# Patient Record
Sex: FEMALE | Race: WHITE | URBAN - METROPOLITAN AREA
[De-identification: names, ages, dates, MRNs, and addresses within clinical notes are randomized per-mention and may not be internally consistent; named-entity substitution may affect disease eponyms.]

---

## 2017-09-24 ENCOUNTER — EMERGENCY (EMERGENCY)
Facility: HOSPITAL | Age: 82
LOS: 0 days | Discharge: HOME | End: 2017-09-24
Attending: EMERGENCY MEDICINE | Admitting: EMERGENCY MEDICINE
Payer: MEDICARE

## 2017-09-24 VITALS
OXYGEN SATURATION: 99 % | TEMPERATURE: 98 F | RESPIRATION RATE: 16 BRPM | HEART RATE: 74 BPM | SYSTOLIC BLOOD PRESSURE: 174 MMHG | DIASTOLIC BLOOD PRESSURE: 87 MMHG

## 2017-09-24 VITALS — WEIGHT: 106.04 LBS | HEIGHT: 61 IN

## 2017-09-24 DIAGNOSIS — Z91.81 HISTORY OF FALLING: ICD-10-CM

## 2017-09-24 DIAGNOSIS — M54.9 DORSALGIA, UNSPECIFIED: ICD-10-CM

## 2017-09-24 DIAGNOSIS — S70.01XA CONTUSION OF RIGHT HIP, INITIAL ENCOUNTER: ICD-10-CM
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
## 2017-09-24 DIAGNOSIS — S20.211A CONTUSION OF RIGHT FRONT WALL OF THORAX, INITIAL ENCOUNTER: ICD-10-CM

## 2017-09-24 DIAGNOSIS — Z96.641 PRESENCE OF RIGHT ARTIFICIAL HIP JOINT: ICD-10-CM
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
## 2017-09-24 PROCEDURE — 73502 X-RAY EXAM HIP UNI 2-3 VIEWS: CPT | Mod: 26

## 2017-09-24 PROCEDURE — 71250 CT THORAX DX C-: CPT | Mod: 26

## 2017-09-24 PROCEDURE — 99285 EMERGENCY DEPT VISIT HI MDM: CPT

## 2017-09-24 PROCEDURE — 71010: CPT | Mod: 26

## 2017-09-24 NOTE — ED STATDOCS - OBJECTIVE STATEMENT
89 y/o female with no significant PMHx presents to the ED for worsening pain s/p fall three days ago. Pt is c/o right sided hip pain, back pain, lower CP. Pt is using a walker to ambulate. Denies dyspnea, head trauma, LOC, MULLINS. Dr. Manley/PMD.

## 2017-09-24 NOTE — ED STATDOCS - ATTENDING CONTRIBUTION TO CARE
I, Urban Hatch MD,  performed the initial face to face bedside interview with this patient regarding history of present illness, review of symptoms and relevant past medical, social and family history.  I completed an independent physical examination.  I was the initial provider who evaluated this patient. I have signed out the follow up of any pending tests (i.e. labs, radiological studies) to the ACP.  I have communicated the patient’s plan of care and disposition with the ACP.  The history, relevant review of systems, past medical and surgical history, medical decision making, and physical examination was documented by the scribe in my presence and I attest to the accuracy of the documentation.    I, Urban Hatch MD, personally saw the patient with ACP.  I have personally performed a face to face diagnostic evaluation on this patient.  I have reviewed the ACP note and agree with the history, exam, and plan of care, except as noted.

## 2017-09-24 NOTE — ED STATDOCS - CARE PLAN
Principal Discharge DX:	Chest wall contusion  Secondary Diagnosis:	Contusion of hip  Secondary Diagnosis:	Fall

## 2017-09-24 NOTE — ED STATDOCS - PROGRESS NOTE DETAILS
89 yr. old female PMH: Glaucoma, MD, Right hip Replacement presents to ED with pain in right side of back and right hip after fall 3 days ago worsening pain no head injury or LOC. . Uses walker for ambulation after fall.  No SOB, No dyspnea. Seen and examined by attending in intake. Plan: X-Ray and CT Chest. Will f/U with results and re evaluate. Chris NP

## 2017-10-15 ENCOUNTER — EMERGENCY (EMERGENCY)
Facility: HOSPITAL | Age: 82
LOS: 0 days | Discharge: HOME | End: 2017-10-15
Attending: EMERGENCY MEDICINE | Admitting: EMERGENCY MEDICINE
Payer: MEDICARE

## 2017-10-15 VITALS
DIASTOLIC BLOOD PRESSURE: 60 MMHG | OXYGEN SATURATION: 100 % | SYSTOLIC BLOOD PRESSURE: 148 MMHG | HEART RATE: 72 BPM | RESPIRATION RATE: 18 BRPM

## 2017-10-15 VITALS — WEIGHT: 119.93 LBS

## 2017-10-15 DIAGNOSIS — H40.9 UNSPECIFIED GLAUCOMA: ICD-10-CM

## 2017-10-15 DIAGNOSIS — K40.90 UNILATERAL INGUINAL HERNIA, WITHOUT OBSTRUCTION OR GANGRENE, NOT SPECIFIED AS RECURRENT: ICD-10-CM

## 2017-10-15 DIAGNOSIS — E78.5 HYPERLIPIDEMIA, UNSPECIFIED: ICD-10-CM

## 2017-10-15 DIAGNOSIS — Z96.649 PRESENCE OF UNSPECIFIED ARTIFICIAL HIP JOINT: ICD-10-CM

## 2017-10-15 DIAGNOSIS — R10.2 PELVIC AND PERINEAL PAIN: ICD-10-CM

## 2017-10-15 LAB
ALBUMIN SERPL ELPH-MCNC: 3.5 G/DL — SIGNIFICANT CHANGE UP (ref 3.3–5)
ALP SERPL-CCNC: 90 U/L — SIGNIFICANT CHANGE UP (ref 40–120)
ALT FLD-CCNC: 18 U/L — SIGNIFICANT CHANGE UP (ref 12–78)
ANION GAP SERPL CALC-SCNC: 3 MMOL/L — LOW (ref 5–17)
APTT BLD: 31.5 SEC — SIGNIFICANT CHANGE UP (ref 27.5–37.4)
AST SERPL-CCNC: 11 U/L — LOW (ref 15–37)
BASOPHILS # BLD AUTO: 0 K/UL — SIGNIFICANT CHANGE UP (ref 0–0.2)
BASOPHILS NFR BLD AUTO: 0.7 % — SIGNIFICANT CHANGE UP (ref 0–2)
BILIRUB SERPL-MCNC: 0.3 MG/DL — SIGNIFICANT CHANGE UP (ref 0.2–1.2)
BUN SERPL-MCNC: 20 MG/DL — SIGNIFICANT CHANGE UP (ref 7–23)
CALCIUM SERPL-MCNC: 8.8 MG/DL — SIGNIFICANT CHANGE UP (ref 8.5–10.1)
CHLORIDE SERPL-SCNC: 107 MMOL/L — SIGNIFICANT CHANGE UP (ref 96–108)
CO2 SERPL-SCNC: 32 MMOL/L — HIGH (ref 22–31)
CREAT SERPL-MCNC: 0.74 MG/DL — SIGNIFICANT CHANGE UP (ref 0.5–1.3)
EOSINOPHIL # BLD AUTO: 0.1 K/UL — SIGNIFICANT CHANGE UP (ref 0–0.5)
EOSINOPHIL NFR BLD AUTO: 0.9 % — SIGNIFICANT CHANGE UP (ref 0–6)
GLUCOSE SERPL-MCNC: 86 MG/DL — SIGNIFICANT CHANGE UP (ref 70–99)
HCT VFR BLD CALC: 37.9 % — SIGNIFICANT CHANGE UP (ref 34.5–45)
HGB BLD-MCNC: 12.5 G/DL — SIGNIFICANT CHANGE UP (ref 11.5–15.5)
INR BLD: 1.06 RATIO — SIGNIFICANT CHANGE UP (ref 0.88–1.16)
LACTATE SERPL-SCNC: 1 MMOL/L — SIGNIFICANT CHANGE UP (ref 0.7–2)
LYMPHOCYTES # BLD AUTO: 1.2 K/UL — SIGNIFICANT CHANGE UP (ref 1–3.3)
LYMPHOCYTES # BLD AUTO: 18.9 % — SIGNIFICANT CHANGE UP (ref 13–44)
MCHC RBC-ENTMCNC: 29.3 PG — SIGNIFICANT CHANGE UP (ref 27–34)
MCHC RBC-ENTMCNC: 33 GM/DL — SIGNIFICANT CHANGE UP (ref 32–36)
MCV RBC AUTO: 88.9 FL — SIGNIFICANT CHANGE UP (ref 80–100)
MONOCYTES # BLD AUTO: 0.4 K/UL — SIGNIFICANT CHANGE UP (ref 0–0.9)
MONOCYTES NFR BLD AUTO: 6.7 % — SIGNIFICANT CHANGE UP (ref 2–14)
NEUTROPHILS # BLD AUTO: 4.6 K/UL — SIGNIFICANT CHANGE UP (ref 1.8–7.4)
NEUTROPHILS NFR BLD AUTO: 72.9 % — SIGNIFICANT CHANGE UP (ref 43–77)
PLATELET # BLD AUTO: 210 K/UL — SIGNIFICANT CHANGE UP (ref 150–400)
POTASSIUM SERPL-MCNC: 4.6 MMOL/L — SIGNIFICANT CHANGE UP (ref 3.5–5.3)
POTASSIUM SERPL-SCNC: 4.6 MMOL/L — SIGNIFICANT CHANGE UP (ref 3.5–5.3)
PROT SERPL-MCNC: 6.9 GM/DL — SIGNIFICANT CHANGE UP (ref 6–8.3)
PROTHROM AB SERPL-ACNC: 11.5 SEC — SIGNIFICANT CHANGE UP (ref 9.8–12.7)
RBC # BLD: 4.27 M/UL — SIGNIFICANT CHANGE UP (ref 3.8–5.2)
RBC # FLD: 13 % — SIGNIFICANT CHANGE UP (ref 10.3–14.5)
SODIUM SERPL-SCNC: 142 MMOL/L — SIGNIFICANT CHANGE UP (ref 135–145)
WBC # BLD: 6.3 K/UL — SIGNIFICANT CHANGE UP (ref 3.8–10.5)
WBC # FLD AUTO: 6.3 K/UL — SIGNIFICANT CHANGE UP (ref 3.8–10.5)

## 2017-10-15 PROCEDURE — 73502 X-RAY EXAM HIP UNI 2-3 VIEWS: CPT | Mod: 26

## 2017-10-15 PROCEDURE — 99285 EMERGENCY DEPT VISIT HI MDM: CPT

## 2017-10-15 PROCEDURE — 74177 CT ABD & PELVIS W/CONTRAST: CPT | Mod: 26

## 2017-10-15 NOTE — ED PROVIDER NOTE - OBJECTIVE STATEMENT
89 y/o female with a PMHX of glaucoma, HLD, hip replacement presents to the ED c/o pelvic pain. Pt had a previous fall on buttocks and on right hip- week and a half ago. Today sudden onset of L sided hip pain and lump on groin. Hurts to walk due to groin pain which has worsened. Normal bowel movements. denies fever. denies HA or neck pain. no chest pain or sob. no abd pain. no n/v/d. no urinary f/u/d. no back pain. no motor or sensory deficits. denies illicit drug use. no recent travel. no rash. no other acute issues symptoms or concerns Dr.Grace Griggs. 89 y/o female with a PMHX of Glaucoma, HLD, hip replacement presents to the ED c/o pelvic pain. Pt had a previous fall on buttocks and on right hip- week and a half ago. Today sudden onset of L sided hip pain and lump on groin. Hurts to walk due to groin pain which has worsened. Normal bowel movements. denies fever. denies HA or neck pain. no chest pain or sob. no abd pain. no n/v/d. no urinary f/u/d. no back pain. no motor or sensory deficits. denies illicit drug use. no recent travel. no rash. no other acute issues symptoms or concerns Dr.Grace Lucia.

## 2017-10-15 NOTE — ED PROVIDER NOTE - MEDICAL DECISION MAKING DETAILS
patient with reducible inguinal hernia with no physical exam findings suggestive of strangulation. appropriate for discharge home with PMD f/u and surgery f/u

## 2017-10-15 NOTE — ED ADULT NURSE REASSESSMENT NOTE - NS ED NURSE REASSESS COMMENT FT1
Report taken at the change of shift at bedside. pt awake alert and oriented x4 resting comfortably in bed with no acute distress noted. Vitals stable. pending CT results. Denies cp,sob,ha,dz,n/v/d/fever/chills or urinary sx. Daughter at bedside. Will cont to monitor for safety and comfort.

## 2018-09-21 ENCOUNTER — INPATIENT (INPATIENT)
Facility: HOSPITAL | Age: 83
LOS: 3 days | Discharge: NURSING, REHAB OR LONG-TERM CARE | End: 2018-09-25
Attending: INTERNAL MEDICINE | Admitting: INTERNAL MEDICINE
Payer: MEDICARE

## 2018-09-21 VITALS — WEIGHT: 100.09 LBS | HEIGHT: 61 IN

## 2018-09-21 PROBLEM — E78.5 HYPERLIPIDEMIA, UNSPECIFIED: Chronic | Status: ACTIVE | Noted: 2017-10-15

## 2018-09-21 PROBLEM — H40.9 UNSPECIFIED GLAUCOMA: Chronic | Status: ACTIVE | Noted: 2017-09-30

## 2018-09-21 LAB
ALBUMIN SERPL ELPH-MCNC: 3.5 G/DL — SIGNIFICANT CHANGE UP (ref 3.3–5)
ALP SERPL-CCNC: 53 U/L — SIGNIFICANT CHANGE UP (ref 40–120)
ALT FLD-CCNC: 22 U/L — SIGNIFICANT CHANGE UP (ref 12–78)
ANION GAP SERPL CALC-SCNC: 9 MMOL/L — SIGNIFICANT CHANGE UP (ref 5–17)
AST SERPL-CCNC: 17 U/L — SIGNIFICANT CHANGE UP (ref 15–37)
BASOPHILS # BLD AUTO: 0.01 K/UL — SIGNIFICANT CHANGE UP (ref 0–0.2)
BASOPHILS NFR BLD AUTO: 0.1 % — SIGNIFICANT CHANGE UP (ref 0–2)
BILIRUB SERPL-MCNC: 0.6 MG/DL — SIGNIFICANT CHANGE UP (ref 0.2–1.2)
BLD GP AB SCN SERPL QL: SIGNIFICANT CHANGE UP
BUN SERPL-MCNC: 16 MG/DL — SIGNIFICANT CHANGE UP (ref 7–23)
CALCIUM SERPL-MCNC: 9.1 MG/DL — SIGNIFICANT CHANGE UP (ref 8.5–10.1)
CHLORIDE SERPL-SCNC: 107 MMOL/L — SIGNIFICANT CHANGE UP (ref 96–108)
CO2 SERPL-SCNC: 24 MMOL/L — SIGNIFICANT CHANGE UP (ref 22–31)
CREAT SERPL-MCNC: 0.73 MG/DL — SIGNIFICANT CHANGE UP (ref 0.5–1.3)
EOSINOPHIL # BLD AUTO: 0 K/UL — SIGNIFICANT CHANGE UP (ref 0–0.5)
EOSINOPHIL NFR BLD AUTO: 0 % — SIGNIFICANT CHANGE UP (ref 0–6)
GLUCOSE SERPL-MCNC: 110 MG/DL — HIGH (ref 70–99)
HCT VFR BLD CALC: 40.3 % — SIGNIFICANT CHANGE UP (ref 34.5–45)
HGB BLD-MCNC: 13.1 G/DL — SIGNIFICANT CHANGE UP (ref 11.5–15.5)
IMM GRANULOCYTES NFR BLD AUTO: 0.5 % — SIGNIFICANT CHANGE UP (ref 0–1.5)
INR BLD: 1.1 RATIO — SIGNIFICANT CHANGE UP (ref 0.88–1.16)
LYMPHOCYTES # BLD AUTO: 0.78 K/UL — LOW (ref 1–3.3)
LYMPHOCYTES # BLD AUTO: 10 % — LOW (ref 13–44)
MCHC RBC-ENTMCNC: 28.9 PG — SIGNIFICANT CHANGE UP (ref 27–34)
MCHC RBC-ENTMCNC: 32.5 GM/DL — SIGNIFICANT CHANGE UP (ref 32–36)
MCV RBC AUTO: 88.8 FL — SIGNIFICANT CHANGE UP (ref 80–100)
MONOCYTES # BLD AUTO: 0.46 K/UL — SIGNIFICANT CHANGE UP (ref 0–0.9)
MONOCYTES NFR BLD AUTO: 5.9 % — SIGNIFICANT CHANGE UP (ref 2–14)
NEUTROPHILS # BLD AUTO: 6.49 K/UL — SIGNIFICANT CHANGE UP (ref 1.8–7.4)
NEUTROPHILS NFR BLD AUTO: 83.5 % — HIGH (ref 43–77)
NRBC # BLD: 0 /100 WBCS — SIGNIFICANT CHANGE UP (ref 0–0)
PLATELET # BLD AUTO: 183 K/UL — SIGNIFICANT CHANGE UP (ref 150–400)
POTASSIUM SERPL-MCNC: 4.4 MMOL/L — SIGNIFICANT CHANGE UP (ref 3.5–5.3)
POTASSIUM SERPL-SCNC: 4.4 MMOL/L — SIGNIFICANT CHANGE UP (ref 3.5–5.3)
PROT SERPL-MCNC: 6.8 GM/DL — SIGNIFICANT CHANGE UP (ref 6–8.3)
PROTHROM AB SERPL-ACNC: 11.9 SEC — SIGNIFICANT CHANGE UP (ref 9.8–12.7)
RBC # BLD: 4.54 M/UL — SIGNIFICANT CHANGE UP (ref 3.8–5.2)
RBC # FLD: 14.7 % — HIGH (ref 10.3–14.5)
SODIUM SERPL-SCNC: 140 MMOL/L — SIGNIFICANT CHANGE UP (ref 135–145)
TYPE + AB SCN PNL BLD: SIGNIFICANT CHANGE UP
WBC # BLD: 7.78 K/UL — SIGNIFICANT CHANGE UP (ref 3.8–10.5)
WBC # FLD AUTO: 7.78 K/UL — SIGNIFICANT CHANGE UP (ref 3.8–10.5)

## 2018-09-21 PROCEDURE — 73552 X-RAY EXAM OF FEMUR 2/>: CPT | Mod: 26,RT

## 2018-09-21 PROCEDURE — 93010 ELECTROCARDIOGRAM REPORT: CPT

## 2018-09-21 PROCEDURE — 99285 EMERGENCY DEPT VISIT HI MDM: CPT

## 2018-09-21 PROCEDURE — 72131 CT LUMBAR SPINE W/O DYE: CPT | Mod: 26

## 2018-09-21 PROCEDURE — 73502 X-RAY EXAM HIP UNI 2-3 VIEWS: CPT | Mod: 26,RT

## 2018-09-21 RX ORDER — ONDANSETRON 8 MG/1
4 TABLET, FILM COATED ORAL EVERY 6 HOURS
Qty: 0 | Refills: 0 | Status: DISCONTINUED | OUTPATIENT
Start: 2018-09-21 | End: 2018-09-25

## 2018-09-21 RX ORDER — ACETAMINOPHEN 500 MG
1000 TABLET ORAL ONCE
Qty: 0 | Refills: 0 | Status: COMPLETED | OUTPATIENT
Start: 2018-09-21 | End: 2018-09-21

## 2018-09-21 RX ORDER — ACETAMINOPHEN 500 MG
650 TABLET ORAL EVERY 6 HOURS
Qty: 0 | Refills: 0 | Status: DISCONTINUED | OUTPATIENT
Start: 2018-09-21 | End: 2018-09-21

## 2018-09-21 RX ORDER — ENOXAPARIN SODIUM 100 MG/ML
30 INJECTION SUBCUTANEOUS EVERY 24 HOURS
Qty: 0 | Refills: 0 | Status: DISCONTINUED | OUTPATIENT
Start: 2018-09-21 | End: 2018-09-25

## 2018-09-21 RX ORDER — ACETAMINOPHEN 500 MG
650 TABLET ORAL EVERY 6 HOURS
Qty: 0 | Refills: 0 | Status: COMPLETED | OUTPATIENT
Start: 2018-09-21 | End: 2018-09-22

## 2018-09-21 RX ORDER — SODIUM CHLORIDE 9 MG/ML
1000 INJECTION INTRAMUSCULAR; INTRAVENOUS; SUBCUTANEOUS
Qty: 0 | Refills: 0 | Status: DISCONTINUED | OUTPATIENT
Start: 2018-09-21 | End: 2018-09-25

## 2018-09-21 RX ORDER — MORPHINE SULFATE 50 MG/1
2 CAPSULE, EXTENDED RELEASE ORAL EVERY 6 HOURS
Qty: 0 | Refills: 0 | Status: DISCONTINUED | OUTPATIENT
Start: 2018-09-21 | End: 2018-09-25

## 2018-09-21 RX ORDER — KETOROLAC TROMETHAMINE 30 MG/ML
15 SYRINGE (ML) INJECTION EVERY 6 HOURS
Qty: 0 | Refills: 0 | Status: DISCONTINUED | OUTPATIENT
Start: 2018-09-21 | End: 2018-09-23

## 2018-09-21 RX ADMIN — SODIUM CHLORIDE 70 MILLILITER(S): 9 INJECTION INTRAMUSCULAR; INTRAVENOUS; SUBCUTANEOUS at 22:16

## 2018-09-21 RX ADMIN — ENOXAPARIN SODIUM 30 MILLIGRAM(S): 100 INJECTION SUBCUTANEOUS at 22:19

## 2018-09-21 RX ADMIN — Medication 400 MILLIGRAM(S): at 11:51

## 2018-09-21 RX ADMIN — Medication 1000 MILLIGRAM(S): at 12:12

## 2018-09-21 RX ADMIN — Medication 650 MILLIGRAM(S): at 22:40

## 2018-09-21 RX ADMIN — Medication 650 MILLIGRAM(S): at 22:18

## 2018-09-21 NOTE — ED ADULT NURSE NOTE - OBJECTIVE STATEMENT
PT FEll last night at home backward c/o right hip pain and back pain ,denies loc or dizziness prior to fall.h/o left eye blindness, pace  maker PT FEll last night at home backward c/o right hip pain and back pain ,denies loc or dizziness prior to fall.h/o left eye blindness, Left breast cancer yellow wrist band applied to left wrist.

## 2018-09-21 NOTE — H&P ADULT - ASSESSMENT
89 y/o F with PMHx of Glaucoma, HTN, and Right Hip replacement, performed by Dr. Hennessy presenting to the ED with son s/p witnessed mechanical fall yesterday. Patient lives with her elderly  with home care.  has dementia, and she provides for him. Family is involved.  Patient was sweeping her kitchen when she lost her balance and fell. At home excrutiating back pain, and family bought her to ER. In Er patient was medicated. Currently she states that she is not pain when not moving. She states when she moves , she has alot of pain. She denies any sob, chest pain, nv/d.     Admit for pain control  -around the clock acetaminophen x 24 hours  -ketorolac prn moderate pain  -ivp morphine for severe pain  -physical therapy consult    DVT prophy- sc lovenox    Plan dicussed with family at bedside whom is open to discussing options about patient goign to rehab or back home, depending how she feel in the next few days. They also want to talk to orthospine to talk about any options

## 2018-09-21 NOTE — ED PROVIDER NOTE - PROGRESS NOTE DETAILS
[facility] for ED attending, Dr. Galvan: Spoke with Ortho Spine Dr. Velazco. Will see pt as inpatient. Will admit to medicine at this time for difficulty ambulating and pain control.

## 2018-09-21 NOTE — PATIENT PROFILE ADULT. - ABILITY TO HEAR (WITH HEARING AID OR HEARING APPLIANCE IF NORMALLY USED):
hard of hearing & no hearing aid with the pt/Mildly to Moderately Impaired: difficulty hearing in some environments or speaker may need to increase volume or speak distinctly

## 2018-09-21 NOTE — ED PROVIDER NOTE - OBJECTIVE STATEMENT
89 y/o F with PMHx of Glaucoma, HTN, and Right Hip replacement, performed by Dr. Hennessy presenting to the ED with son c/o right hip pain and lower back pain s/p witnessed mechanical fall yesterday. Pt reports that she had just swept her kitchen and was waving a broom, trying to prevent her  from walking into the kitchen and making the kitchen dirty when she lost her balance and fell onto the floor. Denies any head injury or LOC. Daughter states that home aide reported pt fell on her left side. Pt c/o rip hip pain and lower back pain. Allergic to Atenolol and Sulfa.

## 2018-09-21 NOTE — ED ADULT NURSE NOTE - NSIMPLEMENTINTERV_GEN_ALL_ED
Implemented All Fall with Harm Risk Interventions:  [place] to call system. Call bell, personal items and telephone within reach. Instruct patient to call for assistance. Room bathroom lighting operational. Non-slip footwear when patient is off stretcher. Physically safe environment: no spills, clutter or unnecessary equipment. Stretcher in lowest position, wheels locked, appropriate side rails in place. Provide visual cue, wrist band, yellow gown, etc. Monitor gait and stability. Monitor for mental status changes and reorient to person, place, and time. Review medications for side effects contributing to fall risk. Reinforce activity limits and safety measures with patient and family. Provide visual clues: red socks.

## 2018-09-21 NOTE — ED ADULT TRIAGE NOTE - CHIEF COMPLAINT QUOTE
Pt alert and oriented x3, pt s/p mechanical fall yesterday c/o bilateral hip pain. Pt states she did hit her head, Pt denies LOC and no blood thinners.

## 2018-09-21 NOTE — H&P ADULT - NSHPPHYSICALEXAM_GEN_ALL_CORE
Vital Signs Last 24 Hrs  T(C): 36.6 (21 Sep 2018 19:10), Max: 37.1 (21 Sep 2018 15:34)  T(F): 97.8 (21 Sep 2018 19:10), Max: 98.8 (21 Sep 2018 15:34)  HR: 87 (21 Sep 2018 19:10) (50 - 87)  BP: 135/45 (21 Sep 2018 19:10) (135/45 - 135/50)  BP(mean): --  RR: 16 (21 Sep 2018 19:10) (16 - 16)  SpO2: 97% (21 Sep 2018 19:10) (97% - 99%)    HEENT:   pupils equal and reactive, EOMI, no oropharyngeal lesions, erythema, exudates, oral thrush    NECK:   supple, no carotid bruits, no palpable lymph nodes, no thyromegaly    CV:  +S1, +S2, regular, no murmurs or rubs    RESP:   lungs clear to auscultation bilaterally, no wheezing, rales, rhonchi, good air entry bilaterally    BREAST:  not examined    GI:  abdomen soft, non-tender, non-distended, normal BS, no bruits, no abdominal masses, no palpable masses    RECTAL:  not examined    :  not examined    MSK:   normal muscle tone, no atrophy, no rigidity, no contractions    EXT:   no clubbing, no cyanosis, no edema, no calf pain, swelling or erythema    VASCULAR:  pulses equal and symmetric in the upper and lower extremities    NEURO:  AAOX3, no focal neurological deficits, follows all commands, able to move extremities spontaneously    SKIN:  no ulcers, lesions or rashes

## 2018-09-21 NOTE — H&P ADULT - NSHPLABSRESULTS_GEN_ALL_CORE
21 Sep 2018 13:21    140    |  107    |  16     ----------------------------<  110    4.4     |  24     |  0.73     Ca    9.1        21 Sep 2018 13:21    TPro  6.8    /  Alb  3.5    /  TBili  0.6    /  DBili  x      /  AST  17     /  ALT  22     /  AlkPhos  53     21 Sep 2018 13:21  LIVER FUNCTIONS - ( 21 Sep 2018 13:21 )  Alb: 3.5 g/dL / Pro: 6.8 gm/dL / ALK PHOS: 53 U/L / ALT: 22 U/L / AST: 17 U/L / GGT: x         PT/INR - ( 21 Sep 2018 11:45 )   PT: 11.9 sec;   INR: 1.10 ratio         CBC Full  -  ( 21 Sep 2018 11:45 )  WBC Count : 7.78 K/uL  Hemoglobin : 13.1 g/dL  Hematocrit : 40.3 %  Platelet Count - Automated : 183 K/uL  Mean Cell Volume : 88.8 fl  Mean Cell Hemoglobin : 28.9 pg  Mean Cell Hemoglobin Concentration : 32.5 gm/dL  Auto Neutrophil # : 6.49 K/uL  Auto Lymphocyte # : 0.78 K/uL  Auto Monocyte # : 0.46 K/uL  Auto Eosinophil # : 0.00 K/uL  Auto Basophil # : 0.01 K/uL  Auto Neutrophil % : 83.5 %  Auto Lymphocyte % : 10.0 %  Auto Monocyte % : 5.9 %  Auto Eosinophil % : 0.0 %  Auto Basophil % : 0.1 %

## 2018-09-21 NOTE — PATIENT PROFILE ADULT. - FALL HARM RISK
coagulation(Bleeding disorder R/T clinical cond/anti-coags)/bones(Osteoporosis,prev fx,steroid use,metastatic bone ca/surgery/age(85 years old or older)

## 2018-09-21 NOTE — ED PROVIDER NOTE - MEDICAL DECISION MAKING DETAILS
Pt with fracture of lumbar spine, with difficulty walking 2/2 pain. Neurologically intact.  Dr. Velazco consulted will see patient.  Admit to medicine service.

## 2018-09-21 NOTE — H&P ADULT - HISTORY OF PRESENT ILLNESS
89 y/o F with PMHx of Glaucoma, HTN, and Right Hip replacement, performed by Dr. Hennessy presenting to the ED with son c/o right hip pain and lower back pain s/p witnessed mechanical fall yesterday. Pt reports that she had just swept her kitchen and was waving a broom, trying to prevent her  from walking into the kitchen and making the kitchen dirty when she lost her balance and fell onto the floor. Denies any head injury or LOC. Daughter states that home aide reported pt fell on her left side. Pt c/o rip hip pain and lower back pain. Allergic to Atenolol and Sulfa 89 y/o F with PMHx of Glaucoma, HTN, and Right Hip replacement, performed by Dr. Hennessy presenting to the ED with son s/p witnessed mechanical fall yesterday. Patient lives with her elderly  with home care.  has dementia, and she provides for him. Family is involved.  Patient was sweeping her kitchen when she lost her balance and fell. At home excrutiating back pain, and family bought her to ER. In Er patient was medicated. Currently she states that she is not pain when not moving. She states when she moves , she has alot of pain. She denies any sob, chest pain, nv/d.

## 2018-09-22 LAB
ANION GAP SERPL CALC-SCNC: 2 MMOL/L — LOW (ref 5–17)
BASOPHILS # BLD AUTO: 0.02 K/UL — SIGNIFICANT CHANGE UP (ref 0–0.2)
BASOPHILS NFR BLD AUTO: 0.3 % — SIGNIFICANT CHANGE UP (ref 0–2)
BUN SERPL-MCNC: 11 MG/DL — SIGNIFICANT CHANGE UP (ref 7–23)
CALCIUM SERPL-MCNC: 8.4 MG/DL — LOW (ref 8.5–10.1)
CHLORIDE SERPL-SCNC: 110 MMOL/L — HIGH (ref 96–108)
CO2 SERPL-SCNC: 30 MMOL/L — SIGNIFICANT CHANGE UP (ref 22–31)
CREAT SERPL-MCNC: 0.59 MG/DL — SIGNIFICANT CHANGE UP (ref 0.5–1.3)
EOSINOPHIL # BLD AUTO: 0.04 K/UL — SIGNIFICANT CHANGE UP (ref 0–0.5)
EOSINOPHIL NFR BLD AUTO: 0.7 % — SIGNIFICANT CHANGE UP (ref 0–6)
GLUCOSE SERPL-MCNC: 93 MG/DL — SIGNIFICANT CHANGE UP (ref 70–99)
HCT VFR BLD CALC: 39.1 % — SIGNIFICANT CHANGE UP (ref 34.5–45)
HGB BLD-MCNC: 12.7 G/DL — SIGNIFICANT CHANGE UP (ref 11.5–15.5)
IMM GRANULOCYTES NFR BLD AUTO: 0.5 % — SIGNIFICANT CHANGE UP (ref 0–1.5)
LYMPHOCYTES # BLD AUTO: 1.33 K/UL — SIGNIFICANT CHANGE UP (ref 1–3.3)
LYMPHOCYTES # BLD AUTO: 21.8 % — SIGNIFICANT CHANGE UP (ref 13–44)
MCHC RBC-ENTMCNC: 28.5 PG — SIGNIFICANT CHANGE UP (ref 27–34)
MCHC RBC-ENTMCNC: 32.5 GM/DL — SIGNIFICANT CHANGE UP (ref 32–36)
MCV RBC AUTO: 87.9 FL — SIGNIFICANT CHANGE UP (ref 80–100)
MONOCYTES # BLD AUTO: 0.85 K/UL — SIGNIFICANT CHANGE UP (ref 0–0.9)
MONOCYTES NFR BLD AUTO: 13.9 % — SIGNIFICANT CHANGE UP (ref 2–14)
NEUTROPHILS # BLD AUTO: 3.83 K/UL — SIGNIFICANT CHANGE UP (ref 1.8–7.4)
NEUTROPHILS NFR BLD AUTO: 62.8 % — SIGNIFICANT CHANGE UP (ref 43–77)
NRBC # BLD: 0 /100 WBCS — SIGNIFICANT CHANGE UP (ref 0–0)
PLATELET # BLD AUTO: 165 K/UL — SIGNIFICANT CHANGE UP (ref 150–400)
POTASSIUM SERPL-MCNC: 3.8 MMOL/L — SIGNIFICANT CHANGE UP (ref 3.5–5.3)
POTASSIUM SERPL-SCNC: 3.8 MMOL/L — SIGNIFICANT CHANGE UP (ref 3.5–5.3)
RBC # BLD: 4.45 M/UL — SIGNIFICANT CHANGE UP (ref 3.8–5.2)
RBC # FLD: 14.9 % — HIGH (ref 10.3–14.5)
SODIUM SERPL-SCNC: 142 MMOL/L — SIGNIFICANT CHANGE UP (ref 135–145)
WBC # BLD: 6.1 K/UL — SIGNIFICANT CHANGE UP (ref 3.8–10.5)
WBC # FLD AUTO: 6.1 K/UL — SIGNIFICANT CHANGE UP (ref 3.8–10.5)

## 2018-09-22 RX ADMIN — Medication 650 MILLIGRAM(S): at 11:23

## 2018-09-22 RX ADMIN — Medication 650 MILLIGRAM(S): at 17:56

## 2018-09-22 RX ADMIN — ENOXAPARIN SODIUM 30 MILLIGRAM(S): 100 INJECTION SUBCUTANEOUS at 22:04

## 2018-09-22 RX ADMIN — MORPHINE SULFATE 2 MILLIGRAM(S): 50 CAPSULE, EXTENDED RELEASE ORAL at 10:32

## 2018-09-22 RX ADMIN — MORPHINE SULFATE 2 MILLIGRAM(S): 50 CAPSULE, EXTENDED RELEASE ORAL at 09:49

## 2018-09-22 RX ADMIN — Medication 650 MILLIGRAM(S): at 12:39

## 2018-09-22 NOTE — PHYSICAL THERAPY INITIAL EVALUATION ADULT - MODALITIES TREATMENT COMMENTS
delfina tx fair, limited by increased pain, RN aware. left in supine, all needs in reach, alarm in place.

## 2018-09-22 NOTE — CONSULT NOTE ADULT - SUBJECTIVE AND OBJECTIVE BOX
[facility]                                                           Orthopedic Spine Consultation    CHIEF COMPLAINT: back pain    HPI: 89 year old pleasant female with hx of right hip replacement, HTN, Glaucoma seen resting in bed. Pt c/o low back pain since fall x 2 days ago. Pt was sweeping in the kitchen after taking care of her  who has dementia. Pt slipped back and hit her back. She states since the fall she has low back pain. She characterizes pain as aching. She has intermittent low back pain. Pain is worse with movement and alleviated with medications she is taking in the hospital and immobility. She has right buttock and R hip pain since the fall. She denies lower extremities numbness. She states she has chronic weakness of b/l lower extremities which has unchanged. She has increase frequency of voiding and constipation.       PAST MEDICAL & SURGICAL HISTORY:  HLD (hyperlipidemia)  Glaucoma  HTN    SOCIAL HISTORY:    REVIEW OF SYSTEMS:    CONSTITUTIONAL: No fever, weight loss, or fatigue  GASTROINTESTINAL: No abdominal or epigastric pain. No nausea, vomiting, or hematemesis; No diarrhea or constipation. No melena or hematochezia.  GENITOURINARY: No dysuria, hematuria, or incontinence  NEUROLOGICAL: See HPI  MUSCULOSKELETAL: See HPI    Vital Signs Last 24 Hrs  T(C): 36.6 (22 Sep 2018 04:15), Max: 37.1 (21 Sep 2018 15:34)  T(F): 97.8 (22 Sep 2018 04:15), Max: 98.8 (21 Sep 2018 15:34)  HR: 91 (22 Sep 2018 04:15) (50 - 91)  BP: 155/89 (22 Sep 2018 04:15) (135/45 - 155/89)  BP(mean): --  RR: 16 (21 Sep 2018 21:27) (16 - 16)  SpO2: 97% (22 Sep 2018 04:15) (97% - 99%)  I&O's Detail    21 Sep 2018 07:01  -  22 Sep 2018 07:00  --------------------------------------------------------  IN:    Oral Fluid: 200 mL    sodium chloride 0.9%.: 550 mL  Total IN: 750 mL    OUT:    Voided: 400 mL  Total OUT: 400 mL    Total NET: 350 mL          LABS:                        12.7   6.10  )-----------( 165      ( 22 Sep 2018 05:22 )             39.1     09-22    142  |  110<H>  |  11  ----------------------------<  93  3.8   |  30  |  0.59    Ca    8.4<L>      22 Sep 2018 05:22    TPro  6.8  /  Alb  3.5  /  TBili  0.6  /  DBili  x   /  AST  17  /  ALT  22  /  AlkPhos  53  09-21    PT/INR - ( 21 Sep 2018 11:45 )   PT: 11.9 sec;   INR: 1.10 ratio               RADIOLOGY & ADDITIONAL STUDIES:    PHYSICAL EXAM:  Constitutional: NAD, well-groomed, well-developed  Extremities:   Vascular:  peripheral pulses intact  Psychiatric: Normal mood, normal affect  Skin: No rashes  Lumbar: Minimal tenderness at left paraspinal region approx at L3  Neurological: A/O x 3              Sensation: [X] intact to light touch  [ ] decreased:          Motor exam: [X]                  [X] Lower ext.     Hip Flx  Hip Ext   Hip Abd  Hip Add Quad   Hamstrg   TA       EHL      GS                              R        5/5        5/5        5/5             5/5        5/5        5/5        5/5     5/5      5/5                              L         5/5        5/5        5/5             5/5        5/5        5/5        5/5     5/5      5/5                                                    SLR of the RLE causes right buttock pain. Neg SLR on the LLE         CT lumbar 9/20/2018: Acute minimal superior endplate compression fracture at L3 with   additional fractures through the L3 vertebral body. No retropulsion or   spinal canal compresses.     Unchanged moderate L1 superior endplate compression fracture with mild   retropulsion and resultant mild spinal canal stenosis.    Unchanged mild L2 superior endplate compression fracture without   retropulsion.    Findings consistent with Baastrup's disease.    Severe diffuse osteopenia.    L5-S1: Moderate left neural foraminal stenosis with contacting the   exiting left L5 nerve root secondary to disc bulge and facet arthrosis.    Subacute to chronic appearing posterior right 12th rib   fracture (image 24 series 3). Total right hip arthroplasty is noted.   Moderate atrophy of the right iliopsoas muscle.      X-rays Right hip 9/21/2018: RIGHT Hip: RIGHT hip prosthesis intact. RIGHT femur intact.  Pelvis:  Unremarkable radiographs of the pelvis.       A/P :  - Acute minimal superior endplate compression fracture at L3 with   additional fractures through the L3 vertebral body. No retropulsion or   spinal canal compresses. Unchanged L1 and L2 compression fracture compared to previous studies on 9/24/2017.    - Right hip pain--Gen Ortho consult recommended  - Subacute to chronic appearing posterior right 12th rib   fracture eval per Gen Ortho  - Lumbar corset recommended  - Recommend f/u with PCP for Osteoporosis.  - Continue analgesics  - Sign off from Spine stand point.   - Discussed case with Dr. Velazco.

## 2018-09-22 NOTE — PROGRESS NOTE ADULT - SUBJECTIVE AND OBJECTIVE BOX
c/c: fall/back pain    HPI:  89 y/o F with PMHx of Glaucoma, HLD, , and Right Hip replacement, performed by Dr. Hennessy presenting to the ED with son s/p witnessed mechanical fall . Patient lives with her elderly  with home care.  has dementia, and she provides care for him. Family is involved.  Patient was sweeping her kitchen when she lost her balance and fell. At home had severe back pain, and family bought her to ER. In ED noted to have acute L3 fracture and admited.    9/22: pt seen and examined this am. Still with a lot of pain with movement. Was unabel to get out of bed with physical therapy. No sob/chest pain. no f/c/r. No focal weakness. No parasthesia.      Review of system- All 10 systems reviewed and is as per HPI otherwise negative.   Vital Signs Last 24 Hrs  T(C): 36.7 (22 Sep 2018 11:26), Max: 37.1 (21 Sep 2018 15:34)  T(F): 98 (22 Sep 2018 11:26), Max: 98.8 (21 Sep 2018 15:34)  HR: 73 (22 Sep 2018 11:26) (50 - 91)  BP: 141/53 (22 Sep 2018 11:26) (135/45 - 155/89)  RR: 16 (22 Sep 2018 11:26) (16 - 16)  SpO2: 96% (22 Sep 2018 11:26) (96% - 99%)  PHYSICAL EXAM:    GENERAL: elderly female laying in bed  HEAD:  Atraumatic, Normocephalic  EYES: EOMI, PERRLA  HEENT: Moist mucous membranes  NECK: Supple, No JVD  NERVOUS SYSTEM:  Alert & Oriented X3,non focal  CHEST/LUNG: Clear to auscultation bilaterally  HEART: Regular rate and rhythm; No murmurs, rubs, or gallops  ABDOMEN: Soft, Nontender, Nondistended; Bowel sounds present  GENITOURINARY- Voiding, no palpable bladder  EXTREMITIES:  No clubbing, cyanosis, or edema  MUSCULOSKELETAL- Lower back tenderness midline.  SKIN-no rash        LABS:                        12.7   6.10  )-----------( 165      ( 22 Sep 2018 05:22 )             39.1     09-22    142  |  110<H>  |  11  ----------------------------<  93  3.8   |  30  |  0.59    Ca    8.4<L>      22 Sep 2018 05:22    TPro  6.8  /  Alb  3.5  /  TBili  0.6  /  DBili  x   /  AST  17  /  ALT  22  /  AlkPhos  53  09-21    PT/INR - ( 21 Sep 2018 11:45 )   PT: 11.9 sec;   INR: 1.10 ratio           MEDS  acetaminophen   Tablet .. 650 milliGRAM(s) Oral every 6 hours  enoxaparin Injectable 30 milliGRAM(s) SubCutaneous every 24 hours  ketorolac   Injectable 15 milliGRAM(s) IV Push every 6 hours PRN  morphine  - Injectable 2 milliGRAM(s) IV Push every 6 hours PRN  ondansetron Injectable 4 milliGRAM(s) IV Push every 6 hours PRN  sodium chloride 0.9%. 1000 milliLiter(s) IV Continuous <Continuous>    ASSESSMENT AND PLAN:  89f, PMH AS ABOVE A/W:   of Glaucoma, HTN, and Right Hip replacement, performed by Dr. Hennessy presenting to the ED with son s/p witnessed mechanical fall yesterday. Patient lives with her elderly  with home care.  has dementia, and she provides for him. Family is involved.  Patient was sweeping her kitchen when she lost her balance and fell. At home excrutiating back pain, and family bought her to ER. In Er patient was medicated. Currently she states that she is not pain when not moving. She states when she moves , she has alot of pain. She denies any sob, chest pain, nv/d.     1. Mechanical fall/acute L3 fracture:  -pain control  -spine eval appreciated  -brace ordered  -incentive spirometry  -physical therapy    2. HLD:  -statin    3. DVT px c/c: fall/back pain    HPI:  89 y/o F with PMHx of Glaucoma, HLD, , and Right Hip replacement, performed by Dr. Hennessy presenting to the ED with son s/p witnessed mechanical fall . Patient lives with her elderly  with home care.  has dementia, and she provides care for him. Family is involved.  Patient was sweeping her kitchen when she lost her balance and fell. At home had severe back pain, and family bought her to ER. In ED noted to have acute L3 fracture and admited.    9/22: pt seen and examined this am. Still with a lot of pain with movement. Was unabel to get out of bed with physical therapy. No sob/chest pain. no f/c/r. No focal weakness. No parasthesia.      Review of system- All 10 systems reviewed and is as per HPI otherwise negative.   Vital Signs Last 24 Hrs  T(C): 36.7 (22 Sep 2018 11:26), Max: 37.1 (21 Sep 2018 15:34)  T(F): 98 (22 Sep 2018 11:26), Max: 98.8 (21 Sep 2018 15:34)  HR: 73 (22 Sep 2018 11:26) (50 - 91)  BP: 141/53 (22 Sep 2018 11:26) (135/45 - 155/89)  RR: 16 (22 Sep 2018 11:26) (16 - 16)  SpO2: 96% (22 Sep 2018 11:26) (96% - 99%)  PHYSICAL EXAM:    GENERAL: elderly female laying in bed  HEAD:  Atraumatic, Normocephalic  EYES: EOMI, PERRLA  HEENT: Moist mucous membranes  NECK: Supple, No JVD  NERVOUS SYSTEM:  Alert & Oriented X3,non focal  CHEST/LUNG: Clear to auscultation bilaterally  HEART: Regular rate and rhythm; No murmurs, rubs, or gallops  ABDOMEN: Soft, Nontender, Nondistended; Bowel sounds present  GENITOURINARY- Voiding, no palpable bladder  EXTREMITIES:  No clubbing, cyanosis, or edema  MUSCULOSKELETAL- Lower back tenderness midline.  SKIN-no rash        LABS:                        12.7   6.10  )-----------( 165      ( 22 Sep 2018 05:22 )             39.1     09-22    142  |  110<H>  |  11  ----------------------------<  93  3.8   |  30  |  0.59    Ca    8.4<L>      22 Sep 2018 05:22    TPro  6.8  /  Alb  3.5  /  TBili  0.6  /  DBili  x   /  AST  17  /  ALT  22  /  AlkPhos  53  09-21    PT/INR - ( 21 Sep 2018 11:45 )   PT: 11.9 sec;   INR: 1.10 ratio           MEDS  acetaminophen   Tablet .. 650 milliGRAM(s) Oral every 6 hours  enoxaparin Injectable 30 milliGRAM(s) SubCutaneous every 24 hours  ketorolac   Injectable 15 milliGRAM(s) IV Push every 6 hours PRN  morphine  - Injectable 2 milliGRAM(s) IV Push every 6 hours PRN  ondansetron Injectable 4 milliGRAM(s) IV Push every 6 hours PRN  sodium chloride 0.9%. 1000 milliLiter(s) IV Continuous <Continuous>    ASSESSMENT AND PLAN:  89f, PMH AS ABOVE A/W:  1. Mechanical fall/acute L3 fracture:  -pain control  -spine eval appreciated  -brace ordered  -incentive spirometry  -physical therapy    2. HLD:  -statin    3. DVT px

## 2018-09-22 NOTE — PHYSICAL THERAPY INITIAL EVALUATION ADULT - PERTINENT HX OF CURRENT PROBLEM, REHAB EVAL
89 y/o F with PMHx of Glaucoma, HTN, and Right Hip replacement, performed by Dr. Hennessy presenting to the ED with son s/p witnessed mechanical fall yesterday. Patient lives with her elderly  with home care.  has dementia, and she provides for him. Family is involved.  Patient was sweeping her kitchen when she lost her balance and fell.

## 2018-09-22 NOTE — PHYSICAL THERAPY INITIAL EVALUATION ADULT - ADDITIONAL COMMENTS
lives with spouse in private home, no steps.   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   functionally independent prior to fall.

## 2018-09-23 RX ORDER — OXYCODONE HYDROCHLORIDE 5 MG/1
10 TABLET ORAL EVERY 4 HOURS
Qty: 0 | Refills: 0 | Status: DISCONTINUED | OUTPATIENT
Start: 2018-09-23 | End: 2018-09-25

## 2018-09-23 RX ORDER — OXYCODONE HYDROCHLORIDE 5 MG/1
5 TABLET ORAL EVERY 4 HOURS
Qty: 0 | Refills: 0 | Status: DISCONTINUED | OUTPATIENT
Start: 2018-09-23 | End: 2018-09-25

## 2018-09-23 RX ORDER — SIMVASTATIN 20 MG/1
10 TABLET, FILM COATED ORAL AT BEDTIME
Qty: 0 | Refills: 0 | Status: DISCONTINUED | OUTPATIENT
Start: 2018-09-23 | End: 2018-09-25

## 2018-09-23 RX ORDER — SENNA PLUS 8.6 MG/1
2 TABLET ORAL AT BEDTIME
Qty: 0 | Refills: 0 | Status: DISCONTINUED | OUTPATIENT
Start: 2018-09-23 | End: 2018-09-25

## 2018-09-23 RX ORDER — DOCUSATE SODIUM 100 MG
100 CAPSULE ORAL THREE TIMES A DAY
Qty: 0 | Refills: 0 | Status: DISCONTINUED | OUTPATIENT
Start: 2018-09-23 | End: 2018-09-25

## 2018-09-23 RX ADMIN — Medication 100 MILLIGRAM(S): at 21:13

## 2018-09-23 RX ADMIN — OXYCODONE HYDROCHLORIDE 5 MILLIGRAM(S): 5 TABLET ORAL at 12:19

## 2018-09-23 RX ADMIN — Medication 650 MILLIGRAM(S): at 10:04

## 2018-09-23 RX ADMIN — Medication 100 MILLIGRAM(S): at 14:47

## 2018-09-23 RX ADMIN — SIMVASTATIN 10 MILLIGRAM(S): 20 TABLET, FILM COATED ORAL at 21:20

## 2018-09-23 RX ADMIN — OXYCODONE HYDROCHLORIDE 5 MILLIGRAM(S): 5 TABLET ORAL at 11:34

## 2018-09-23 RX ADMIN — ONDANSETRON 4 MILLIGRAM(S): 8 TABLET, FILM COATED ORAL at 11:58

## 2018-09-23 NOTE — PROGRESS NOTE ADULT - SUBJECTIVE AND OBJECTIVE BOX
c/c: fall/back pain    HPI:  89 y/o F with PMHx of Glaucoma, HLD, , and Right Hip replacement, performed by Dr. Hennessy presenting to the ED with son s/p witnessed mechanical fall . Patient lives with her elderly  with home care.  has dementia, and she provides care for him. Family is involved.  Patient was sweeping her kitchen when she lost her balance and fell. At home had severe back pain, and family bought her to ER. In ED noted to have acute L3 fracture and admited.    9/23: pt seen and examined this am. C/o back pain, nausea. Ate some of breakfast. No dizziness/lightheadness. No sob/chest pain.       Review of system- All 10 systems reviewed and is as per HPI otherwise negative.     Vital Signs Last 24 Hrs  T(C): 36.7 (22 Sep 2018 23:32), Max: 36.7 (22 Sep 2018 11:26)  T(F): 98 (22 Sep 2018 23:32), Max: 98 (22 Sep 2018 11:26)  HR: 57 (22 Sep 2018 23:32) (57 - 73)  BP: 157/61 (22 Sep 2018 23:32) (141/53 - 157/61)  RR: 16 (22 Sep 2018 23:32) (16 - 16)  SpO2: 95% (22 Sep 2018 23:32) (95% - 96%)    PHYSICAL EXAM:    GENERAL: elderly female laying in bed  HEAD:  Atraumatic, Normocephalic  EYES: EOMI, PERRLA  HEENT: Moist mucous membranes  NECK: Supple, No JVD  NERVOUS SYSTEM:  Alert & Oriented X3,non focal  CHEST/LUNG: Clear to auscultation bilaterally  HEART: Regular rate and rhythm; No murmurs, rubs, or gallops  ABDOMEN: Soft, Nontender, Nondistended; Bowel sounds present  GENITOURINARY- Voiding, no palpable bladder  EXTREMITIES:  No clubbing, cyanosis, or edema  MUSCULOSKELETAL- Lower back tenderness midline.  SKIN-no rash    LABS:                        12.7   6.10  )-----------( 165      ( 22 Sep 2018 05:22 )             39.1     09-22    142  |  110<H>  |  11  ----------------------------<  93  3.8   |  30  |  0.59    Ca    8.4<L>      22 Sep 2018 05:22    TPro  6.8  /  Alb  3.5  /  TBili  0.6  /  DBili  x   /  AST  17  /  ALT  22  /  AlkPhos  53  09-21    PT/INR - ( 21 Sep 2018 11:45 )   PT: 11.9 sec;   INR: 1.10 ratio       MEDICATIONS  (STANDING):  enoxaparin Injectable 30 milliGRAM(s) SubCutaneous every 24 hours  simvastatin 10 milliGRAM(s) Oral at bedtime  sodium chloride 0.9%. 1000 milliLiter(s) (70 mL/Hr) IV Continuous <Continuous>    MEDICATIONS  (PRN):  ketorolac   Injectable 15 milliGRAM(s) IV Push every 6 hours PRN Moderate Pain (4 - 6)  morphine  - Injectable 2 milliGRAM(s) IV Push every 6 hours PRN Severe Pain (7 - 10)  ondansetron Injectable 4 milliGRAM(s) IV Push every 6 hours PRN Nausea    ASSESSMENT AND PLAN:  89f, PMH AS ABOVE A/W:  1. Mechanical fall/acute L3 fracture:  -pain control  -spine eval appreciated  -brace ordered  -incentive spirometry  -physical therapy    2. HLD:  -statin    3. DVT px c/c: fall/back pain    HPI:  89 y/o F with PMHx of Glaucoma, HLD, , and Right Hip replacement, performed by Dr. Hennessy presenting to the ED with son s/p witnessed mechanical fall . Patient lives with her elderly  with home care.  has dementia, and she provides care for him. Family is involved.  Patient was sweeping her kitchen when she lost her balance and fell. At home had severe back pain, and family bought her to ER. In ED noted to have acute L3 fracture and admited.    9/23: pt seen and examined this am. C/o back pain, nausea. Ate some of breakfast. No dizziness/lightheadness. No sob/chest pain.       Review of system- All 10 systems reviewed and is as per HPI otherwise negative.     Vital Signs Last 24 Hrs  T(C): 36.7 (22 Sep 2018 23:32), Max: 36.7 (22 Sep 2018 11:26)  T(F): 98 (22 Sep 2018 23:32), Max: 98 (22 Sep 2018 11:26)  HR: 57 (22 Sep 2018 23:32) (57 - 73)  BP: 157/61 (22 Sep 2018 23:32) (141/53 - 157/61)  RR: 16 (22 Sep 2018 23:32) (16 - 16)  SpO2: 95% (22 Sep 2018 23:32) (95% - 96%)    PHYSICAL EXAM:    GENERAL: elderly female laying in bed  HEAD:  Atraumatic, Normocephalic  EYES: EOMI, PERRLA  HEENT: Moist mucous membranes  NECK: Supple, No JVD  NERVOUS SYSTEM:  Alert & Oriented X3,non focal  CHEST/LUNG: Clear to auscultation bilaterally  HEART: Regular rate and rhythm; No murmurs, rubs, or gallops  ABDOMEN: Soft, Nontender, Nondistended; Bowel sounds present  GENITOURINARY- Voiding, no palpable bladder  EXTREMITIES:  No clubbing, cyanosis, or edema  MUSCULOSKELETAL- Lower back tenderness midline.  SKIN-no rash    LABS:                        12.7   6.10  )-----------( 165      ( 22 Sep 2018 05:22 )             39.1     09-22    142  |  110<H>  |  11  ----------------------------<  93  3.8   |  30  |  0.59    Ca    8.4<L>      22 Sep 2018 05:22    TPro  6.8  /  Alb  3.5  /  TBili  0.6  /  DBili  x   /  AST  17  /  ALT  22  /  AlkPhos  53  09-21    PT/INR - ( 21 Sep 2018 11:45 )   PT: 11.9 sec;   INR: 1.10 ratio       MEDICATIONS  (STANDING):  enoxaparin Injectable 30 milliGRAM(s) SubCutaneous every 24 hours  simvastatin 10 milliGRAM(s) Oral at bedtime  sodium chloride 0.9%. 1000 milliLiter(s) (70 mL/Hr) IV Continuous <Continuous>    MEDICATIONS  (PRN):  ketorolac   Injectable 15 milliGRAM(s) IV Push every 6 hours PRN Moderate Pain (4 - 6)  morphine  - Injectable 2 milliGRAM(s) IV Push every 6 hours PRN Severe Pain (7 - 10)  ondansetron Injectable 4 milliGRAM(s) IV Push every 6 hours PRN Nausea    ASSESSMENT AND PLAN:  89f, PMH AS ABOVE A/W:  1. Mechanical fall/acute L3 fracture:  -pain control  -spine eval appreciated  -brace ordered  -incentive spirometry  -physical therapy    2. HLD:  -statin    3. DVT px    4. Dispo:  pt eval  will likely need rehab.

## 2018-09-24 RX ADMIN — Medication 100 MILLIGRAM(S): at 13:38

## 2018-09-24 RX ADMIN — OXYCODONE HYDROCHLORIDE 5 MILLIGRAM(S): 5 TABLET ORAL at 11:00

## 2018-09-24 RX ADMIN — ENOXAPARIN SODIUM 30 MILLIGRAM(S): 100 INJECTION SUBCUTANEOUS at 21:20

## 2018-09-24 RX ADMIN — OXYCODONE HYDROCHLORIDE 5 MILLIGRAM(S): 5 TABLET ORAL at 10:06

## 2018-09-24 RX ADMIN — SIMVASTATIN 10 MILLIGRAM(S): 20 TABLET, FILM COATED ORAL at 21:18

## 2018-09-24 NOTE — PROGRESS NOTE ADULT - SUBJECTIVE AND OBJECTIVE BOX
c/c: fall/back pain    HPI:  89 y/o F with PMHx of Glaucoma, HLD, , and Right Hip replacement, performed by Dr. Hennessy presenting to the ED with son s/p witnessed mechanical fall . Patient lives with her elderly  with home care.  has dementia, and she provides care for him. Family is involved.  Patient was sweeping her kitchen when she lost her balance and fell. At home had severe back pain, and family bought her to ER. In ED noted to have acute L3 fracture and admited.    9/23: pt seen and examined this am. C/o back pain, nausea. Ate some of breakfast. No dizziness/lightheadness. No sob/chest pain.   9/24/18 awaiting for brace, no acute overnight events    Review of system- All 10 systems reviewed and is as per HPI otherwise negative.     Vital Signs Last 24 Hrs  T(C): 36.7 (23 Sep 2018 23:00), Max: 36.7 (23 Sep 2018 23:00)  T(F): 98.1 (23 Sep 2018 23:00), Max: 98.1 (23 Sep 2018 23:00)  HR: 93 (23 Sep 2018 23:00) (93 - 94)  BP: 134/64 (23 Sep 2018 23:00) (134/64 - 166/67)  BP(mean): --  RR: 18 (23 Sep 2018 23:00) (18 - 18)  SpO2: 97% (23 Sep 2018 23:00) (95% - 97%)    PHYSICAL EXAM:  GENERAL: elderly female laying in bed  HEAD:  Atraumatic, Normocephalic  EYES: EOMI, PERRLA  HEENT: Moist mucous membranes  NECK: Supple, No JVD  NERVOUS SYSTEM:  Alert & Oriented X3,non focal  CHEST/LUNG: Clear to auscultation bilaterally  HEART: Regular rate and rhythm; No murmurs, rubs, or gallops  ABDOMEN: Soft, Nontender, Nondistended; Bowel sounds present  GENITOURINARY- Voiding, no palpable bladder  EXTREMITIES:  No clubbing, cyanosis, or edema  MUSCULOSKELETAL- Lower back tenderness midline.  SKIN-no rash    LABS:             no new labs    MEDICATIONS  (STANDING):  enoxaparin Injectable 30 milliGRAM(s) SubCutaneous every 24 hours  simvastatin 10 milliGRAM(s) Oral at bedtime  sodium chloride 0.9%. 1000 milliLiter(s) (70 mL/Hr) IV Continuous <Continuous>    MEDICATIONS  (PRN):  ketorolac   Injectable 15 milliGRAM(s) IV Push every 6 hours PRN Moderate Pain (4 - 6)  morphine  - Injectable 2 milliGRAM(s) IV Push every 6 hours PRN Severe Pain (7 - 10)  ondansetron Injectable 4 milliGRAM(s) IV Push every 6 hours PRN Nausea    ASSESSMENT AND PLAN:  89f, PMH AS ABOVE A/W:  1. Mechanical fall/acute L3 fracture:  -pain control  -spine eval appreciated  -brace ordered  -incentive spirometry  -physical therapy    2. HLD:  -statin    #Dispo- likely [facility] in 1-2 days

## 2018-09-25 ENCOUNTER — TRANSCRIPTION ENCOUNTER (OUTPATIENT)
Age: 83
End: 2018-09-25

## 2018-09-25 VITALS
DIASTOLIC BLOOD PRESSURE: 69 MMHG | RESPIRATION RATE: 16 BRPM | OXYGEN SATURATION: 97 % | HEART RATE: 94 BPM | SYSTOLIC BLOOD PRESSURE: 126 MMHG | TEMPERATURE: 99 F

## 2018-09-25 RX ORDER — MULTIVIT-MIN/FERROUS GLUCONATE 9 MG/15 ML
1 LIQUID (ML) ORAL
Qty: 0 | Refills: 0 | COMMUNITY

## 2018-09-25 RX ORDER — ENOXAPARIN SODIUM 100 MG/ML
30 INJECTION SUBCUTANEOUS
Qty: 0 | Refills: 0 | COMMUNITY
Start: 2018-09-25

## 2018-09-25 RX ORDER — SENNA PLUS 8.6 MG/1
2 TABLET ORAL
Qty: 0 | Refills: 0 | COMMUNITY
Start: 2018-09-25

## 2018-09-25 RX ORDER — DOCUSATE SODIUM 100 MG
1 CAPSULE ORAL
Qty: 0 | Refills: 0 | COMMUNITY
Start: 2018-09-25

## 2018-09-25 RX ORDER — OXYCODONE HYDROCHLORIDE 5 MG/1
1 TABLET ORAL
Qty: 0 | Refills: 0 | COMMUNITY
Start: 2018-09-25

## 2018-09-25 RX ADMIN — OXYCODONE HYDROCHLORIDE 5 MILLIGRAM(S): 5 TABLET ORAL at 13:20

## 2018-09-25 NOTE — DISCHARGE NOTE ADULT - ABILITY TO HEAR (WITH HEARING AID OR HEARING APPLIANCE IF NORMALLY USED):
Mildly to Moderately Impaired: difficulty hearing in some environments or speaker may need to increase volume or speak distinctly/hard of hearing & no hearing aid with the pt

## 2018-09-25 NOTE — DISCHARGE NOTE ADULT - CARE PROVIDER_API CALL
Ingrid Manley), Internal Medicine  [address] [facility]  [address] [address]  Phone: [phone]  Fax: [phone]

## 2018-09-25 NOTE — DISCHARGE NOTE ADULT - HOSPITAL COURSE
CC-  fall / back pain (25 Sep 2018 11:38)  HPI:  89 y/o F with PMHx of Glaucoma, HTN, and Right Hip replacement, performed by Dr. Hennessy presenting to the ED with son s/p witnessed mechanical fall yesterday. Patient lives with her elderly  with home care.  has dementia, and she provides for him. Family is involved.  Patient was sweeping her kitchen when she lost her balance and fell. At home excrutiating back pain, and family bought her to ER. In Er patient was medicated. Currently she states that she is not pain when not moving. She states when she moves , she has alot of pain. She denies any sob, chest pain, nv/d. (21 Sep 2018 19:01)    Hospital stay- seen by spine, brace arrived. For [facility] today  Review of system- All 10 systems reviewed and is as per HPI otherwise negative.     T(C): 37 (09-25-18 @ 11:09), Max: 37 (09-25-18 @ 11:09)  HR: 94 (09-25-18 @ 11:09) (69 - 100)  BP: 126/69 (09-25-18 @ 11:09) (123/68 - 150/66)  RR: 16 (09-25-18 @ 11:09) (16 - 18)  SpO2: 97% (09-25-18 @ 11:09) (96% - 100%)  Wt(kg): --    PHYSICAL EXAM:  GENERAL: NAD, well-groomed, well-developed  HEAD:  Atraumatic, Normocephalic  EYES: EOMI, PERRLA, conjunctiva and sclera clear  HEENT: Moist mucous membranes  NECK: Supple, No JVD  NERVOUS SYSTEM:  Alert & Oriented X3, Motor Strength 5/5 B/L upper and lower extremities; DTRs 2+ intact and symmetric  CHEST/LUNG: Clear to auscultation bilaterally; No rales, rhonchi, wheezing, or rubs  HEART: Regular rate and rhythm; No murmurs, rubs, or gallops  ABDOMEN: Soft, Nontender, Nondistended; Bowel sounds present  GENITOURINARY- Voiding, no palpable bladder  EXTREMITIES:  2+ Peripheral Pulses, No clubbing, cyanosis, or edema  MUSCULOSKELTAL- No muscle tenderness, Muscle tone normal, No joint tenderness, no Joint swelling, Joint range of motion-normal  SKIN-no rash, no lesion  CNS- alert, oriented X3, non focal     Assessment/Plan  #S/p mechanical fall with L3 compression fracture  [facility] today  #HTN- stable  #Dispo- [facility] today

## 2018-09-25 NOTE — DISCHARGE NOTE ADULT - PATIENT PORTAL LINK FT
You can access the [facility] Patient Portal, offered by [facility], by registering with the following website: [email or web address]

## 2018-09-25 NOTE — DISCHARGE NOTE ADULT - MEDICATION SUMMARY - MEDICATIONS TO TAKE
I will START or STAY ON the medications listed below when I get home from the hospital:    oxyCODONE 5 mg oral tablet  -- 1 tab(s) by mouth every 4 hours, As needed, for moderate-to-severe pain  -- Indication: For prn for pain    enoxaparin  -- 30 milligram(s) subcutaneous once a day for 2 weeks or until fully ambulatory  -- Indication: For DVT proph    simvastatin 10 mg oral tablet  -- 1 tab(s) by mouth once a day (at bedtime)  -- Indication: For cholesterol    docusate sodium 100 mg oral capsule  -- 1 cap(s) by mouth 3 times a day  -- Indication: For bowel regimen    senna oral tablet  -- 2 tab(s) by mouth once a day (at bedtime)  -- Indication: For bowel regimen

## 2018-09-25 NOTE — DISCHARGE NOTE ADULT - CARE PLAN
Principal Discharge DX:	Fracture of lumbar spine  Goal:	[facility]  Assessment and plan of treatment:	outpatient f/u

## 2018-09-25 NOTE — PROGRESS NOTE ADULT - SUBJECTIVE AND OBJECTIVE BOX
c/c: fall/back pain    HPI:  89 y/o F with PMHx of Glaucoma, HLD, , and Right Hip replacement, performed by Dr. Hennessy presenting to the ED with son s/p witnessed mechanical fall . Patient lives with her elderly  with home care.  has dementia, and she provides care for him. Family is involved.  Patient was sweeping her kitchen when she lost her balance and fell. At home had severe back pain, and family bought her to ER. In ED noted to have acute L3 fracture and admited.    9/23: pt seen and examined this am. C/o back pain, nausea. Ate some of breakfast. No dizziness/lightheadness. No sob/chest pain.   9/24/18 awaiting for brace, no acute overnight events  9/25/18- doing good    Review of system- All 10 systems reviewed and is as per HPI otherwise negative.     Vital Signs Last 24 Hrs  T(C): 37 (25 Sep 2018 11:09), Max: 37 (25 Sep 2018 11:09)  T(F): 98.6 (25 Sep 2018 11:09), Max: 98.6 (25 Sep 2018 11:09)  HR: 94 (25 Sep 2018 11:09) (69 - 100)  BP: 126/69 (25 Sep 2018 11:09) (123/68 - 150/66)  BP(mean): --  RR: 16 (25 Sep 2018 11:09) (16 - 18)  SpO2: 97% (25 Sep 2018 11:09) (96% - 100%)    PHYSICAL EXAM:  GENERAL: elderly female laying in bed  HEAD:  Atraumatic, Normocephalic  EYES: EOMI, PERRLA  HEENT: Moist mucous membranes  NECK: Supple, No JVD  NERVOUS SYSTEM:  Alert & Oriented X3,non focal  CHEST/LUNG: Clear to auscultation bilaterally  HEART: Regular rate and rhythm; No murmurs, rubs, or gallops  ABDOMEN: Soft, Nontender, Nondistended; Bowel sounds present  GENITOURINARY- Voiding, no palpable bladder  EXTREMITIES:  No clubbing, cyanosis, or edema  MUSCULOSKELETAL- Lower back tenderness midline.  SKIN-no rash    LABS:             no new labs    MEDICATIONS  (STANDING):  enoxaparin Injectable 30 milliGRAM(s) SubCutaneous every 24 hours  simvastatin 10 milliGRAM(s) Oral at bedtime  sodium chloride 0.9%. 1000 milliLiter(s) (70 mL/Hr) IV Continuous <Continuous>    MEDICATIONS  (PRN):  ketorolac   Injectable 15 milliGRAM(s) IV Push every 6 hours PRN Moderate Pain (4 - 6)  morphine  - Injectable 2 milliGRAM(s) IV Push every 6 hours PRN Severe Pain (7 - 10)  ondansetron Injectable 4 milliGRAM(s) IV Push every 6 hours PRN Nausea    ASSESSMENT AND PLAN:  89f, PMH AS ABOVE A/W:  1. Mechanical fall/acute L3 fracture:  -pain control  -spine eval appreciated  -brace ordered  -incentive spirometry  -physical therapy- for [facility] today    2. HLD:  -statin    #Dispo- [facility] today. DC time 35 mins

## 2018-09-28 DIAGNOSIS — M25.551 PAIN IN RIGHT HIP: ICD-10-CM

## 2018-09-28 DIAGNOSIS — Z96.641 PRESENCE OF RIGHT ARTIFICIAL HIP JOINT: ICD-10-CM
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
## 2018-09-28 DIAGNOSIS — Z88.8 ALLERGY STATUS TO OTHER DRUGS, MEDICAMENTS AND BIOLOGICAL SUBSTANCES STATUS: ICD-10-CM
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
## 2018-09-28 DIAGNOSIS — M84.48XA PATHOLOGICAL FRACTURE, OTHER SITE, INITIAL ENCOUNTER FOR FRACTURE: ICD-10-CM

## 2018-09-28 DIAGNOSIS — I10 ESSENTIAL (PRIMARY) HYPERTENSION: ICD-10-CM

## 2018-09-28 DIAGNOSIS — S32.039A UNSPECIFIED FRACTURE OF THIRD LUMBAR VERTEBRA, INITIAL ENCOUNTER FOR CLOSED FRACTURE: ICD-10-CM

## 2018-09-28 DIAGNOSIS — Z88.2 ALLERGY STATUS TO SULFONAMIDES: ICD-10-CM

## 2018-09-28 DIAGNOSIS — E78.5 HYPERLIPIDEMIA, UNSPECIFIED: ICD-10-CM

## 2018-09-28 DIAGNOSIS — M48.56XA COLLAPSED VERTEBRA, NOT ELSEWHERE CLASSIFIED, LUMBAR REGION, INITIAL ENCOUNTER FOR FRACTURE: ICD-10-CM
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
## 2018-09-28 DIAGNOSIS — H40.9 UNSPECIFIED GLAUCOMA: ICD-10-CM

## 2018-09-28 DIAGNOSIS — M54.9 DORSALGIA, UNSPECIFIED: ICD-10-CM

## 2018-11-10 ENCOUNTER — INPATIENT (INPATIENT)
Facility: HOSPITAL | Age: 83
LOS: 1 days | Discharge: NURSING, REHAB OR LONG-TERM CARE | End: 2018-11-12
Attending: FAMILY MEDICINE | Admitting: FAMILY MEDICINE
Payer: MEDICARE

## 2018-11-10 VITALS
WEIGHT: 106.92 LBS | SYSTOLIC BLOOD PRESSURE: 187 MMHG | RESPIRATION RATE: 18 BRPM | DIASTOLIC BLOOD PRESSURE: 72 MMHG | OXYGEN SATURATION: 100 % | HEART RATE: 86 BPM | TEMPERATURE: 98 F | HEIGHT: 64 IN

## 2018-11-10 LAB
ANION GAP SERPL CALC-SCNC: 8 MMOL/L — SIGNIFICANT CHANGE UP (ref 5–17)
APTT BLD: 25.6 SEC — LOW (ref 27.5–36.3)
BASOPHILS # BLD AUTO: 0.02 K/UL — SIGNIFICANT CHANGE UP (ref 0–0.2)
BASOPHILS NFR BLD AUTO: 0.4 % — SIGNIFICANT CHANGE UP (ref 0–2)
BLD GP AB SCN SERPL QL: SIGNIFICANT CHANGE UP
BUN SERPL-MCNC: 30 MG/DL — HIGH (ref 7–23)
CALCIUM SERPL-MCNC: 9 MG/DL — SIGNIFICANT CHANGE UP (ref 8.5–10.1)
CHLORIDE SERPL-SCNC: 109 MMOL/L — HIGH (ref 96–108)
CK SERPL-CCNC: 44 U/L — SIGNIFICANT CHANGE UP (ref 26–192)
CO2 SERPL-SCNC: 28 MMOL/L — SIGNIFICANT CHANGE UP (ref 22–31)
CREAT SERPL-MCNC: 0.7 MG/DL — SIGNIFICANT CHANGE UP (ref 0.5–1.3)
EOSINOPHIL # BLD AUTO: 0.04 K/UL — SIGNIFICANT CHANGE UP (ref 0–0.5)
EOSINOPHIL NFR BLD AUTO: 0.9 % — SIGNIFICANT CHANGE UP (ref 0–6)
GLUCOSE SERPL-MCNC: 114 MG/DL — HIGH (ref 70–99)
HCT VFR BLD CALC: 38 % — SIGNIFICANT CHANGE UP (ref 34.5–45)
HGB BLD-MCNC: 12.2 G/DL — SIGNIFICANT CHANGE UP (ref 11.5–15.5)
IMM GRANULOCYTES NFR BLD AUTO: 0.2 % — SIGNIFICANT CHANGE UP (ref 0–1.5)
INR BLD: 1.04 RATIO — SIGNIFICANT CHANGE UP (ref 0.88–1.16)
LYMPHOCYTES # BLD AUTO: 1.33 K/UL — SIGNIFICANT CHANGE UP (ref 1–3.3)
LYMPHOCYTES # BLD AUTO: 28.4 % — SIGNIFICANT CHANGE UP (ref 13–44)
MCHC RBC-ENTMCNC: 28.7 PG — SIGNIFICANT CHANGE UP (ref 27–34)
MCHC RBC-ENTMCNC: 32.1 GM/DL — SIGNIFICANT CHANGE UP (ref 32–36)
MCV RBC AUTO: 89.4 FL — SIGNIFICANT CHANGE UP (ref 80–100)
MONOCYTES # BLD AUTO: 0.35 K/UL — SIGNIFICANT CHANGE UP (ref 0–0.9)
MONOCYTES NFR BLD AUTO: 7.5 % — SIGNIFICANT CHANGE UP (ref 2–14)
NEUTROPHILS # BLD AUTO: 2.94 K/UL — SIGNIFICANT CHANGE UP (ref 1.8–7.4)
NEUTROPHILS NFR BLD AUTO: 62.6 % — SIGNIFICANT CHANGE UP (ref 43–77)
NRBC # BLD: 0 /100 WBCS — SIGNIFICANT CHANGE UP (ref 0–0)
PLATELET # BLD AUTO: 207 K/UL — SIGNIFICANT CHANGE UP (ref 150–400)
POTASSIUM SERPL-MCNC: 4.1 MMOL/L — SIGNIFICANT CHANGE UP (ref 3.5–5.3)
POTASSIUM SERPL-SCNC: 4.1 MMOL/L — SIGNIFICANT CHANGE UP (ref 3.5–5.3)
PROTHROM AB SERPL-ACNC: 11.6 SEC — SIGNIFICANT CHANGE UP (ref 10–12.9)
RBC # BLD: 4.25 M/UL — SIGNIFICANT CHANGE UP (ref 3.8–5.2)
RBC # FLD: 15.4 % — HIGH (ref 10.3–14.5)
SODIUM SERPL-SCNC: 145 MMOL/L — SIGNIFICANT CHANGE UP (ref 135–145)
TYPE + AB SCN PNL BLD: SIGNIFICANT CHANGE UP
WBC # BLD: 4.69 K/UL — SIGNIFICANT CHANGE UP (ref 3.8–10.5)
WBC # FLD AUTO: 4.69 K/UL — SIGNIFICANT CHANGE UP (ref 3.8–10.5)

## 2018-11-10 PROCEDURE — 72125 CT NECK SPINE W/O DYE: CPT | Mod: 26

## 2018-11-10 PROCEDURE — 70450 CT HEAD/BRAIN W/O DYE: CPT | Mod: 26

## 2018-11-10 PROCEDURE — 71045 X-RAY EXAM CHEST 1 VIEW: CPT | Mod: 26

## 2018-11-10 PROCEDURE — 73030 X-RAY EXAM OF SHOULDER: CPT | Mod: 26,LT,76

## 2018-11-10 PROCEDURE — 73060 X-RAY EXAM OF HUMERUS: CPT | Mod: 26,LT

## 2018-11-10 PROCEDURE — 93010 ELECTROCARDIOGRAM REPORT: CPT

## 2018-11-10 PROCEDURE — 99285 EMERGENCY DEPT VISIT HI MDM: CPT | Mod: 25

## 2018-11-10 RX ORDER — ACETAMINOPHEN 500 MG
650 TABLET ORAL EVERY 4 HOURS
Qty: 0 | Refills: 0 | Status: DISCONTINUED | OUTPATIENT
Start: 2018-11-10 | End: 2018-11-11

## 2018-11-10 RX ORDER — SODIUM CHLORIDE 9 MG/ML
1000 INJECTION, SOLUTION INTRAVENOUS
Qty: 0 | Refills: 0 | Status: DISCONTINUED | OUTPATIENT
Start: 2018-11-10 | End: 2018-11-12

## 2018-11-10 RX ORDER — MULTIVIT-MIN/FERROUS GLUCONATE 9 MG/15 ML
1 LIQUID (ML) ORAL DAILY
Qty: 0 | Refills: 0 | Status: DISCONTINUED | OUTPATIENT
Start: 2018-11-10 | End: 2018-11-11

## 2018-11-10 RX ORDER — INFLUENZA VIRUS VACCINE 15; 15; 15; 15 UG/.5ML; UG/.5ML; UG/.5ML; UG/.5ML
0.5 SUSPENSION INTRAMUSCULAR ONCE
Qty: 0 | Refills: 0 | Status: COMPLETED | OUTPATIENT
Start: 2018-11-10 | End: 2018-11-10

## 2018-11-10 RX ORDER — DOCUSATE SODIUM 100 MG
100 CAPSULE ORAL THREE TIMES A DAY
Qty: 0 | Refills: 0 | Status: DISCONTINUED | OUTPATIENT
Start: 2018-11-10 | End: 2018-11-12

## 2018-11-10 RX ORDER — ONDANSETRON 8 MG/1
4 TABLET, FILM COATED ORAL ONCE
Qty: 0 | Refills: 0 | Status: COMPLETED | OUTPATIENT
Start: 2018-11-10 | End: 2018-11-10

## 2018-11-10 RX ORDER — ENOXAPARIN SODIUM 100 MG/ML
30 INJECTION SUBCUTANEOUS EVERY 24 HOURS
Qty: 0 | Refills: 0 | Status: DISCONTINUED | OUTPATIENT
Start: 2018-11-10 | End: 2018-11-12

## 2018-11-10 RX ORDER — MORPHINE SULFATE 50 MG/1
2 CAPSULE, EXTENDED RELEASE ORAL ONCE
Qty: 0 | Refills: 0 | Status: DISCONTINUED | OUTPATIENT
Start: 2018-11-10 | End: 2018-11-10

## 2018-11-10 RX ORDER — MORPHINE SULFATE 50 MG/1
4 CAPSULE, EXTENDED RELEASE ORAL ONCE
Qty: 0 | Refills: 0 | Status: DISCONTINUED | OUTPATIENT
Start: 2018-11-10 | End: 2018-11-10

## 2018-11-10 RX ORDER — SIMVASTATIN 20 MG/1
10 TABLET, FILM COATED ORAL AT BEDTIME
Qty: 0 | Refills: 0 | Status: DISCONTINUED | OUTPATIENT
Start: 2018-11-10 | End: 2018-11-12

## 2018-11-10 RX ADMIN — MORPHINE SULFATE 4 MILLIGRAM(S): 50 CAPSULE, EXTENDED RELEASE ORAL at 11:37

## 2018-11-10 RX ADMIN — Medication 650 MILLIGRAM(S): at 17:51

## 2018-11-10 RX ADMIN — MORPHINE SULFATE 2 MILLIGRAM(S): 50 CAPSULE, EXTENDED RELEASE ORAL at 04:46

## 2018-11-10 RX ADMIN — MORPHINE SULFATE 2 MILLIGRAM(S): 50 CAPSULE, EXTENDED RELEASE ORAL at 05:10

## 2018-11-10 RX ADMIN — MORPHINE SULFATE 4 MILLIGRAM(S): 50 CAPSULE, EXTENDED RELEASE ORAL at 05:30

## 2018-11-10 RX ADMIN — ONDANSETRON 4 MILLIGRAM(S): 8 TABLET, FILM COATED ORAL at 06:58

## 2018-11-10 RX ADMIN — Medication 100 MILLIGRAM(S): at 21:12

## 2018-11-10 RX ADMIN — Medication 650 MILLIGRAM(S): at 21:10

## 2018-11-10 RX ADMIN — MORPHINE SULFATE 4 MILLIGRAM(S): 50 CAPSULE, EXTENDED RELEASE ORAL at 06:17

## 2018-11-10 RX ADMIN — SODIUM CHLORIDE 60 MILLILITER(S): 9 INJECTION, SOLUTION INTRAVENOUS at 15:25

## 2018-11-10 RX ADMIN — ENOXAPARIN SODIUM 30 MILLIGRAM(S): 100 INJECTION SUBCUTANEOUS at 17:51

## 2018-11-10 NOTE — ED ADULT NURSE NOTE - CHPI ED NUR SYMPTOMS NEG
no tingling/no nausea/no vomiting/no decreased eating/drinking/no chills/no dizziness/no weakness/no fever

## 2018-11-10 NOTE — H&P ADULT - NSHPPHYSICALEXAM_GEN_ALL_CORE
PHYSICAL EXAM:    Daily Height in cm: 162.56 (10 Nov 2018 04:14)    Daily     ICU Vital Signs Last 24 Hrs  T(C): 37.1 (10 Nov 2018 07:37), Max: 37.1 (10 Nov 2018 07:37)  T(F): 98.7 (10 Nov 2018 07:37), Max: 98.7 (10 Nov 2018 07:37)  HR: 86 (10 Nov 2018 11:39) (80 - 86)  BP: 165/71 (10 Nov 2018 11:39) (147/64 - 187/72)  BP(mean): --  ABP: --  ABP(mean): --  RR: 18 (10 Nov 2018 11:39) (18 - 19)  SpO2: 96% (10 Nov 2018 11:39) (96% - 100%)      Constitutional: mild distress due to left arm pain,   HEENT: left forehead abrasion  Respiratory: Breath Sounds normal, no rhonchi/wheeze  Cardiovascular: N S1S2;   Gastrointestinal: Abdomen soft, non tender, Bowel Ssounds present  Extremities: left arm in sling, is able to move all fingers , B/l lower extremities and Right upper extremity motor 4/5  Neurological: awake, alert, follows all commands, responds to questions of HPI and ROS appropriately    Musculoskeletal: non tender  Breasts: Deferred  Genitourinary: deferred  Rectal: Deferred

## 2018-11-10 NOTE — ED ADULT TRIAGE NOTE - CHIEF COMPLAINT QUOTE
Patient reports that she was walking to the bathroom with a walker and had a mechanical fall, striking her head and left shoulder. Patient has a laceration above her left eye and left shoulder pain 10/10. Denies anticoagulation or LOC.

## 2018-11-10 NOTE — H&P ADULT - NSHPLABSRESULTS_GEN_ALL_CORE
12.2   4.69  )-----------( 207      ( 10 Nov 2018 04:41 )             38.0       CBC Full  -  ( 10 Nov 2018 04:41 )  WBC Count : 4.69 K/uL  Hemoglobin : 12.2 g/dL  Hematocrit : 38.0 %  Platelet Count - Automated : 207 K/uL  Mean Cell Volume : 89.4 fl  Mean Cell Hemoglobin : 28.7 pg  Mean Cell Hemoglobin Concentration : 32.1 gm/dL  Auto Neutrophil # : 2.94 K/uL  Auto Lymphocyte # : 1.33 K/uL  Auto Monocyte # : 0.35 K/uL  Auto Eosinophil # : 0.04 K/uL  Auto Basophil # : 0.02 K/uL  Auto Neutrophil % : 62.6 %  Auto Lymphocyte % : 28.4 %  Auto Monocyte % : 7.5 %  Auto Eosinophil % : 0.9 %  Auto Basophil % : 0.4 %      11-10    145  |  109<H>  |  30<H>  ----------------------------<  114<H>  4.1   |  28  |  0.70    Ca    9.0      10 Nov 2018 04:41            PT/INR - ( 10 Nov 2018 04:41 )   PT: 11.6 sec;   INR: 1.04 ratio         PTT - ( 10 Nov 2018 04:41 )  PTT:25.6 sec    CARDIAC MARKERS ( 10 Nov 2018 04:41 )  x     / x     / 44 U/L / x     / x                    MEDICATIONS  (STANDING):  acetaminophen   Tablet .. 650 milliGRAM(s) Oral every 4 hours  docusate sodium 100 milliGRAM(s) Oral three times a day  multivitamin/minerals 1 Tablet(s) Oral daily  simvastatin 10 milliGRAM(s) Oral at bedtime  sodium chloride 0.45%. 1000 milliLiter(s) (60 mL/Hr) IV Continuous <Continuous>

## 2018-11-10 NOTE — ED ADULT NURSE NOTE - NSIMPLEMENTINTERV_GEN_ALL_ED
Implemented All Fall with Harm Risk Interventions:  [place] to call system. Call bell, personal items and telephone within reach. Instruct patient to call for assistance. Room bathroom lighting operational. Non-slip footwear when patient is off stretcher. Physically safe environment: no spills, clutter or unnecessary equipment. Stretcher in lowest position, wheels locked, appropriate side rails in place. Provide visual cue, wrist band, yellow gown, etc. Monitor gait and stability. Monitor for mental status changes and reorient to person, place, and time. Review medications for side effects contributing to fall risk. Reinforce activity limits and safety measures with patient and family. Provide visual clues: red socks.

## 2018-11-10 NOTE — CONSULT NOTE ADULT - ASSESSMENT
A/P: 89y Female with L proximal humerus fracture  Pain control  NWB LUE in sling, keep c/d/I   Ice  Active movement of fingers/elbow encouraged  Ca/Vit D, outpt osteoporosis workup  Possible future need for surgical intervention discussed with patient  All question answered  No acute orthopedic surgical intervention at this time  Follow up with Dr. Leone as outpatient within 1 week, call office for appointment   Ortho stable for DC  Discussed with Dr. Leone who agrees with above

## 2018-11-10 NOTE — ED PROVIDER NOTE - CARE PLAN
Principal Discharge DX:	Fall  Secondary Diagnosis:	Head injury Principal Discharge DX:	Fall  Secondary Diagnosis:	Head injury  Secondary Diagnosis:	Fracture of humerus, closed Principal Discharge DX:	Fall  Secondary Diagnosis:	Head injury  Secondary Diagnosis:	Fracture of humerus, closed  Secondary Diagnosis:	Unable to ambulate

## 2018-11-10 NOTE — ED PROVIDER NOTE - OBJECTIVE STATEMENT
pt presents with left shoulder pain constant achy non radiating and left forehead abrasion s/p mechanical fall walking to bathromm. no loc no blood thinners. denies fever. denies HA or neck pain. no chest pain or sob. no abd pain. no n/v/d. no urinary f/u/d. no back pain. no  sensory deficits. denies illicit drug use. no recent travel. + left forehead abrasion . no other acute issues symptoms or concerns

## 2018-11-10 NOTE — H&P ADULT - HISTORY OF PRESENT ILLNESS
pt presents with left shoulder pain constant achy non radiating and left forehead abrasion s/p mechanical fall walking to bathromm. no loc no blood thinners. denies fever. denies HA or neck pain. no chest pain or sob. no abd pain. no n/v/d. no urinary f/u/d. no back pain. no  sensory deficits. denies illicit drug use. no recent travel. + left forehead abrasion . no other acute issues symptoms or concern 89 y/o F with PMHx of hx meningioma followed up by her outpatient neurologist, Glaucoma, HTN, and Right Hip replacement, performed by Dr. Hennessy,ambulates with walker,  last admission sept 2018for mechanical fall resulting in L3 vertebral compression fx and sent to short term rehab.Recently discharged from rehab within 1 month, presented to the ED  with left shoulder pain constant achy non radiating and left forehead abrasion s/p unwitnessed  mechanical fall walking to bathroom. no loc no blood thinners. Her daughter at bedside side says she always trip over her own foot   due to chronic B/l lower extremity weakness due to her chronic  lower back pain since she incurred L3 fx.  In ed afebrile, c/o sever left shoulder pain, was evaluated by ortho and found to have left proximal humerus closed fx  and has left forehead abrasion from fall  CT head- meningioma R frontal lobe unchanged from prior study as per report and CT C spine - no acute pathology  EKG NSR, premature atrial complexes, septal infarct age undetermined  No acute ortho surgical intervention recommended as per ortho  patient was to be dsicharged from ed, but was unable to hold walker and unable to  ambulate with walker due to left arm pain  patient being admitted for PT evaluation and possible short term rehab        ROS- patient denies any dizziness or CP or palpitations or nausea or SOB before or after the fall   denies HA or neck pain. no chest pain or sob. no abd pain. no n/v/d. no urinary f/u/d. denies illicit drug use.     Family hx -no significant family hx of heart problems  social hx - denies smoking, etoh use, no recreational drug use, lives with demented  and walks with walker

## 2018-11-10 NOTE — ED ADULT NURSE REASSESSMENT NOTE - NS ED NURSE REASSESS COMMENT FT1
An attempt made to place a sling at this time. Pt not tolerating the sling. Pain med given as ordered. Awaiting to xray result at this time. Will follow up.
pt received at change of shift. c/o left shoulder pain states only mild relief in pain s/p morphine. NV intact in left arm and wrist. abrasion to left eyebrow is clean no oozing or drainage noted. awaiting ortho consult. pt updated on POC. will cont to monitor.

## 2018-11-10 NOTE — ED ADULT NURSE NOTE - OBJECTIVE STATEMENT
Pt presented to ED c/o fall. As per pt, pt fell down at home while ambulating w/ walker to go to bathroom. Pt not sure how she fell down. Upon arrival to ED, pt GCS of 15. Limited movement to left shoulder due to excruciating pain. C/O lower back pain as well. Hx L1~L3 compression Fx 3 in sept. Was at rehab and discharged home recently. Hematoma to left shoulder. VS as documented. Noticed abrasion to right eyebrow w/ controlled bleeding.

## 2018-11-10 NOTE — ED PROVIDER NOTE - PROGRESS NOTE DETAILS
ortho resdient paged for grosly displaced humeral fx CC:  Signout received from Dr. Reinoso at shift change.  Ortho resident aware of ED consult. CC:  Orthopedic consult appreciated, no surgical intervention indicated at this time.  Pt baseline ambulatory w/ walker use but cannot use walker currently due to prox. humeral fx; pt therefore nonambulatory.  Dr. Hudson aware of admission.

## 2018-11-10 NOTE — ED ADULT NURSE REASSESSMENT NOTE - COMFORT CARE
warm blanket provided/Bed Change
treatment delay explained/wait time explained/plan of care explained/darkened lights/repositioned/warm blanket provided/side rails up

## 2018-11-10 NOTE — CONSULT NOTE ADULT - SUBJECTIVE AND OBJECTIVE BOX
89y Female RHD presents c/o L shoulder pain sp mechanical fall this morning while ambulating to the bathroom. Patient reports a recent vertebral compression fracture which is being treated nonoperatively. Denies HS/LOC. Denies numbness/tingling. Denies fever/chills. Denies new pain/injury elsewhere. No other complaints.    HEALTH ISSUES - PROBLEM Dx:  Glaucoma    HLD (hyperlipidemia).      MEDICATIONS  (STANDING):    Allergies    atenolol (Other)  sulfa drugs (Other)    Intolerances                            12.2   4.69  )-----------( 207      ( 10 Nov 2018 04:41 )             38.0     10 Nov 2018 04:41    145    |  109    |  30     ----------------------------<  114    4.1     |  28     |  0.70     Ca    9.0        10 Nov 2018 04:41      PT/INR - ( 10 Nov 2018 04:41 )   PT: 11.6 sec;   INR: 1.04 ratio         PTT - ( 10 Nov 2018 04:41 )  PTT:25.6 sec  Vital Signs Last 24 Hrs  T(C): 37.1 (11-10-18 @ 07:37), Max: 37.1 (11-10-18 @ 07:37)  T(F): 98.7 (11-10-18 @ 07:37), Max: 98.7 (11-10-18 @ 07:37)  HR: 80 (11-10-18 @ 07:37) (80 - 86)  BP: 147/64 (11-10-18 @ 07:37) (147/64 - 187/72)  BP(mean): --  RR: 18 (11-10-18 @ 07:37) (18 - 19)  SpO2: 97% (11-10-18 @ 07:37) (97% - 100%)    Imaging: XR demonstrates L proximal humerus fracture  Axillary view shows humeral head is located    Physical Exam  Gen: NAD  L UE: Skin intact,  +ttp shoulder, +r/u/m/ain/pin function, SILT, radial pulse intact, compartments soft/compressible, warm/well perfused  Secondary Survey: Full ROM of unaffected extremities, able to SLR B/L, SILT Globally, compartments soft, no bony TTP over bony prominences, no calf TTP B/L, no TTP along axial spine.

## 2018-11-10 NOTE — H&P ADULT - ASSESSMENT
89 y/o F with PMHx of hx meningioma followed up by her outpatient neurologist, Glaucoma, HTN, and Right Hip replacement, performed by Dr. Hennessy,ambulates with walker,  last admission sept 2018for mechanical fall resulting in L3 vertebral compression fx and sent to short term rehab.Recently discharged from rehab within 1 month, presented to the ED  with left shoulder pain constant achy non radiating and left forehead abrasion s/p unwitnessed  mechanical fall walking to bathroom.    A/P  #Mechanical fall/left proximal closed fx of humerus  # Unable to use walker and ambulate due to left arm pain  admit to med surg  plan for PT evaluation for  possible short term rehab placment   NWDENZEL BRENNAN in sling   f/u with Dr Leone(ortho) in 1week as outpatient  no acute ortho surgical intervention recommended by ortho  pain management  DVT prophylaxis     #Mild dehydration from decreased po intake from pain  slow IVF, monito electrolytes  diet    #hx glaucoma, HLD  continue home meds    #,hx HTN  stable -not on BP meds  monitor BP    # DVT prophylaxis-Improve score 2(due to immobilisation and age >60)  lovenox SC   IMPROVE VTE Individual Risk Assessment    RISK                                                                Points    [  ] Previous VTE                                                  3    [  ] Thrombophilia                                               2    [  ] Lower limb paralysis                                      2        (unable to hold up >15 seconds)      [  ] Current Cancer                                              2         (within 6 months)    [  ] Immobilization > 24 hrs                                1    [  ] ICU/CCU stay > 24 hours                              1    [  ] Age > 60                                                      1    IMPROVE VTE Score _________ 89 y/o F with PMHx of hx meningioma followed up by her outpatient neurologist, Glaucoma, HTN, and Right Hip replacement, performed by Dr. Hennessy,ambulates with walker,  last admission sept 2018for mechanical fall resulting in L3 vertebral compression fx and sent to short term rehab.Recently discharged from rehab within 1 month, presented to the ED  with left shoulder pain constant achy non radiating and left forehead abrasion s/p unwitnessed  mechanical fall walking to bathroom.    A/P  #Mechanical fall/left proximal closed fx of humerus  # Unable to use walker and ambulate due to left arm pain  admit to med surg  plan for PT evaluation for  possible short term rehab placment   NWDENZEL BRENNAN in sling   f/u with Dr Leone(ortho) in 1week as outpatient  no acute ortho surgical intervention recommended by ortho  pain management  DVT prophylaxis     #Mild dehydration from decreased po intake from pain  slow IVF, monito electrolytes  diet    #hx glaucoma, HLD  continue home meds    #,hx HTN  stable -not on BP meds  monitor BP    # hx meningioma, last MRI by her neurologist was a couple months ago ,which  was  stable as per daughter at bedside  f/u with outpatient neurology    # DVT prophylaxis-Improve score 2(due to immobilisation and age >60)  lovenox SC   IMPROVE VTE Individual Risk Assessment    RISK                                                                Points    [  ] Previous VTE                                                  3    [  ] Thrombophilia                                               2    [  ] Lower limb paralysis                                      2        (unable to hold up >15 seconds)      [  ] Current Cancer                                              2         (within 6 months)    [  ] Immobilization > 24 hrs                                1    [  ] ICU/CCU stay > 24 hours                              1    [  ] Age > 60                                                      1    IMPROVE VTE Score _____2____

## 2018-11-11 LAB
ANION GAP SERPL CALC-SCNC: 9 MMOL/L — SIGNIFICANT CHANGE UP (ref 5–17)
BUN SERPL-MCNC: 14 MG/DL — SIGNIFICANT CHANGE UP (ref 7–23)
CALCIUM SERPL-MCNC: 8.4 MG/DL — LOW (ref 8.5–10.1)
CHLORIDE SERPL-SCNC: 106 MMOL/L — SIGNIFICANT CHANGE UP (ref 96–108)
CO2 SERPL-SCNC: 24 MMOL/L — SIGNIFICANT CHANGE UP (ref 22–31)
CREAT SERPL-MCNC: 0.52 MG/DL — SIGNIFICANT CHANGE UP (ref 0.5–1.3)
GLUCOSE SERPL-MCNC: 99 MG/DL — SIGNIFICANT CHANGE UP (ref 70–99)
POTASSIUM SERPL-MCNC: 4.2 MMOL/L — SIGNIFICANT CHANGE UP (ref 3.5–5.3)
POTASSIUM SERPL-SCNC: 4.2 MMOL/L — SIGNIFICANT CHANGE UP (ref 3.5–5.3)
SODIUM SERPL-SCNC: 139 MMOL/L — SIGNIFICANT CHANGE UP (ref 135–145)

## 2018-11-11 RX ORDER — MORPHINE SULFATE 50 MG/1
2 CAPSULE, EXTENDED RELEASE ORAL EVERY 4 HOURS
Qty: 0 | Refills: 0 | Status: DISCONTINUED | OUTPATIENT
Start: 2018-11-11 | End: 2018-11-12

## 2018-11-11 RX ORDER — ACETAMINOPHEN 500 MG
650 TABLET ORAL EVERY 4 HOURS
Qty: 0 | Refills: 0 | Status: DISCONTINUED | OUTPATIENT
Start: 2018-11-11 | End: 2018-11-12

## 2018-11-11 RX ADMIN — Medication 100 MILLIGRAM(S): at 13:33

## 2018-11-11 RX ADMIN — Medication 1 TABLET(S): at 13:33

## 2018-11-11 RX ADMIN — Medication 650 MILLIGRAM(S): at 03:24

## 2018-11-11 RX ADMIN — MORPHINE SULFATE 2 MILLIGRAM(S): 50 CAPSULE, EXTENDED RELEASE ORAL at 13:33

## 2018-11-11 RX ADMIN — Medication 650 MILLIGRAM(S): at 18:03

## 2018-11-11 RX ADMIN — Medication 650 MILLIGRAM(S): at 22:54

## 2018-11-11 RX ADMIN — Medication 650 MILLIGRAM(S): at 22:49

## 2018-11-11 RX ADMIN — Medication 100 MILLIGRAM(S): at 07:09

## 2018-11-11 RX ADMIN — Medication 650 MILLIGRAM(S): at 18:08

## 2018-11-11 RX ADMIN — Medication 650 MILLIGRAM(S): at 13:37

## 2018-11-11 RX ADMIN — Medication 650 MILLIGRAM(S): at 07:08

## 2018-11-11 RX ADMIN — Medication 650 MILLIGRAM(S): at 14:00

## 2018-11-11 RX ADMIN — ENOXAPARIN SODIUM 30 MILLIGRAM(S): 100 INJECTION SUBCUTANEOUS at 18:03

## 2018-11-11 NOTE — PHYSICAL THERAPY INITIAL EVALUATION ADULT - PERTINENT HX OF CURRENT PROBLEM, REHAB EVAL
89 y/o F with PMHx of hx meningioma followed up by her outpatient neurologist, Glaucoma, HTN, and Right Hip replacement, performed by Dr. Hennessy,ambulates with walker,  last admission sept 2018for mechanical fall resulting in L3 vertebral compression fx and sent to short term rehab.Recently discharged from rehab within 1 month, presented to the ED  with left shoulder pain constant achy non radiating and left forehead abrasion s/p unwitnessed  mechanical fall walking to bathroom.

## 2018-11-11 NOTE — PROGRESS NOTE ADULT - SUBJECTIVE AND OBJECTIVE BOX
PCP- DR Manley    CC- s/p mechanical fall    HPI:  89 y/o F with PMHx of hx meningioma followed up by her outpatient neurologist, Glaucoma, HTN, and Right Hip replacement, performed by Dr. Hennessy,ambulates with walker,  last admission sept 2018for mechanical fall resulting in L3 vertebral compression fx and sent to short term rehab.Recently discharged from rehab within 1 month, presented to the ED  with left shoulder pain constant achy non radiating and left forehead abrasion s/p unwitnessed  mechanical fall walking to bathroom. no loc no blood thinners. Her daughter at bedside side says she always trip over her own foot   due to chronic B/l lower extremity weakness due to her chronic  lower back pain since she incurred L3 fx.  In ed afebrile, c/o sever left shoulder pain, was evaluated by ortho and found to have left proximal humerus closed fx  and has left forehead abrasion from fall  CT head- meningioma R frontal lobe unchanged from prior study as per report and CT C spine - no acute pathology  EKG NSR, premature atrial complexes, septal infarct age undetermined  No acute ortho surgical intervention recommended as per ortho  patient was to be dsicharged from ed, but was unable to hold walker and unable to  ambulate with walker due to left arm pain  patient being admitted for PT evaluation and possible short term rehab     ROS- patient denies any dizziness or CP or palpitations or nausea or SOB before or after the fall   denies HA or neck pain. no chest pain or sob. no abd pain. no n/v/d. no urinary f/u/d. denies illicit drug use.     Family hx -no significant family hx of heart problems  social hx - denies smoking, etoh use, no recreational drug use, lives with demented  and walks with walker (10 Nov 2018 12:13)    11/11/18- c/o pain left arm    Review of system- All 10 systems reviewed and is as per HPI otherwise negative.     T(C): 36.9 (11-11-18 @ 11:32), Max: 37.1 (11-10-18 @ 13:30)  HR: 85 (11-11-18 @ 11:32) (69 - 85)  BP: 144/79 (11-11-18 @ 11:32) (134/54 - 161/66)  RR: 16 (11-11-18 @ 11:32) (16 - 18)  SpO2: 97% (11-11-18 @ 11:32) (95% - 100%)  Wt(kg): --    LABS:                        12.2   4.69  )-----------( 207      ( 10 Nov 2018 04:41 )             38.0     11 Nov 2018 07:11    139    |  106    |  14     ----------------------------<  99     4.2     |  24     |  0.52     Ca    8.4        11 Nov 2018 07:11    PT/INR - ( 10 Nov 2018 04:41 )   PT: 11.6 sec;   INR: 1.04 ratio       PTT - ( 10 Nov 2018 04:41 )  PTT:25.6 sec    CARDIAC MARKERS ( 10 Nov 2018 04:41 )  x     / x     / 44 U/L / x     / x        CARDIAC MARKERS ( 10 Nov 2018 04:41 )  x     / x     / 44 U/L / x     / x        RADIOLOGY & ADDITIONAL TESTS:  EXAM:  XR SHOULDER COMP MIN 2V LT                        PROCEDURE DATE:  11/10/2018    INTERPRETATION:      Radiographs of the LEFT shoulder        CLINICAL INFORMATION:       Pain.    TECHNIQUE: Axillary view of the shoulder was obtained.    FINDINGS:   Series performed the same date available for review.    The osseous structures of the shoulder comminuted transverse fracture at   the neck of the humerus with impaction. Associated soft tissue swelling   is noted.    The glenohumeral joint is located and intact.  The acromioclavicular   joint appears aligned and intact.    No pathologic calcifications or other soft tissue abnormalities are seen.    The visualized chest wall and ribs are intact.  No radiopaque foreign   body is identified.     IMPRESSION:  comminuted transverse fracture at the neck of the humerus   with impaction.    PHYSICAL EXAM:  GENERAL: NAD, well-groomed, well-developed  HEAD:  Atraumatic, Normocephalic  EYES: EOMI, PERRLA, conjunctiva and sclera clear  HEENT: Moist mucous membranes  NECK: Supple, No JVD  NERVOUS SYSTEM:  Alert & Oriented X3, Motor Strength 5/5 B/L upper and lower extremities; DTRs 2+ intact and symmetric  CHEST/LUNG: Clear to auscultation bilaterally; No rales, rhonchi, wheezing, or rubs  HEART: Regular rate and rhythm; No murmurs, rubs, or gallops  ABDOMEN: Soft, Nontender, Nondistended; Bowel sounds present  GENITOURINARY- Voiding, no palpable bladder  EXTREMITIES:  2+ Peripheral Pulses, No clubbing, cyanosis, or edema  MUSCULOSKELTAL- LUE with hematoma, in sling  SKIN-no rash, no lesion  CNS- alert, oriented X3, non focal     MEDICATIONS  (STANDING):  acetaminophen    Suspension .. 650 milliGRAM(s) Oral every 4 hours  docusate sodium 100 milliGRAM(s) Oral three times a day  enoxaparin Injectable 30 milliGRAM(s) SubCutaneous every 24 hours  influenza   Vaccine 0.5 milliLiter(s) IntraMuscular once  multivitamin 1 Tablet(s) Oral daily  simvastatin 10 milliGRAM(s) Oral at bedtime  sodium chloride 0.45%. 1000 milliLiter(s) (60 mL/Hr) IV Continuous <Continuous>    MEDICATIONS  (PRN):  morphine  - Injectable 2 milliGRAM(s) IV Push every 4 hours PRN Severe Pain (7 - 10)    Assessment/Plan  #Mechanical fall/left proximal closed fx of humerus  # Unable to use walker and ambulate due to left arm pain  plan for PT evaluation for  possible short term rehab placement  NWB LUE in sling  f/u with Dr Leone(ortho) in 1week as outpatient  no acute ortho surgical intervention recommended by ortho  pain management  DVT prophylaxis     #Mild dehydration from decreased po intake from pain  slow IVF, monitor electrolytes    #hx glaucoma, HLD  continue home meds    #,hx HTN  stable -not on BP meds  monitor BP    # hx meningioma, last MRI by her neurologist was a couple months ago ,which  was  stable as per daughter at bedside  f/u with outpatient neurology    #Dispo- transfer to [address] [place] [address]. Continue PT. [facility] on [place]

## 2018-11-12 ENCOUNTER — TRANSCRIPTION ENCOUNTER (OUTPATIENT)
Age: 83
End: 2018-11-12

## 2018-11-12 VITALS
OXYGEN SATURATION: 98 % | DIASTOLIC BLOOD PRESSURE: 83 MMHG | RESPIRATION RATE: 17 BRPM | SYSTOLIC BLOOD PRESSURE: 149 MMHG | HEART RATE: 92 BPM | TEMPERATURE: 98 F

## 2018-11-12 LAB
ANION GAP SERPL CALC-SCNC: 8 MMOL/L — SIGNIFICANT CHANGE UP (ref 5–17)
BUN SERPL-MCNC: 10 MG/DL — SIGNIFICANT CHANGE UP (ref 7–23)
CALCIUM SERPL-MCNC: 8.3 MG/DL — LOW (ref 8.5–10.1)
CHLORIDE SERPL-SCNC: 105 MMOL/L — SIGNIFICANT CHANGE UP (ref 96–108)
CO2 SERPL-SCNC: 26 MMOL/L — SIGNIFICANT CHANGE UP (ref 22–31)
CREAT SERPL-MCNC: 0.69 MG/DL — SIGNIFICANT CHANGE UP (ref 0.5–1.3)
GLUCOSE SERPL-MCNC: 147 MG/DL — HIGH (ref 70–99)
HCT VFR BLD CALC: 34 % — LOW (ref 34.5–45)
HGB BLD-MCNC: 10.9 G/DL — LOW (ref 11.5–15.5)
MCHC RBC-ENTMCNC: 28.2 PG — SIGNIFICANT CHANGE UP (ref 27–34)
MCHC RBC-ENTMCNC: 32.1 GM/DL — SIGNIFICANT CHANGE UP (ref 32–36)
MCV RBC AUTO: 87.9 FL — SIGNIFICANT CHANGE UP (ref 80–100)
NRBC # BLD: 0 /100 WBCS — SIGNIFICANT CHANGE UP (ref 0–0)
PLATELET # BLD AUTO: 181 K/UL — SIGNIFICANT CHANGE UP (ref 150–400)
POTASSIUM SERPL-MCNC: 3.6 MMOL/L — SIGNIFICANT CHANGE UP (ref 3.5–5.3)
POTASSIUM SERPL-SCNC: 3.6 MMOL/L — SIGNIFICANT CHANGE UP (ref 3.5–5.3)
RBC # BLD: 3.87 M/UL — SIGNIFICANT CHANGE UP (ref 3.8–5.2)
RBC # FLD: 15.3 % — HIGH (ref 10.3–14.5)
SODIUM SERPL-SCNC: 139 MMOL/L — SIGNIFICANT CHANGE UP (ref 135–145)
WBC # BLD: 5.4 K/UL — SIGNIFICANT CHANGE UP (ref 3.8–10.5)
WBC # FLD AUTO: 5.4 K/UL — SIGNIFICANT CHANGE UP (ref 3.8–10.5)

## 2018-11-12 RX ORDER — ACETAMINOPHEN 500 MG
650 TABLET ORAL EVERY 6 HOURS
Qty: 0 | Refills: 0 | Status: DISCONTINUED | OUTPATIENT
Start: 2018-11-12 | End: 2018-11-12

## 2018-11-12 RX ORDER — MULTIVIT-MIN/FERROUS GLUCONATE 9 MG/15 ML
1 LIQUID (ML) ORAL
Qty: 0 | Refills: 0 | COMMUNITY

## 2018-11-12 RX ADMIN — Medication 1 TABLET(S): at 11:36

## 2018-11-12 RX ADMIN — Medication 650 MILLIGRAM(S): at 05:26

## 2018-11-12 RX ADMIN — Medication 650 MILLIGRAM(S): at 13:43

## 2018-11-12 NOTE — DISCHARGE NOTE ADULT - PATIENT PORTAL LINK FT
You can access the [facility] Patient Portal, offered by [facility], by registering with the following website: [email or web address]

## 2018-11-12 NOTE — DISCHARGE NOTE ADULT - CARE PROVIDER_API CALL
Salina Leone), Orthopaedic Surgery; Surgery of the Hand  [address] [place] [address]  Phone: [phone]  Fax: [phone]    Ingrid Manley), Internal Medicine  [address] [place] [address]  Phone: [phone]  Fax: [phone]

## 2018-11-12 NOTE — DISCHARGE NOTE ADULT - MEDICATION SUMMARY - MEDICATIONS TO TAKE
I will START or STAY ON the medications listed below when I get home from the hospital:    acetaminophen 325 mg oral tablet  -- 2 tab(s) by mouth every 4 hours, As Needed - for mild to moderate pain  -- Indication: For prn for pain    aspirin 325 mg oral tablet  -- 1 tab(s) by mouth 2 times a day for 7 days (DVT proph)  -- Indication: For DVT proph    oxyCODONE 5 mg oral tablet  -- 1 tab(s) by mouth every 6 hours, As Needed for severe pain  -- Indication: For prn for pain    simvastatin 10 mg oral tablet  -- 1 tab(s) by mouth once a day (at bedtime)  -- Indication: For cholesterol    Colace 100 mg oral capsule  -- 1 cap(s) by mouth 3 times a day  -- Indication: For bowel regimen    Multiple Vitamins oral tablet  -- 1 tab(s) by mouth once a day  -- Indication: For vitamin

## 2018-11-12 NOTE — DISCHARGE NOTE ADULT - CARE PLAN
Principal Discharge DX:	Fracture of humerus, closed  Goal:	[facility]  Assessment and plan of treatment:	Outpatient f/u with DR Leone

## 2018-11-12 NOTE — PROGRESS NOTE ADULT - SUBJECTIVE AND OBJECTIVE BOX
PCP- DR Manley    CC- s/p mechanical fall    HPI:  89 y/o F with PMHx of hx meningioma followed up by her outpatient neurologist, Glaucoma, HTN, and Right Hip replacement, performed by Dr. Hennessy,ambulates with walker,  last admission sept 2018for mechanical fall resulting in L3 vertebral compression fx and sent to short term rehab.Recently discharged from rehab within 1 month, presented to the ED  with left shoulder pain constant achy non radiating and left forehead abrasion s/p unwitnessed  mechanical fall walking to bathroom. no loc no blood thinners. Her daughter at bedside side says she always trip over her own foot   due to chronic B/l lower extremity weakness due to her chronic  lower back pain since she incurred L3 fx.  In ed afebrile, c/o sever left shoulder pain, was evaluated by ortho and found to have left proximal humerus closed fx  and has left forehead abrasion from fall  CT head- meningioma R frontal lobe unchanged from prior study as per report and CT C spine - no acute pathology  EKG NSR, premature atrial complexes, septal infarct age undetermined  No acute ortho surgical intervention recommended as per ortho  patient was to be dsicharged from ed, but was unable to hold walker and unable to  ambulate with walker due to left arm pain  patient being admitted for PT evaluation and possible short term rehab     ROS- patient denies any dizziness or CP or palpitations or nausea or SOB before or after the fall   denies HA or neck pain. no chest pain or sob. no abd pain. no n/v/d. no urinary f/u/d. denies illicit drug use.     Family hx -no significant family hx of heart problems  social hx - denies smoking, etoh use, no recreational drug use, lives with demented  and walks with walker (10 Nov 2018 12:13)    11/11/18- c/o pain left arm  11/12/18 doing good    Review of system- All 10 systems reviewed and is as per HPI otherwise negative.     Vital Signs Last 24 Hrs  T(C): 36.8 (12 Nov 2018 05:29), Max: 37.2 (11 Nov 2018 17:37)  T(F): 98.3 (12 Nov 2018 05:29), Max: 99 (11 Nov 2018 17:37)  HR: 85 (12 Nov 2018 05:29) (83 - 90)  BP: 158/69 (12 Nov 2018 05:29) (136/73 - 158/69)  BP(mean): --  RR: 16 (12 Nov 2018 05:29) (16 - 16)  SpO2: 94% (12 Nov 2018 05:29) (94% - 99%)    LABS:                        10.9   5.40  )-----------( 181      ( 12 Nov 2018 06:14 )             34.0     12 Nov 2018 06:14    139    |  105    |  10     ----------------------------<  147    3.6     |  26     |  0.69     Ca    8.3        12 Nov 2018 06:14    RADIOLOGY & ADDITIONAL TESTS:  EXAM:  XR SHOULDER COMP MIN 2V LT                        PROCEDURE DATE:  11/10/2018    INTERPRETATION:      Radiographs of the LEFT shoulder        CLINICAL INFORMATION:       Pain.    TECHNIQUE: Axillary view of the shoulder was obtained.    FINDINGS:   Series performed the same date available for review.    The osseous structures of the shoulder comminuted transverse fracture at   the neck of the humerus with impaction. Associated soft tissue swelling   is noted.    The glenohumeral joint is located and intact.  The acromioclavicular   joint appears aligned and intact.    No pathologic calcifications or other soft tissue abnormalities are seen.    The visualized chest wall and ribs are intact.  No radiopaque foreign   body is identified.     IMPRESSION:  comminuted transverse fracture at the neck of the humerus   with impaction.    PHYSICAL EXAM:  GENERAL: NAD, well-groomed, well-developed  HEAD:  Atraumatic, Normocephalic  EYES: EOMI, PERRLA, conjunctiva and sclera clear  HEENT: Moist mucous membranes  NECK: Supple, No JVD  NERVOUS SYSTEM:  Alert & Oriented X3, Motor Strength 5/5 B/L upper and lower extremities; DTRs 2+ intact and symmetric  CHEST/LUNG: Clear to auscultation bilaterally; No rales, rhonchi, wheezing, or rubs  HEART: Regular rate and rhythm; No murmurs, rubs, or gallops  ABDOMEN: Soft, Nontender, Nondistended; Bowel sounds present  GENITOURINARY- Voiding, no palpable bladder  EXTREMITIES:  2+ Peripheral Pulses, No clubbing, cyanosis, or edema  MUSCULOSKELTAL- LUE with hematoma, in sling  SKIN-no rash, no lesion  CNS- alert, oriented X3, non focal     MEDICATIONS  (STANDING):  acetaminophen    Suspension .. 650 milliGRAM(s) Oral every 4 hours  docusate sodium 100 milliGRAM(s) Oral three times a day  enoxaparin Injectable 30 milliGRAM(s) SubCutaneous every 24 hours  influenza   Vaccine 0.5 milliLiter(s) IntraMuscular once  multivitamin 1 Tablet(s) Oral daily  simvastatin 10 milliGRAM(s) Oral at bedtime  sodium chloride 0.45%. 1000 milliLiter(s) (60 mL/Hr) IV Continuous <Continuous>    MEDICATIONS  (PRN):  morphine  - Injectable 2 milliGRAM(s) IV Push every 4 hours PRN Severe Pain (7 - 10)    Assessment/Plan  #Mechanical fall/left proximal closed fx of humerus  # Unable to use walker and ambulate due to left arm pain  #Anemia acute blood loss from fracture  NWB LUE in sling  f/u with Dr Leone(ortho) in 1week as outpatient  no acute ortho surgical intervention recommended by ortho  pain management  DVT prophylaxis     #Mild dehydration from decreased po intake from pain  slow IVF, monitor electrolytes    #hx glaucoma, HLD  continue home meds    #,hx HTN  stable -not on BP meds  monitor BP    # hx meningioma, last MRI by her neurologist was a couple months ago ,which  was  stable as per daughter at bedside  f/u with outpatient neurology    #Dispo- stable for [facility] today. DC time 40 mins

## 2018-11-12 NOTE — DISCHARGE NOTE ADULT - HOSPITAL COURSE
PCP- DR Manley  CC- s/p mechanical fall  HPI:  89 y/o F with PMHx of hx meningioma followed up by her outpatient neurologist, Glaucoma, HTN, and Right Hip replacement, performed by Dr. Hennessy,ambulates with walker,  last admission sept 2018for mechanical fall resulting in L3 vertebral compression fx and sent to short term rehab.Recently discharged from rehab within 1 month, presented to the ED  with left shoulder pain constant achy non radiating and left forehead abrasion s/p unwitnessed  mechanical fall walking to bathroom. no loc no blood thinners. Her daughter at bedside side says she always trip over her own foot   due to chronic B/l lower extremity weakness due to her chronic  lower back pain since she incurred L3 fx.  In ed afebrile, c/o sever left shoulder pain, was evaluated by ortho and found to have left proximal humerus closed fx  and has left forehead abrasion from fall  CT head- meningioma R frontal lobe unchanged from prior study as per report and CT C spine - no acute pathology  EKG NSR, premature atrial complexes, septal infarct age undetermined  No acute ortho surgical intervention recommended as per ortho  patient was to be dsicharged from ed, but was unable to hold walker and unable to  ambulate with walker due to left arm pain  patient being admitted for PT evaluation and possible short term rehab   Hospital stay- seen by ortho- fracture non-operative, NWB to LUE, keep in sling. Outpatient f/u with DR Leone    Review of system- All 10 systems reviewed and is as per HPI otherwise negative.     T(C): 36.8 (11-12-18 @ 05:29), Max: 37.2 (11-11-18 @ 17:37)  HR: 85 (11-12-18 @ 05:29) (83 - 90)  BP: 158/69 (11-12-18 @ 05:29) (136/73 - 158/69)  RR: 16 (11-12-18 @ 05:29) (16 - 16)  SpO2: 94% (11-12-18 @ 05:29) (94% - 99%)  Wt(kg): --    LABS:                        10.9   5.40  )-----------( 181      ( 12 Nov 2018 06:14 )             34.0     139  |  105  |  10  ----------------------------<  147<H>  3.6   |  26  |  0.69  Ca    8.3<L>      12 Nov 2018 06:14    PHYSICAL EXAM:  GENERAL: NAD, well-groomed, well-developed  HEAD:  Atraumatic, Normocephalic  EYES: EOMI, PERRLA, conjunctiva and sclera clear  HEENT: Moist mucous membranes  NECK: Supple, No JVD  NERVOUS SYSTEM:  Alert & Oriented X3, Motor Strength 5/5 B/L upper and lower extremities; DTRs 2+ intact and symmetric  CHEST/LUNG: Clear to auscultation bilaterally; No rales, rhonchi, wheezing, or rubs  HEART: Regular rate and rhythm; No murmurs, rubs, or gallops  ABDOMEN: Soft, Nontender, Nondistended; Bowel sounds present  GENITOURINARY- Voiding, no palpable bladder  EXTREMITIES:  2+ Peripheral Pulses, No clubbing, cyanosis, or edema  MUSCULOSKELTAL- LT arm hematoma, in sling  SKIN-no rash, no lesion  CNS- alert, oriented X3, non focal     Assessment/Plan  #S/p mechanical fall with LT humerus fracture/Anemia acute blood loss from fracture  Non-operative, NWB to LUE, keep in sling. Outpatient f/u with Dr Leone in 2 weeks  DVT proph by Aspirin BID for 7 days  #Recent L3 compression fracture- family will bring the brace to [facility]  #Dispo- [facility] to [facility] today

## 2018-11-15 DIAGNOSIS — I10 ESSENTIAL (PRIMARY) HYPERTENSION: ICD-10-CM

## 2018-11-15 DIAGNOSIS — D62 ACUTE POSTHEMORRHAGIC ANEMIA: ICD-10-CM

## 2018-11-15 DIAGNOSIS — S42.202A UNSPECIFIED FRACTURE OF UPPER END OF LEFT HUMERUS, INITIAL ENCOUNTER FOR CLOSED FRACTURE: ICD-10-CM
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
## 2018-11-15 DIAGNOSIS — E86.0 DEHYDRATION: ICD-10-CM

## 2018-11-15 DIAGNOSIS — D32.9 BENIGN NEOPLASM OF MENINGES, UNSPECIFIED: ICD-10-CM
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
## 2018-11-15 DIAGNOSIS — S00.81XA ABRASION OF OTHER PART OF HEAD, INITIAL ENCOUNTER: ICD-10-CM

## 2018-11-15 DIAGNOSIS — E78.5 HYPERLIPIDEMIA, UNSPECIFIED: ICD-10-CM

## 2018-11-15 DIAGNOSIS — H40.9 UNSPECIFIED GLAUCOMA: ICD-10-CM

## 2019-02-28 NOTE — PATIENT PROFILE ADULT - HARM RISK FACTORS
Bunion of the great toe of the right foot with comfortable shoes the patient can tolerate this I do not recommend surgical intervention for correction of this deformity at this point in the patient's life  yes

## 2019-05-08 NOTE — H&P ADULT - MINUTES
[facility]  Cardiology Progress Note    Patient Identification:  Name: Rock Maciel Jr.  Age: 74 y.o.  Sex: male  :  1944  MRN: [id number]                 Follow Up / Chief Complaint: cardiac clearance    Interval History: Surgery planned once Plavix washout complete.  ECG stable from prior.  Vital signs stable and within normal limits.  Troponins negative, sinus rhythm with right bundle branch block noted on telemetry, bradycardic with heart rate in the 50s.       Subjective: LLE edema noted, denies chest pain and shortness of breath.       Objective:    Past Medical History:  Past Medical History:   Diagnosis Date   • Allergic rhinitis    • Bronchitis    • Coronary artery disease    • Diabetes mellitus (CMS/Formerly [facility])    • DM (diabetes mellitus) (CMS/Formerly [facility])    • Encounter for annual health examination 2014    Annual Health Assessment   • Gout    • Hiatal hernia    • History of MRSA infection     RIGHT FOOT    • Hyperlipidemia    • Hypertension    • Murmur    • Pneumothorax on right    • Sleep apnea     pt wears CPAP at night   • Wellness examination 2015    Annual Wellness Visit     Past Surgical History:  Past Surgical History:   Procedure Laterality Date   • ARTERY SURGERY Bilateral     carotid   • CARDIAC CATHETERIZATION N/A 2018    Procedure: Left Heart Cath;  Surgeon: Jo Toney MD;  Location:  [facility] CATH INVASIVE LOCATION;  Service: Cardiovascular   • CARDIAC CATHETERIZATION N/A 2018    Procedure: Coronary angiography;  Surgeon: Jo Toney MD;  Location: [facility] CATH INVASIVE LOCATION;  Service: Cardiovascular   • CAROTID ENDARTERECTOMY Bilateral    • COLONOSCOPY     • CORONARY ARTERY BYPASS GRAFT WITH AORTIC VALVE REPAIR/REPLACEMENT N/A 2018    Procedure: INTRAOPERATIVE ALEX, MIDLINE STERNOTOMY, CORONARY ARTERY BYPASS GRAFTING X 3 USING ENDOSCOPICALLY HARVESTED LEFT GREATER SAPHENOUS VEIN,  AORTIC VALVE REPLACEMENT USING 25MM  LOPEZ II ULTRA PORCINE VALVE, PRP;  Surgeon: Phong Posey MD;  Location: [facility];  Service: Cardiothoracic   • FOOT SURGERY Right 2010    5th digit removal   • FOOT SURGERY Left 2011    1 digit removed   • THORACENTESIS Right 11/21/2016   • THORACOSCOPY Right 5/8/2017    Procedure: BRONCHOSCOPY, RIGHT VAT,  TOTAL DECORTICATION RIGHT LUNG, PLEURAL BX, PLACEMENT SUBPLEURAL PAIN CAATHETERS X2;  Surgeon: Donald Orlando III, MD;  Location: [facility];  Service:    • TONSILECTOMY, ADENOIDECTOMY, BILATERAL MYRINGOTOMY AND TUBES          Social History:   Social History     Tobacco Use   • Smoking status: Never Smoker   • Smokeless tobacco: Never Used   Substance Use Topics   • Alcohol use: Yes     Comment: either one glass wine or one glass liquor per  day      Family History:  Family History   Problem Relation Age of Onset   • Hypertension Father           Allergies:  Allergies   Allergen Reactions   • Ceclor [Cefaclor] Rash     Scheduled Meds:    amLODIPine 5 mg Daily   atenolol 50 mg Daily   atorvastatin 40 mg Nightly   insulin lispro 0-7 Units 4x Daily With Meals & Nightly   miconazole nitrate  Q12H   sodium chloride 3 mL Q12H       I have reviewed the patient's recent medical history and current medications.     INTAKE AND OUTPUT:    Intake/Output Summary (Last 24 hours) at 5/8/2019 1156  Last data filed at 5/8/2019 0420  Gross per 24 hour   Intake 480 ml   Output 400 ml   Net 80 ml       Review of Systems:   GI: Denies abdominal pain and n/v/d  Cardiac: Denies chest pain and palpitations  Pulmonary: Denies shortness of breath and cough    Constitutional:  Temp:  [96.7 °F (35.9 °C)-98.7 °F (37.1 °C)] 97.5 °F (36.4 °C)  Heart Rate:  [50-56] 56  Resp:  [16-18] 16  BP: (138-165)/(55-66) 165/66    Physical Exam:  General:  Appears in no acute distress; resting in bed reading.   Eyes: PERTL,  HEENT:  No JVD. Thyroid not visibly enlarged. No mucosal pallor or cyanosis  Respiratory: Respirations regular  and unlabored at rest. BBS with good air entry anteriorly and laterally. No crackles, rubs or wheezes auscultated  Cardiovascular: S1S2 Regular rate and rhythm. No murmur, rub or gallop. No carotid bruits. DP/PT pulses 1+. 1+ pretibial pitting edema  Gastrointestinal: Abdomen soft, round, non tender. Bowel sounds present. No ascites  Musculoskeletal: MARSHALL x4. No abnormal movements  Extremities: No digital clubbing or cyanosis  Skin: Color pink. Skin warm and dry to touch. No rashes    Neuro: AAO x3 CN II-XII grossly intact  Psych: Mood and affect normal, pleasant and cooperative          Cardiographics  Telemetry:     Unavailable     EC/7 SR rate 50s-talisha, RBBB known-similar to prior tracing        2018 SR rate 70s RBBB            Echocardiogram:     Pending     2018  Interpretation Summary     · Limited echo performed  · Suboptimal scan  · LV function appears to be normal  · No pericardial effusion         2018  Interpretation Summary     · Left atrial cavity size is moderately dilated.  · Mild mitral valve regurgitation is present  · Mild tricuspid valve regurgitation is present.  · Calculated EF = 68%.  · There is no evidence of pericardial effusion.  · There is calcification of the aortic valve.  · Mild to moderate aortic valve stenosis is present.       Stress test  2018  Interpretation Summary        · Equivocal ECG evidence of myocardial ischemia.Negative clinical evidence of myocardial ischemia. Findings consistent with an equivocal ECG stress test. Submaximal Stress Test.  · . Asymptomatic for chest pain       Cardiac catheterization  2018  Conclusion        · Successful right and left coronary angiogram and LV pressures  · Normal left main  · Minimum diffuse left anterior descending irregularity with midportion 40-50% stenosis  · Circumflex artery has OM ostial 70% distal circumflex 60% stenosis  · Right coronary artery dominant with mid and distal portion 70% stenosis  · Severe aortic  "stenosis with gradient 60 mmHg               Lab Review   Results from last 7 days   Lab Units 05/08/19  0554 05/07/19  1744 05/07/19  0639 05/06/19  0721 05/06/19  0000   CK TOTAL U/L  --   --  480* 517* 510*   TROPONIN T ng/mL <0.010 <0.010  --   --   --      Results from last 7 days   Lab Units 05/08/19  0554   MAGNESIUM mg/dL 2.6*     Results from last 7 days   Lab Units 05/07/19  0639   SODIUM mmol/L 137   POTASSIUM mmol/L 3.7   BUN mg/dL 39*   CREATININE mg/dL 1.27   CALCIUM mg/dL 8.6       Results from last 7 days   Lab Units 05/07/19  0639 05/06/19  0721 05/06/19  0000   WBC 10*3/mm3 6.15 8.17 13.61*   HEMOGLOBIN g/dL 10.4* 11.0* 12.4*   HEMATOCRIT % 33.7* 34.7* 39.8   PLATELETS 10*3/mm3 174 197 221           I have reviewed the medical decision making with Dr. Toney in detail.       Assessment/plan    1.  Cardiac clearance-ECG similar to prior tracing, rhythm a bit slower with rate in the 50s.  Sinus rhythm with right bundle branch block noted.  Troponins negative x2.  Discussed with MD, patient clear to proceed with surgery.  Awaiting repeat echo.    2.  CAD-last cardiac cath as above.  Currently without chest pain and shortness of breath, troponins negative, no ST elevation or depression noted on ECG.    3.  Patient currently on statin therapy.  AST/ALT within normal limits 5/6, lipid panel 5/8 revealed  HDL 39 LDL 61 triglycerides 77, controlled.  Continue statin therapy.    4.  Hypertension- better controlled today.  Continue to monitor, suspect elevation in blood pressure is related to pain.    5.  Left knee pain- left quadricep tendon repair planned once Plavix washout complete.  Patient cleared for surgery from cardiac standpoint.        Labs/tests ordered: echo pending.       )5/8/2019  ZEINA Wilson/Transcription:   \"Dictated utilizing Dragon dictation\".   " 60

## 2019-08-10 ENCOUNTER — INPATIENT (INPATIENT)
Facility: HOSPITAL | Age: 84
LOS: 4 days | Discharge: NURSING, REHAB OR LONG-TERM CARE | DRG: 481 | End: 2019-08-15
Attending: HOSPITALIST | Admitting: INTERNAL MEDICINE
Payer: MEDICARE

## 2019-08-10 VITALS
DIASTOLIC BLOOD PRESSURE: 80 MMHG | OXYGEN SATURATION: 100 % | TEMPERATURE: 99 F | WEIGHT: 160.06 LBS | SYSTOLIC BLOOD PRESSURE: 167 MMHG | HEIGHT: 64 IN | HEART RATE: 86 BPM | RESPIRATION RATE: 18 BRPM

## 2019-08-10 DIAGNOSIS — M97.8XXA PERIPROSTHETIC FRACTURE AROUND OTHER INTERNAL PROSTHETIC JOINT, INITIAL ENCOUNTER: ICD-10-CM

## 2019-08-10 LAB
ALBUMIN SERPL ELPH-MCNC: 4 G/DL — SIGNIFICANT CHANGE UP (ref 3.3–5)
ALP SERPL-CCNC: 50 U/L — SIGNIFICANT CHANGE UP (ref 40–120)
ALT FLD-CCNC: 17 U/L — SIGNIFICANT CHANGE UP (ref 12–78)
ANION GAP SERPL CALC-SCNC: 7 MMOL/L — SIGNIFICANT CHANGE UP (ref 5–17)
APTT BLD: 32.5 SEC — SIGNIFICANT CHANGE UP (ref 27.5–36.3)
AST SERPL-CCNC: 23 U/L — SIGNIFICANT CHANGE UP (ref 15–37)
BASOPHILS # BLD AUTO: 0.03 K/UL — SIGNIFICANT CHANGE UP (ref 0–0.2)
BASOPHILS NFR BLD AUTO: 0.2 % — SIGNIFICANT CHANGE UP (ref 0–2)
BILIRUB SERPL-MCNC: 0.5 MG/DL — SIGNIFICANT CHANGE UP (ref 0.2–1.2)
BUN SERPL-MCNC: 19 MG/DL — SIGNIFICANT CHANGE UP (ref 7–23)
CALCIUM SERPL-MCNC: 9.4 MG/DL — SIGNIFICANT CHANGE UP (ref 8.5–10.1)
CHLORIDE SERPL-SCNC: 107 MMOL/L — SIGNIFICANT CHANGE UP (ref 96–108)
CO2 SERPL-SCNC: 28 MMOL/L — SIGNIFICANT CHANGE UP (ref 22–31)
CREAT SERPL-MCNC: 0.84 MG/DL — SIGNIFICANT CHANGE UP (ref 0.5–1.3)
EOSINOPHIL # BLD AUTO: 0 K/UL — SIGNIFICANT CHANGE UP (ref 0–0.5)
EOSINOPHIL NFR BLD AUTO: 0 % — SIGNIFICANT CHANGE UP (ref 0–6)
GLUCOSE SERPL-MCNC: 112 MG/DL — HIGH (ref 70–99)
HCT VFR BLD CALC: 39 % — SIGNIFICANT CHANGE UP (ref 34.5–45)
HGB BLD-MCNC: 12.6 G/DL — SIGNIFICANT CHANGE UP (ref 11.5–15.5)
IMM GRANULOCYTES NFR BLD AUTO: 0.9 % — SIGNIFICANT CHANGE UP (ref 0–1.5)
INR BLD: 1.06 RATIO — SIGNIFICANT CHANGE UP (ref 0.88–1.16)
LYMPHOCYTES # BLD AUTO: 0.86 K/UL — LOW (ref 1–3.3)
LYMPHOCYTES # BLD AUTO: 6.7 % — LOW (ref 13–44)
MCHC RBC-ENTMCNC: 29.2 PG — SIGNIFICANT CHANGE UP (ref 27–34)
MCHC RBC-ENTMCNC: 32.3 GM/DL — SIGNIFICANT CHANGE UP (ref 32–36)
MCV RBC AUTO: 90.5 FL — SIGNIFICANT CHANGE UP (ref 80–100)
MONOCYTES # BLD AUTO: 0.4 K/UL — SIGNIFICANT CHANGE UP (ref 0–0.9)
MONOCYTES NFR BLD AUTO: 3.1 % — SIGNIFICANT CHANGE UP (ref 2–14)
NEUTROPHILS # BLD AUTO: 11.51 K/UL — HIGH (ref 1.8–7.4)
NEUTROPHILS NFR BLD AUTO: 89.1 % — HIGH (ref 43–77)
PLATELET # BLD AUTO: 170 K/UL — SIGNIFICANT CHANGE UP (ref 150–400)
POTASSIUM SERPL-MCNC: 5.1 MMOL/L — SIGNIFICANT CHANGE UP (ref 3.5–5.3)
POTASSIUM SERPL-SCNC: 5.1 MMOL/L — SIGNIFICANT CHANGE UP (ref 3.5–5.3)
PROT SERPL-MCNC: 7.3 GM/DL — SIGNIFICANT CHANGE UP (ref 6–8.3)
PROTHROM AB SERPL-ACNC: 11.8 SEC — SIGNIFICANT CHANGE UP (ref 10–12.9)
RBC # BLD: 4.31 M/UL — SIGNIFICANT CHANGE UP (ref 3.8–5.2)
RBC # FLD: 14.7 % — HIGH (ref 10.3–14.5)
SODIUM SERPL-SCNC: 142 MMOL/L — SIGNIFICANT CHANGE UP (ref 135–145)
WBC # BLD: 12.91 K/UL — HIGH (ref 3.8–10.5)
WBC # FLD AUTO: 12.91 K/UL — HIGH (ref 3.8–10.5)

## 2019-08-10 PROCEDURE — 85610 PROTHROMBIN TIME: CPT

## 2019-08-10 PROCEDURE — C1713: CPT

## 2019-08-10 PROCEDURE — 85027 COMPLETE CBC AUTOMATED: CPT

## 2019-08-10 PROCEDURE — 72100 X-RAY EXAM L-S SPINE 2/3 VWS: CPT

## 2019-08-10 PROCEDURE — 86923 COMPATIBILITY TEST ELECTRIC: CPT

## 2019-08-10 PROCEDURE — 73501 X-RAY EXAM HIP UNI 1 VIEW: CPT | Mod: RT

## 2019-08-10 PROCEDURE — 70450 CT HEAD/BRAIN W/O DYE: CPT | Mod: 26

## 2019-08-10 PROCEDURE — 36415 COLL VENOUS BLD VENIPUNCTURE: CPT

## 2019-08-10 PROCEDURE — 86900 BLOOD TYPING SEROLOGIC ABO: CPT

## 2019-08-10 PROCEDURE — 73552 X-RAY EXAM OF FEMUR 2/>: CPT | Mod: 26,RT

## 2019-08-10 PROCEDURE — 97116 GAIT TRAINING THERAPY: CPT | Mod: GP

## 2019-08-10 PROCEDURE — 86850 RBC ANTIBODY SCREEN: CPT

## 2019-08-10 PROCEDURE — 97162 PT EVAL MOD COMPLEX 30 MIN: CPT | Mod: GP

## 2019-08-10 PROCEDURE — 76000 FLUOROSCOPY <1 HR PHYS/QHP: CPT

## 2019-08-10 PROCEDURE — 73502 X-RAY EXAM HIP UNI 2-3 VIEWS: CPT | Mod: 26,RT

## 2019-08-10 PROCEDURE — C1889: CPT

## 2019-08-10 PROCEDURE — 73700 CT LOWER EXTREMITY W/O DYE: CPT | Mod: 26,RT

## 2019-08-10 PROCEDURE — 97530 THERAPEUTIC ACTIVITIES: CPT | Mod: GP

## 2019-08-10 PROCEDURE — 76376 3D RENDER W/INTRP POSTPROCES: CPT | Mod: 26

## 2019-08-10 PROCEDURE — 72070 X-RAY EXAM THORAC SPINE 2VWS: CPT

## 2019-08-10 PROCEDURE — 80048 BASIC METABOLIC PNL TOTAL CA: CPT

## 2019-08-10 PROCEDURE — 85730 THROMBOPLASTIN TIME PARTIAL: CPT

## 2019-08-10 PROCEDURE — 86901 BLOOD TYPING SEROLOGIC RH(D): CPT

## 2019-08-10 PROCEDURE — 93005 ELECTROCARDIOGRAM TRACING: CPT

## 2019-08-10 PROCEDURE — 81001 URINALYSIS AUTO W/SCOPE: CPT

## 2019-08-10 PROCEDURE — 71045 X-RAY EXAM CHEST 1 VIEW: CPT | Mod: 26

## 2019-08-10 RX ORDER — MORPHINE SULFATE 50 MG/1
2 CAPSULE, EXTENDED RELEASE ORAL ONCE
Refills: 0 | Status: DISCONTINUED | OUTPATIENT
Start: 2019-08-10 | End: 2019-08-10

## 2019-08-10 RX ORDER — TRAMADOL HYDROCHLORIDE 50 MG/1
50 TABLET ORAL ONCE
Refills: 0 | Status: DISCONTINUED | OUTPATIENT
Start: 2019-08-10 | End: 2019-08-10

## 2019-08-10 RX ORDER — ACETAMINOPHEN 500 MG
1000 TABLET ORAL ONCE
Refills: 0 | Status: COMPLETED | OUTPATIENT
Start: 2019-08-10 | End: 2019-08-10

## 2019-08-10 RX ADMIN — MORPHINE SULFATE 2 MILLIGRAM(S): 50 CAPSULE, EXTENDED RELEASE ORAL at 20:51

## 2019-08-10 RX ADMIN — Medication 1000 MILLIGRAM(S): at 20:05

## 2019-08-10 RX ADMIN — MORPHINE SULFATE 2 MILLIGRAM(S): 50 CAPSULE, EXTENDED RELEASE ORAL at 21:00

## 2019-08-10 RX ADMIN — Medication 400 MILLIGRAM(S): at 19:50

## 2019-08-10 NOTE — ED PROVIDER NOTE - CARE PLAN
Principal Discharge DX:	Periprosthetic fracture around internal prosthetic hip joint, initial encounter  Secondary Diagnosis:	Fall from standing, initial encounter

## 2019-08-10 NOTE — ED PROVIDER NOTE - PRINCIPAL DIAGNOSIS
----- Message from Valarie Jamison NP sent at 7/22/2019  1:11 PM CDT -----  Pt needs CT Head w/o in 1 week. Send me a reminder about when his appt is so I can review it and call pt with results after scan. Thanks.   Periprosthetic fracture around internal prosthetic hip joint, initial encounter

## 2019-08-10 NOTE — ED ADULT TRIAGE NOTE - CHIEF COMPLAINT QUOTE
Pt BIBA EMS s/p mechanical fall from standing height, denies LOC. pt states right hip pain. Pt denies blood thinners.

## 2019-08-10 NOTE — ED PROVIDER NOTE - PROGRESS NOTE DETAILS
Panfilo GAN for ED Attending Dr. Garcia: Reviewed imaging with ortho who is consulting on pt in ED at this time. Ortho discussed w/ patient, likely to go to OR tomorrow pending patient and daughters consent. Endorsed to Dr. Fenton Panfilo GAN for ED Attending Dr. Garcia: Discussed w/ patient about XR findings, family aware. Repeat NV exam normal, redosed w/ pain medication. Reviewed imaging with ortho who is consulting on pt in ED at this time. Ortho discussed w/ patient, likely to go to OR tomorrow pending patient and daughters consent. Endorsed to Dr. Fenton. Stable NV exam.

## 2019-08-10 NOTE — ED PROVIDER NOTE - CONSTITUTIONAL, MLM
normal... Well appearing, well nourished, awake, alert, oriented to person, place, time/situation and in no apparent distress. Well appearing, well nourished, awake, alert, oriented to person, place, situation and in no apparent distress.

## 2019-08-10 NOTE — ED PROVIDER NOTE - MUSCULOSKELETAL, MLM
RLE in shortened, externally rotated, abducted. otherwise distally neurovascularly intact. RLE in shortened, externally rotated, abducted. + TTP to the R proximal and mid thigh. soft compartments. otherwise distally neurovascularly intact.

## 2019-08-10 NOTE — ED PROVIDER NOTE - PMH
Assumed care of patient who is AOx4. Pt has 7 out of 10 pain level, no nausea, or dizziness. Interventions for pain level can be seen on the MAR. Pt is tolerating Soft Fiber GI diet adequately 100%, but will be NPO at midnight. Pt is voiding in the urinal. Pt mobility status is independent. Pt is awaiting IR procedure.  RN Reviewed plan of care with patient, bed in lowest position and locked, pt resting comfortably now, call light within reach, all needs met at this time   Glaucoma    HLD (hyperlipidemia)

## 2019-08-10 NOTE — CONSULT NOTE ADULT - SUBJECTIVE AND OBJECTIVE BOX
89y Female presents c/o R hip pain and inability to ambulate sp mechanical fall. Admits to HS, CT head in ED negative. Denies LOC. Denies numbness/tingling. Denies fever/chills. Denies pain/injury elsewhere. Patient has a hx of right JUAN by Dr. Hennessy approximately 10-12 years ago.     HEALTH ISSUES - PROBLEM Dx:  Glaucoma    HLD (hyperlipidemia).      MEDICATIONS  (STANDING):    Allergies    atenolol (Other)  sulfa drugs (Other)    Intolerances                            12.6   12.91 )-----------( 170      ( 10 Aug 2019 18:42 )             39.0     10 Aug 2019 18:42    142    |  107    |  19     ----------------------------<  112    5.1     |  28     |  0.84     Ca    9.4        10 Aug 2019 18:42    TPro  7.3    /  Alb  4.0    /  TBili  0.5    /  DBili  x      /  AST  23     /  ALT  17     /  AlkPhos  50     10 Aug 2019 18:42    PT/INR - ( 10 Aug 2019 18:42 )   PT: 11.8 sec;   INR: 1.06 ratio         PTT - ( 10 Aug 2019 18:42 )  PTT:32.5 sec  Vital Signs Last 24 Hrs  T(C): 37 (08-10-19 @ 16:42), Max: 37 (08-10-19 @ 16:42)  T(F): 98.6 (08-10-19 @ 16:42), Max: 98.6 (08-10-19 @ 16:42)  HR: 87 (08-10-19 @ 20:58) (86 - 87)  BP: 115/90 (08-10-19 @ 20:58) (115/90 - 167/80)  BP(mean): --  RR: 16 (08-10-19 @ 20:58) (16 - 18)  SpO2: 98% (08-10-19 @ 20:58) (98% - 100%)  Imaging: XR demonstrates R periprosthetic hip fracture    Physical Exam  Gen: NAD  RLE: skin intact, unable to SLR RLE, + log roll RLE, +ttp hip/groin, no ttp elsewhere, +ehl/fhl/ta/gs function, no calf ttp, dp/pt pulse intact, compartments soft  Secondary survey: benign, nv intact, able to SLR contralateral leg, negative log roll contralateral leg, no bony ttp elsewhere    A/P: 89y Female with R periprosthetic hip fracture  Pain control  NWB RLE, bedrest  FU labs/imaging  Ca/Vit D  Outpt osteoporosis workup  Admit to medical team  Medical clearance/optimization for OR  Will discuss with attending  Possible OR with Dr. Hennessy 8/11 for ORIF

## 2019-08-11 LAB
ANION GAP SERPL CALC-SCNC: 6 MMOL/L — SIGNIFICANT CHANGE UP (ref 5–17)
APPEARANCE UR: CLEAR — SIGNIFICANT CHANGE UP
APTT BLD: 30.4 SEC — SIGNIFICANT CHANGE UP (ref 27.5–36.3)
BILIRUB UR-MCNC: NEGATIVE — SIGNIFICANT CHANGE UP
BUN SERPL-MCNC: 13 MG/DL — SIGNIFICANT CHANGE UP (ref 7–23)
CALCIUM SERPL-MCNC: 8.5 MG/DL — SIGNIFICANT CHANGE UP (ref 8.5–10.1)
CHLORIDE SERPL-SCNC: 105 MMOL/L — SIGNIFICANT CHANGE UP (ref 96–108)
CO2 SERPL-SCNC: 29 MMOL/L — SIGNIFICANT CHANGE UP (ref 22–31)
COLOR SPEC: YELLOW — SIGNIFICANT CHANGE UP
CREAT SERPL-MCNC: 0.63 MG/DL — SIGNIFICANT CHANGE UP (ref 0.5–1.3)
DIFF PNL FLD: ABNORMAL
GLUCOSE SERPL-MCNC: 125 MG/DL — HIGH (ref 70–99)
GLUCOSE UR QL: NEGATIVE MG/DL — SIGNIFICANT CHANGE UP
HCT VFR BLD CALC: 35 % — SIGNIFICANT CHANGE UP (ref 34.5–45)
HGB BLD-MCNC: 11.5 G/DL — SIGNIFICANT CHANGE UP (ref 11.5–15.5)
INR BLD: 1.11 RATIO — SIGNIFICANT CHANGE UP (ref 0.88–1.16)
KETONES UR-MCNC: ABNORMAL
LEUKOCYTE ESTERASE UR-ACNC: NEGATIVE — SIGNIFICANT CHANGE UP
MCHC RBC-ENTMCNC: 29.3 PG — SIGNIFICANT CHANGE UP (ref 27–34)
MCHC RBC-ENTMCNC: 32.9 GM/DL — SIGNIFICANT CHANGE UP (ref 32–36)
MCV RBC AUTO: 89.3 FL — SIGNIFICANT CHANGE UP (ref 80–100)
NITRITE UR-MCNC: NEGATIVE — SIGNIFICANT CHANGE UP
PH UR: 6.5 — SIGNIFICANT CHANGE UP (ref 5–8)
PLATELET # BLD AUTO: 156 K/UL — SIGNIFICANT CHANGE UP (ref 150–400)
POTASSIUM SERPL-MCNC: 3.8 MMOL/L — SIGNIFICANT CHANGE UP (ref 3.5–5.3)
POTASSIUM SERPL-SCNC: 3.8 MMOL/L — SIGNIFICANT CHANGE UP (ref 3.5–5.3)
PROT UR-MCNC: 30 MG/DL
PROTHROM AB SERPL-ACNC: 12.4 SEC — SIGNIFICANT CHANGE UP (ref 10–12.9)
RBC # BLD: 3.92 M/UL — SIGNIFICANT CHANGE UP (ref 3.8–5.2)
RBC # FLD: 14.7 % — HIGH (ref 10.3–14.5)
SODIUM SERPL-SCNC: 140 MMOL/L — SIGNIFICANT CHANGE UP (ref 135–145)
SP GR SPEC: 1.01 — SIGNIFICANT CHANGE UP (ref 1.01–1.02)
UROBILINOGEN FLD QL: NEGATIVE MG/DL — SIGNIFICANT CHANGE UP
WBC # BLD: 7.63 K/UL — SIGNIFICANT CHANGE UP (ref 3.8–10.5)
WBC # FLD AUTO: 7.63 K/UL — SIGNIFICANT CHANGE UP (ref 3.8–10.5)

## 2019-08-11 PROCEDURE — 93010 ELECTROCARDIOGRAM REPORT: CPT

## 2019-08-11 PROCEDURE — 72100 X-RAY EXAM L-S SPINE 2/3 VWS: CPT | Mod: 26

## 2019-08-11 PROCEDURE — 72070 X-RAY EXAM THORAC SPINE 2VWS: CPT | Mod: 26

## 2019-08-11 RX ORDER — SODIUM CHLORIDE 9 MG/ML
1000 INJECTION, SOLUTION INTRAVENOUS
Refills: 0 | Status: DISCONTINUED | OUTPATIENT
Start: 2019-08-11 | End: 2019-08-11

## 2019-08-11 RX ORDER — DOCUSATE SODIUM 100 MG
1 CAPSULE ORAL
Qty: 0 | Refills: 0 | DISCHARGE

## 2019-08-11 RX ORDER — HYDROMORPHONE HYDROCHLORIDE 2 MG/ML
0.5 INJECTION INTRAMUSCULAR; INTRAVENOUS; SUBCUTANEOUS EVERY 6 HOURS
Refills: 0 | Status: DISCONTINUED | OUTPATIENT
Start: 2019-08-11 | End: 2019-08-15

## 2019-08-11 RX ORDER — ACETAMINOPHEN 500 MG
650 TABLET ORAL EVERY 6 HOURS
Refills: 0 | Status: DISCONTINUED | OUTPATIENT
Start: 2019-08-11 | End: 2019-08-15

## 2019-08-11 RX ORDER — HEPARIN SODIUM 5000 [USP'U]/ML
5000 INJECTION INTRAVENOUS; SUBCUTANEOUS EVERY 8 HOURS
Refills: 0 | Status: COMPLETED | OUTPATIENT
Start: 2019-08-11 | End: 2019-08-11

## 2019-08-11 RX ORDER — OXYCODONE HYDROCHLORIDE 5 MG/1
1 TABLET ORAL
Qty: 0 | Refills: 0 | DISCHARGE

## 2019-08-11 RX ORDER — ASPIRIN/CALCIUM CARB/MAGNESIUM 324 MG
1 TABLET ORAL
Qty: 0 | Refills: 0 | DISCHARGE

## 2019-08-11 RX ORDER — SIMVASTATIN 20 MG/1
10 TABLET, FILM COATED ORAL AT BEDTIME
Refills: 0 | Status: DISCONTINUED | OUTPATIENT
Start: 2019-08-11 | End: 2019-08-15

## 2019-08-11 RX ORDER — DOCUSATE SODIUM 100 MG
100 CAPSULE ORAL
Refills: 0 | Status: DISCONTINUED | OUTPATIENT
Start: 2019-08-11 | End: 2019-08-15

## 2019-08-11 RX ORDER — ACETAMINOPHEN 500 MG
2 TABLET ORAL
Qty: 0 | Refills: 0 | DISCHARGE

## 2019-08-11 RX ORDER — SODIUM CHLORIDE 9 MG/ML
1000 INJECTION, SOLUTION INTRAVENOUS
Refills: 0 | Status: DISCONTINUED | OUTPATIENT
Start: 2019-08-11 | End: 2019-08-15

## 2019-08-11 RX ORDER — ONDANSETRON 8 MG/1
4 TABLET, FILM COATED ORAL EVERY 6 HOURS
Refills: 0 | Status: DISCONTINUED | OUTPATIENT
Start: 2019-08-11 | End: 2019-08-15

## 2019-08-11 RX ORDER — MORPHINE SULFATE 50 MG/1
2 CAPSULE, EXTENDED RELEASE ORAL EVERY 4 HOURS
Refills: 0 | Status: DISCONTINUED | OUTPATIENT
Start: 2019-08-11 | End: 2019-08-11

## 2019-08-11 RX ADMIN — MORPHINE SULFATE 2 MILLIGRAM(S): 50 CAPSULE, EXTENDED RELEASE ORAL at 08:32

## 2019-08-11 RX ADMIN — ONDANSETRON 4 MILLIGRAM(S): 8 TABLET, FILM COATED ORAL at 13:37

## 2019-08-11 RX ADMIN — HEPARIN SODIUM 5000 UNIT(S): 5000 INJECTION INTRAVENOUS; SUBCUTANEOUS at 21:31

## 2019-08-11 RX ADMIN — SIMVASTATIN 10 MILLIGRAM(S): 20 TABLET, FILM COATED ORAL at 21:31

## 2019-08-11 RX ADMIN — Medication 650 MILLIGRAM(S): at 02:05

## 2019-08-11 RX ADMIN — SODIUM CHLORIDE 80 MILLILITER(S): 9 INJECTION, SOLUTION INTRAVENOUS at 05:24

## 2019-08-11 NOTE — CONSULT NOTE ADULT - ASSESSMENT
89 yo female with PMHx of HLD, macular degeneration, hip surgery 8 years ago presents to the ED with complain of Right hip pain radiating to knee after fall. Patient was getting up off of toilet with help of commode rails when she fell forward, landing against shower door. Unable to ambulate afterwards.    8/11/2019  she denies chest pain or shortness of breath.   EKG has no evidence of ischemia.   From a cardiac standpoint, she can proceed with surgery as planned.

## 2019-08-11 NOTE — PROGRESS NOTE ADULT - SUBJECTIVE AND OBJECTIVE BOX
Pt S&E. Sub optimal pain control. No acute events overnight. Patient complains of mild vague back pain.    Vital Signs Last 24 Hrs  T(C): 36.4 (11 Aug 2019 04:55), Max: 37 (10 Aug 2019 16:42)  T(F): 97.6 (11 Aug 2019 04:55), Max: 98.6 (10 Aug 2019 16:42)  HR: 84 (11 Aug 2019 04:55) (73 - 87)  BP: 140/64 (11 Aug 2019 04:55) (115/90 - 167/80)  BP(mean): --  RR: 19 (11 Aug 2019 04:55) (16 - 19)  SpO2: 98% (11 Aug 2019 04:55) (98% - 100%)    Gen: NAD  RLE:  SILT L2-S1  +EHL/FHL/TA/Gastroc  DP+  Soft compartments, - calf ttp

## 2019-08-11 NOTE — H&P ADULT - ASSESSMENT
89 yo Female presented with Right LE pain after fall.    A/P:    1.  Right Femur Fracture  Fall  -need surgical intervention  -Orthopedics service is following to provide the surgical intervention  -EKG showed abnormality, no prior comparison available for now  -Will need clearance fro cardiology service  -If she is cleared by the Cardiology service then she will be medically optimized for the surgery    2.  HLD  -on Simvastatin    3.  SCD for DVT ppx

## 2019-08-11 NOTE — PROGRESS NOTE ADULT - ASSESSMENT
89 YO F with R periprosthetic hip fracture  Pain control  NWB RLE  Treatment options discussed at great length with patient and family overnight. Surgery is recommended by orthopedic service, patient and family still deciding whether to proceed  FU med/cards clearance for OR  Remain NPO for now  Will reach out to patients daughter and speak with Dr. Hennessy this Am regarding patient  Further recommendations to follow

## 2019-08-11 NOTE — PROVIDER CONTACT NOTE (OTHER) - SITUATION
Tele hospital  to request patient be put on the list to see .
Tele service spoke with Kayy to advise MD of admission.
Tele service spoke with Kayy to request routine consult.

## 2019-08-11 NOTE — CONSULT NOTE ADULT - SUBJECTIVE AND OBJECTIVE BOX
89 yo female with PMHx of HLD, macular degeneration, hip surgery 8 years ago presents to the ED with complain of Right hip pain radiating to knee after fall. Patient was getting up off of toilet with help of commode rails when she fell forward, landing against shower door. Unable to ambulate afterwards. Denies LOC. Denies numbness/tingling. Denies fever/chills. Denies pain/injury elsewhere. Denies CP, Headache, abd pain, dizziness, back pain. Took 2 Tylenol before coming to ED. Lives at home with  with dementia. Has 2 home aids.     8/11/2019  very uncomfortable.   denies chest pain or shortness of breath.   Has no cardiac history except for elevated cholesterol for which she takes Zocor.     Family Hx:  Patient is unable to provide detail family history this time. (11 Aug 2019 04:58)      PAST MEDICAL & SURGICAL HISTORY:  HLD (hyperlipidemia)  Glaucoma    MEDICATIONS  (STANDING):  dextrose 5% + sodium chloride 0.45%. 1000 milliLiter(s) (80 mL/Hr) IV Continuous <Continuous>  simvastatin 10 milliGRAM(s) Oral at bedtime    MEDICATIONS  (PRN):  acetaminophen   Tablet .. 650 milliGRAM(s) Oral every 6 hours PRN Mild Pain (1 - 3), Moderate Pain (4 - 6)  acetaminophen   Tablet .. 650 milliGRAM(s) Oral every 6 hours PRN Temp greater or equal to 38C (100.4F), Mild Pain (1 - 3)  morphine  - Injectable 2 milliGRAM(s) IV Push every 4 hours PRN Moderate Pain (4 - 6)    Allergies    atenolol (Other)  sulfa drugs (Other)    Intolerances      FAMILY HISTORY:        REVIEW OF SYSTEMS:    CONSTITUTIONAL: No weakness, fevers or chills. in a great deal of discomfort, mostly in the hip and back.   EYES/ENT: No visual changes;  No vertigo or throat pain   NECK: No pain or stiffness  RESPIRATORY: No cough, wheezing, hemoptysis; No shortness of breath  CARDIOVASCULAR: No chest pain or palpitations  GASTROINTESTINAL: No abdominal or epigastric pain. No nausea, vomiting, or hematemesis; No diarrhea or constipation. No melena or hematochezia.  GENITOURINARY: No dysuria, frequency or hematuria  NEUROLOGICAL: No numbness or weakness  SKIN: No itching, burning, rashes, or lesions   All other review of systems is negative unless indicated above      PHYSICAL EXAM:  Daily Height in cm: 162.56 (10 Aug 2019 16:42)    Daily   Vital Signs Last 24 Hrs  T(C): 36.4 (11 Aug 2019 04:55), Max: 37 (10 Aug 2019 16:42)  T(F): 97.6 (11 Aug 2019 04:55), Max: 98.6 (10 Aug 2019 16:42)  HR: 84 (11 Aug 2019 04:55) (73 - 87)  BP: 140/64 (11 Aug 2019 04:55) (115/90 - 167/80)  BP(mean): --  RR: 19 (11 Aug 2019 04:55) (16 - 19)  SpO2: 98% (11 Aug 2019 04:55) (98% - 100%)    Constitutional: NAD, awake and alert, well-developed  HEENT: PERR, EOMI, Normal Hearing, MMM  Neck: Soft and supple, No LAD, No JVD  Respiratory: Breath sounds are clear bilaterally, No wheezing, rales or rhonchi  Cardiovascular: S1 and S2, regular rate and rhythm, no Murmurs, gallops or rubs  Gastrointestinal: Bowel Sounds present, soft, nontender, nondistended, no guarding, no rebound  Extremities: No peripheral edema  Vascular: 2+ peripheral pulses  Neurological: A/O x 3, no focal deficits  Musculoskeletal: 5/5 strength b/l upper and lower extremities  Skin: No rashes    LABS: All Labs Reviewed:                        12.6   12.91 )-----------( 170      ( 10 Aug 2019 18:42 )             39.0     08-10    142  |  107  |  19  ----------------------------<  112<H>  5.1   |  28  |  0.84    Ca    9.4      10 Aug 2019 18:42    TPro  7.3  /  Alb  4.0  /  TBili  0.5  /  DBili  x   /  AST  23  /  ALT  17  /  AlkPhos  50  08-10    PT/INR - ( 10 Aug 2019 18:42 )   PT: 11.8 sec;   INR: 1.06 ratio         PTT - ( 10 Aug 2019 18:42 )  PTT:32.5 sec      Blood Culture:     RADIOLOGY:< from: CT 3D Reconstruct w/o Workstation (08.10.19 @ 20:48) >  EXAM:  CT FEMUR ONLY RT                          EXAM:  CT 3D RECONSTRUCT WO MEG                            PROCEDURE DATE:  08/10/2019          INTERPRETATION:      EXAM:   CT Right Lower Extremity Without Contrast, Femur     EXAM DATE/TIME:  8/10/2019 8:37 PM     CLINICAL HISTORY: Right hip pain. 89 years old, female; Other: Femur   fracture     TECHNIQUE:   Imaging protocol: CT of the Right lower extremity without contrast was   performed. Exam focused on the femur. Coronal and sagittal reformatted   images   were created and reviewed. 3-dimensional reconstructions were obtained.    COMPARISON:   CR XR FEMUR 2 VIEWS RIGHT 8/10/2019 5:41 PM     FINDINGS:   Bones/joints: Right hip prosthesis in place with streak artifact. Mildly   displaced acute periprosthetic fracture of the proximal femur extending   from the subtrochanteric region to the distal aspect of the femoral stem   component. There is no periprosthetic fracture of the acetabulum.     Soft tissues: Periosseous and intramuscular soft tissue swelling adjacent   to the fracture.     IMPRESSION:   Right hip prosthesis in place with streak artifact. Mildly displaced   periprosthetic fracture of the right femur extending from the   subtrochanteric region to the level ofthe distal aspect of the femoral   prosthesis.    < end of copied text >    EKG: NSR with frequent PVC and PAC. LBBB

## 2019-08-11 NOTE — H&P ADULT - NEUROLOGICAL DETAILS
deep reflexes intact/cranial nerves intact/sensation intact/alert and oriented x 3/responds to pain/responds to verbal commands/normal strength

## 2019-08-11 NOTE — H&P ADULT - NSHPPHYSICALEXAM_GEN_ALL_CORE
Vital Signs Last 24 Hrs  T(C): 36.4 (11 Aug 2019 04:55), Max: 37 (10 Aug 2019 16:42)  T(F): 97.6 (11 Aug 2019 04:55), Max: 98.6 (10 Aug 2019 16:42)  HR: 84 (11 Aug 2019 04:55) (73 - 87)  BP: 140/64 (11 Aug 2019 04:55) (115/90 - 167/80)  RR: 19 (11 Aug 2019 04:55) (16 - 19)  SpO2: 98% (11 Aug 2019 04:55) (98% - 100%)

## 2019-08-11 NOTE — H&P ADULT - HISTORY OF PRESENT ILLNESS
89 yo female with PMHx of HLD, macular degeneration, hip surgery 8 years ago presents to the ED with complain of Right hip pain radiating to knee after fall. Patient was getting up off of toilet with help of commode rails when she fell forward, landing against shower door. Unable to ambulate afterwards. Denies LOC. Denies numbness/tingling. Denies fever/chills. Denies pain/injury elsewhere. Denies CP, Headache, abd pain, dizziness, back pain. Took 2 Tylenol before coming to ED. Lives at home with  with dementia. Has 2 home aids.     Family Hx:  Patient is unable to provide detail family history this time.

## 2019-08-11 NOTE — PROGRESS NOTE ADULT - SUBJECTIVE AND OBJECTIVE BOX
Dx:     Periprosthetic right femur fracture    Sx: Right Femur ORIF versus Right Revision Total Hip Arthroplasty    Surgeons:  Dr. Hennessy    Med Cleared by Heladio; Cards Cleared Miriam    H&P: done    Allergies:  atenolol (Other)  sulfa drugs (Other)    Vital Signs Last 24 Hrs  T(C): 37.2 (11 Aug 2019 11:52), Max: 37.2 (11 Aug 2019 11:52)  T(F): 98.9 (11 Aug 2019 11:52), Max: 98.9 (11 Aug 2019 11:52)  HR: 59 (11 Aug 2019 11:52) (59 - 87)  BP: 136/64 (11 Aug 2019 11:52) (115/90 - 167/80)  BP(mean): --  RR: 18 (11 Aug 2019 11:52) (16 - 19)  SpO2: 98% (11 Aug 2019 11:52) (98% - 100%)                          11.5   7.63  )-----------( 156      ( 11 Aug 2019 09:11 )             35.0   08-11    140  |  105  |  13  ----------------------------<  125<H>  3.8   |  29  |  0.63    Ca    8.5      11 Aug 2019 09:11    TPro  7.3  /  Alb  4.0  /  TBili  0.5  /  DBili  x   /  AST  23  /  ALT  17  /  AlkPhos  50  08-10    PT/INR - ( 11 Aug 2019 09:11 )   PT: 12.4 sec;   INR: 1.11 ratio         PTT - ( 11 Aug 2019 09:11 )  PTT:30.4 sec    Repeat CBC, BMP, Coags, T&S ordered for AM    UA:  done    EKG: done    CXR: no Pacemaker, clear lungs      89 yo F w/ R periprosthetic femur fracture. To OR tomorrow w/ Dr. Hennessy.    - Pain Control  - NWB R LE  - DVT PPx: - hold all chemical PPx at midnight  - NPO except for meds  - IVF while NPO  - Medical Clearance per Dr. Leija, Cards clearance per Dr. Pineda  - Plan for OR 8/11    Pt discussed w/ Dr. Kaiser who agrees with the plan.

## 2019-08-12 ENCOUNTER — TRANSCRIPTION ENCOUNTER (OUTPATIENT)
Age: 84
End: 2019-08-12

## 2019-08-12 LAB
ANION GAP SERPL CALC-SCNC: 4 MMOL/L — LOW (ref 5–17)
ANION GAP SERPL CALC-SCNC: 8 MMOL/L — SIGNIFICANT CHANGE UP (ref 5–17)
APTT BLD: 29 SEC — SIGNIFICANT CHANGE UP (ref 27.5–36.3)
BUN SERPL-MCNC: 11 MG/DL — SIGNIFICANT CHANGE UP (ref 7–23)
BUN SERPL-MCNC: 13 MG/DL — SIGNIFICANT CHANGE UP (ref 7–23)
CALCIUM SERPL-MCNC: 8 MG/DL — LOW (ref 8.5–10.1)
CALCIUM SERPL-MCNC: 8.5 MG/DL — SIGNIFICANT CHANGE UP (ref 8.5–10.1)
CHLORIDE SERPL-SCNC: 107 MMOL/L — SIGNIFICANT CHANGE UP (ref 96–108)
CHLORIDE SERPL-SCNC: 107 MMOL/L — SIGNIFICANT CHANGE UP (ref 96–108)
CO2 SERPL-SCNC: 25 MMOL/L — SIGNIFICANT CHANGE UP (ref 22–31)
CO2 SERPL-SCNC: 28 MMOL/L — SIGNIFICANT CHANGE UP (ref 22–31)
CREAT SERPL-MCNC: 0.63 MG/DL — SIGNIFICANT CHANGE UP (ref 0.5–1.3)
CREAT SERPL-MCNC: 0.71 MG/DL — SIGNIFICANT CHANGE UP (ref 0.5–1.3)
GLUCOSE SERPL-MCNC: 107 MG/DL — HIGH (ref 70–99)
GLUCOSE SERPL-MCNC: 134 MG/DL — HIGH (ref 70–99)
HCT VFR BLD CALC: 32.5 % — LOW (ref 34.5–45)
HCT VFR BLD CALC: 35.2 % — SIGNIFICANT CHANGE UP (ref 34.5–45)
HGB BLD-MCNC: 10.5 G/DL — LOW (ref 11.5–15.5)
HGB BLD-MCNC: 11.7 G/DL — SIGNIFICANT CHANGE UP (ref 11.5–15.5)
INR BLD: 1.06 RATIO — SIGNIFICANT CHANGE UP (ref 0.88–1.16)
MCHC RBC-ENTMCNC: 29.5 PG — SIGNIFICANT CHANGE UP (ref 27–34)
MCHC RBC-ENTMCNC: 29.7 PG — SIGNIFICANT CHANGE UP (ref 27–34)
MCHC RBC-ENTMCNC: 32.3 GM/DL — SIGNIFICANT CHANGE UP (ref 32–36)
MCHC RBC-ENTMCNC: 33.2 GM/DL — SIGNIFICANT CHANGE UP (ref 32–36)
MCV RBC AUTO: 89.3 FL — SIGNIFICANT CHANGE UP (ref 80–100)
MCV RBC AUTO: 91.3 FL — SIGNIFICANT CHANGE UP (ref 80–100)
PLATELET # BLD AUTO: 129 K/UL — LOW (ref 150–400)
PLATELET # BLD AUTO: 141 K/UL — LOW (ref 150–400)
POTASSIUM SERPL-MCNC: 3.8 MMOL/L — SIGNIFICANT CHANGE UP (ref 3.5–5.3)
POTASSIUM SERPL-MCNC: 4.1 MMOL/L — SIGNIFICANT CHANGE UP (ref 3.5–5.3)
POTASSIUM SERPL-SCNC: 3.8 MMOL/L — SIGNIFICANT CHANGE UP (ref 3.5–5.3)
POTASSIUM SERPL-SCNC: 4.1 MMOL/L — SIGNIFICANT CHANGE UP (ref 3.5–5.3)
PROTHROM AB SERPL-ACNC: 11.8 SEC — SIGNIFICANT CHANGE UP (ref 10–12.9)
RBC # BLD: 3.56 M/UL — LOW (ref 3.8–5.2)
RBC # BLD: 3.94 M/UL — SIGNIFICANT CHANGE UP (ref 3.8–5.2)
RBC # FLD: 14.7 % — HIGH (ref 10.3–14.5)
RBC # FLD: 14.7 % — HIGH (ref 10.3–14.5)
SODIUM SERPL-SCNC: 139 MMOL/L — SIGNIFICANT CHANGE UP (ref 135–145)
SODIUM SERPL-SCNC: 140 MMOL/L — SIGNIFICANT CHANGE UP (ref 135–145)
WBC # BLD: 12.95 K/UL — HIGH (ref 3.8–10.5)
WBC # BLD: 8.17 K/UL — SIGNIFICANT CHANGE UP (ref 3.8–10.5)
WBC # FLD AUTO: 12.95 K/UL — HIGH (ref 3.8–10.5)
WBC # FLD AUTO: 8.17 K/UL — SIGNIFICANT CHANGE UP (ref 3.8–10.5)

## 2019-08-12 PROCEDURE — 93010 ELECTROCARDIOGRAM REPORT: CPT

## 2019-08-12 PROCEDURE — 73501 X-RAY EXAM HIP UNI 1 VIEW: CPT | Mod: 26,RT

## 2019-08-12 RX ORDER — OXYCODONE HYDROCHLORIDE 5 MG/1
5 TABLET ORAL EVERY 4 HOURS
Refills: 0 | Status: DISCONTINUED | OUTPATIENT
Start: 2019-08-12 | End: 2019-08-15

## 2019-08-12 RX ORDER — FOLIC ACID 0.8 MG
1 TABLET ORAL DAILY
Refills: 0 | Status: DISCONTINUED | OUTPATIENT
Start: 2019-08-12 | End: 2019-08-15

## 2019-08-12 RX ORDER — MORPHINE SULFATE 50 MG/1
4 CAPSULE, EXTENDED RELEASE ORAL
Refills: 0 | Status: DISCONTINUED | OUTPATIENT
Start: 2019-08-12 | End: 2019-08-13

## 2019-08-12 RX ORDER — FENTANYL CITRATE 50 UG/ML
25 INJECTION INTRAVENOUS
Refills: 0 | Status: DISCONTINUED | OUTPATIENT
Start: 2019-08-12 | End: 2019-08-13

## 2019-08-12 RX ORDER — MAGNESIUM HYDROXIDE 400 MG/1
30 TABLET, CHEWABLE ORAL DAILY
Refills: 0 | Status: DISCONTINUED | OUTPATIENT
Start: 2019-08-12 | End: 2019-08-15

## 2019-08-12 RX ORDER — ASCORBIC ACID 60 MG
500 TABLET,CHEWABLE ORAL
Refills: 0 | Status: DISCONTINUED | OUTPATIENT
Start: 2019-08-12 | End: 2019-08-15

## 2019-08-12 RX ORDER — BENZOCAINE AND MENTHOL 5; 1 G/100ML; G/100ML
1 LIQUID ORAL
Refills: 0 | Status: DISCONTINUED | OUTPATIENT
Start: 2019-08-12 | End: 2019-08-15

## 2019-08-12 RX ORDER — OXYCODONE HYDROCHLORIDE 5 MG/1
2.5 TABLET ORAL EVERY 4 HOURS
Refills: 0 | Status: DISCONTINUED | OUTPATIENT
Start: 2019-08-12 | End: 2019-08-15

## 2019-08-12 RX ORDER — SODIUM CHLORIDE 9 MG/ML
1000 INJECTION, SOLUTION INTRAVENOUS
Refills: 0 | Status: DISCONTINUED | OUTPATIENT
Start: 2019-08-12 | End: 2019-08-13

## 2019-08-12 RX ORDER — CEFAZOLIN SODIUM 1 G
2000 VIAL (EA) INJECTION EVERY 8 HOURS
Refills: 0 | Status: COMPLETED | OUTPATIENT
Start: 2019-08-12 | End: 2019-08-13

## 2019-08-12 RX ORDER — FERROUS SULFATE 325(65) MG
325 TABLET ORAL
Refills: 0 | Status: DISCONTINUED | OUTPATIENT
Start: 2019-08-12 | End: 2019-08-15

## 2019-08-12 RX ORDER — SODIUM CHLORIDE 9 MG/ML
1000 INJECTION, SOLUTION INTRAVENOUS
Refills: 0 | Status: DISCONTINUED | OUTPATIENT
Start: 2019-08-12 | End: 2019-08-14

## 2019-08-12 RX ADMIN — ONDANSETRON 4 MILLIGRAM(S): 8 TABLET, FILM COATED ORAL at 23:13

## 2019-08-12 RX ADMIN — FENTANYL CITRATE 25 MICROGRAM(S): 50 INJECTION INTRAVENOUS at 23:13

## 2019-08-12 RX ADMIN — FENTANYL CITRATE 25 MICROGRAM(S): 50 INJECTION INTRAVENOUS at 22:46

## 2019-08-12 RX ADMIN — SODIUM CHLORIDE 75 MILLILITER(S): 9 INJECTION, SOLUTION INTRAVENOUS at 09:07

## 2019-08-12 NOTE — DIETITIAN INITIAL EVALUATION ADULT. - FACTORS AFF FOOD INTAKE
family stress - pt states her  has dementia and requires increased care./persistent lack of appetite/other (specify)

## 2019-08-12 NOTE — DISCHARGE NOTE PROVIDER - CARE PROVIDER_API CALL
Casimiro Hennessy)  Orthopaedic Surgery  [address] [place] [address] [place] [address]  Phone: [phone]  Fax: [phone]  Follow Up Time:

## 2019-08-12 NOTE — PROGRESS NOTE ADULT - ASSESSMENT
89 YO F with R periprosthetic hip fracture    Pain control  NWB RLE  Plan for OR today.   Cleared  Npo  Hold AC  FAISAL Hennessy

## 2019-08-12 NOTE — DISCHARGE NOTE PROVIDER - NSDCFUADDINST_GEN_ALL_CORE_FT
ORIF DC Instructions:    1.	Analgesia PRN pain  2.	Non-Weight Bearing  Right Lower Extremity, with assistive device/rolling walker  3.	Continue DVT/PE Prophylaxis. EC ASpirin 325 x 2 weeks OR Lovenox x 2-4 weeks See Med Rec.   4. Take Protonix while on Lovenox  5.	Out of Bed Daily, try not to sit around.  6.	Follow up with Orthopedic Surgeon Dr. Hennessy in 14 Days. Call Office For Appointment. Repeat x-rays in office.  7.	RN to Remove Staples/Sutures at Rehab Post-Op Day 14 (8/26) if they are accessible ie. not covered by cast or splint.   VERSUS  MD will need to Remove staples/sutures in office POD14 (8/26).  8.	Elevate the extremity as much as possible  9.	Keep bandage/Splint Clean and dry. Do not get it wet. Do not walk or put any body weight on splint because it will break.  10.  ***If Dr. Hennessy did surgery, follow up in 1 month. And perform Portable Xray of Extremity at Rehab POD14 before follow up. ORIF DC Instructions:    1.	Analgesia PRN pain  2.	Non-Weight Bearing  Right Lower Extremity, with assistive device/rolling walker  3.	Continue DVT/PE Prophylaxis. See Med Rec.   4. Take Protonix while on Lovenox  5.	Out of Bed Daily, try not to sit around.  6.	Follow up with Orthopedic Surgeon Dr. Hennessy in 14 Days. Call Office For Appointment. Repeat x-rays in office.  7.	RN to Remove Staples/Sutures at Rehab Post-Op Day 14 (8/26) if they are accessible ie. not covered by cast or splint.   8.	Elevate the extremity as much as possible  9.	Keep bandage/Splint Clean and dry. Do not get it wet. Do not walk or put any body weight on splint because it will break.  10.  ***If Dr. Hennessy did surgery, follow up in 1 month. And perform Portable Xray of Extremity at Rehab POD14 before follow up. ORIF DC Instructions:    1. Analgesia PRN pain  2. Non-Weight Bearing  Right Lower Extremity, with assistive device/rolling walker  3. Continue DVT/PE Prophylaxis. See Med Rec.   4. Continue Anticoagulation  5. Out of Bed Daily, try not to sit around.  6. Follow up with Orthopedic Surgeon Dr. Hennessy in 14 Days. Call Office For Appointment. Repeat x-rays in office.  7. RN to Remove Staples at Rehab Post-Op Day 14 (8/26)   8. Aquacel bandage to hip every 7 days, or sooner if it falls off or is saturated.  9. follow up in 1 month with Dr. Hennessy. And perform Portable Xray of Extremity at Rehab POD14 (8/26) before follow up.

## 2019-08-12 NOTE — DIETITIAN INITIAL EVALUATION ADULT. - PERTINENT MEDS FT
MEDICATIONS  (STANDING):  docusate sodium Liquid 100 milliGRAM(s) Oral two times a day  lactated ringers. 1000 milliLiter(s) (75 mL/Hr) IV Continuous <Continuous>  simvastatin 10 milliGRAM(s) Oral at bedtime    MEDICATIONS  (PRN):  acetaminophen   Tablet .. 650 milliGRAM(s) Oral every 6 hours PRN Mild Pain (1 - 3), Moderate Pain (4 - 6)  acetaminophen   Tablet .. 650 milliGRAM(s) Oral every 6 hours PRN Temp greater or equal to 38C (100.4F), Mild Pain (1 - 3)  HYDROmorphone  Injectable 0.5 milliGRAM(s) IV Push every 6 hours PRN Severe Pain (7 - 10)  ondansetron Injectable 4 milliGRAM(s) IV Push every 6 hours PRN Nausea and/or Vomiting

## 2019-08-12 NOTE — DIETITIAN INITIAL EVALUATION ADULT. - ENERGY INTAKE
Pt reports good appetite and states that since aides have been hired her intake has improved. Pt reports having 3 meals daily and normally consuming >75% at each meal. Pts current intake likely meeting needs to maintain current wt however inadequate to regain prior wt loss. Encoraged small frequent meals w/ adequate protein. Recommended supplementing w/ ensure enlive and prosource gelatein to meet ENN Poor (<50%)/Pt currently poor d/t NPO for procedure.

## 2019-08-12 NOTE — CHART NOTE - NSCHARTNOTEFT_GEN_A_CORE
Upon Nutritional Assessment by the Registered Dietitian your patient was determined to meet criteria / has evidence of the following diagnosis/diagnoses:          [ ]  Mild Protein Calorie Malnutrition        [x ]  Moderate Protein Calorie Malnutrition        [ ] Severe Protein Calorie Malnutrition        [ ] Unspecified Protein Calorie Malnutrition        [ ] Underweight / BMI <19        [ ] Morbid Obesity / BMI > 40      Findings:    Pt meets criteria for moderate malnutrition in the context of social/environmental circumstances AEB severe muscle wasting and moderate fat depletion.    *Pt may meet criteria for severe malnutrition however admission wt likely inaccurate. Recommend obtaining new wt to assess for true wt loss.*      Findings as based on:  •  Comprehensive nutrition assessment and consultation  •  Calorie counts (nutrient intake analysis)  •  Food acceptance and intake status from observations by staff  •  Follow up  •  Patient education  •  Intervention secondary to interdisciplinary rounds  •   concerns      Treatment:      1. advance diet as medically feasible to regular diet   2. add ensure enlive BID and gelatein 1x/day   3. encourage small frequent meals to optimize intake   4. obtain new wt and monitor weekly   5. add MVI w/ minerals      The following diet has been recommended:    regular diet      PROVIDER Section:     By signing this assessment you are acknowledging and agree with the diagnosis/diagnoses assigned by the Registered Dietitian    Comments:

## 2019-08-12 NOTE — DISCHARGE NOTE PROVIDER - HOSPITAL COURSE
Orthopedic Summary    H&P:    Pt is a 89y Female  PAST MEDICAL & SURGICAL HISTORY:    HLD (hyperlipidemia)    Glaucoma             Now s/p Total Hip REvision Arthroplasty for [place] B fracture. Pt is afebrile with stable vital signs. Pain is controlled. Exam reveals intact EHL FHL TA GS, +DP. Dressing is clean and dry with a New Aquacel bandage on.        Hospital Course:    Patient presented to [facility] ED after a fall, found to have a femoral neck fracture. Pt was  medically/cardiac cleared prior to surgery. Prophylactic antibiotics were started before the procedure and continued for 24 hours. They were admitted after surgery to the orthopedic floor.  There were no complications during the hospital stay. All home medications were continued. Pt was kept Non Weight Bearing on the Right Leg until further notice.        Routine consults were obtained from the Anticoagulation Team for DVT/PE prophylaxis, from Physical Therapy for posterior hip precaution teaching and twice daily PT, Non Weight Bearing and pt was followed by Medicine for Co-management. Patient was placed on anticoagulation.  Pertinent home medications were continued.  Daily labs were followed.          On POD 0 there were no overnight events. Received twice daily PT and new Aquacel dressing was applied prior to discharge. The pt will likely need DC to Rehab for ongoing PT, once evaluated and ok per Medicine.  The orthopedic Attending is aware and agrees. See addendum to DC summary per medical team below for any additional info or if any changes. Orthopedic Summary    H&P:    Pt is a 89y Female  PAST MEDICAL & SURGICAL HISTORY:    HLD (hyperlipidemia)    Glaucoma              Now s/p Total Hip REvision Arthroplasty for [place] B fracture. Pt is afebrile with stable vital signs. Pain is controlled. Exam reveals intact EHL FHL TA GS, +DP. Dressing is clean and dry with a New Aquacel bandage on.        Hospital Course:    Patient presented to [facility] ED after a fall, found to have a femoral neck fracture. Pt was  medically/cardiac cleared prior to surgery. Prophylactic antibiotics were started before the procedure and continued for 24 hours. They were admitted after surgery to the orthopedic floor.  There were no complications during the hospital stay. All home medications were continued. Pt was kept Non Weight Bearing on the Right Leg until further notice.        Routine consults were obtained from the Anticoagulation Team for DVT/PE prophylaxis, from Physical Therapy for posterior hip precaution teaching and twice daily PT, Non Weight Bearing and pt was followed by Medicine for Co-management. Patient was placed on anticoagulation.  Pertinent home medications were continued.  Daily labs were followed.          On POD 0 there were no overnight events. Received twice daily PT and new Aquacel dressing was applied prior to discharge. The pt will likely need DC to Rehab for ongoing PT, once evaluated and ok per Medicine.  The orthopedic Attending is aware and agrees. See addendum to DC summary per medical team below for any additional info or if any changes.

## 2019-08-12 NOTE — PROGRESS NOTE ADULT - SUBJECTIVE AND OBJECTIVE BOX
hpi: 89y female presents w/ hip pain after fall and found to have right femur fracture.  Plan for OR today.   No complaints.  No pain.     ros- as per hpi, 10 point ros negative    Vital Signs Last 24 Hrs  T(C): 36.8 (12 Aug 2019 11:36), Max: 37.1 (11 Aug 2019 16:48)  T(F): 98.3 (12 Aug 2019 11:36), Max: 98.8 (11 Aug 2019 16:48)  HR: 88 (12 Aug 2019 11:36) (76 - 88)  BP: 125/45 (12 Aug 2019 11:36) (125/45 - 133/50)  BP(mean): --  RR: 18 (12 Aug 2019 11:36) (17 - 18)  SpO2: 95% (12 Aug 2019 11:36) (95% - 98%)      PHYSICAL EXAM:  General: NAD, comfortable  Neuro: AAOx3, no focal deficits  HEENT: NCAT   Neck: Soft and supple  Respiratory: CTA b/l, no w/r/r  Cardiovascular: S1 and S2, RRR  Gastrointestinal: soft, non ttp   Extremities: No edema  Vascular: 2+ peripheral pulses          LABS: All Labs Reviewed:                        11.7   8.17  )-----------( 141      ( 12 Aug 2019 07:11 )             35.2     08-12    139  |  107  |  11  ----------------------------<  107<H>  4.1   |  28  |  0.63    Ca    8.5      12 Aug 2019 07:11    TPro  7.3  /  Alb  4.0  /  TBili  0.5  /  DBili  x   /  AST  23  /  ALT  17  /  AlkPhos  50  08-10    PT/INR - ( 12 Aug 2019 07:11 )   PT: 11.8 sec;   INR: 1.06 ratio         PTT - ( 12 Aug 2019 07:11 )  PTT:29.0 sec        MEDICATIONS  (STANDING):  docusate sodium Liquid 100 milliGRAM(s) Oral two times a day  lactated ringers. 1000 milliLiter(s) (75 mL/Hr) IV Continuous <Continuous>  simvastatin 10 milliGRAM(s) Oral at bedtime    MEDICATIONS  (PRN):  acetaminophen   Tablet .. 650 milliGRAM(s) Oral every 6 hours PRN Mild Pain (1 - 3), Moderate Pain (4 - 6)  acetaminophen   Tablet .. 650 milliGRAM(s) Oral every 6 hours PRN Temp greater or equal to 38C (100.4F), Mild Pain (1 - 3)  HYDROmorphone  Injectable 0.5 milliGRAM(s) IV Push every 6 hours PRN Severe Pain (7 - 10)  ondansetron Injectable 4 milliGRAM(s) IV Push every 6 hours PRN Nausea and/or Vomiting    Assessment and Plan:   89y female w/     1. right periprosthetic hip fracture  - pain controlled  - npo for procedure   - medically optimized for OR today   - Cardio, Ortho f/u appreciated     2. dvt px  scds  - start post op

## 2019-08-12 NOTE — DIETITIAN INITIAL EVALUATION ADULT. - PHYSICAL APPEARANCE
BMI 27.5 however admission wt likely inaccurate causing inaccurate BMi/other (specify)/underweight/emaciated NFPE significant for muscle wasting: severe: temporal, clavicle, deltoid regions; fat depletion: moderate: occipital and buccal.   No noted edema or skin breakdown  Anton 16.

## 2019-08-12 NOTE — DIETITIAN INITIAL EVALUATION ADULT. - OTHER INFO
Pt is a 89 yo F w/ PMH HLD, macular degeneration, hip surgery 8 years ago presents to the ED with complain of Right hip pain radiating to knee after fall.

## 2019-08-12 NOTE — DIETITIAN INITIAL EVALUATION ADULT. - PERTINENT LABORATORY DATA
08-12 Na139 mmol/L Glu 107 mg/dL<H> K+ 4.1 mmol/L Cr  0.63 mg/dL BUN 11 mg/dL Phos n/a   Alb n/a   PAB n/a

## 2019-08-12 NOTE — DISCHARGE NOTE PROVIDER - NSDCCPCAREPLAN_GEN_ALL_CORE_FT
PRINCIPAL DISCHARGE DIAGNOSIS  Diagnosis: Periprosthetic fracture around internal prosthetic hip joint, initial encounter  Assessment and Plan of Treatment:       SECONDARY DISCHARGE DIAGNOSES  Diagnosis: Fall from standing, initial encounter  Assessment and Plan of Treatment: PRINCIPAL DISCHARGE DIAGNOSIS  Diagnosis: Periprosthetic fracture around internal prosthetic hip joint, initial encounter  Assessment and Plan of Treatment: s/p ORIF.   pain control.   follow up with Dr. Hennessy  take lovenox 40mg subcutaneous injections daily for 4 weeks for dvt prophylaxis.   Physical therapy at rehab.   check labs within 1 week- cbc.      SECONDARY DISCHARGE DIAGNOSES  Diagnosis: Fall from standing, initial encounter  Assessment and Plan of Treatment:

## 2019-08-12 NOTE — DIETITIAN INITIAL EVALUATION ADULT. - ADD RECOMMEND
1. advance diet as medically feasible to regular diet 2. add ensure enlive BID and gelatein 1x/day 3. encourage small frequent meals to optimize intake 4. obtain new wt and monitor weekly 5. add MVI w/ minerals

## 2019-08-12 NOTE — DIETITIAN INITIAL EVALUATION ADULT. - NS FNS WEIGHT CHANGE REASON
unintentional/Pt reports unintentional wt loss over the past year r/t  requiring increased help and care and pts hx of falls. Pt now has 2 aides at home to help with care for her and her  also w/ daughter in the home. As per pt wt has been stable as of recent. Pt reports UBW prior to wt loss 135# and she was recently 106#. Admission wt likely inaccurate given hx. Bed scale reveals 124# however bed not zeroed out to provide accurate wt. Based on pts reported wt loss, wt loss is clinically significant.

## 2019-08-12 NOTE — PROGRESS NOTE ADULT - SUBJECTIVE AND OBJECTIVE BOX
Pt S&E. Sub optimal pain control. No acute events overnight. Mild LBp, nonradicular.     Vital Signs Last 24 Hrs  T(C): 36.4 (11 Aug 2019 04:55), Max: 37 (10 Aug 2019 16:42)  T(F): 97.6 (11 Aug 2019 04:55), Max: 98.6 (10 Aug 2019 16:42)  HR: 84 (11 Aug 2019 04:55) (73 - 87)  BP: 140/64 (11 Aug 2019 04:55) (115/90 - 167/80)  BP(mean): --  RR: 19 (11 Aug 2019 04:55) (16 - 19)  SpO2: 98% (11 Aug 2019 04:55) (98% - 100%)    Gen: NAD  RLE:  SILT L2-S1  +EHL/FHL/TA/Gastroc  DP+  Soft compartments, - calf ttp

## 2019-08-13 LAB
ANION GAP SERPL CALC-SCNC: 6 MMOL/L — SIGNIFICANT CHANGE UP (ref 5–17)
APPEARANCE UR: CLEAR — SIGNIFICANT CHANGE UP
BILIRUB UR-MCNC: NEGATIVE — SIGNIFICANT CHANGE UP
BUN SERPL-MCNC: 12 MG/DL — SIGNIFICANT CHANGE UP (ref 7–23)
CALCIUM SERPL-MCNC: 8.1 MG/DL — LOW (ref 8.5–10.1)
CHLORIDE SERPL-SCNC: 106 MMOL/L — SIGNIFICANT CHANGE UP (ref 96–108)
CO2 SERPL-SCNC: 28 MMOL/L — SIGNIFICANT CHANGE UP (ref 22–31)
COLOR SPEC: YELLOW — SIGNIFICANT CHANGE UP
CREAT SERPL-MCNC: 0.72 MG/DL — SIGNIFICANT CHANGE UP (ref 0.5–1.3)
DIFF PNL FLD: ABNORMAL
GLUCOSE SERPL-MCNC: 115 MG/DL — HIGH (ref 70–99)
GLUCOSE UR QL: NEGATIVE MG/DL — SIGNIFICANT CHANGE UP
HCT VFR BLD CALC: 30.4 % — LOW (ref 34.5–45)
HGB BLD-MCNC: 9.9 G/DL — LOW (ref 11.5–15.5)
KETONES UR-MCNC: ABNORMAL
LEUKOCYTE ESTERASE UR-ACNC: ABNORMAL
MCHC RBC-ENTMCNC: 29.3 PG — SIGNIFICANT CHANGE UP (ref 27–34)
MCHC RBC-ENTMCNC: 32.6 GM/DL — SIGNIFICANT CHANGE UP (ref 32–36)
MCV RBC AUTO: 89.9 FL — SIGNIFICANT CHANGE UP (ref 80–100)
NITRITE UR-MCNC: NEGATIVE — SIGNIFICANT CHANGE UP
PH UR: 6 — SIGNIFICANT CHANGE UP (ref 5–8)
PLATELET # BLD AUTO: 129 K/UL — LOW (ref 150–400)
POTASSIUM SERPL-MCNC: 3.8 MMOL/L — SIGNIFICANT CHANGE UP (ref 3.5–5.3)
POTASSIUM SERPL-SCNC: 3.8 MMOL/L — SIGNIFICANT CHANGE UP (ref 3.5–5.3)
PROT UR-MCNC: 15 MG/DL
RBC # BLD: 3.38 M/UL — LOW (ref 3.8–5.2)
RBC # FLD: 14.9 % — HIGH (ref 10.3–14.5)
SODIUM SERPL-SCNC: 140 MMOL/L — SIGNIFICANT CHANGE UP (ref 135–145)
SP GR SPEC: 1.01 — SIGNIFICANT CHANGE UP (ref 1.01–1.02)
UROBILINOGEN FLD QL: NEGATIVE MG/DL — SIGNIFICANT CHANGE UP
WBC # BLD: 9.96 K/UL — SIGNIFICANT CHANGE UP (ref 3.8–10.5)
WBC # FLD AUTO: 9.96 K/UL — SIGNIFICANT CHANGE UP (ref 3.8–10.5)

## 2019-08-13 PROCEDURE — 99223 1ST HOSP IP/OBS HIGH 75: CPT

## 2019-08-13 RX ORDER — ENOXAPARIN SODIUM 100 MG/ML
40 INJECTION SUBCUTANEOUS
Qty: 0 | Refills: 0 | DISCHARGE
Start: 2019-08-13 | End: 2019-09-10

## 2019-08-13 RX ORDER — ACETAMINOPHEN 500 MG
1000 TABLET ORAL ONCE
Refills: 0 | Status: COMPLETED | OUTPATIENT
Start: 2019-08-13 | End: 2019-08-13

## 2019-08-13 RX ORDER — DIPHENHYDRAMINE HCL 50 MG
25 CAPSULE ORAL EVERY 4 HOURS
Refills: 0 | Status: DISCONTINUED | OUTPATIENT
Start: 2019-08-13 | End: 2019-08-15

## 2019-08-13 RX ORDER — ENOXAPARIN SODIUM 100 MG/ML
40 INJECTION SUBCUTANEOUS DAILY
Refills: 0 | Status: DISCONTINUED | OUTPATIENT
Start: 2019-08-13 | End: 2019-08-15

## 2019-08-13 RX ORDER — ENOXAPARIN SODIUM 100 MG/ML
40 INJECTION SUBCUTANEOUS
Qty: 0 | Refills: 0 | DISCHARGE
Start: 2019-08-13 | End: 2019-09-09

## 2019-08-13 RX ORDER — HEPARIN SODIUM 5000 [USP'U]/ML
5000 INJECTION INTRAVENOUS; SUBCUTANEOUS EVERY 8 HOURS
Refills: 0 | Status: DISCONTINUED | OUTPATIENT
Start: 2019-08-13 | End: 2019-08-13

## 2019-08-13 RX ADMIN — SIMVASTATIN 10 MILLIGRAM(S): 20 TABLET, FILM COATED ORAL at 22:12

## 2019-08-13 RX ADMIN — ENOXAPARIN SODIUM 40 MILLIGRAM(S): 100 INJECTION SUBCUTANEOUS at 17:47

## 2019-08-13 RX ADMIN — HEPARIN SODIUM 5000 UNIT(S): 5000 INJECTION INTRAVENOUS; SUBCUTANEOUS at 06:20

## 2019-08-13 RX ADMIN — Medication 100 MILLIGRAM(S): at 04:18

## 2019-08-13 RX ADMIN — Medication 400 MILLIGRAM(S): at 11:37

## 2019-08-13 RX ADMIN — ONDANSETRON 4 MILLIGRAM(S): 8 TABLET, FILM COATED ORAL at 11:37

## 2019-08-13 RX ADMIN — HYDROMORPHONE HYDROCHLORIDE 0.5 MILLIGRAM(S): 2 INJECTION INTRAMUSCULAR; INTRAVENOUS; SUBCUTANEOUS at 00:28

## 2019-08-13 RX ADMIN — Medication 650 MILLIGRAM(S): at 18:18

## 2019-08-13 RX ADMIN — Medication 1000 MILLIGRAM(S): at 11:37

## 2019-08-13 RX ADMIN — HYDROMORPHONE HYDROCHLORIDE 0.5 MILLIGRAM(S): 2 INJECTION INTRAMUSCULAR; INTRAVENOUS; SUBCUTANEOUS at 00:13

## 2019-08-13 RX ADMIN — Medication 500 MILLIGRAM(S): at 05:10

## 2019-08-13 RX ADMIN — Medication 650 MILLIGRAM(S): at 17:48

## 2019-08-13 RX ADMIN — Medication 100 MILLIGRAM(S): at 12:23

## 2019-08-13 NOTE — PHYSICAL THERAPY INITIAL EVALUATION ADULT - GENERAL OBSERVATIONS, REHAB EVAL
The pt was pleasant and cooperative and eager to ambulate with PT, the pt was initially very scared and apprehensive with getting OOB due to hx of falls, the pt agreed to participate with PT after much pt education and encouragement.

## 2019-08-13 NOTE — PHYSICAL THERAPY INITIAL EVALUATION ADULT - ADDITIONAL COMMENTS
The pt reports that she lives with her  and she uses a RW for ambulation at home, the pt reports that she enters her home through the laundry room but has multiple steps to negotiate, the pt does not enter through the front of the home due to steps without railings. The pt acknowledges having 2 falls in the past which have greatly affected her confidence with ambulation.

## 2019-08-13 NOTE — PROGRESS NOTE ADULT - SUBJECTIVE AND OBJECTIVE BOX
hpi: 89y female presents w/ hip pain after fall and found to have right femur fracture.  Plan for OR today.   No complaints.  No pain.   8/13- resting, pain controlled.  Discussed w/ daughter at bedside- requesting Dianna.     ros- as per hpi, 10 point ros negative    Vital Signs Last 24 Hrs  T(C): 37.7 (13 Aug 2019 11:09), Max: 37.7 (13 Aug 2019 11:09)  T(F): 99.9 (13 Aug 2019 11:09), Max: 99.9 (13 Aug 2019 11:09)  HR: 95 (13 Aug 2019 11:09) (77 - 96)  BP: 137/72 (13 Aug 2019 11:09) (122/69 - 164/62)  BP(mean): --  RR: 16 (13 Aug 2019 11:09) (12 - 18)  SpO2: 95% (13 Aug 2019 11:09) (95% - 100%)  T(C): 36.8 (12 Aug 2019 11:36), Max: 37.1 (11 Aug 2019 16:48)      PHYSICAL EXAM:  General: NAD, comfortable  Neuro: AAOx3, no focal deficits  HEENT: NCAT   Neck: Soft and supple  Respiratory: CTA b/l, no w/r/r  Cardiovascular: S1 and S2, RRR  Gastrointestinal: soft, non ttp   Extremities: dressing intact,  2+ peripheral pulses        LABS: All Labs Reviewed:                        9.9    9.96  )-----------( 129      ( 13 Aug 2019 06:35 )             30.4     08-13    140  |  106  |  12  ----------------------------<  115<H>  3.8   |  28  |  0.72    Ca    8.1<L>      13 Aug 2019 06:35      PT/INR - ( 12 Aug 2019 07:11 )   PT: 11.8 sec;   INR: 1.06 ratio         PTT - ( 12 Aug 2019 07:11 )  PTT:29.0 sec      MEDICATIONS  (STANDING):  ascorbic acid 500 milliGRAM(s) Oral two times a day  docusate sodium Liquid 100 milliGRAM(s) Oral two times a day  enoxaparin Injectable 40 milliGRAM(s) SubCutaneous daily  ferrous    sulfate 325 milliGRAM(s) Oral three times a day with meals  folic acid 1 milliGRAM(s) Oral daily  lactated ringers. 1000 milliLiter(s) (75 mL/Hr) IV Continuous <Continuous>  lactated ringers. 1000 milliLiter(s) (75 mL/Hr) IV Continuous <Continuous>  multivitamin 1 Tablet(s) Oral daily  simvastatin 10 milliGRAM(s) Oral at bedtime    MEDICATIONS  (PRN):  acetaminophen   Tablet .. 650 milliGRAM(s) Oral every 6 hours PRN Mild Pain (1 - 3), Moderate Pain (4 - 6)  acetaminophen   Tablet .. 650 milliGRAM(s) Oral every 6 hours PRN Temp greater or equal to 38C (100.4F), Mild Pain (1 - 3)  benzocaine 15 mG/menthol 3.6 mG Lozenge 1 Lozenge Oral every 3 hours PRN Sore Throat  diphenhydrAMINE 25 milliGRAM(s) Oral every 4 hours PRN Rash and/or Itching  HYDROmorphone  Injectable 0.5 milliGRAM(s) IV Push every 6 hours PRN Severe Pain (7 - 10)  magnesium hydroxide Suspension 30 milliLiter(s) Oral daily PRN Constipation  ondansetron Injectable 4 milliGRAM(s) IV Push every 6 hours PRN Nausea and/or Vomiting  oxyCODONE    IR 2.5 milliGRAM(s) Oral every 4 hours PRN Moderate Pain (4 - 6)  oxyCODONE    IR 5 milliGRAM(s) Oral every 4 hours PRN Severe Pain (7 - 10)    Assessment and Plan:   89y female w/     1. right periprosthetic hip fracture s/p ORIF pod 1  - pain controlled  - nwb  - oob to chair, PT eval  - Cardio, Ortho f/u appreciated      2. dvt px  lovenox sc x 4 weeks post op

## 2019-08-13 NOTE — PROGRESS NOTE ADULT - SUBJECTIVE AND OBJECTIVE BOX
89 yo female with PMHx of HLD, macular degeneration, hip surgery 8 years ago presents to the ED with complain of Right hip pain radiating to knee after fall. Patient was getting up off of toilet with help of commode rails when she fell forward, landing against shower door. Unable to ambulate afterwards. Denies LOC. Denies numbness/tingling. Denies fever/chills. Denies pain/injury elsewhere. Denies CP, Headache, abd pain, dizziness, back pain. Took 2 Tylenol before coming to ED. Lives at home with  with dementia. Has 2 home aids.     8/11/2019  very uncomfortable.   denies chest pain or shortness of breath.   Has no cardiac history except for elevated cholesterol for which she takes Zocor.     8/13 feeling slightly better this AM  s/p surgery    Family Hx:  Patient is unable to provide detail family history this time. (11 Aug 2019 04:58)      PAST MEDICAL & SURGICAL HISTORY:  HLD (hyperlipidemia)  Glaucoma    MEDICATIONS  (STANDING):  ascorbic acid 500 milliGRAM(s) Oral two times a day  ceFAZolin   IVPB 2000 milliGRAM(s) IV Intermittent every 8 hours  docusate sodium Liquid 100 milliGRAM(s) Oral two times a day  ferrous    sulfate 325 milliGRAM(s) Oral three times a day with meals  folic acid 1 milliGRAM(s) Oral daily  heparin  Injectable 5000 Unit(s) SubCutaneous every 8 hours  lactated ringers. 1000 milliLiter(s) (75 mL/Hr) IV Continuous <Continuous>  lactated ringers. 1000 milliLiter(s) (75 mL/Hr) IV Continuous <Continuous>  multivitamin 1 Tablet(s) Oral daily  simvastatin 10 milliGRAM(s) Oral at bedtime    Allergies    atenolol (Other)  sulfa drugs (Other)    Intolerances      FAMILY HISTORY:        REVIEW OF SYSTEMS:    CONSTITUTIONAL: No weakness, fevers or chills. mild discomfort, mostly in the hip and back.   EYES/ENT: No visual changes;  No vertigo or throat pain   NECK: No pain or stiffness  RESPIRATORY: No cough, wheezing, hemoptysis; No shortness of breath  CARDIOVASCULAR: No chest pain or palpitations  GASTROINTESTINAL: No abdominal or epigastric pain. No nausea, vomiting, or hematemesis; No diarrhea or constipation. No melena or hematochezia.  GENITOURINARY: No dysuria, frequency or hematuria  NEUROLOGICAL: No numbness or weakness  SKIN: No itching, burning, rashes, or lesions   All other review of systems is negative unless indicated above      ICU Vital Signs Last 24 Hrs  T(C): 36.8 (13 Aug 2019 05:08), Max: 37.1 (12 Aug 2019 23:30)  T(F): 98.2 (13 Aug 2019 05:08), Max: 98.7 (12 Aug 2019 23:30)  HR: 92 (13 Aug 2019 05:08) (77 - 96)  BP: 122/69 (13 Aug 2019 05:08) (122/69 - 164/62)  BP(mean): --  ABP: --  ABP(mean): --  RR: 18 (13 Aug 2019 00:04) (12 - 18)  SpO2: 96% (13 Aug 2019 05:08) (95% - 100%)    Constitutional: NAD, awake and alert, well-developed  HEENT: PERR, EOMI, Normal Hearing, MMM  Neck: Soft and supple, No LAD, No JVD  Respiratory: Breath sounds are clear bilaterally, No wheezing, rales or rhonchi  Cardiovascular: S1 and S2, regular rate and rhythm, no Murmurs, gallops or rubs  Gastrointestinal: Bowel Sounds present, soft, nontender, nondistended, no guarding, no rebound  Extremities: No peripheral edema  Vascular: 2+ peripheral pulses  Neurological: A/O x 3, no focal deficits  Musculoskeletal: 5/5 strength b/l upper and lower extremities  Skin: No rashes    LABS: All Labs Reviewed:                        9.9    9.96  )-----------( 129      ( 13 Aug 2019 06:35 )             30.4   08-13    140  |  106  |  12  ----------------------------<  115<H>  3.8   |  28  |  0.72    Ca    8.1<L>      13 Aug 2019 06:35        Blood Culture:     RADIOLOGY:< from: CT 3D Reconstruct w/o Workstation (08.10.19 @ 20:48) >  EXAM:  CT FEMUR ONLY RT                          EXAM:  CT 3D RECONSTRUCT MEGAN WEAVER                            PROCEDURE DATE:  08/10/2019          INTERPRETATION:      EXAM:   CT Right Lower Extremity Without Contrast, Femur     EXAM DATE/TIME:  8/10/2019 8:37 PM     CLINICAL HISTORY: Right hip pain. 89 years old, female; Other: Femur   fracture     TECHNIQUE:   Imaging protocol: CT of the Right lower extremity without contrast was   performed. Exam focused on the femur. Coronal and sagittal reformatted   images   were created and reviewed. 3-dimensional reconstructions were obtained.    COMPARISON:   CR XR FEMUR 2 VIEWS RIGHT 8/10/2019 5:41 PM     FINDINGS:   Bones/joints: Right hip prosthesis in place with streak artifact. Mildly   displaced acute periprosthetic fracture of the proximal femur extending   from the subtrochanteric region to the distal aspect of the femoral stem   component. There is no periprosthetic fracture of the acetabulum.     Soft tissues: Periosseous and intramuscular soft tissue swelling adjacent   to the fracture.     IMPRESSION:   Right hip prosthesis in place with streak artifact. Mildly displaced   periprosthetic fracture of the right femur extending from the   subtrochanteric region to the level ofthe distal aspect of the femoral   prosthesis.    < end of copied text >    EKG: NSR with frequent PVC and PAC. LBBB

## 2019-08-13 NOTE — PHYSICAL THERAPY INITIAL EVALUATION ADULT - PERTINENT HX OF CURRENT PROBLEM, REHAB EVAL
89 yo female with PMHx of HLD, macular degeneration, hip surgery 8 years ago presents to the ED with complain of Right hip pain radiating to knee after fall. Patient was getting up off of toilet with help of commode rails when she fell forward, landing against shower door. Unable to ambulate afterwards.  Lives at home with  with dementia. Has 2 home aids.

## 2019-08-13 NOTE — PROGRESS NOTE ADULT - ASSESSMENT
89 yo female with PMHx of HLD, macular degeneration, hip surgery 8 years ago presents to the ED with complain of Right hip pain radiating to knee after fall. Patient was getting up off of toilet with help of commode rails when she fell forward, landing against shower door. Unable to ambulate afterwards.    8/11/2019  she denies chest pain or shortness of breath.   EKG has no evidence of ischemia.   From a cardiac standpoint, she can proceed with surgery as planned.     8/13- hemodynamically stable post op- continue home medications  PT and DC planning as per ortho

## 2019-08-13 NOTE — PROGRESS NOTE ADULT - ASSESSMENT
Assessment    This is a 89y old  Female who presents with right hip pain, now on 2N, s/p Right femur ORIF  Consulted by Dr. Hennessy  for VTE prophylaxis, risk stratification, and anticoagulation management. POD#1. Patient is high risk for VTE due to age, immobility, surgery, and BMI. Low risk for bleeding.    Plan  : will d/c heparin and start Lovenox 40mg sq daily for four weeks post procedure  :daily cbc/bmp  :LE Venodynes  : increase mobility as tolerated  :Thanks for consult will f/u Assessment    This is a 89y old  Female who presents with right hip pain, now on 2N, s/p Right femur ORIF  Consulted by Dr. Hennessy  for VTE prophylaxis, risk stratification, and anticoagulation management. POD#1. Patient is high risk for VTE due to age, immobility, surgery, and BMI. Low risk for bleeding.    Plan  : will d/c heparin and start Lovenox 40mg sq daily for four weeks post procedure  :daily cbc/bmp  :LE Venodynes  : increase mobility as tolerated    :Thanks for consult will f/u

## 2019-08-13 NOTE — PROGRESS NOTE ADULT - SUBJECTIVE AND OBJECTIVE BOX
HPI:  89 yo female with PMHx of HLD, macular degeneration, hip surgery 8 years ago presents to the ED with complain of Right hip pain radiating to knee after fall. Patient was getting up off of toilet with help of commode rails when she fell forward, landing against shower door. Unable to ambulate afterwards. Denies LOC. Denies numbness/tingling. Denies fever/chills. Denies pain/injury elsewhere. Denies CP, Headache, abd pain, dizziness, back pain. Took 2 Tylenol before coming to ED. Lives at home with  with dementia. Has 2 home aids.     Family Hx:  Patient is unable to provide detail family history this time. (11 Aug 2019 04:58)      Patient is a 89y old  Female who presents with a chief complaint of complain of right lower ext pain (13 Aug 2019 07:25)      Consulted by     for VTE prophylaxis, risk stratification, and anticoagulation management.    PAST MEDICAL & SURGICAL HISTORY:  HLD (hyperlipidemia)  Glaucoma          CrCl:    Caprini VTE Risk Score:    IMPROVE VTE Risk Score:    [id number] Score:     IMPROVE Bleeding Risk Score    Torniq time:     Time In:   Time Out:     Falls Risk:   High (  )  Mod (  )  Low (  )      FAMILY HISTORY:    Denies any personal or familial history of clotting or bleeding disorders.    Allergies    atenolol (Other)  sulfa drugs (Other)    Intolerances        REVIEW OF SYSTEMS    (  )Fever	     (  )Constipation	(  )SOB				(  )Headache	(  )Dysuria  (  )Chills	     (  )Melena	(  )Dyspnea present on exertion	                    (  )Dizziness                    (  )Polyuria  (  )Nausea	     (  )Hematochezia	(  )Cough			                    (  )Syncope   	(  )Hematuria  (  )Vomiting    (  )Chest Pain	(  )Wheezing			(  )Weakness  (  )Diarrhea     (  )Palpitations	(  )Anorexia			( x )joint pain    All  other review of systems negative: Yes    Unable to obtain review of systems due to:      PHYSICAL EXAM:    Constitutional: Appears Well    Neurological: A& O x 3    Skin: Warm    Respiratory and Thorax: normal effort; Breath sounds: normal; No rales/wheezing/rhonchi  	  Cardiovascular: S1, S2, regular, NMBR	    Gastrointestinal: BS + x 4Q, nontender	    Genitourinary:  Bladder nondistended, nontender    Musculoskeletal:   General Right:   no muscle/joint tenderness,   normal tone, no joint swelling,   ROM: limited/full	    General Left:   no muscle/joint tenderness,   normal tone, no joint swelling,   ROM: limited/full    Hip:  Right: Dressing CDI;            Left: Dressing CDI;       Knee:  Right: Incision: ; Dressing CDI;                 Left: Incision: ; Dressing CDI;     Shoulder:  Rt: Drsing CDI; Sling/immob. noted; Cap refill good; Radial pulse +; Sensation intact                     Lt: Drsing CDI; Sling/immob. noted; Cap refill good; Radial pulse +; Sensation intact    Lower extrems:   Right: no calf tenderness              negative evi's sign               + pedal pulses    Left:   no calf tenderness              negative evi's sign               + pedal pulses                          9.9    9.96  )-----------( 129      ( 13 Aug 2019 06:35 )             30.4       08-13    140  |  106  |  12  ----------------------------<  115<H>  3.8   |  28  |  0.72    Ca    8.1<L>      13 Aug 2019 06:35        PT/INR - ( 12 Aug 2019 07:11 )   PT: 11.8 sec;   INR: 1.06 ratio         PTT - ( 12 Aug 2019 07:11 )  PTT:29.0 sec				    MEDICATIONS  (STANDING):  ascorbic acid 500 milliGRAM(s) Oral two times a day  docusate sodium Liquid 100 milliGRAM(s) Oral two times a day  ferrous    sulfate 325 milliGRAM(s) Oral three times a day with meals  folic acid 1 milliGRAM(s) Oral daily  heparin  Injectable 5000 Unit(s) SubCutaneous every 8 hours  lactated ringers. 1000 milliLiter(s) IV Continuous <Continuous>  lactated ringers. 1000 milliLiter(s) IV Continuous <Continuous>  multivitamin 1 Tablet(s) Oral daily  simvastatin 10 milliGRAM(s) Oral at bedtime          DVT Prophylaxis:  LMWH                   (  )  Heparin SQ           (  )  Coumadin             (  )  Xarelto                  (  )  Eliquis                   (  )  Venodynes           (  )  Ambulation          (  )  UFH                       (  )  Contraindicated  (  )  EC ASPIRIN       (  ) HPI:  This is a 89 yo female with PMHx of HLD, macular degeneration, hip surgery 8 years ago presents to the ED with complain of Right hip pain radiating to knee after fall. Patient was getting up off of toilet with help of commode rails when she fell forward, landing against shower door. Unable to ambulate afterwards.   Patient is a 89y old  Female who presents with right hip pain, now on 2N, s/p Right femur ORIF  Consulted by Dr. Hennessy   for VTE prophylaxis, risk stratification, and anticoagulation management.      Family Hx:  Patient is unable to provide detail family history this time. (11 Aug 2019 04:58)      PAST MEDICAL & SURGICAL HISTORY:  HLD (hyperlipidemia)  Glaucoma          CrCl:57.1    Caprini VTE Risk Score:CAPRINI SCORE  AGE RELATED RISK FACTORS                                                       MOBILITY RELATED FACTORS  [ ] Age 41-60 years                                            (1 Point)                  [x ] Bed rest /restricted mobility                             (1 Point)  [ ] Age: 61-74 years                                           (2 Points)                [ ] Plaster cast                                                   (2 Points)  [x ] Age= 75 years                                              (3 Points)                 [ ] Bed bound for more than 72 hours                   (2 Points)    DISEASE RELATED RISK FACTORS                                               GENDER SPECIFIC FACTORS  [ ] Edema in the lower extremities                       (1 Point)           [ ] Pregnancy                                                            (1 Point)  [ ] Varicose veins                                               (1 Point)                  [ ] Post-partum < 6 weeks                                      (1 Point)             [x ] BMI > 25 Kg/m2                                            (1 Point)                  [ ] Hormonal therapy or oral contraception       (1 Point)                 [ ] Sepsis (in the previous month)                        (1 Point)             [ ] History of pregnancy complications                (1Point)  [ ] Pneumonia or serious lung disease                                             [ ] Unexplained or recurrent  (=/>3), premature                                 (In the previous month)                               (1 Point)                birth with toxemia or growth-restricted infant (1 Point)  [ ] Abnormal pulmonary function test            (1 Point)                                   SURGERY RELATED RISK FACTORS  [ ] Acute myocardial infarction                       (1 Point)                  [ ]  Section                                         (1 Point)  [ ] Congestive heart failure (in the previous month) (1 Point)   [ ] Minor surgery   lasting <45 minutes       (1 Point)   [ ] Inflammatory bowel disease                             (1 Point)          [ ] Arthroscopic surgery                                  (2 Points)  [ ] Central venous access                                    (2 Points)            [ ] General surgery lasting >45 minutes      (2 Points)       [ ] Stroke (in the previous month)                  (5 Points)            [ ] Elective major lower extremity arthroplasty (5 Points)                                   [  ] Malignancy (present or past include skin melanoma                                          but exclude  basal skin cell)    (2 points)                                      TRAUMA RELATED RISK FACTORS                HEMATOLOGY RELATED FACTORS                                  [x ] Fracture of the hip, pelvis, or leg                       (5 Points)  [ ] Prior episodes of VTE                                     (3 Points)          [ ] Acute spinal cord injury (in the previous month)  (5 Points)  [ ] Positive family history for VTE                         (3 Points)       [ ] Paralysis (less than 1 month)                          (5 Points)  [ ] Prothrombin 44008 A                                      (3 Points)         [ ] Multiple Trauma (within 1month)                 (5Points)                                                                                                                                                                [ ] Factor V Leiden                                          (3 Points)                                OTHER RISK FACTORS                          [ ] Lupus anticoagulants                                     (3 Points)                       [ ] BMI > 40                          (1 Point)                                                         [ ] Anticardiolipin antibodies                                (3 Points)                   [ ] Smoking                              (1Point)                                                [ ] High homocysteine in the blood                      (3 Points)                [  ] Diabetes requiring insulin (1point)                         [ ] Other congenital or acquired thrombophilia       (3 Points)          [  ] Chemotherapy                   (1 Point)  [ ] Heparin induced thrombocytopenia                  (3 Points)             [  ] Blood Transfusion                (1 point)                                                                                                             Total Score [   10       ]                                                                                                                                                                                                                                                                                                                                                                                                                                             IMPROVE Bleeding Risk Score: 3.5    Torniq time:     Time In:   Time Out:     Falls Risk:   High ( x )  Mod (  )  Low (  )      FAMILY HISTORY:    Denies any personal or familial history of clotting or bleeding disorders.    Allergies    atenolol (Other)  sulfa drugs (Other)    Intolerances        REVIEW OF SYSTEMS    (  )Fever	     (  )Constipation	(  )SOB				(  )Headache	(  )Dysuria  (  )Chills	     (  )Melena	(  )Dyspnea present on exertion	                    (  )Dizziness                    (  )Polyuria  (  )Nausea	     (  )Hematochezia	(  )Cough			                    (  )Syncope   	(  )Hematuria  (  )Vomiting    (  )Chest Pain	(  )Wheezing			(  )Weakness  (  )Diarrhea     (  )Palpitations	(  )Anorexia			( x )joint pain    All  other review of systems negative: Yes        PHYSICAL EXAM:    Constitutional: Appears Well    Neurological: A& O x forgetfull    Skin: Warm    Respiratory and Thorax: normal effort; Breath sounds: normal; No rales/wheezing/rhonchi  	  Cardiovascular: S1, S2, regular, NMBR	    Gastrointestinal: BS + x 4Q, nontender	    Genitourinary:  Bladder nondistended, nontender    Musculoskeletal:   General Right:   + muscle/joint tenderness,   normal tone, no joint swelling,   ROM: limited	    General Left:   no muscle/joint tenderness,   normal tone, no joint swelling,   ROM: full    Hip:  Right: Dressing CDI;             Lower extrems:   Right: no calf tenderness              negative evi's sign               + pedal pulses    Left:   no calf tenderness              negative evi's sign               + pedal pulses                          9.9    9.96  )-----------( 129      ( 13 Aug 2019 06:35 )             30.4       08-13    140  |  106  |  12  ----------------------------<  115<H>  3.8   |  28  |  0.72    Ca    8.1<L>      13 Aug 2019 06:35        PT/INR - ( 12 Aug 2019 07:11 )   PT: 11.8 sec;   INR: 1.06 ratio         PTT - ( 12 Aug 2019 07:11 )  PTT:29.0 sec				    MEDICATIONS  (STANDING):  ascorbic acid 500 milliGRAM(s) Oral two times a day  docusate sodium Liquid 100 milliGRAM(s) Oral two times a day  ferrous    sulfate 325 milliGRAM(s) Oral three times a day with meals  folic acid 1 milliGRAM(s) Oral daily  heparin  Injectable 5000 Unit(s) SubCutaneous every 8 hours  lactated ringers. 1000 milliLiter(s) IV Continuous <Continuous>  lactated ringers. 1000 milliLiter(s) IV Continuous <Continuous>  multivitamin 1 Tablet(s) Oral daily  simvastatin 10 milliGRAM(s) Oral at bedtime          DVT Prophylaxis:  LMWH                   (  )  Heparin SQ           (x  )  Coumadin             (  )  Xarelto                  (  )  Eliquis                   (  )  Venodynes           (x  )  Ambulation          (x  )  UFH                       (  )  Contraindicated  (  )  EC ASPIRIN       (  ) Consult:   HPI:  This is a 89 yo female with PMHx of HLD, macular degeneration, hip surgery 8 years ago presents to the ED with complain of Right hip pain radiating to knee after fall. Patient was getting up off of toilet with help of commode rails when she fell forward, landing against shower door. Unable to ambulate afterwards.   Patient is a 89y old  Female who presents with right hip pain, now on 2N, s/p Right femur ORIF  Consulted by Dr. Hennessy   for VTE prophylaxis, risk stratification, and anticoagulation management.      Family Hx:  Patient is unable to provide detail family history this time. (11 Aug 2019 04:58)      PAST MEDICAL & SURGICAL HISTORY:  HLD (hyperlipidemia)  Glaucoma    SH denies ETOH, denies smoking          CrCl:57.1    Caprini VTE Risk Score:CAPRINI SCORE  AGE RELATED RISK FACTORS                                                       MOBILITY RELATED FACTORS  [ ] Age 41-60 years                                            (1 Point)                  [x ] Bed rest /restricted mobility                             (1 Point)  [ ] Age: 61-74 years                                           (2 Points)                [ ] Plaster cast                                                   (2 Points)  [x ] Age= 75 years                                              (3 Points)                 [ ] Bed bound for more than 72 hours                   (2 Points)    DISEASE RELATED RISK FACTORS                                               GENDER SPECIFIC FACTORS  [ ] Edema in the lower extremities                       (1 Point)           [ ] Pregnancy                                                            (1 Point)  [ ] Varicose veins                                               (1 Point)                  [ ] Post-partum < 6 weeks                                      (1 Point)             [x ] BMI > 25 Kg/m2                                            (1 Point)                  [ ] Hormonal therapy or oral contraception       (1 Point)                 [ ] Sepsis (in the previous month)                        (1 Point)             [ ] History of pregnancy complications                (1Point)  [ ] Pneumonia or serious lung disease                                             [ ] Unexplained or recurrent  (=/>3), premature                                 (In the previous month)                               (1 Point)                birth with toxemia or growth-restricted infant (1 Point)  [ ] Abnormal pulmonary function test            (1 Point)                                   SURGERY RELATED RISK FACTORS  [ ] Acute myocardial infarction                       (1 Point)                  [ ]  Section                                         (1 Point)  [ ] Congestive heart failure (in the previous month) (1 Point)   [ ] Minor surgery   lasting <45 minutes       (1 Point)   [ ] Inflammatory bowel disease                             (1 Point)          [ ] Arthroscopic surgery                                  (2 Points)  [ ] Central venous access                                    (2 Points)            [ ] General surgery lasting >45 minutes      (2 Points)       [ ] Stroke (in the previous month)                  (5 Points)            [ ] Elective major lower extremity arthroplasty (5 Points)                                   [  ] Malignancy (present or past include skin melanoma                                          but exclude  basal skin cell)    (2 points)                                      TRAUMA RELATED RISK FACTORS                HEMATOLOGY RELATED FACTORS                                  [x ] Fracture of the hip, pelvis, or leg                       (5 Points)  [ ] Prior episodes of VTE                                     (3 Points)          [ ] Acute spinal cord injury (in the previous month)  (5 Points)  [ ] Positive family history for VTE                         (3 Points)       [ ] Paralysis (less than 1 month)                          (5 Points)  [ ] Prothrombin 78065 A                                      (3 Points)         [ ] Multiple Trauma (within 1month)                 (5Points)                                                                                                                                                                [ ] Factor V Leiden                                          (3 Points)                                OTHER RISK FACTORS                          [ ] Lupus anticoagulants                                     (3 Points)                       [ ] BMI > 40                          (1 Point)                                                         [ ] Anticardiolipin antibodies                                (3 Points)                   [ ] Smoking                              (1Point)                                                [ ] High homocysteine in the blood                      (3 Points)                [  ] Diabetes requiring insulin (1point)                         [ ] Other congenital or acquired thrombophilia       (3 Points)          [  ] Chemotherapy                   (1 Point)  [ ] Heparin induced thrombocytopenia                  (3 Points)             [  ] Blood Transfusion                (1 point)                                                                                                             Total Score [   10       ]                                                                                                                                                                                                                                                                                                                                                                                                                                             IMPROVE Bleeding Risk Score: 3.5    Torniq time:     Time In:   Time Out:     Falls Risk:   High ( x )  Mod (  )  Low (  )      FAMILY HISTORY:    Denies any personal or familial history of clotting or bleeding disorders.    Allergies    atenolol (Other)  sulfa drugs (Other)    Intolerances        REVIEW OF SYSTEMS    (  )Fever	     (  )Constipation	(  )SOB				(  )Headache	(  )Dysuria  (  )Chills	     (  )Melena	(  )Dyspnea present on exertion	                    (  )Dizziness                    (  )Polyuria  (  )Nausea	     (  )Hematochezia	(  )Cough			                    (  )Syncope   	(  )Hematuria  (  )Vomiting    (  )Chest Pain	(  )Wheezing			(  )Weakness  (  )Diarrhea     (  )Palpitations	(  )Anorexia			( x )joint pain    All  other review of systems negative: Yes        PHYSICAL EXAM:    Constitutional: Appears Well    Neurological: A& O x forgetful    Skin: Warm    Respiratory and Thorax: normal effort; Breath sounds: normal; No rales/wheezing/rhonchi  	  Cardiovascular: S1, S2, regular, NMBR	    Gastrointestinal: BS + x 4Q, nontender	    Genitourinary:  Bladder nondistended, nontender    Musculoskeletal:   General Right:   + muscle/joint tenderness,   normal tone, no joint swelling,   ROM: limited	    General Left:   no muscle/joint tenderness,   normal tone, no joint swelling,   ROM: full    Hip:  Right: Dressing CDI;             Lower extrems:   Right: no calf tenderness              negative evi's sign               + pedal pulses    Left:   no calf tenderness              negative evi's sign               + pedal pulses                          9.9    9.96  )-----------( 129      ( 13 Aug 2019 06:35 )             30.4       08-13    140  |  106  |  12  ----------------------------<  115<H>  3.8   |  28  |  0.72    Ca    8.1<L>      13 Aug 2019 06:35        PT/INR - ( 12 Aug 2019 07:11 )   PT: 11.8 sec;   INR: 1.06 ratio         PTT - ( 12 Aug 2019 07:11 )  PTT:29.0 sec				    MEDICATIONS  (STANDING):  ascorbic acid 500 milliGRAM(s) Oral two times a day  docusate sodium Liquid 100 milliGRAM(s) Oral two times a day  enoxaparin Injectable 40 milliGRAM(s) SubCutaneous daily  ferrous    sulfate 325 milliGRAM(s) Oral three times a day with meals  folic acid 1 milliGRAM(s) Oral daily  lactated ringers. 1000 milliLiter(s) IV Continuous <Continuous>  lactated ringers. 1000 milliLiter(s) IV Continuous <Continuous>  multivitamin 1 Tablet(s) Oral daily  simvastatin 10 milliGRAM(s) Oral at bedtime      DVT Prophylaxis:  LMWH                   (x  )  Heparin SQ           (  )  Coumadin             (  )  Xarelto                  (  )  Eliquis                   (  )  Venodynes           (x  )  Ambulation          (x  )  UFH                       (  )  Contraindicated  (  )  EC ASPIRIN       (  )

## 2019-08-13 NOTE — PROGRESS NOTE ADULT - SUBJECTIVE AND OBJECTIVE BOX
Pt S&E. Tolerated procedure well. Pt in pain but controlled. No acute events overnight. Pt mildly disoriented/confused.    Vital Signs Last 24 Hrs  T(C): 36.4 (13 Aug 2019 00:04), Max: 37.1 (12 Aug 2019 23:30)  T(F): 97.6 (13 Aug 2019 00:04), Max: 98.7 (12 Aug 2019 23:30)  HR: 77 (13 Aug 2019 00:04) (77 - 96)  BP: 146/65 (13 Aug 2019 00:04) (125/45 - 164/62)  BP(mean): --  RR: 18 (13 Aug 2019 00:04) (12 - 18)  SpO2: 100% (13 Aug 2019 00:04) (95% - 100%)    Gen: NAD  RLE:  Dressing CDI  SILT L2-S1  +EHL/FHL/TA/Gastroc  DP+  Soft compartments, - calf ttp

## 2019-08-13 NOTE — PHYSICAL THERAPY INITIAL EVALUATION ADULT - MANUAL MUSCLE TESTING RESULTS, REHAB EVAL
Right LE grossly 3-/5, all other extremities grossly 3+/5 except left UE which is grossly 3/5 but slightly reactive./grossly assessed due to

## 2019-08-13 NOTE — PROGRESS NOTE ADULT - ASSESSMENT
89 YO F with R periprosthetic hip fracture s/p ORIF POD 1:  FU Am labs  Pain control  NWB RLE  OOB to chair with assistance  dvt ppx per AC  medical comanagement  SCDs  DW Dr. Hennessy

## 2019-08-14 LAB
ANION GAP SERPL CALC-SCNC: 7 MMOL/L — SIGNIFICANT CHANGE UP (ref 5–17)
BUN SERPL-MCNC: 15 MG/DL — SIGNIFICANT CHANGE UP (ref 7–23)
CALCIUM SERPL-MCNC: 8.4 MG/DL — LOW (ref 8.5–10.1)
CHLORIDE SERPL-SCNC: 103 MMOL/L — SIGNIFICANT CHANGE UP (ref 96–108)
CO2 SERPL-SCNC: 26 MMOL/L — SIGNIFICANT CHANGE UP (ref 22–31)
CREAT SERPL-MCNC: 0.67 MG/DL — SIGNIFICANT CHANGE UP (ref 0.5–1.3)
GLUCOSE SERPL-MCNC: 115 MG/DL — HIGH (ref 70–99)
HCT VFR BLD CALC: 26.1 % — LOW (ref 34.5–45)
HGB BLD-MCNC: 8.5 G/DL — LOW (ref 11.5–15.5)
MCHC RBC-ENTMCNC: 29.2 PG — SIGNIFICANT CHANGE UP (ref 27–34)
MCHC RBC-ENTMCNC: 32.6 GM/DL — SIGNIFICANT CHANGE UP (ref 32–36)
MCV RBC AUTO: 89.7 FL — SIGNIFICANT CHANGE UP (ref 80–100)
PLATELET # BLD AUTO: 128 K/UL — LOW (ref 150–400)
POTASSIUM SERPL-MCNC: 3.7 MMOL/L — SIGNIFICANT CHANGE UP (ref 3.5–5.3)
POTASSIUM SERPL-SCNC: 3.7 MMOL/L — SIGNIFICANT CHANGE UP (ref 3.5–5.3)
RBC # BLD: 2.91 M/UL — LOW (ref 3.8–5.2)
RBC # FLD: 15.1 % — HIGH (ref 10.3–14.5)
SODIUM SERPL-SCNC: 136 MMOL/L — SIGNIFICANT CHANGE UP (ref 135–145)
WBC # BLD: 13.35 K/UL — HIGH (ref 3.8–10.5)
WBC # FLD AUTO: 13.35 K/UL — HIGH (ref 3.8–10.5)

## 2019-08-14 PROCEDURE — 99232 SBSQ HOSP IP/OBS MODERATE 35: CPT

## 2019-08-14 RX ADMIN — Medication 500 MILLIGRAM(S): at 17:41

## 2019-08-14 RX ADMIN — Medication 100 MILLIGRAM(S): at 17:41

## 2019-08-14 RX ADMIN — Medication 1 TABLET(S): at 11:13

## 2019-08-14 RX ADMIN — Medication 1 MILLIGRAM(S): at 11:13

## 2019-08-14 RX ADMIN — Medication 650 MILLIGRAM(S): at 11:14

## 2019-08-14 RX ADMIN — Medication 325 MILLIGRAM(S): at 11:13

## 2019-08-14 RX ADMIN — SIMVASTATIN 10 MILLIGRAM(S): 20 TABLET, FILM COATED ORAL at 22:34

## 2019-08-14 RX ADMIN — ENOXAPARIN SODIUM 40 MILLIGRAM(S): 100 INJECTION SUBCUTANEOUS at 11:14

## 2019-08-14 RX ADMIN — Medication 325 MILLIGRAM(S): at 17:41

## 2019-08-14 RX ADMIN — Medication 650 MILLIGRAM(S): at 12:00

## 2019-08-14 NOTE — PROGRESS NOTE ADULT - SUBJECTIVE AND OBJECTIVE BOX
hpi: 89y female presents w/ hip pain after fall and found to have right femur fracture.  Plan for OR today.   No complaints.  No pain.   8/13- resting, pain controlled.  Discussed w/ daughter at bedside- requesting Dianna.   8/14- c/o pain, Tired.  No blood in stool, no cp, sob, palp.  Eating.      ros- as per hpi, 10 point ros negative      Vital Signs Last 24 Hrs  T(C): 37.1 (14 Aug 2019 11:38), Max: 37.3 (14 Aug 2019 04:25)  T(F): 98.7 (14 Aug 2019 11:38), Max: 99.1 (14 Aug 2019 04:25)  HR: 96 (14 Aug 2019 11:38) (85 - 99)  BP: 114/50 (14 Aug 2019 11:38) (98/40 - 122/42)  BP(mean): --  RR: 16 (14 Aug 2019 11:38) (16 - 16)  SpO2: 97% (14 Aug 2019 11:38) (97% - 100%)  T(C): 37.7 (13 Aug 2019 11:09), Max: 37.7 (13 Aug 2019 11:09)    PHYSICAL EXAM:  General: NAD, comfortable, seated in chair  Neuro: AAOx3, no focal deficits  HEENT: NCAT   Neck: Soft and supple  Respiratory: CTA b/l, no w/r/r  Cardiovascular: S1 and S2, RRR  Gastrointestinal: soft, non ttp   Extremities: dressing intact,  2+ peripheral pulses          LABS: All Labs Reviewed:                        8.5    13.35 )-----------( 128      ( 14 Aug 2019 06:36 )             26.1     08-14    136  |  103  |  15  ----------------------------<  115<H>  3.7   |  26  |  0.67    Ca    8.4<L>      14 Aug 2019 06:36          MEDICATIONS  (STANDING):  ascorbic acid 500 milliGRAM(s) Oral two times a day  docusate sodium Liquid 100 milliGRAM(s) Oral two times a day  enoxaparin Injectable 40 milliGRAM(s) SubCutaneous daily  ferrous    sulfate 325 milliGRAM(s) Oral three times a day with meals  folic acid 1 milliGRAM(s) Oral daily  lactated ringers. 1000 milliLiter(s) (75 mL/Hr) IV Continuous <Continuous>  lactated ringers. 1000 milliLiter(s) (75 mL/Hr) IV Continuous <Continuous>  multivitamin 1 Tablet(s) Oral daily  simvastatin 10 milliGRAM(s) Oral at bedtime    MEDICATIONS  (PRN):  acetaminophen   Tablet .. 650 milliGRAM(s) Oral every 6 hours PRN Mild Pain (1 - 3), Moderate Pain (4 - 6)  acetaminophen   Tablet .. 650 milliGRAM(s) Oral every 6 hours PRN Temp greater or equal to 38C (100.4F), Mild Pain (1 - 3)  benzocaine 15 mG/menthol 3.6 mG Lozenge 1 Lozenge Oral every 3 hours PRN Sore Throat  bisacodyl Suppository 10 milliGRAM(s) Rectal daily PRN If no bowel movement  diphenhydrAMINE 25 milliGRAM(s) Oral every 4 hours PRN Rash and/or Itching  HYDROmorphone  Injectable 0.5 milliGRAM(s) IV Push every 6 hours PRN Severe Pain (7 - 10)  magnesium hydroxide Suspension 30 milliLiter(s) Oral daily PRN Constipation  ondansetron Injectable 4 milliGRAM(s) IV Push every 6 hours PRN Nausea and/or Vomiting  oxyCODONE    IR 2.5 milliGRAM(s) Oral every 4 hours PRN Moderate Pain (4 - 6)  oxyCODONE    IR 5 milliGRAM(s) Oral every 4 hours PRN Severe Pain (7 - 10)      Assessment and Plan:   89y female w/     1. right periprosthetic hip fracture s/p ORIF pod 2  - prn tylenol for pain control- as per daughter cannot tolerate opioids  - nwb  - oob to chair, PT eval  - Cardio, Ortho f/u appreciated      2.  acute blood loss anemia  - post op, no indication for transfusion, repeat h/h  d/c ivf    3. leukocytosis  - likely reactive; afebrile, repeat cbc    4.  dvt px  lovenox sc x 4 weeks post op

## 2019-08-14 NOTE — PROGRESS NOTE ADULT - ASSESSMENT
Assessment    This is a 89y old  Female who presents with right hip pain, now on 2N, s/p Right femur ORIF  Consulted by Dr. Hennessy  for VTE prophylaxis, risk stratification, and anticoagulation management. POD#1. Patient is high risk for VTE due to age, immobility, surgery, and BMI. Low risk for bleeding.    Plan  ::Continue Lovenox 40mg sq daily for four weeks post procedure  ::Daily cbc/bmp  ::LE Venodynes  ::Increase mobility as tolerated    Will continue to follow.    Dispo: rehab- likely tomorrow

## 2019-08-14 NOTE — PROGRESS NOTE ADULT - SUBJECTIVE AND OBJECTIVE BOX
Consult:   HPI:  This is a 89 yo female with PMHx of HLD, macular degeneration, hip surgery 8 years ago presents to the ED with complain of Right hip pain radiating to knee after fall. Patient was getting up off of toilet with help of commode rails when she fell forward, landing against shower door. Unable to ambulate afterwards.   Patient is a 89y old  Female who presents with right hip pain, now on 2N, s/p Right femur ORIF  Consulted by Dr. Hennessy   for VTE prophylaxis, risk stratification, and anticoagulation management.      Family Hx:  Patient is unable to provide detail family history this time. (11 Aug 2019 04:58)      PAST MEDICAL & SURGICAL HISTORY:  HLD (hyperlipidemia)  Glaucoma    SH denies ETOH, denies smoking    CrCl:57.1    Caprini VTE Risk Score:CAPRINI SCORE  AGE RELATED RISK FACTORS                                                       MOBILITY RELATED FACTORS  [ ] Age 41-60 years                                            (1 Point)                  [x ] Bed rest /restricted mobility                             (1 Point)  [ ] Age: 61-74 years                                           (2 Points)                [ ] Plaster cast                                                   (2 Points)  [x ] Age= 75 years                                              (3 Points)                 [ ] Bed bound for more than 72 hours                   (2 Points)    DISEASE RELATED RISK FACTORS                                               GENDER SPECIFIC FACTORS  [ ] Edema in the lower extremities                       (1 Point)           [ ] Pregnancy                                                            (1 Point)  [ ] Varicose veins                                               (1 Point)                  [ ] Post-partum < 6 weeks                                      (1 Point)             [x ] BMI > 25 Kg/m2                                            (1 Point)                  [ ] Hormonal therapy or oral contraception       (1 Point)                 [ ] Sepsis (in the previous month)                        (1 Point)             [ ] History of pregnancy complications                (1Point)  [ ] Pneumonia or serious lung disease                                             [ ] Unexplained or recurrent  (=/>3), premature                                 (In the previous month)                               (1 Point)                birth with toxemia or growth-restricted infant (1 Point)  [ ] Abnormal pulmonary function test            (1 Point)                                   SURGERY RELATED RISK FACTORS  [ ] Acute myocardial infarction                       (1 Point)                  [ ]  Section                                         (1 Point)  [ ] Congestive heart failure (in the previous month) (1 Point)   [ ] Minor surgery   lasting <45 minutes       (1 Point)   [ ] Inflammatory bowel disease                             (1 Point)          [ ] Arthroscopic surgery                                  (2 Points)  [ ] Central venous access                                    (2 Points)            [ ] General surgery lasting >45 minutes      (2 Points)       [ ] Stroke (in the previous month)                  (5 Points)            [ ] Elective major lower extremity arthroplasty (5 Points)                                   [  ] Malignancy (present or past include skin melanoma                                          but exclude  basal skin cell)    (2 points)                                      TRAUMA RELATED RISK FACTORS                HEMATOLOGY RELATED FACTORS                                  [x ] Fracture of the hip, pelvis, or leg                       (5 Points)  [ ] Prior episodes of VTE                                     (3 Points)          [ ] Acute spinal cord injury (in the previous month)  (5 Points)  [ ] Positive family history for VTE                         (3 Points)       [ ] Paralysis (less than 1 month)                          (5 Points)  [ ] Prothrombin 71362 A                                      (3 Points)         [ ] Multiple Trauma (within 1month)                 (5Points)                                                                                                                                                                [ ] Factor V Leiden                                          (3 Points)                                OTHER RISK FACTORS                          [ ] Lupus anticoagulants                                     (3 Points)                       [ ] BMI > 40                          (1 Point)                                                         [ ] Anticardiolipin antibodies                                (3 Points)                   [ ] Smoking                              (1Point)                                                [ ] High homocysteine in the blood                      (3 Points)                [  ] Diabetes requiring insulin (1point)                         [ ] Other congenital or acquired thrombophilia       (3 Points)          [  ] Chemotherapy                   (1 Point)  [ ] Heparin induced thrombocytopenia                  (3 Points)             [  ] Blood Transfusion                (1 point)                                                                                                             Total Score [   10       ]                                                                                                                                                                                                                                                                                                                                                                                                                 IMPROVE Bleeding Risk Score: 3.5    Falls Risk:   High ( x )  Mod (  )  Low (  )    : Patient seen at bedside, resting with minimal disruption. HH stable: 8.5/26.1. To go to rehab.    FAMILY HISTORY:    Denies any personal or familial history of clotting or bleeding disorders.    Allergies    atenolol (Other)  sulfa drugs (Other)    Intolerances        REVIEW OF SYSTEMS    (  )Fever	     (  )Constipation	(  )SOB				(  )Headache	(  )Dysuria  (  )Chills	     (  )Melena	(  )Dyspnea present on exertion	                    (  )Dizziness                    (  )Polyuria  (  )Nausea	     (  )Hematochezia	(  )Cough			                    (  )Syncope   	(  )Hematuria  (  )Vomiting    (  )Chest Pain	(  )Wheezing			(  )Weakness  (  )Diarrhea     (  )Palpitations	(  )Anorexia			( x )joint pain    All  other review of systems negative: Yes        PHYSICAL EXAM:    Constitutional: Appears Well    Neurological: A& O x forgetful    Skin: Warm    Respiratory and Thorax: normal effort; Breath sounds: normal; No rales/wheezing/rhonchi  	  Cardiovascular: S1, S2, regular, NMBR	    Gastrointestinal: BS + x 4Q, nontender	    Genitourinary:  Bladder nondistended, nontender    Musculoskeletal:   General Right:   + muscle/joint tenderness,   normal tone, no joint swelling,   ROM: limited	    General Left:   no muscle/joint tenderness,   normal tone, no joint swelling,   ROM: full    Hip:  Right: Dressing CDI;             Lower extrems:   Right: no calf tenderness              negative evi's sign               + pedal pulses    Left:   no calf tenderness              negative evi's sign               + pedal pulses                          8.5    13.35 )-----------( 128      ( 14 Aug 2019 06:36 )             26.1       08-14    136  |  103  |  15  ----------------------------<  115<H>  3.7   |  26  |  0.67    Ca    8.4<L>      14 Aug 2019 06:36                                  9.9    9.96  )-----------( 129      ( 13 Aug 2019 06:35 )             30.4       08-13    140  |  106  |  12  ----------------------------<  115<H>  3.8   |  28  |  0.72    Ca    8.1<L>      13 Aug 2019 06:35        PT/INR - ( 12 Aug 2019 07:11 )   PT: 11.8 sec;   INR: 1.06 ratio         PTT - ( 12 Aug 2019 07:11 )  PTT:29.0 sec				    MEDICATIONS  (STANDING):  ascorbic acid 500 milliGRAM(s) Oral two times a day  docusate sodium Liquid 100 milliGRAM(s) Oral two times a day  enoxaparin Injectable 40 milliGRAM(s) SubCutaneous daily  ferrous    sulfate 325 milliGRAM(s) Oral three times a day with meals  folic acid 1 milliGRAM(s) Oral daily  lactated ringers. 1000 milliLiter(s) IV Continuous <Continuous>  lactated ringers. 1000 milliLiter(s) IV Continuous <Continuous>  multivitamin 1 Tablet(s) Oral daily  simvastatin 10 milliGRAM(s) Oral at bedtime      DVT Prophylaxis:  LMWH                   (x  )  Heparin SQ           (  )  Coumadin             (  )  Xarelto                  (  )  Eliquis                   (  )  Venodynes           (x  )  Ambulation          (x  )  UFH                       (  )  Contraindicated  (  )  EC ASPIRIN       (  )

## 2019-08-14 NOTE — PROGRESS NOTE ADULT - SUBJECTIVE AND OBJECTIVE BOX
Pt S&E. Pt in pain but controlled. No acute events overnight.     Vital Signs Last 24 Hrs  T(C): 36.4 (13 Aug 2019 00:04), Max: 37.1 (12 Aug 2019 23:30)  T(F): 97.6 (13 Aug 2019 00:04), Max: 98.7 (12 Aug 2019 23:30)  HR: 77 (13 Aug 2019 00:04) (77 - 96)  BP: 146/65 (13 Aug 2019 00:04) (125/45 - 164/62)  BP(mean): --  RR: 18 (13 Aug 2019 00:04) (12 - 18)  SpO2: 100% (13 Aug 2019 00:04) (95% - 100%)    Gen: NAD  RLE:  Dressing CDI  SILT L2-S1  +EHL/FHL/TA/Gastroc  DP+  Soft compartments, - calf ttp

## 2019-08-14 NOTE — PROGRESS NOTE ADULT - ASSESSMENT
89 YO F with R periprosthetic hip fracture s/p ORIF POD 2    FU Am labs  Pain control  NWB RLE  OOB to chair with assistance  dvt ppx per AC  medical comanagement  SCDs  Will DW Dr. Hennessy

## 2019-08-15 ENCOUNTER — TRANSCRIPTION ENCOUNTER (OUTPATIENT)
Age: 84
End: 2019-08-15

## 2019-08-15 VITALS
RESPIRATION RATE: 16 BRPM | OXYGEN SATURATION: 99 % | SYSTOLIC BLOOD PRESSURE: 104 MMHG | DIASTOLIC BLOOD PRESSURE: 63 MMHG | TEMPERATURE: 98 F | HEART RATE: 105 BPM

## 2019-08-15 LAB
ANION GAP SERPL CALC-SCNC: 4 MMOL/L — LOW (ref 5–17)
BUN SERPL-MCNC: 32 MG/DL — HIGH (ref 7–23)
CALCIUM SERPL-MCNC: 8.4 MG/DL — LOW (ref 8.5–10.1)
CHLORIDE SERPL-SCNC: 104 MMOL/L — SIGNIFICANT CHANGE UP (ref 96–108)
CO2 SERPL-SCNC: 29 MMOL/L — SIGNIFICANT CHANGE UP (ref 22–31)
CREAT SERPL-MCNC: 0.88 MG/DL — SIGNIFICANT CHANGE UP (ref 0.5–1.3)
GLUCOSE SERPL-MCNC: 120 MG/DL — HIGH (ref 70–99)
HCT VFR BLD CALC: 27.6 % — LOW (ref 34.5–45)
HGB BLD-MCNC: 9 G/DL — LOW (ref 11.5–15.5)
MCHC RBC-ENTMCNC: 28.8 PG — SIGNIFICANT CHANGE UP (ref 27–34)
MCHC RBC-ENTMCNC: 32.6 GM/DL — SIGNIFICANT CHANGE UP (ref 32–36)
MCV RBC AUTO: 88.2 FL — SIGNIFICANT CHANGE UP (ref 80–100)
PLATELET # BLD AUTO: 168 K/UL — SIGNIFICANT CHANGE UP (ref 150–400)
POTASSIUM SERPL-MCNC: 4.1 MMOL/L — SIGNIFICANT CHANGE UP (ref 3.5–5.3)
POTASSIUM SERPL-SCNC: 4.1 MMOL/L — SIGNIFICANT CHANGE UP (ref 3.5–5.3)
RBC # BLD: 3.13 M/UL — LOW (ref 3.8–5.2)
RBC # FLD: 15.4 % — HIGH (ref 10.3–14.5)
SODIUM SERPL-SCNC: 137 MMOL/L — SIGNIFICANT CHANGE UP (ref 135–145)
WBC # BLD: 12.93 K/UL — HIGH (ref 3.8–10.5)
WBC # FLD AUTO: 12.93 K/UL — HIGH (ref 3.8–10.5)

## 2019-08-15 PROCEDURE — 99232 SBSQ HOSP IP/OBS MODERATE 35: CPT

## 2019-08-15 RX ORDER — OXYCODONE HYDROCHLORIDE 5 MG/1
1 TABLET ORAL
Qty: 0 | Refills: 0 | DISCHARGE
Start: 2019-08-15

## 2019-08-15 RX ORDER — DOCUSATE SODIUM 100 MG
10 CAPSULE ORAL
Qty: 0 | Refills: 0 | DISCHARGE
Start: 2019-08-15

## 2019-08-15 RX ORDER — ACETAMINOPHEN 500 MG
2 TABLET ORAL
Qty: 0 | Refills: 0 | DISCHARGE
Start: 2019-08-15

## 2019-08-15 RX ORDER — ACETAMINOPHEN 500 MG
2 TABLET ORAL
Qty: 0 | Refills: 0 | DISCHARGE

## 2019-08-15 RX ORDER — ASPIRIN/CALCIUM CARB/MAGNESIUM 324 MG
1 TABLET ORAL
Qty: 0 | Refills: 0 | DISCHARGE

## 2019-08-15 RX ADMIN — Medication 650 MILLIGRAM(S): at 12:32

## 2019-08-15 RX ADMIN — Medication 100 MILLIGRAM(S): at 06:22

## 2019-08-15 RX ADMIN — ENOXAPARIN SODIUM 40 MILLIGRAM(S): 100 INJECTION SUBCUTANEOUS at 11:25

## 2019-08-15 RX ADMIN — Medication 1 TABLET(S): at 09:32

## 2019-08-15 RX ADMIN — Medication 325 MILLIGRAM(S): at 09:33

## 2019-08-15 RX ADMIN — Medication 650 MILLIGRAM(S): at 12:02

## 2019-08-15 RX ADMIN — Medication 1 MILLIGRAM(S): at 09:33

## 2019-08-15 NOTE — PROGRESS NOTE ADULT - ASSESSMENT
Assessment    This is a 89y old  Female who presents with right hip pain, now on 2N, s/p Right femur ORIF  Consulted by Dr. Hennessy  for VTE prophylaxis, risk stratification, and anticoagulation management. POD#1. Patient is high risk for VTE due to age, immobility, surgery, and BMI. Low risk for bleeding.    Plan  ::Continue Lovenox 40mg sq daily for four weeks post procedure  ::Daily cbc/bmp  ::LE Venodynes  ::Increase mobility as tolerated    Will continue to follow.    Dispo: rehab- Today

## 2019-08-15 NOTE — PROGRESS NOTE ADULT - SUBJECTIVE AND OBJECTIVE BOX
Consult:   HPI:  This is a 89 yo female with PMHx of HLD, macular degeneration, hip surgery 8 years ago presents to the ED with complain of Right hip pain radiating to knee after fall. Patient was getting up off of toilet with help of commode rails when she fell forward, landing against shower door. Unable to ambulate afterwards.   Patient is a 89y old  Female who presents with right hip pain, now on 2N, s/p Right femur ORIF  Consulted by Dr. Hennessy   for VTE prophylaxis, risk stratification, and anticoagulation management.      Family Hx:  Patient is unable to provide detail family history this time. (11 Aug 2019 04:58)      PAST MEDICAL & SURGICAL HISTORY:  HLD (hyperlipidemia)  Glaucoma    SH denies ETOH, denies smoking    CrCl:46.7    Caprini VTE Risk Score:CAPRINI SCORE  AGE RELATED RISK FACTORS                                                       MOBILITY RELATED FACTORS  [ ] Age 41-60 years                                            (1 Point)                  [x ] Bed rest /restricted mobility                             (1 Point)  [ ] Age: 61-74 years                                           (2 Points)                [ ] Plaster cast                                                   (2 Points)  [x ] Age= 75 years                                              (3 Points)                 [ ] Bed bound for more than 72 hours                   (2 Points)    DISEASE RELATED RISK FACTORS                                               GENDER SPECIFIC FACTORS  [ ] Edema in the lower extremities                       (1 Point)           [ ] Pregnancy                                                            (1 Point)  [ ] Varicose veins                                               (1 Point)                  [ ] Post-partum < 6 weeks                                      (1 Point)             [x ] BMI > 25 Kg/m2                                            (1 Point)                  [ ] Hormonal therapy or oral contraception       (1 Point)                 [ ] Sepsis (in the previous month)                        (1 Point)             [ ] History of pregnancy complications                (1Point)  [ ] Pneumonia or serious lung disease                                             [ ] Unexplained or recurrent  (=/>3), premature                                 (In the previous month)                               (1 Point)                birth with toxemia or growth-restricted infant (1 Point)  [ ] Abnormal pulmonary function test            (1 Point)                                   SURGERY RELATED RISK FACTORS  [ ] Acute myocardial infarction                       (1 Point)                  [ ]  Section                                         (1 Point)  [ ] Congestive heart failure (in the previous month) (1 Point)   [ ] Minor surgery   lasting <45 minutes       (1 Point)   [ ] Inflammatory bowel disease                             (1 Point)          [ ] Arthroscopic surgery                                  (2 Points)  [ ] Central venous access                                    (2 Points)            [ ] General surgery lasting >45 minutes      (2 Points)       [ ] Stroke (in the previous month)                  (5 Points)            [ ] Elective major lower extremity arthroplasty (5 Points)                                   [  ] Malignancy (present or past include skin melanoma                                          but exclude  basal skin cell)    (2 points)                                      TRAUMA RELATED RISK FACTORS                HEMATOLOGY RELATED FACTORS                                  [x ] Fracture of the hip, pelvis, or leg                       (5 Points)  [ ] Prior episodes of VTE                                     (3 Points)          [ ] Acute spinal cord injury (in the previous month)  (5 Points)  [ ] Positive family history for VTE                         (3 Points)       [ ] Paralysis (less than 1 month)                          (5 Points)  [ ] Prothrombin 64902 A                                      (3 Points)         [ ] Multiple Trauma (within 1month)                 (5Points)                                                                                                                                                                [ ] Factor V Leiden                                          (3 Points)                                OTHER RISK FACTORS                          [ ] Lupus anticoagulants                                     (3 Points)                       [ ] BMI > 40                          (1 Point)                                                         [ ] Anticardiolipin antibodies                                (3 Points)                   [ ] Smoking                              (1Point)                                                [ ] High homocysteine in the blood                      (3 Points)                [  ] Diabetes requiring insulin (1point)                         [ ] Other congenital or acquired thrombophilia       (3 Points)          [  ] Chemotherapy                   (1 Point)  [ ] Heparin induced thrombocytopenia                  (3 Points)             [  ] Blood Transfusion                (1 point)                                                                                                             Total Score [   10       ]                                                                                                                                                                                                                                                                                                                                                                                                                 IMPROVE Bleeding Risk Score: 3.5    Falls Risk:   High ( x )  Mod (  )  Low (  )    : Patient seen at bedside, resting with minimal disruption. HH stable: 8.5/26.1. To go to rehab.  8/15/19: Patient seen at the bedside, denies any concerns regarding AC, reiterated the need for AC with Lovenox, , H&H 9.0/27.6, dispo-Rehab today    FAMILY HISTORY:    Denies any personal or familial history of clotting or bleeding disorders.    Allergies    atenolol (Other)  sulfa drugs (Other)    Intolerances        REVIEW OF SYSTEMS    (  )Fever	     (  )Constipation	(  )SOB				(  )Headache	(  )Dysuria  (  )Chills	     (  )Melena	(  )Dyspnea present on exertion	                    (  )Dizziness                    (  )Polyuria  (  )Nausea	     (  )Hematochezia	(  )Cough			                    (  )Syncope   	(  )Hematuria  (  )Vomiting    (  )Chest Pain	(  )Wheezing			(  )Weakness  (  )Diarrhea     (  )Palpitations	(  )Anorexia			( x )joint pain    All  other review of systems negative: Yes        PHYSICAL EXAM:    Constitutional: Appears Well    Neurological: A& O x forgetful    Skin: Warm    Respiratory and Thorax: normal effort; Breath sounds: normal; No rales/wheezing/rhonchi  	  Cardiovascular: S1, S2, regular, NMBR	    Gastrointestinal: BS + x 4Q, nontender	    Genitourinary:  Bladder nondistended, nontender    Musculoskeletal:   General Right:   + muscle/joint tenderness,   normal tone, no joint swelling,   ROM: limited	    General Left:   no muscle/joint tenderness,   normal tone, no joint swelling,   ROM: full    Hip:  Right: Dressing CDI;             Lower extrems:   Right: no calf tenderness              negative evi's sign               + pedal pulses    Left:   no calf tenderness              negative evi's sign               + pedal pulses                          9.0    12.93 )-----------( 168      ( 15 Aug 2019 06:55 )             27.6       08-15    137  |  104  |  32<H>  ----------------------------<  120<H>  4.1   |  29  |  0.88    Ca    8.4<L>      15 Aug 2019 06:55                                8.5    13.35 )-----------( 128      ( 14 Aug 2019 06:36 )             26.1       08-14    136  |  103  |  15  ----------------------------<  115<H>  3.7   |  26  |  0.67    Ca    8.4<L>      14 Aug 2019 06:36                                  9.9    9.96  )-----------( 129      ( 13 Aug 2019 06:35 )             30.4       08-13    140  |  106  |  12  ----------------------------<  115<H>  3.8   |  28  |  0.72    Ca    8.1<L>      13 Aug 2019 06:35        PT/INR - ( 12 Aug 2019 07:11 )   PT: 11.8 sec;   INR: 1.06 ratio         PTT - ( 12 Aug 2019 07:11 )  PTT:29.0 sec				    MEDICATIONS  (STANDING):  ascorbic acid 500 milliGRAM(s) Oral two times a day  docusate sodium Liquid 100 milliGRAM(s) Oral two times a day  enoxaparin Injectable 40 milliGRAM(s) SubCutaneous daily  ferrous    sulfate 325 milliGRAM(s) Oral three times a day with meals  folic acid 1 milliGRAM(s) Oral daily  lactated ringers. 1000 milliLiter(s) IV Continuous <Continuous>  lactated ringers. 1000 milliLiter(s) IV Continuous <Continuous>  multivitamin 1 Tablet(s) Oral daily  simvastatin 10 milliGRAM(s) Oral at bedtime      DVT Prophylaxis:  LMWH                   (x  )  Heparin SQ           (  )  Coumadin             (  )  Xarelto                  (  )  Eliquis                   (  )  Venodynes           (x  )  Ambulation          (x  )  UFH                       (  )  Contraindicated  (  )  EC ASPIRIN       (  ) Consult:   HPI:  This is a 89 yo female with PMHx of HLD, macular degeneration, hip surgery 8 years ago presents to the ED with complain of Right hip pain radiating to knee after fall. Patient was getting up off of toilet with help of commode rails when she fell forward, landing against shower door. Unable to ambulate afterwards.   Patient is a 89y old  Female who presents with right hip pain, now on 2N, s/p Right femur ORIF  Consulted by Dr. Hennessy   for VTE prophylaxis, risk stratification, and anticoagulation management.      Family Hx:  Patient is unable to provide detail family history this time. (11 Aug 2019 04:58)      PAST MEDICAL & SURGICAL HISTORY:  HLD (hyperlipidemia)  Glaucoma    SH denies ETOH, denies smoking    CrCl:46.7    Caprini VTE Risk Score:CAPRINI SCORE  AGE RELATED RISK FACTORS                                                       MOBILITY RELATED FACTORS  [ ] Age 41-60 years                                            (1 Point)                  [x ] Bed rest /restricted mobility                             (1 Point)  [ ] Age: 61-74 years                                           (2 Points)                [ ] Plaster cast                                                   (2 Points)  [x ] Age= 75 years                                              (3 Points)                 [ ] Bed bound for more than 72 hours                   (2 Points)    DISEASE RELATED RISK FACTORS                                               GENDER SPECIFIC FACTORS  [ ] Edema in the lower extremities                       (1 Point)           [ ] Pregnancy                                                            (1 Point)  [ ] Varicose veins                                               (1 Point)                  [ ] Post-partum < 6 weeks                                      (1 Point)             [x ] BMI > 25 Kg/m2                                            (1 Point)                  [ ] Hormonal therapy or oral contraception       (1 Point)                 [ ] Sepsis (in the previous month)                        (1 Point)             [ ] History of pregnancy complications                (1Point)  [ ] Pneumonia or serious lung disease                                             [ ] Unexplained or recurrent  (=/>3), premature                                 (In the previous month)                               (1 Point)                birth with toxemia or growth-restricted infant (1 Point)  [ ] Abnormal pulmonary function test            (1 Point)                                   SURGERY RELATED RISK FACTORS  [ ] Acute myocardial infarction                       (1 Point)                  [ ]  Section                                         (1 Point)  [ ] Congestive heart failure (in the previous month) (1 Point)   [ ] Minor surgery   lasting <45 minutes       (1 Point)   [ ] Inflammatory bowel disease                             (1 Point)          [ ] Arthroscopic surgery                                  (2 Points)  [ ] Central venous access                                    (2 Points)            [ ] General surgery lasting >45 minutes      (2 Points)       [ ] Stroke (in the previous month)                  (5 Points)            [ ] Elective major lower extremity arthroplasty (5 Points)                                   [  ] Malignancy (present or past include skin melanoma                                          but exclude  basal skin cell)    (2 points)                                      TRAUMA RELATED RISK FACTORS                HEMATOLOGY RELATED FACTORS                                  [x ] Fracture of the hip, pelvis, or leg                       (5 Points)  [ ] Prior episodes of VTE                                     (3 Points)          [ ] Acute spinal cord injury (in the previous month)  (5 Points)  [ ] Positive family history for VTE                         (3 Points)       [ ] Paralysis (less than 1 month)                          (5 Points)  [ ] Prothrombin 16951 A                                      (3 Points)         [ ] Multiple Trauma (within 1month)                 (5Points)                                                                                                                                                                [ ] Factor V Leiden                                          (3 Points)                                OTHER RISK FACTORS                          [ ] Lupus anticoagulants                                     (3 Points)                       [ ] BMI > 40                          (1 Point)                                                         [ ] Anticardiolipin antibodies                                (3 Points)                   [ ] Smoking                              (1Point)                                                [ ] High homocysteine in the blood                      (3 Points)                [  ] Diabetes requiring insulin (1point)                         [ ] Other congenital or acquired thrombophilia       (3 Points)          [  ] Chemotherapy                   (1 Point)  [ ] Heparin induced thrombocytopenia                  (3 Points)             [  ] Blood Transfusion                (1 point)                                                                                                             Total Score [   10       ]                                                                                                                                                                                                                                                                                                                                                                                                                 IMPROVE Bleeding Risk Score: 3.5    Falls Risk:   High ( x )  Mod (  )  Low (  )    : Patient seen at bedside, resting with minimal disruption. HH stable: 8.5/26.1. To go to rehab.  8/15/19: Patient seen at the bedside, denies any concerns regarding AC, reiterated the need for AC with Lovenox, , H&H 9.0/27.6, dispo-Rehab today    FAMILY HISTORY:    Denies any personal or familial history of clotting or bleeding disorders.    Allergies    atenolol (Other)  sulfa drugs (Other)    Intolerances        REVIEW OF SYSTEMS    (  )Fever	     (  )Constipation	(  )SOB				(  )Headache	(  )Dysuria  (  )Chills	     (  )Melena	(  )Dyspnea present on exertion	                    (  )Dizziness                    (  )Polyuria  (  )Nausea	     (  )Hematochezia	(  )Cough			                    (  )Syncope   	(  )Hematuria  (  )Vomiting    (  )Chest Pain	(  )Wheezing			(  )Weakness  (  )Diarrhea     (  )Palpitations	(  )Anorexia			( x )joint pain    All  other review of systems negative: Yes    Vital Signs Last 24 Hrs  T(C): 36.7 (15 Aug 2019 11:16), Max: 37.1 (14 Aug 2019 17:09)  T(F): 98.1 (15 Aug 2019 11:16), Max: 98.8 (15 Aug 2019 00:06)  HR: 105 (15 Aug 2019 11:16) (94 - 107)  BP: 104/63 (15 Aug 2019 11:16) (101/47 - 123/99)  BP(mean): --  RR: 16 (15 Aug 2019 11:16) (16 - 16)  SpO2: 99% (15 Aug 2019 11:16) (97% - 99%)    PHYSICAL EXAM:    Constitutional: Appears Well    Neurological: A& O x forgetful    Skin: Warm    Respiratory and Thorax: normal effort; Breath sounds: normal; No rales/wheezing/rhonchi  	  Cardiovascular: S1, S2, regular, NMBR	    Gastrointestinal: BS + x 4Q, nontender	    Genitourinary:  Bladder nondistended, nontender    Musculoskeletal:   General Right:   + muscle/joint tenderness,   normal tone, no joint swelling,   ROM: limited	    General Left:   no muscle/joint tenderness,   normal tone, no joint swelling,   ROM: full    Hip:  Right: Dressing CDI;             Lower extrems:   Right: no calf tenderness              negative evi's sign               + pedal pulses    Left:   no calf tenderness              negative evi's sign               + pedal pulses                          9.0    12.93 )-----------( 168      ( 15 Aug 2019 06:55 )             27.6       08-15    137  |  104  |  32<H>  ----------------------------<  120<H>  4.1   |  29  |  0.88    Ca    8.4<L>      15 Aug 2019 06:55                                8.5    13.35 )-----------( 128      ( 14 Aug 2019 06:36 )             26.1       08-14    136  |  103  |  15  ----------------------------<  115<H>  3.7   |  26  |  0.67    Ca    8.4<L>      14 Aug 2019 06:36                                  9.9    9.96  )-----------( 129      ( 13 Aug 2019 06:35 )             30.4       08-13    140  |  106  |  12  ----------------------------<  115<H>  3.8   |  28  |  0.72    Ca    8.1<L>      13 Aug 2019 06:35        PT/INR - ( 12 Aug 2019 07:11 )   PT: 11.8 sec;   INR: 1.06 ratio         PTT - ( 12 Aug 2019 07:11 )  PTT:29.0 sec				    MEDICATIONS  (STANDING):  ascorbic acid 500 milliGRAM(s) Oral two times a day  docusate sodium Liquid 100 milliGRAM(s) Oral two times a day  enoxaparin Injectable 40 milliGRAM(s) SubCutaneous daily  ferrous    sulfate 325 milliGRAM(s) Oral three times a day with meals  folic acid 1 milliGRAM(s) Oral daily  lactated ringers. 1000 milliLiter(s) IV Continuous <Continuous>  lactated ringers. 1000 milliLiter(s) IV Continuous <Continuous>  multivitamin 1 Tablet(s) Oral daily  simvastatin 10 milliGRAM(s) Oral at bedtime      DVT Prophylaxis:  LMWH                   (x  )  Heparin SQ           (  )  Coumadin             (  )  Xarelto                  (  )  Eliquis                   (  )  Venodynes           (x  )  Ambulation          (x  )  UFH                       (  )  Contraindicated  (  )  EC ASPIRIN       (  )

## 2019-08-15 NOTE — PROGRESS NOTE ADULT - SUBJECTIVE AND OBJECTIVE BOX
Pt S/E at bedside, no acute events overnight, pain controlled    Vital Signs Last 24 Hrs  T(C): 37 (15 Aug 2019 05:12), Max: 37.1 (14 Aug 2019 11:38)  T(F): 98.6 (15 Aug 2019 05:12), Max: 98.8 (15 Aug 2019 00:06)  HR: 107 (15 Aug 2019 05:12) (94 - 107)  BP: 123/99 (15 Aug 2019 05:12) (101/47 - 123/99)  BP(mean): --  RR: 16 (15 Aug 2019 05:12) (16 - 16)  SpO2: 99% (15 Aug 2019 05:12) (97% - 99%)    Gen: NAD    Right Lower Extremity:  Dressing clean dry intact  +EHL/FHL/TA/GS  SILT L3-S1  +DP/PT Pulses  Compartments soft  No calf TTP B/L

## 2019-08-15 NOTE — PROGRESS NOTE ADULT - SUBJECTIVE AND OBJECTIVE BOX
hpi: 89y female presents w/ hip pain after fall and found to have right femur fracture.  Plan for OR today.   No complaints.  No pain.   - resting, pain controlled.  Discussed w/ daughter at bedside- requesting Dianna.   - c/o pain, Tired.  No blood in stool, no cp, sob, palp.  Eating.    8/15- no o/n events.  Stable for transfer to rehab- f/u Ortho    ros- as per hpi, 10 point ros negative      Vital Signs Last 24 Hrs  T(C): 36.7 (15 Aug 2019 11:16), Max: 37.1 (14 Aug 2019 17:09)  T(F): 98.1 (15 Aug 2019 11:16), Max: 98.8 (15 Aug 2019 00:06)  HR: 105 (15 Aug 2019 11:16) (94 - 107)  BP: 104/63 (15 Aug 2019 11:16) (101/47 - 123/99)  BP(mean): --  RR: 16 (15 Aug 2019 11:16) (16 - 16)  SpO2: 99% (15 Aug 2019 11:16) (97% - 99%)  T(C): 37.1 (14 Aug 2019 11:38), Max: 37.3 (14 Aug 2019 04:25)  T(F): 98.7 (14 Aug 2019 11:38), Max: 99.1 (14 Aug 2019 04:25)  HR: 96 (14 Aug 2019 11:38) (85 - 99)  BP: 114/50 (14 Aug 2019 11:38) (98/40 - 122/42)  BP(mean): --  RR: 16 (14 Aug 2019 11:38) (16 - 16)  SpO2: 97% (14 Aug 2019 11:38) (97% - 100%)  T(C): 37.7 (13 Aug 2019 11:09), Max: 37.7 (13 Aug 2019 11:09)    PHYSICAL EXAM:  General: NAD, comfortable, seated in chair  Neuro: AAOx3, no focal deficits  HEENT: NCAT   Neck: Soft and supple  Respiratory: CTA b/l, no w/r/r  Cardiovascular: S1 and S2, RRR  Gastrointestinal: soft, non ttp   Extremities: dressing intact,  2+ peripheral pulses              LABS: All Labs Reviewed:                        9.0    12.93 )-----------( 168      ( 15 Aug 2019 06:55 )             27.6     08-15    137  |  104  |  32<H>  ----------------------------<  120<H>  4.1   |  29  |  0.88    Ca    8.4<L>      15 Aug 2019 06:55                Blood Culture:     RADIOLOGY/EK.5    13.35 )-----------( 128      ( 14 Aug 2019 06:36 )             26.1     08-    136  |  103  |  15  ----------------------------<  115<H>  3.7   |  26  |  0.67    Ca    8.4<L>      14 Aug 2019 06:36          MEDICATIONS  (STANDING):  ascorbic acid 500 milliGRAM(s) Oral two times a day  docusate sodium Liquid 100 milliGRAM(s) Oral two times a day  enoxaparin Injectable 40 milliGRAM(s) SubCutaneous daily  ferrous    sulfate 325 milliGRAM(s) Oral three times a day with meals  folic acid 1 milliGRAM(s) Oral daily  lactated ringers. 1000 milliLiter(s) (75 mL/Hr) IV Continuous <Continuous>  lactated ringers. 1000 milliLiter(s) (75 mL/Hr) IV Continuous <Continuous>  multivitamin 1 Tablet(s) Oral daily  simvastatin 10 milliGRAM(s) Oral at bedtime    MEDICATIONS  (PRN):  acetaminophen   Tablet .. 650 milliGRAM(s) Oral every 6 hours PRN Mild Pain (1 - 3), Moderate Pain (4 - 6)  acetaminophen   Tablet .. 650 milliGRAM(s) Oral every 6 hours PRN Temp greater or equal to 38C (100.4F), Mild Pain (1 - 3)  benzocaine 15 mG/menthol 3.6 mG Lozenge 1 Lozenge Oral every 3 hours PRN Sore Throat  bisacodyl Suppository 10 milliGRAM(s) Rectal daily PRN If no bowel movement  diphenhydrAMINE 25 milliGRAM(s) Oral every 4 hours PRN Rash and/or Itching  HYDROmorphone  Injectable 0.5 milliGRAM(s) IV Push every 6 hours PRN Severe Pain (7 - 10)  magnesium hydroxide Suspension 30 milliLiter(s) Oral daily PRN Constipation  ondansetron Injectable 4 milliGRAM(s) IV Push every 6 hours PRN Nausea and/or Vomiting  oxyCODONE    IR 2.5 milliGRAM(s) Oral every 4 hours PRN Moderate Pain (4 - 6)  oxyCODONE    IR 5 milliGRAM(s) Oral every 4 hours PRN Severe Pain (7 - 10)      Assessment and Plan:   89y female w/     1. right periprosthetic hip fracture s/p ORIF pod 3  - prn tylenol for pain control- as per daughter cannot tolerate opioids  - nwb  - oob to chair, PT eval  - Cardio, Ortho f/u appreciated      2.  acute blood loss anemia  - post op, no indication for transfusion, repeat h/h  d/c ivf  - improving, stable, repeat cbc in 1 week    3. leukocytosis  - likely reactive; afebrile, improving, repeat cbc in 1 week    4.  dvt px  lovenox sc x 4 weeks post op    stable for d/c.  f/u w/ Dr. Hennessy. PT.  Time 40min.

## 2019-08-15 NOTE — PROGRESS NOTE ADULT - PROVIDER SPECIALTY LIST ADULT
Anticoag Management
Anticoag Management
Cardiology
Hospitalist
Orthopedics
Anticoag Management

## 2019-08-15 NOTE — DISCHARGE NOTE NURSING/CASE MANAGEMENT/SOCIAL WORK - [email or web address]_GEN_ALL_CORE
You can access the [facility] Patient Portal, offered by [facility], by registering with the following website: [email or web address]

## 2019-08-15 NOTE — PROGRESS NOTE ADULT - REASON FOR ADMISSION
complain of right lower ext pain

## 2019-08-15 NOTE — PROGRESS NOTE ADULT - ASSESSMENT
Seroquel 50 mg; sinemet  mg;   Discontinued taxodione 50 mg  Restarted exelon 4.6 mg in the morning when he was more awake   A/P: 89F s/p R Periprosthetic hip fracture ORIF  Dressing changed today  Analgesia  DVT ppx per AC team  NWB RLE  PT  FU labs  No further orthopedic intervention, can follow up with Dr. Hennessy as scheduled, see paperwork for details  Orthopedically stable for DC  DC planning  Please reconsult as needed

## 2019-08-21 DIAGNOSIS — Z79.82 LONG TERM (CURRENT) USE OF ASPIRIN: ICD-10-CM

## 2019-08-21 DIAGNOSIS — H35.30 UNSPECIFIED MACULAR DEGENERATION: ICD-10-CM

## 2019-08-21 DIAGNOSIS — E44.0 MODERATE PROTEIN-CALORIE MALNUTRITION: ICD-10-CM

## 2019-08-21 DIAGNOSIS — S72.001A FRACTURE OF UNSPECIFIED PART OF NECK OF RIGHT FEMUR, INITIAL ENCOUNTER FOR CLOSED FRACTURE: ICD-10-CM
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
## 2019-08-21 DIAGNOSIS — Z88.2 ALLERGY STATUS TO SULFONAMIDES: ICD-10-CM

## 2019-08-21 DIAGNOSIS — D72.829 ELEVATED WHITE BLOOD CELL COUNT, UNSPECIFIED: ICD-10-CM
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
## 2019-08-21 DIAGNOSIS — D62 ACUTE POSTHEMORRHAGIC ANEMIA: ICD-10-CM

## 2019-08-21 DIAGNOSIS — M54.5 LOW BACK PAIN: ICD-10-CM

## 2019-08-21 DIAGNOSIS — M97.01XA PERIPROSTHETIC FRACTURE AROUND INTERNAL PROSTHETIC RIGHT HIP JOINT, INITIAL ENCOUNTER: ICD-10-CM

## 2019-08-21 DIAGNOSIS — E78.5 HYPERLIPIDEMIA, UNSPECIFIED: ICD-10-CM

## 2019-08-21 DIAGNOSIS — Z88.8 ALLERGY STATUS TO OTHER DRUGS, MEDICAMENTS AND BIOLOGICAL SUBSTANCES STATUS: ICD-10-CM

## 2019-11-22 NOTE — ED ADULT NURSE NOTE - CARDIO WDL
Left voicemail. Fluocinolone not covered by pharmacy. Please continue cortisporin as prescribed and follow up with PCP if not improving or worsening. Normal rate, regular rhythm, normal S1, S2 heart sounds heard.

## 2020-01-01 ENCOUNTER — NON-APPOINTMENT (OUTPATIENT)
Age: 85
End: 2020-01-01

## 2020-01-01 ENCOUNTER — INPATIENT (INPATIENT)
Facility: HOSPITAL | Age: 85
LOS: 5 days | Discharge: HOME HEALTH | DRG: 291 | End: 2020-11-25
Attending: STUDENT IN AN ORGANIZED HEALTH CARE EDUCATION/TRAINING PROGRAM | Admitting: FAMILY MEDICINE
Payer: MEDICARE

## 2020-01-01 ENCOUNTER — TRANSCRIPTION ENCOUNTER (OUTPATIENT)
Age: 85
End: 2020-01-01

## 2020-01-01 VITALS
SYSTOLIC BLOOD PRESSURE: 122 MMHG | DIASTOLIC BLOOD PRESSURE: 70 MMHG | OXYGEN SATURATION: 97 % | TEMPERATURE: 98 F | RESPIRATION RATE: 17 BRPM | HEART RATE: 106 BPM

## 2020-01-01 VITALS
HEART RATE: 141 BPM | OXYGEN SATURATION: 98 % | WEIGHT: 119.93 LBS | DIASTOLIC BLOOD PRESSURE: 94 MMHG | TEMPERATURE: 98 F | RESPIRATION RATE: 18 BRPM | SYSTOLIC BLOOD PRESSURE: 130 MMHG | HEIGHT: 62 IN

## 2020-01-01 DIAGNOSIS — K52.9 NONINFECTIVE GASTROENTERITIS AND COLITIS, UNSPECIFIED: ICD-10-CM

## 2020-01-01 DIAGNOSIS — J96.01 ACUTE RESPIRATORY FAILURE WITH HYPOXIA: ICD-10-CM

## 2020-01-01 DIAGNOSIS — N39.0 URINARY TRACT INFECTION, SITE NOT SPECIFIED: ICD-10-CM

## 2020-01-01 DIAGNOSIS — I48.91 UNSPECIFIED ATRIAL FIBRILLATION: ICD-10-CM

## 2020-01-01 DIAGNOSIS — Z88.2 ALLERGY STATUS TO SULFONAMIDES: ICD-10-CM

## 2020-01-01 DIAGNOSIS — I27.20 PULMONARY HYPERTENSION, UNSPECIFIED: ICD-10-CM

## 2020-01-01 DIAGNOSIS — Z96.641 PRESENCE OF RIGHT ARTIFICIAL HIP JOINT: Chronic | ICD-10-CM

## 2020-01-01 DIAGNOSIS — B96.20 UNSPECIFIED ESCHERICHIA COLI [E. COLI] AS THE CAUSE OF DISEASES CLASSIFIED ELSEWHERE: ICD-10-CM

## 2020-01-01 DIAGNOSIS — I50.23 ACUTE ON CHRONIC SYSTOLIC (CONGESTIVE) HEART FAILURE: ICD-10-CM

## 2020-01-01 DIAGNOSIS — K56.41 FECAL IMPACTION: ICD-10-CM

## 2020-01-01 DIAGNOSIS — E87.5 HYPERKALEMIA: ICD-10-CM

## 2020-01-01 DIAGNOSIS — Z79.82 LONG TERM (CURRENT) USE OF ASPIRIN: ICD-10-CM

## 2020-01-01 DIAGNOSIS — E87.1 HYPO-OSMOLALITY AND HYPONATREMIA: ICD-10-CM

## 2020-01-01 DIAGNOSIS — I11.0 HYPERTENSIVE HEART DISEASE WITH HEART FAILURE: ICD-10-CM

## 2020-01-01 DIAGNOSIS — I48.21 PERMANENT ATRIAL FIBRILLATION: ICD-10-CM

## 2020-01-01 DIAGNOSIS — Z96.641 PRESENCE OF RIGHT ARTIFICIAL HIP JOINT: ICD-10-CM

## 2020-01-01 DIAGNOSIS — I95.9 HYPOTENSION, UNSPECIFIED: ICD-10-CM

## 2020-01-01 DIAGNOSIS — E78.5 HYPERLIPIDEMIA, UNSPECIFIED: ICD-10-CM

## 2020-01-01 DIAGNOSIS — I50.9 HEART FAILURE, UNSPECIFIED: ICD-10-CM

## 2020-01-01 DIAGNOSIS — Z88.8 ALLERGY STATUS TO OTHER DRUGS, MEDICAMENTS AND BIOLOGICAL SUBSTANCES: ICD-10-CM

## 2020-01-01 DIAGNOSIS — J18.9 PNEUMONIA, UNSPECIFIED ORGANISM: ICD-10-CM

## 2020-01-01 DIAGNOSIS — R91.1 SOLITARY PULMONARY NODULE: ICD-10-CM

## 2020-01-01 LAB
-  AMIKACIN: SIGNIFICANT CHANGE UP
-  AMOXICILLIN/CLAVULANIC ACID: SIGNIFICANT CHANGE UP
-  AMPICILLIN/SULBACTAM: SIGNIFICANT CHANGE UP
-  AMPICILLIN: SIGNIFICANT CHANGE UP
-  AZTREONAM: SIGNIFICANT CHANGE UP
-  CEFAZOLIN: SIGNIFICANT CHANGE UP
-  CEFEPIME: SIGNIFICANT CHANGE UP
-  CEFOXITIN: SIGNIFICANT CHANGE UP
-  CEFTRIAXONE: SIGNIFICANT CHANGE UP
-  CIPROFLOXACIN: SIGNIFICANT CHANGE UP
-  ERTAPENEM: SIGNIFICANT CHANGE UP
-  GENTAMICIN: SIGNIFICANT CHANGE UP
-  IMIPENEM: SIGNIFICANT CHANGE UP
-  LEVOFLOXACIN: SIGNIFICANT CHANGE UP
-  MEROPENEM: SIGNIFICANT CHANGE UP
-  NITROFURANTOIN: SIGNIFICANT CHANGE UP
-  PIPERACILLIN/TAZOBACTAM: SIGNIFICANT CHANGE UP
-  TIGECYCLINE: SIGNIFICANT CHANGE UP
-  TOBRAMYCIN: SIGNIFICANT CHANGE UP
-  TRIMETHOPRIM/SULFAMETHOXAZOLE: SIGNIFICANT CHANGE UP
ALBUMIN SERPL ELPH-MCNC: 3.3 G/DL — SIGNIFICANT CHANGE UP (ref 3.3–5)
ALP SERPL-CCNC: 66 U/L — SIGNIFICANT CHANGE UP (ref 40–120)
ALT FLD-CCNC: 30 U/L — SIGNIFICANT CHANGE UP (ref 12–78)
ANION GAP SERPL CALC-SCNC: 5 MMOL/L — SIGNIFICANT CHANGE UP (ref 5–17)
ANION GAP SERPL CALC-SCNC: 5 MMOL/L — SIGNIFICANT CHANGE UP (ref 5–17)
ANION GAP SERPL CALC-SCNC: 6 MMOL/L — SIGNIFICANT CHANGE UP (ref 5–17)
ANION GAP SERPL CALC-SCNC: 6 MMOL/L — SIGNIFICANT CHANGE UP (ref 5–17)
ANION GAP SERPL CALC-SCNC: 7 MMOL/L — SIGNIFICANT CHANGE UP (ref 5–17)
ANION GAP SERPL CALC-SCNC: 7 MMOL/L — SIGNIFICANT CHANGE UP (ref 5–17)
APPEARANCE UR: ABNORMAL
AST SERPL-CCNC: 28 U/L — SIGNIFICANT CHANGE UP (ref 15–37)
BACTERIA # UR AUTO: ABNORMAL
BASOPHILS # BLD AUTO: 0.02 K/UL — SIGNIFICANT CHANGE UP (ref 0–0.2)
BASOPHILS NFR BLD AUTO: 0.2 % — SIGNIFICANT CHANGE UP (ref 0–2)
BILIRUB SERPL-MCNC: 0.9 MG/DL — SIGNIFICANT CHANGE UP (ref 0.2–1.2)
BILIRUB UR-MCNC: NEGATIVE — SIGNIFICANT CHANGE UP
BUN SERPL-MCNC: 15 MG/DL — SIGNIFICANT CHANGE UP (ref 7–23)
BUN SERPL-MCNC: 21 MG/DL — SIGNIFICANT CHANGE UP (ref 7–23)
BUN SERPL-MCNC: 25 MG/DL — HIGH (ref 7–23)
BUN SERPL-MCNC: 25 MG/DL — HIGH (ref 7–23)
BUN SERPL-MCNC: 27 MG/DL — HIGH (ref 7–23)
BUN SERPL-MCNC: 28 MG/DL — HIGH (ref 7–23)
CALCIUM SERPL-MCNC: 8.5 MG/DL — SIGNIFICANT CHANGE UP (ref 8.5–10.1)
CALCIUM SERPL-MCNC: 8.9 MG/DL — SIGNIFICANT CHANGE UP (ref 8.5–10.1)
CALCIUM SERPL-MCNC: 9 MG/DL — SIGNIFICANT CHANGE UP (ref 8.5–10.1)
CALCIUM SERPL-MCNC: 9.1 MG/DL — SIGNIFICANT CHANGE UP (ref 8.5–10.1)
CHLORIDE SERPL-SCNC: 100 MMOL/L — SIGNIFICANT CHANGE UP (ref 96–108)
CHLORIDE SERPL-SCNC: 101 MMOL/L — SIGNIFICANT CHANGE UP (ref 96–108)
CHLORIDE SERPL-SCNC: 101 MMOL/L — SIGNIFICANT CHANGE UP (ref 96–108)
CHLORIDE SERPL-SCNC: 94 MMOL/L — LOW (ref 96–108)
CHLORIDE SERPL-SCNC: 97 MMOL/L — SIGNIFICANT CHANGE UP (ref 96–108)
CHLORIDE SERPL-SCNC: 98 MMOL/L — SIGNIFICANT CHANGE UP (ref 96–108)
CO2 SERPL-SCNC: 24 MMOL/L — SIGNIFICANT CHANGE UP (ref 22–31)
CO2 SERPL-SCNC: 27 MMOL/L — SIGNIFICANT CHANGE UP (ref 22–31)
CO2 SERPL-SCNC: 28 MMOL/L — SIGNIFICANT CHANGE UP (ref 22–31)
CO2 SERPL-SCNC: 28 MMOL/L — SIGNIFICANT CHANGE UP (ref 22–31)
CO2 SERPL-SCNC: 29 MMOL/L — SIGNIFICANT CHANGE UP (ref 22–31)
CO2 SERPL-SCNC: 33 MMOL/L — HIGH (ref 22–31)
COLOR SPEC: YELLOW — SIGNIFICANT CHANGE UP
CREAT SERPL-MCNC: 0.68 MG/DL — SIGNIFICANT CHANGE UP (ref 0.5–1.3)
CREAT SERPL-MCNC: 0.83 MG/DL — SIGNIFICANT CHANGE UP (ref 0.5–1.3)
CREAT SERPL-MCNC: 0.86 MG/DL — SIGNIFICANT CHANGE UP (ref 0.5–1.3)
CREAT SERPL-MCNC: 0.87 MG/DL — SIGNIFICANT CHANGE UP (ref 0.5–1.3)
CREAT SERPL-MCNC: 0.88 MG/DL — SIGNIFICANT CHANGE UP (ref 0.5–1.3)
CREAT SERPL-MCNC: 1.05 MG/DL — SIGNIFICANT CHANGE UP (ref 0.5–1.3)
CULTURE RESULTS: SIGNIFICANT CHANGE UP
CULTURE RESULTS: SIGNIFICANT CHANGE UP
DIFF PNL FLD: ABNORMAL
EOSINOPHIL # BLD AUTO: 0.01 K/UL — SIGNIFICANT CHANGE UP (ref 0–0.5)
EOSINOPHIL NFR BLD AUTO: 0.1 % — SIGNIFICANT CHANGE UP (ref 0–6)
EPI CELLS # UR: SIGNIFICANT CHANGE UP
GLUCOSE SERPL-MCNC: 103 MG/DL — HIGH (ref 70–99)
GLUCOSE SERPL-MCNC: 107 MG/DL — HIGH (ref 70–99)
GLUCOSE SERPL-MCNC: 109 MG/DL — HIGH (ref 70–99)
GLUCOSE SERPL-MCNC: 121 MG/DL — HIGH (ref 70–99)
GLUCOSE SERPL-MCNC: 148 MG/DL — HIGH (ref 70–99)
GLUCOSE SERPL-MCNC: 98 MG/DL — SIGNIFICANT CHANGE UP (ref 70–99)
GLUCOSE UR QL: NEGATIVE MG/DL — SIGNIFICANT CHANGE UP
HCT VFR BLD CALC: 32.4 % — LOW (ref 34.5–45)
HCT VFR BLD CALC: 34 % — LOW (ref 34.5–45)
HCT VFR BLD CALC: 35.2 % — SIGNIFICANT CHANGE UP (ref 34.5–45)
HCT VFR BLD CALC: 35.7 % — SIGNIFICANT CHANGE UP (ref 34.5–45)
HCT VFR BLD CALC: 37.4 % — SIGNIFICANT CHANGE UP (ref 34.5–45)
HGB BLD-MCNC: 10.5 G/DL — LOW (ref 11.5–15.5)
HGB BLD-MCNC: 11.1 G/DL — LOW (ref 11.5–15.5)
HGB BLD-MCNC: 11.5 G/DL — SIGNIFICANT CHANGE UP (ref 11.5–15.5)
HGB BLD-MCNC: 11.7 G/DL — SIGNIFICANT CHANGE UP (ref 11.5–15.5)
HGB BLD-MCNC: 12.6 G/DL — SIGNIFICANT CHANGE UP (ref 11.5–15.5)
IMM GRANULOCYTES NFR BLD AUTO: 0.6 % — SIGNIFICANT CHANGE UP (ref 0–1.5)
KETONES UR-MCNC: NEGATIVE — SIGNIFICANT CHANGE UP
LACTATE SERPL-SCNC: 2 MMOL/L — SIGNIFICANT CHANGE UP (ref 0.7–2)
LEUKOCYTE ESTERASE UR-ACNC: ABNORMAL
LIDOCAIN IGE QN: 109 U/L — SIGNIFICANT CHANGE UP (ref 73–393)
LYMPHOCYTES # BLD AUTO: 0.88 K/UL — LOW (ref 1–3.3)
LYMPHOCYTES # BLD AUTO: 8.7 % — LOW (ref 13–44)
MAGNESIUM SERPL-MCNC: 2.1 MG/DL — SIGNIFICANT CHANGE UP (ref 1.6–2.6)
MAGNESIUM SERPL-MCNC: 2.5 MG/DL — SIGNIFICANT CHANGE UP (ref 1.6–2.6)
MCHC RBC-ENTMCNC: 27.4 PG — SIGNIFICANT CHANGE UP (ref 27–34)
MCHC RBC-ENTMCNC: 27.6 PG — SIGNIFICANT CHANGE UP (ref 27–34)
MCHC RBC-ENTMCNC: 27.8 PG — SIGNIFICANT CHANGE UP (ref 27–34)
MCHC RBC-ENTMCNC: 28 PG — SIGNIFICANT CHANGE UP (ref 27–34)
MCHC RBC-ENTMCNC: 28.2 PG — SIGNIFICANT CHANGE UP (ref 27–34)
MCHC RBC-ENTMCNC: 32.2 GM/DL — SIGNIFICANT CHANGE UP (ref 32–36)
MCHC RBC-ENTMCNC: 32.4 GM/DL — SIGNIFICANT CHANGE UP (ref 32–36)
MCHC RBC-ENTMCNC: 32.6 GM/DL — SIGNIFICANT CHANGE UP (ref 32–36)
MCHC RBC-ENTMCNC: 33.2 GM/DL — SIGNIFICANT CHANGE UP (ref 32–36)
MCHC RBC-ENTMCNC: 33.7 GM/DL — SIGNIFICANT CHANGE UP (ref 32–36)
MCV RBC AUTO: 83.7 FL — SIGNIFICANT CHANGE UP (ref 80–100)
MCV RBC AUTO: 84.2 FL — SIGNIFICANT CHANGE UP (ref 80–100)
MCV RBC AUTO: 85 FL — SIGNIFICANT CHANGE UP (ref 80–100)
MCV RBC AUTO: 85.2 FL — SIGNIFICANT CHANGE UP (ref 80–100)
MCV RBC AUTO: 85.2 FL — SIGNIFICANT CHANGE UP (ref 80–100)
METHOD TYPE: SIGNIFICANT CHANGE UP
MONOCYTES # BLD AUTO: 0.61 K/UL — SIGNIFICANT CHANGE UP (ref 0–0.9)
MONOCYTES NFR BLD AUTO: 6 % — SIGNIFICANT CHANGE UP (ref 2–14)
NEUTROPHILS # BLD AUTO: 8.53 K/UL — HIGH (ref 1.8–7.4)
NEUTROPHILS NFR BLD AUTO: 84.4 % — HIGH (ref 43–77)
NITRITE UR-MCNC: NEGATIVE — SIGNIFICANT CHANGE UP
NT-PROBNP SERPL-SCNC: HIGH PG/ML (ref 0–450)
NT-PROBNP SERPL-SCNC: HIGH PG/ML (ref 0–450)
ORGANISM # SPEC MICROSCOPIC CNT: SIGNIFICANT CHANGE UP
ORGANISM # SPEC MICROSCOPIC CNT: SIGNIFICANT CHANGE UP
PH UR: 5 — SIGNIFICANT CHANGE UP (ref 5–8)
PHOSPHATE SERPL-MCNC: 3 MG/DL — SIGNIFICANT CHANGE UP (ref 2.5–4.5)
PLATELET # BLD AUTO: 262 K/UL — SIGNIFICANT CHANGE UP (ref 150–400)
PLATELET # BLD AUTO: 263 K/UL — SIGNIFICANT CHANGE UP (ref 150–400)
PLATELET # BLD AUTO: 290 K/UL — SIGNIFICANT CHANGE UP (ref 150–400)
PLATELET # BLD AUTO: 310 K/UL — SIGNIFICANT CHANGE UP (ref 150–400)
PLATELET # BLD AUTO: 318 K/UL — SIGNIFICANT CHANGE UP (ref 150–400)
POTASSIUM SERPL-MCNC: 4 MMOL/L — SIGNIFICANT CHANGE UP (ref 3.5–5.3)
POTASSIUM SERPL-MCNC: 4 MMOL/L — SIGNIFICANT CHANGE UP (ref 3.5–5.3)
POTASSIUM SERPL-MCNC: 4.2 MMOL/L — SIGNIFICANT CHANGE UP (ref 3.5–5.3)
POTASSIUM SERPL-MCNC: 4.2 MMOL/L — SIGNIFICANT CHANGE UP (ref 3.5–5.3)
POTASSIUM SERPL-MCNC: 4.5 MMOL/L — SIGNIFICANT CHANGE UP (ref 3.5–5.3)
POTASSIUM SERPL-MCNC: 5.5 MMOL/L — HIGH (ref 3.5–5.3)
POTASSIUM SERPL-SCNC: 4 MMOL/L — SIGNIFICANT CHANGE UP (ref 3.5–5.3)
POTASSIUM SERPL-SCNC: 4 MMOL/L — SIGNIFICANT CHANGE UP (ref 3.5–5.3)
POTASSIUM SERPL-SCNC: 4.2 MMOL/L — SIGNIFICANT CHANGE UP (ref 3.5–5.3)
POTASSIUM SERPL-SCNC: 4.2 MMOL/L — SIGNIFICANT CHANGE UP (ref 3.5–5.3)
POTASSIUM SERPL-SCNC: 4.5 MMOL/L — SIGNIFICANT CHANGE UP (ref 3.5–5.3)
POTASSIUM SERPL-SCNC: 5.5 MMOL/L — HIGH (ref 3.5–5.3)
PROT SERPL-MCNC: 7.6 GM/DL — SIGNIFICANT CHANGE UP (ref 6–8.3)
PROT UR-MCNC: 100 MG/DL
RBC # BLD: 3.81 M/UL — SIGNIFICANT CHANGE UP (ref 3.8–5.2)
RBC # BLD: 3.99 M/UL — SIGNIFICANT CHANGE UP (ref 3.8–5.2)
RBC # BLD: 4.18 M/UL — SIGNIFICANT CHANGE UP (ref 3.8–5.2)
RBC # BLD: 4.19 M/UL — SIGNIFICANT CHANGE UP (ref 3.8–5.2)
RBC # BLD: 4.47 M/UL — SIGNIFICANT CHANGE UP (ref 3.8–5.2)
RBC # FLD: 14.9 % — HIGH (ref 10.3–14.5)
RBC # FLD: 15 % — HIGH (ref 10.3–14.5)
RBC # FLD: 15 % — HIGH (ref 10.3–14.5)
RBC CASTS # UR COMP ASSIST: SIGNIFICANT CHANGE UP /HPF (ref 0–4)
SARS-COV-2 IGG SERPL QL IA: POSITIVE
SARS-COV-2 IGM SERPL IA-ACNC: 5.48 INDEX — HIGH
SARS-COV-2 RNA SPEC QL NAA+PROBE: SIGNIFICANT CHANGE UP
SODIUM SERPL-SCNC: 125 MMOL/L — LOW (ref 135–145)
SODIUM SERPL-SCNC: 131 MMOL/L — LOW (ref 135–145)
SODIUM SERPL-SCNC: 134 MMOL/L — LOW (ref 135–145)
SODIUM SERPL-SCNC: 135 MMOL/L — SIGNIFICANT CHANGE UP (ref 135–145)
SODIUM SERPL-SCNC: 135 MMOL/L — SIGNIFICANT CHANGE UP (ref 135–145)
SODIUM SERPL-SCNC: 136 MMOL/L — SIGNIFICANT CHANGE UP (ref 135–145)
SP GR SPEC: 1.01 — SIGNIFICANT CHANGE UP (ref 1.01–1.02)
SPECIMEN SOURCE: SIGNIFICANT CHANGE UP
SPECIMEN SOURCE: SIGNIFICANT CHANGE UP
TROPONIN I SERPL-MCNC: 0.03 NG/ML — SIGNIFICANT CHANGE UP (ref 0.01–0.04)
TSH SERPL-MCNC: 3.01 UU/ML — SIGNIFICANT CHANGE UP (ref 0.34–4.82)
UROBILINOGEN FLD QL: NEGATIVE MG/DL — SIGNIFICANT CHANGE UP
WBC # BLD: 10.11 K/UL — SIGNIFICANT CHANGE UP (ref 3.8–10.5)
WBC # BLD: 12.44 K/UL — HIGH (ref 3.8–10.5)
WBC # BLD: 8.49 K/UL — SIGNIFICANT CHANGE UP (ref 3.8–10.5)
WBC # BLD: 8.95 K/UL — SIGNIFICANT CHANGE UP (ref 3.8–10.5)
WBC # BLD: 8.99 K/UL — SIGNIFICANT CHANGE UP (ref 3.8–10.5)
WBC # FLD AUTO: 10.11 K/UL — SIGNIFICANT CHANGE UP (ref 3.8–10.5)
WBC # FLD AUTO: 12.44 K/UL — HIGH (ref 3.8–10.5)
WBC # FLD AUTO: 8.49 K/UL — SIGNIFICANT CHANGE UP (ref 3.8–10.5)
WBC # FLD AUTO: 8.95 K/UL — SIGNIFICANT CHANGE UP (ref 3.8–10.5)
WBC # FLD AUTO: 8.99 K/UL — SIGNIFICANT CHANGE UP (ref 3.8–10.5)
WBC UR QL: >50

## 2020-01-01 PROCEDURE — 84100 ASSAY OF PHOSPHORUS: CPT

## 2020-01-01 PROCEDURE — 74177 CT ABD & PELVIS W/CONTRAST: CPT | Mod: 26

## 2020-01-01 PROCEDURE — 85027 COMPLETE CBC AUTOMATED: CPT

## 2020-01-01 PROCEDURE — 99232 SBSQ HOSP IP/OBS MODERATE 35: CPT

## 2020-01-01 PROCEDURE — 99233 SBSQ HOSP IP/OBS HIGH 50: CPT

## 2020-01-01 PROCEDURE — 84443 ASSAY THYROID STIM HORMONE: CPT

## 2020-01-01 PROCEDURE — 99231 SBSQ HOSP IP/OBS SF/LOW 25: CPT

## 2020-01-01 PROCEDURE — 71045 X-RAY EXAM CHEST 1 VIEW: CPT | Mod: 26

## 2020-01-01 PROCEDURE — 93306 TTE W/DOPPLER COMPLETE: CPT | Mod: 26

## 2020-01-01 PROCEDURE — 87186 SC STD MICRODIL/AGAR DIL: CPT

## 2020-01-01 PROCEDURE — 71250 CT THORAX DX C-: CPT | Mod: 26

## 2020-01-01 PROCEDURE — 83735 ASSAY OF MAGNESIUM: CPT

## 2020-01-01 PROCEDURE — 87086 URINE CULTURE/COLONY COUNT: CPT

## 2020-01-01 PROCEDURE — 80048 BASIC METABOLIC PNL TOTAL CA: CPT

## 2020-01-01 PROCEDURE — 81001 URINALYSIS AUTO W/SCOPE: CPT

## 2020-01-01 PROCEDURE — 93010 ELECTROCARDIOGRAM REPORT: CPT

## 2020-01-01 PROCEDURE — 99239 HOSP IP/OBS DSCHRG MGMT >30: CPT

## 2020-01-01 PROCEDURE — 71045 X-RAY EXAM CHEST 1 VIEW: CPT

## 2020-01-01 PROCEDURE — 36415 COLL VENOUS BLD VENIPUNCTURE: CPT

## 2020-01-01 PROCEDURE — 93306 TTE W/DOPPLER COMPLETE: CPT

## 2020-01-01 PROCEDURE — 99223 1ST HOSP IP/OBS HIGH 75: CPT

## 2020-01-01 PROCEDURE — 99222 1ST HOSP IP/OBS MODERATE 55: CPT

## 2020-01-01 PROCEDURE — 83880 ASSAY OF NATRIURETIC PEPTIDE: CPT

## 2020-01-01 PROCEDURE — 94760 N-INVAS EAR/PLS OXIMETRY 1: CPT

## 2020-01-01 RX ORDER — FUROSEMIDE 40 MG
40 TABLET ORAL DAILY
Refills: 0 | Status: DISCONTINUED | OUTPATIENT
Start: 2020-01-01 | End: 2020-01-01

## 2020-01-01 RX ORDER — METOPROLOL TARTRATE 50 MG
1 TABLET ORAL
Qty: 60 | Refills: 0
Start: 2020-01-01 | End: 2020-01-01

## 2020-01-01 RX ORDER — ONDANSETRON 8 MG/1
4 TABLET, FILM COATED ORAL ONCE
Refills: 0 | Status: COMPLETED | OUTPATIENT
Start: 2020-01-01 | End: 2020-01-01

## 2020-01-01 RX ORDER — HEPARIN SODIUM 5000 [USP'U]/ML
5000 INJECTION INTRAVENOUS; SUBCUTANEOUS EVERY 12 HOURS
Refills: 0 | Status: DISCONTINUED | OUTPATIENT
Start: 2020-01-01 | End: 2020-01-01

## 2020-01-01 RX ORDER — POLYETHYLENE GLYCOL 3350 17 G/17G
17 POWDER, FOR SOLUTION ORAL
Qty: 0 | Refills: 0 | DISCHARGE

## 2020-01-01 RX ORDER — SODIUM CHLORIDE 9 MG/ML
500 INJECTION INTRAMUSCULAR; INTRAVENOUS; SUBCUTANEOUS ONCE
Refills: 0 | Status: COMPLETED | OUTPATIENT
Start: 2020-01-01 | End: 2020-01-01

## 2020-01-01 RX ORDER — HYDROXYZINE HCL 10 MG
25 TABLET ORAL
Refills: 0 | Status: DISCONTINUED | OUTPATIENT
Start: 2020-01-01 | End: 2020-01-01

## 2020-01-01 RX ORDER — DILTIAZEM HCL 120 MG
60 CAPSULE, EXT RELEASE 24 HR ORAL EVERY 6 HOURS
Refills: 0 | Status: DISCONTINUED | OUTPATIENT
Start: 2020-01-01 | End: 2020-01-01

## 2020-01-01 RX ORDER — FAMOTIDINE 10 MG/ML
20 INJECTION INTRAVENOUS DAILY
Refills: 0 | Status: DISCONTINUED | OUTPATIENT
Start: 2020-01-01 | End: 2020-01-01

## 2020-01-01 RX ORDER — FUROSEMIDE 40 MG
40 TABLET ORAL ONCE
Refills: 0 | Status: COMPLETED | OUTPATIENT
Start: 2020-01-01 | End: 2020-01-01

## 2020-01-01 RX ORDER — AZITHROMYCIN 500 MG/1
250 TABLET, FILM COATED ORAL DAILY
Refills: 0 | Status: DISCONTINUED | OUTPATIENT
Start: 2020-01-01 | End: 2020-01-01

## 2020-01-01 RX ORDER — MULTIVIT WITH MIN/MFOLATE/K2 340-15/3 G
1 POWDER (GRAM) ORAL ONCE
Refills: 0 | Status: COMPLETED | OUTPATIENT
Start: 2020-01-01 | End: 2020-01-01

## 2020-01-01 RX ORDER — ENALAPRIL MALEATE 2.5 MG/1
2.5 TABLET ORAL
Refills: 0 | Status: ACTIVE | COMMUNITY
Start: 2020-02-18

## 2020-01-01 RX ORDER — METOPROLOL TARTRATE 50 MG/1
50 TABLET, FILM COATED ORAL
Refills: 0 | Status: ACTIVE | COMMUNITY
Start: 2020-01-01

## 2020-01-01 RX ORDER — CEFUROXIME AXETIL 250 MG
1 TABLET ORAL
Qty: 10 | Refills: 0
Start: 2020-01-01 | End: 2020-01-01

## 2020-01-01 RX ORDER — DILTIAZEM HCL 120 MG
10 CAPSULE, EXT RELEASE 24 HR ORAL ONCE
Refills: 0 | Status: COMPLETED | OUTPATIENT
Start: 2020-01-01 | End: 2020-01-01

## 2020-01-01 RX ORDER — CEFTRIAXONE 500 MG/1
1000 INJECTION, POWDER, FOR SOLUTION INTRAMUSCULAR; INTRAVENOUS EVERY 24 HOURS
Refills: 0 | Status: DISCONTINUED | OUTPATIENT
Start: 2020-01-01 | End: 2020-01-01

## 2020-01-01 RX ORDER — FUROSEMIDE 40 MG
1 TABLET ORAL
Qty: 15 | Refills: 0
Start: 2020-01-01 | End: 2020-01-01

## 2020-01-01 RX ORDER — ATORVASTATIN CALCIUM 80 MG/1
10 TABLET, FILM COATED ORAL AT BEDTIME
Refills: 0 | Status: DISCONTINUED | OUTPATIENT
Start: 2020-01-01 | End: 2020-01-01

## 2020-01-01 RX ORDER — FUROSEMIDE 40 MG
1 TABLET ORAL
Qty: 0 | Refills: 0 | DISCHARGE

## 2020-01-01 RX ORDER — SENNA PLUS 8.6 MG/1
2 TABLET ORAL AT BEDTIME
Refills: 0 | Status: DISCONTINUED | OUTPATIENT
Start: 2020-01-01 | End: 2020-01-01

## 2020-01-01 RX ORDER — METOPROLOL TARTRATE 50 MG
25 TABLET ORAL
Refills: 0 | Status: DISCONTINUED | OUTPATIENT
Start: 2020-01-01 | End: 2020-01-01

## 2020-01-01 RX ORDER — FUROSEMIDE 20 MG/1
20 TABLET ORAL DAILY
Refills: 0 | Status: ACTIVE | COMMUNITY
Start: 2020-02-18

## 2020-01-01 RX ORDER — ACETAMINOPHEN 500 MG
650 TABLET ORAL ONCE
Refills: 0 | Status: COMPLETED | OUTPATIENT
Start: 2020-01-01 | End: 2020-01-01

## 2020-01-01 RX ORDER — HYDROXYZINE HCL 10 MG
1 TABLET ORAL
Qty: 20 | Refills: 0
Start: 2020-01-01 | End: 2020-01-01

## 2020-01-01 RX ORDER — DILTIAZEM HCL 120 MG
30 CAPSULE, EXT RELEASE 24 HR ORAL EVERY 6 HOURS
Refills: 0 | Status: DISCONTINUED | OUTPATIENT
Start: 2020-01-01 | End: 2020-01-01

## 2020-01-01 RX ORDER — CHOLECALCIFEROL (VITAMIN D3) 1250 MCG
1.25 MG CAPSULE ORAL
Qty: 4 | Refills: 1 | Status: DISCONTINUED | COMMUNITY
Start: 2020-02-18 | End: 2020-01-01

## 2020-01-01 RX ORDER — HYDROXYZINE HYDROCHLORIDE 25 MG/1
25 TABLET ORAL TWICE DAILY
Refills: 0 | Status: ACTIVE | COMMUNITY
Start: 2020-01-01

## 2020-01-01 RX ORDER — METOPROLOL TARTRATE 50 MG
50 TABLET ORAL
Refills: 0 | Status: DISCONTINUED | OUTPATIENT
Start: 2020-01-01 | End: 2020-01-01

## 2020-01-01 RX ORDER — ASPIRIN/CALCIUM CARB/MAGNESIUM 324 MG
81 TABLET ORAL DAILY
Refills: 0 | Status: DISCONTINUED | OUTPATIENT
Start: 2020-01-01 | End: 2020-01-01

## 2020-01-01 RX ORDER — CARVEDILOL PHOSPHATE 80 MG/1
3.12 CAPSULE, EXTENDED RELEASE ORAL EVERY 12 HOURS
Refills: 0 | Status: DISCONTINUED | OUTPATIENT
Start: 2020-01-01 | End: 2020-01-01

## 2020-01-01 RX ORDER — CARVEDILOL 3.12 MG/1
3.12 TABLET, FILM COATED ORAL TWICE DAILY
Refills: 0 | Status: DISCONTINUED | COMMUNITY
Start: 2020-02-18 | End: 2020-01-01

## 2020-01-01 RX ORDER — DILTIAZEM HCL 120 MG
60 CAPSULE, EXT RELEASE 24 HR ORAL ONCE
Refills: 0 | Status: COMPLETED | OUTPATIENT
Start: 2020-01-01 | End: 2020-01-01

## 2020-01-01 RX ORDER — CEFUROXIME AXETIL 500 MG/1
500 TABLET ORAL
Qty: 10 | Refills: 0 | Status: ACTIVE | COMMUNITY
Start: 2020-01-01

## 2020-01-01 RX ORDER — DILTIAZEM HCL 120 MG
30 CAPSULE, EXT RELEASE 24 HR ORAL EVERY 8 HOURS
Refills: 0 | Status: DISCONTINUED | OUTPATIENT
Start: 2020-01-01 | End: 2020-01-01

## 2020-01-01 RX ORDER — FUROSEMIDE 40 MG
40 TABLET ORAL EVERY 12 HOURS
Refills: 0 | Status: DISCONTINUED | OUTPATIENT
Start: 2020-01-01 | End: 2020-01-01

## 2020-01-01 RX ORDER — FAMOTIDINE 10 MG/ML
10 INJECTION INTRAVENOUS DAILY
Refills: 0 | Status: DISCONTINUED | OUTPATIENT
Start: 2020-01-01 | End: 2020-01-01

## 2020-01-01 RX ORDER — CALCIUM GLUCONATE 100 MG/ML
1 VIAL (ML) INTRAVENOUS ONCE
Refills: 0 | Status: COMPLETED | OUTPATIENT
Start: 2020-01-01 | End: 2020-01-01

## 2020-01-01 RX ORDER — AZITHROMYCIN 500 MG/1
500 TABLET, FILM COATED ORAL ONCE
Refills: 0 | Status: COMPLETED | OUTPATIENT
Start: 2020-01-01 | End: 2020-01-01

## 2020-01-01 RX ORDER — POLYETHYLENE GLYCOL 3350 17 G/17G
17 POWDER, FOR SOLUTION ORAL DAILY
Refills: 0 | Status: DISCONTINUED | OUTPATIENT
Start: 2020-01-01 | End: 2020-01-01

## 2020-01-01 RX ADMIN — AZITHROMYCIN 250 MILLIGRAM(S): 500 TABLET, FILM COATED ORAL at 11:01

## 2020-01-01 RX ADMIN — AZITHROMYCIN 500 MILLIGRAM(S): 500 TABLET, FILM COATED ORAL at 20:17

## 2020-01-01 RX ADMIN — Medication 60 MILLIGRAM(S): at 00:53

## 2020-01-01 RX ADMIN — AZITHROMYCIN 250 MILLIGRAM(S): 500 TABLET, FILM COATED ORAL at 10:48

## 2020-01-01 RX ADMIN — Medication 25 MILLIGRAM(S): at 17:15

## 2020-01-01 RX ADMIN — Medication 25 MILLIGRAM(S): at 10:48

## 2020-01-01 RX ADMIN — Medication 10 MILLIGRAM(S): at 12:42

## 2020-01-01 RX ADMIN — Medication 10 MILLIGRAM(S): at 14:55

## 2020-01-01 RX ADMIN — Medication 1 BOTTLE: at 11:21

## 2020-01-01 RX ADMIN — Medication 650 MILLIGRAM(S): at 13:36

## 2020-01-01 RX ADMIN — CEFTRIAXONE 1000 MILLIGRAM(S): 500 INJECTION, POWDER, FOR SOLUTION INTRAMUSCULAR; INTRAVENOUS at 21:53

## 2020-01-01 RX ADMIN — Medication 40 MILLIGRAM(S): at 12:16

## 2020-01-01 RX ADMIN — SENNA PLUS 2 TABLET(S): 8.6 TABLET ORAL at 21:03

## 2020-01-01 RX ADMIN — SODIUM CHLORIDE 500 MILLILITER(S): 9 INJECTION INTRAMUSCULAR; INTRAVENOUS; SUBCUTANEOUS at 13:00

## 2020-01-01 RX ADMIN — FAMOTIDINE 10 MILLIGRAM(S): 10 INJECTION INTRAVENOUS at 10:49

## 2020-01-01 RX ADMIN — POLYETHYLENE GLYCOL 3350 17 GRAM(S): 17 POWDER, FOR SOLUTION ORAL at 12:15

## 2020-01-01 RX ADMIN — Medication 50 MILLIGRAM(S): at 12:15

## 2020-01-01 RX ADMIN — Medication 40 MILLIGRAM(S): at 05:59

## 2020-01-01 RX ADMIN — Medication 40 MILLIGRAM(S): at 05:49

## 2020-01-01 RX ADMIN — FAMOTIDINE 10 MILLIGRAM(S): 10 INJECTION INTRAVENOUS at 12:14

## 2020-01-01 RX ADMIN — Medication 30 MILLIGRAM(S): at 05:49

## 2020-01-01 RX ADMIN — HEPARIN SODIUM 5000 UNIT(S): 5000 INJECTION INTRAVENOUS; SUBCUTANEOUS at 11:18

## 2020-01-01 RX ADMIN — Medication 650 MILLIGRAM(S): at 13:00

## 2020-01-01 RX ADMIN — HEPARIN SODIUM 5000 UNIT(S): 5000 INJECTION INTRAVENOUS; SUBCUTANEOUS at 22:19

## 2020-01-01 RX ADMIN — HEPARIN SODIUM 5000 UNIT(S): 5000 INJECTION INTRAVENOUS; SUBCUTANEOUS at 21:03

## 2020-01-01 RX ADMIN — Medication 81 MILLIGRAM(S): at 10:47

## 2020-01-01 RX ADMIN — CARVEDILOL PHOSPHATE 3.12 MILLIGRAM(S): 80 CAPSULE, EXTENDED RELEASE ORAL at 11:21

## 2020-01-01 RX ADMIN — HEPARIN SODIUM 5000 UNIT(S): 5000 INJECTION INTRAVENOUS; SUBCUTANEOUS at 10:50

## 2020-01-01 RX ADMIN — HEPARIN SODIUM 5000 UNIT(S): 5000 INJECTION INTRAVENOUS; SUBCUTANEOUS at 11:21

## 2020-01-01 RX ADMIN — SENNA PLUS 2 TABLET(S): 8.6 TABLET ORAL at 22:19

## 2020-01-01 RX ADMIN — AZITHROMYCIN 250 MILLIGRAM(S): 500 TABLET, FILM COATED ORAL at 11:21

## 2020-01-01 RX ADMIN — POLYETHYLENE GLYCOL 3350 17 GRAM(S): 17 POWDER, FOR SOLUTION ORAL at 15:50

## 2020-01-01 RX ADMIN — Medication 81 MILLIGRAM(S): at 12:16

## 2020-01-01 RX ADMIN — HEPARIN SODIUM 5000 UNIT(S): 5000 INJECTION INTRAVENOUS; SUBCUTANEOUS at 10:48

## 2020-01-01 RX ADMIN — ATORVASTATIN CALCIUM 10 MILLIGRAM(S): 80 TABLET, FILM COATED ORAL at 21:51

## 2020-01-01 RX ADMIN — POLYETHYLENE GLYCOL 3350 17 GRAM(S): 17 POWDER, FOR SOLUTION ORAL at 10:50

## 2020-01-01 RX ADMIN — Medication 40 MILLIGRAM(S): at 16:32

## 2020-01-01 RX ADMIN — CARVEDILOL PHOSPHATE 3.12 MILLIGRAM(S): 80 CAPSULE, EXTENDED RELEASE ORAL at 11:18

## 2020-01-01 RX ADMIN — Medication 50 MILLIGRAM(S): at 21:03

## 2020-01-01 RX ADMIN — CEFTRIAXONE 1000 MILLIGRAM(S): 500 INJECTION, POWDER, FOR SOLUTION INTRAMUSCULAR; INTRAVENOUS at 22:19

## 2020-01-01 RX ADMIN — CEFTRIAXONE 1000 MILLIGRAM(S): 500 INJECTION, POWDER, FOR SOLUTION INTRAMUSCULAR; INTRAVENOUS at 22:10

## 2020-01-01 RX ADMIN — Medication 40 MILLIGRAM(S): at 05:18

## 2020-01-01 RX ADMIN — FAMOTIDINE 20 MILLIGRAM(S): 10 INJECTION INTRAVENOUS at 11:18

## 2020-01-01 RX ADMIN — SENNA PLUS 2 TABLET(S): 8.6 TABLET ORAL at 22:09

## 2020-01-01 RX ADMIN — Medication 10 MILLIGRAM(S): at 17:34

## 2020-01-01 RX ADMIN — Medication 2.5 MILLIGRAM(S): at 22:09

## 2020-01-01 RX ADMIN — Medication 25 MILLIGRAM(S): at 21:23

## 2020-01-01 RX ADMIN — Medication 40 MILLIGRAM(S): at 22:10

## 2020-01-01 RX ADMIN — Medication 40 MILLIGRAM(S): at 11:18

## 2020-01-01 RX ADMIN — POLYETHYLENE GLYCOL 3350 17 GRAM(S): 17 POWDER, FOR SOLUTION ORAL at 11:20

## 2020-01-01 RX ADMIN — Medication 81 MILLIGRAM(S): at 10:50

## 2020-01-01 RX ADMIN — ATORVASTATIN CALCIUM 10 MILLIGRAM(S): 80 TABLET, FILM COATED ORAL at 22:19

## 2020-01-01 RX ADMIN — ATORVASTATIN CALCIUM 10 MILLIGRAM(S): 80 TABLET, FILM COATED ORAL at 21:03

## 2020-01-01 RX ADMIN — Medication 81 MILLIGRAM(S): at 11:21

## 2020-01-01 RX ADMIN — CARVEDILOL PHOSPHATE 3.12 MILLIGRAM(S): 80 CAPSULE, EXTENDED RELEASE ORAL at 21:51

## 2020-01-01 RX ADMIN — ATORVASTATIN CALCIUM 10 MILLIGRAM(S): 80 TABLET, FILM COATED ORAL at 22:12

## 2020-01-01 RX ADMIN — Medication 81 MILLIGRAM(S): at 11:18

## 2020-01-01 RX ADMIN — Medication 25 MILLIGRAM(S): at 10:50

## 2020-01-01 RX ADMIN — CEFTRIAXONE 1000 MILLIGRAM(S): 500 INJECTION, POWDER, FOR SOLUTION INTRAMUSCULAR; INTRAVENOUS at 21:04

## 2020-01-01 RX ADMIN — HEPARIN SODIUM 5000 UNIT(S): 5000 INJECTION INTRAVENOUS; SUBCUTANEOUS at 22:13

## 2020-01-01 RX ADMIN — Medication 60 MILLIGRAM(S): at 16:35

## 2020-01-01 RX ADMIN — SENNA PLUS 2 TABLET(S): 8.6 TABLET ORAL at 22:12

## 2020-01-01 RX ADMIN — CEFTRIAXONE 1000 MILLIGRAM(S): 500 INJECTION, POWDER, FOR SOLUTION INTRAMUSCULAR; INTRAVENOUS at 21:24

## 2020-01-01 RX ADMIN — CEFTRIAXONE 1000 MILLIGRAM(S): 500 INJECTION, POWDER, FOR SOLUTION INTRAMUSCULAR; INTRAVENOUS at 22:13

## 2020-01-01 RX ADMIN — Medication 50 MILLIGRAM(S): at 10:50

## 2020-01-01 RX ADMIN — ONDANSETRON 4 MILLIGRAM(S): 8 TABLET, FILM COATED ORAL at 12:42

## 2020-01-01 RX ADMIN — HEPARIN SODIUM 5000 UNIT(S): 5000 INJECTION INTRAVENOUS; SUBCUTANEOUS at 22:10

## 2020-01-01 RX ADMIN — Medication 50 MILLIGRAM(S): at 22:12

## 2020-01-01 RX ADMIN — ATORVASTATIN CALCIUM 10 MILLIGRAM(S): 80 TABLET, FILM COATED ORAL at 22:09

## 2020-01-01 RX ADMIN — CARVEDILOL PHOSPHATE 3.12 MILLIGRAM(S): 80 CAPSULE, EXTENDED RELEASE ORAL at 22:09

## 2020-01-01 RX ADMIN — FAMOTIDINE 10 MILLIGRAM(S): 10 INJECTION INTRAVENOUS at 11:00

## 2020-01-01 RX ADMIN — FAMOTIDINE 10 MILLIGRAM(S): 10 INJECTION INTRAVENOUS at 10:47

## 2020-01-01 RX ADMIN — Medication 30 MILLIGRAM(S): at 21:51

## 2020-01-01 RX ADMIN — HEPARIN SODIUM 5000 UNIT(S): 5000 INJECTION INTRAVENOUS; SUBCUTANEOUS at 21:25

## 2020-01-01 RX ADMIN — HEPARIN SODIUM 5000 UNIT(S): 5000 INJECTION INTRAVENOUS; SUBCUTANEOUS at 12:16

## 2020-01-01 RX ADMIN — FAMOTIDINE 10 MILLIGRAM(S): 10 INJECTION INTRAVENOUS at 11:20

## 2020-01-01 RX ADMIN — Medication 40 MILLIGRAM(S): at 05:48

## 2020-01-01 RX ADMIN — ATORVASTATIN CALCIUM 10 MILLIGRAM(S): 80 TABLET, FILM COATED ORAL at 21:23

## 2020-01-01 RX ADMIN — Medication 25 MILLIGRAM(S): at 03:05

## 2020-01-01 RX ADMIN — HEPARIN SODIUM 5000 UNIT(S): 5000 INJECTION INTRAVENOUS; SUBCUTANEOUS at 11:01

## 2020-01-01 RX ADMIN — Medication 50 MILLIGRAM(S): at 11:01

## 2020-01-01 RX ADMIN — Medication 50 GRAM(S): at 17:56

## 2020-01-01 RX ADMIN — Medication 30 MILLIGRAM(S): at 05:59

## 2020-01-01 RX ADMIN — HEPARIN SODIUM 5000 UNIT(S): 5000 INJECTION INTRAVENOUS; SUBCUTANEOUS at 21:51

## 2020-01-01 RX ADMIN — Medication 81 MILLIGRAM(S): at 11:00

## 2020-01-01 RX ADMIN — Medication 30 MILLIGRAM(S): at 17:33

## 2020-01-01 RX ADMIN — POLYETHYLENE GLYCOL 3350 17 GRAM(S): 17 POWDER, FOR SOLUTION ORAL at 22:26

## 2020-01-31 ENCOUNTER — INPATIENT (INPATIENT)
Facility: HOSPITAL | Age: 85
LOS: 6 days | Discharge: HOSPICE | DRG: 291 | End: 2020-02-07
Attending: HOSPITALIST | Admitting: HOSPITALIST
Payer: MEDICARE

## 2020-01-31 VITALS
HEIGHT: 62 IN | WEIGHT: 134.92 LBS | DIASTOLIC BLOOD PRESSURE: 75 MMHG | SYSTOLIC BLOOD PRESSURE: 146 MMHG | RESPIRATION RATE: 28 BRPM | HEART RATE: 133 BPM | TEMPERATURE: 98 F | OXYGEN SATURATION: 92 %

## 2020-01-31 DIAGNOSIS — I48.91 UNSPECIFIED ATRIAL FIBRILLATION: ICD-10-CM

## 2020-01-31 DIAGNOSIS — J96.01 ACUTE RESPIRATORY FAILURE WITH HYPOXIA: ICD-10-CM

## 2020-01-31 DIAGNOSIS — I50.9 HEART FAILURE, UNSPECIFIED: ICD-10-CM

## 2020-01-31 DIAGNOSIS — Z29.9 ENCOUNTER FOR PROPHYLACTIC MEASURES, UNSPECIFIED: ICD-10-CM

## 2020-01-31 LAB
ALBUMIN SERPL ELPH-MCNC: 3.5 G/DL — SIGNIFICANT CHANGE UP (ref 3.3–5)
ALP SERPL-CCNC: 79 U/L — SIGNIFICANT CHANGE UP (ref 40–120)
ALT FLD-CCNC: 24 U/L — SIGNIFICANT CHANGE UP (ref 12–78)
ANION GAP SERPL CALC-SCNC: 7 MMOL/L — SIGNIFICANT CHANGE UP (ref 5–17)
APTT BLD: 27.5 SEC — SIGNIFICANT CHANGE UP (ref 27.5–36.3)
AST SERPL-CCNC: 18 U/L — SIGNIFICANT CHANGE UP (ref 15–37)
BASE EXCESS BLDV CALC-SCNC: -0.8 MMOL/L — SIGNIFICANT CHANGE UP (ref -2–2)
BASOPHILS # BLD AUTO: 0.03 K/UL — SIGNIFICANT CHANGE UP (ref 0–0.2)
BASOPHILS NFR BLD AUTO: 0.3 % — SIGNIFICANT CHANGE UP (ref 0–2)
BILIRUB SERPL-MCNC: 0.5 MG/DL — SIGNIFICANT CHANGE UP (ref 0.2–1.2)
BUN SERPL-MCNC: 21 MG/DL — SIGNIFICANT CHANGE UP (ref 7–23)
CALCIUM SERPL-MCNC: 9 MG/DL — SIGNIFICANT CHANGE UP (ref 8.5–10.1)
CHLORIDE SERPL-SCNC: 104 MMOL/L — SIGNIFICANT CHANGE UP (ref 96–108)
CO2 SERPL-SCNC: 25 MMOL/L — SIGNIFICANT CHANGE UP (ref 22–31)
CREAT SERPL-MCNC: 0.96 MG/DL — SIGNIFICANT CHANGE UP (ref 0.5–1.3)
EOSINOPHIL # BLD AUTO: 0.06 K/UL — SIGNIFICANT CHANGE UP (ref 0–0.5)
EOSINOPHIL NFR BLD AUTO: 0.7 % — SIGNIFICANT CHANGE UP (ref 0–6)
FLU A RESULT: SIGNIFICANT CHANGE UP
FLU A RESULT: SIGNIFICANT CHANGE UP
FLUAV AG NPH QL: SIGNIFICANT CHANGE UP
FLUBV AG NPH QL: SIGNIFICANT CHANGE UP
GLUCOSE SERPL-MCNC: 162 MG/DL — HIGH (ref 70–99)
HCO3 BLDV-SCNC: 26 MMOL/L — SIGNIFICANT CHANGE UP (ref 21–29)
HCT VFR BLD CALC: 39.8 % — SIGNIFICANT CHANGE UP (ref 34.5–45)
HGB BLD-MCNC: 12.9 G/DL — SIGNIFICANT CHANGE UP (ref 11.5–15.5)
IMM GRANULOCYTES NFR BLD AUTO: 0.5 % — SIGNIFICANT CHANGE UP (ref 0–1.5)
INR BLD: 1.13 RATIO — SIGNIFICANT CHANGE UP (ref 0.88–1.16)
LIDOCAIN IGE QN: 297 U/L — SIGNIFICANT CHANGE UP (ref 73–393)
LYMPHOCYTES # BLD AUTO: 2.09 K/UL — SIGNIFICANT CHANGE UP (ref 1–3.3)
LYMPHOCYTES # BLD AUTO: 22.8 % — SIGNIFICANT CHANGE UP (ref 13–44)
MAGNESIUM SERPL-MCNC: 2.4 MG/DL — SIGNIFICANT CHANGE UP (ref 1.6–2.6)
MCHC RBC-ENTMCNC: 27.6 PG — SIGNIFICANT CHANGE UP (ref 27–34)
MCHC RBC-ENTMCNC: 32.4 GM/DL — SIGNIFICANT CHANGE UP (ref 32–36)
MCV RBC AUTO: 85 FL — SIGNIFICANT CHANGE UP (ref 80–100)
MONOCYTES # BLD AUTO: 0.4 K/UL — SIGNIFICANT CHANGE UP (ref 0–0.9)
MONOCYTES NFR BLD AUTO: 4.4 % — SIGNIFICANT CHANGE UP (ref 2–14)
NEUTROPHILS # BLD AUTO: 6.55 K/UL — SIGNIFICANT CHANGE UP (ref 1.8–7.4)
NEUTROPHILS NFR BLD AUTO: 71.3 % — SIGNIFICANT CHANGE UP (ref 43–77)
NT-PROBNP SERPL-SCNC: 6280 PG/ML — HIGH (ref 0–450)
PCO2 BLDV: 57 MMHG — HIGH (ref 35–50)
PH BLDV: 7.29 — LOW (ref 7.35–7.45)
PLATELET # BLD AUTO: 281 K/UL — SIGNIFICANT CHANGE UP (ref 150–400)
PO2 BLDV: 57 MMHG — HIGH (ref 25–45)
POTASSIUM SERPL-MCNC: 4.3 MMOL/L — SIGNIFICANT CHANGE UP (ref 3.5–5.3)
POTASSIUM SERPL-SCNC: 4.3 MMOL/L — SIGNIFICANT CHANGE UP (ref 3.5–5.3)
PROT SERPL-MCNC: 7.5 GM/DL — SIGNIFICANT CHANGE UP (ref 6–8.3)
PROTHROM AB SERPL-ACNC: 12.6 SEC — SIGNIFICANT CHANGE UP (ref 10–12.9)
RBC # BLD: 4.68 M/UL — SIGNIFICANT CHANGE UP (ref 3.8–5.2)
RBC # FLD: 16.2 % — HIGH (ref 10.3–14.5)
RSV RESULT: SIGNIFICANT CHANGE UP
RSV RNA RESP QL NAA+PROBE: SIGNIFICANT CHANGE UP
SAO2 % BLDV: 83 % — SIGNIFICANT CHANGE UP (ref 67–88)
SODIUM SERPL-SCNC: 136 MMOL/L — SIGNIFICANT CHANGE UP (ref 135–145)
TROPONIN I SERPL-MCNC: 0.03 NG/ML — SIGNIFICANT CHANGE UP (ref 0.01–0.04)
TROPONIN I SERPL-MCNC: 0.08 NG/ML — HIGH (ref 0.01–0.04)
WBC # BLD: 9.18 K/UL — SIGNIFICANT CHANGE UP (ref 3.8–10.5)
WBC # FLD AUTO: 9.18 K/UL — SIGNIFICANT CHANGE UP (ref 3.8–10.5)

## 2020-01-31 PROCEDURE — 83880 ASSAY OF NATRIURETIC PEPTIDE: CPT

## 2020-01-31 PROCEDURE — 99223 1ST HOSP IP/OBS HIGH 75: CPT | Mod: AI

## 2020-01-31 PROCEDURE — 83605 ASSAY OF LACTIC ACID: CPT

## 2020-01-31 PROCEDURE — 71250 CT THORAX DX C-: CPT

## 2020-01-31 PROCEDURE — 36415 COLL VENOUS BLD VENIPUNCTURE: CPT

## 2020-01-31 PROCEDURE — 82803 BLOOD GASES ANY COMBINATION: CPT

## 2020-01-31 PROCEDURE — 94760 N-INVAS EAR/PLS OXIMETRY 1: CPT

## 2020-01-31 PROCEDURE — 84484 ASSAY OF TROPONIN QUANT: CPT

## 2020-01-31 PROCEDURE — 93010 ELECTROCARDIOGRAM REPORT: CPT

## 2020-01-31 PROCEDURE — 83735 ASSAY OF MAGNESIUM: CPT

## 2020-01-31 PROCEDURE — 80299 QUANTITATIVE ASSAY DRUG: CPT

## 2020-01-31 PROCEDURE — 97116 GAIT TRAINING THERAPY: CPT | Mod: GP

## 2020-01-31 PROCEDURE — 71045 X-RAY EXAM CHEST 1 VIEW: CPT | Mod: 26

## 2020-01-31 PROCEDURE — 97162 PT EVAL MOD COMPLEX 30 MIN: CPT | Mod: GP

## 2020-01-31 PROCEDURE — 93306 TTE W/DOPPLER COMPLETE: CPT

## 2020-01-31 PROCEDURE — 84100 ASSAY OF PHOSPHORUS: CPT

## 2020-01-31 PROCEDURE — 80048 BASIC METABOLIC PNL TOTAL CA: CPT

## 2020-01-31 PROCEDURE — 74019 RADEX ABDOMEN 2 VIEWS: CPT

## 2020-01-31 PROCEDURE — 74176 CT ABD & PELVIS W/O CONTRAST: CPT

## 2020-01-31 PROCEDURE — 97530 THERAPEUTIC ACTIVITIES: CPT | Mod: GP

## 2020-01-31 PROCEDURE — 81001 URINALYSIS AUTO W/SCOPE: CPT

## 2020-01-31 PROCEDURE — 71045 X-RAY EXAM CHEST 1 VIEW: CPT

## 2020-01-31 PROCEDURE — 80053 COMPREHEN METABOLIC PANEL: CPT

## 2020-01-31 PROCEDURE — 85027 COMPLETE CBC AUTOMATED: CPT

## 2020-01-31 RX ORDER — DILTIAZEM HCL 120 MG
30 CAPSULE, EXT RELEASE 24 HR ORAL EVERY 6 HOURS
Refills: 0 | Status: DISCONTINUED | OUTPATIENT
Start: 2020-01-31 | End: 2020-02-01

## 2020-01-31 RX ORDER — CEFTRIAXONE 500 MG/1
INJECTION, POWDER, FOR SOLUTION INTRAMUSCULAR; INTRAVENOUS
Refills: 0 | Status: DISCONTINUED | OUTPATIENT
Start: 2020-01-31 | End: 2020-01-31

## 2020-01-31 RX ORDER — SIMVASTATIN 20 MG/1
10 TABLET, FILM COATED ORAL AT BEDTIME
Refills: 0 | Status: DISCONTINUED | OUTPATIENT
Start: 2020-01-31 | End: 2020-02-05

## 2020-01-31 RX ORDER — ATORVASTATIN CALCIUM 80 MG/1
10 TABLET, FILM COATED ORAL AT BEDTIME
Refills: 0 | Status: DISCONTINUED | OUTPATIENT
Start: 2020-01-31 | End: 2020-02-07

## 2020-01-31 RX ORDER — ASPIRIN/CALCIUM CARB/MAGNESIUM 324 MG
324 TABLET ORAL ONCE
Refills: 0 | Status: COMPLETED | OUTPATIENT
Start: 2020-01-31 | End: 2020-01-31

## 2020-01-31 RX ORDER — ONDANSETRON 8 MG/1
4 TABLET, FILM COATED ORAL EVERY 6 HOURS
Refills: 0 | Status: DISCONTINUED | OUTPATIENT
Start: 2020-01-31 | End: 2020-02-07

## 2020-01-31 RX ORDER — CEFTRIAXONE 500 MG/1
1000 INJECTION, POWDER, FOR SOLUTION INTRAMUSCULAR; INTRAVENOUS EVERY 24 HOURS
Refills: 0 | Status: DISCONTINUED | OUTPATIENT
Start: 2020-01-31 | End: 2020-02-01

## 2020-01-31 RX ORDER — FUROSEMIDE 40 MG
40 TABLET ORAL ONCE
Refills: 0 | Status: COMPLETED | OUTPATIENT
Start: 2020-01-31 | End: 2020-01-31

## 2020-01-31 RX ORDER — SIMVASTATIN 20 MG/1
1 TABLET, FILM COATED ORAL
Qty: 0 | Refills: 0 | DISCHARGE

## 2020-01-31 RX ORDER — NITROGLYCERIN 6.5 MG
0.5 CAPSULE, EXTENDED RELEASE ORAL ONCE
Refills: 0 | Status: COMPLETED | OUTPATIENT
Start: 2020-01-31 | End: 2020-01-31

## 2020-01-31 RX ORDER — HEPARIN SODIUM 5000 [USP'U]/ML
5000 INJECTION INTRAVENOUS; SUBCUTANEOUS
Refills: 0 | Status: DISCONTINUED | OUTPATIENT
Start: 2020-01-31 | End: 2020-02-07

## 2020-01-31 RX ORDER — DILTIAZEM HCL 120 MG
5 CAPSULE, EXT RELEASE 24 HR ORAL ONCE
Refills: 0 | Status: COMPLETED | OUTPATIENT
Start: 2020-01-31 | End: 2020-01-31

## 2020-01-31 RX ORDER — ONDANSETRON 8 MG/1
4 TABLET, FILM COATED ORAL ONCE
Refills: 0 | Status: COMPLETED | OUTPATIENT
Start: 2020-01-31 | End: 2020-01-31

## 2020-01-31 RX ORDER — DILTIAZEM HCL 120 MG
240 CAPSULE, EXT RELEASE 24 HR ORAL DAILY
Refills: 0 | Status: DISCONTINUED | OUTPATIENT
Start: 2020-01-31 | End: 2020-01-31

## 2020-01-31 RX ORDER — DILTIAZEM HCL 120 MG
10 CAPSULE, EXT RELEASE 24 HR ORAL ONCE
Refills: 0 | Status: COMPLETED | OUTPATIENT
Start: 2020-01-31 | End: 2020-01-31

## 2020-01-31 RX ORDER — DIGOXIN 250 MCG
0.12 TABLET ORAL DAILY
Refills: 0 | Status: DISCONTINUED | OUTPATIENT
Start: 2020-01-31 | End: 2020-02-07

## 2020-01-31 RX ORDER — FUROSEMIDE 40 MG
40 TABLET ORAL EVERY 12 HOURS
Refills: 0 | Status: DISCONTINUED | OUTPATIENT
Start: 2020-01-31 | End: 2020-02-01

## 2020-01-31 RX ORDER — ASPIRIN/CALCIUM CARB/MAGNESIUM 324 MG
81 TABLET ORAL DAILY
Refills: 0 | Status: DISCONTINUED | OUTPATIENT
Start: 2020-01-31 | End: 2020-02-07

## 2020-01-31 RX ORDER — FUROSEMIDE 40 MG
40 TABLET ORAL DAILY
Refills: 0 | Status: DISCONTINUED | OUTPATIENT
Start: 2020-01-31 | End: 2020-02-01

## 2020-01-31 RX ADMIN — ONDANSETRON 4 MILLIGRAM(S): 8 TABLET, FILM COATED ORAL at 06:50

## 2020-01-31 RX ADMIN — Medication 10 MILLIGRAM(S): at 07:09

## 2020-01-31 RX ADMIN — Medication 30 MILLIGRAM(S): at 18:28

## 2020-01-31 RX ADMIN — Medication 5 MILLIGRAM(S): at 10:48

## 2020-01-31 RX ADMIN — Medication 5 MILLIGRAM(S): at 13:10

## 2020-01-31 RX ADMIN — CEFTRIAXONE 1000 MILLIGRAM(S): 500 INJECTION, POWDER, FOR SOLUTION INTRAMUSCULAR; INTRAVENOUS at 22:02

## 2020-01-31 RX ADMIN — Medication 0.12 MILLIGRAM(S): at 18:28

## 2020-01-31 RX ADMIN — Medication 324 MILLIGRAM(S): at 07:32

## 2020-01-31 RX ADMIN — HEPARIN SODIUM 5000 UNIT(S): 5000 INJECTION INTRAVENOUS; SUBCUTANEOUS at 18:28

## 2020-01-31 RX ADMIN — Medication 81 MILLIGRAM(S): at 13:11

## 2020-01-31 RX ADMIN — Medication 40 MILLIGRAM(S): at 07:33

## 2020-01-31 NOTE — ED ADULT TRIAGE NOTE - CHIEF COMPLAINT QUOTE
PT P/w c/o sob from home.  Home health aide called ambulance for SOB upon wakening, tachypneic to 28 and hypoxic on RA to 92.  PT speaking in full sentences upon arrival to ED.

## 2020-01-31 NOTE — ED PROVIDER NOTE - CLINICAL SUMMARY MEDICAL DECISION MAKING FREE TEXT BOX
89F htn hld afib no a/c presents to the ED for sob - worse over the past 2 days. last night tonight became much so brought in for eval. associated nausea and epigastric discomfort. no fevers cough dysuria leg swelling. no chest pain. 4/10 constant non-radiating. on arrival O2 on RA 82% afib rvr to 140s. exam with rales b/l with diminished b/s b/l - moderate respiratory distress. bedside ultrasound with b/l b lines and b/l large pleural effusions - chf exacerbation on 20mg lasix at home. will give lasix bipap nitro cardiac workup and admit. DNR status discussed family deciding.

## 2020-01-31 NOTE — PATIENT PROFILE ADULT - ABILITY TO HEAR (WITH HEARING AID OR HEARING APPLIANCE IF NORMALLY USED):
98.2
Mildly to Moderately Impaired: difficulty hearing in some environments or speaker may need to increase volume or speak distinctly

## 2020-01-31 NOTE — ED PROVIDER NOTE - PROGRESS NOTE DETAILS
patient feeling much better on bipap - confirms DNR DNI - pt endorsed to DR. Martines accepts step down DNM. Augusto Moreland M.D., Attending Physician

## 2020-01-31 NOTE — ED ADULT NURSE NOTE - OBJECTIVE STATEMENT
patient presents to the ED co new onset difficulty breathing, shortness of breath, abdominal pain, nausea, vomiting, and epigastric pain that began sometime this morning. Pt brought in by EMS, was found to be hypoxic at home. pt placed on a nonrebreather and her oxygen saturations came up. pt with tachypnea, wheezes diffusely, and a strong work of breathing. pt is lethargic, but alert and able to participate in her physical exam.

## 2020-01-31 NOTE — ED PROVIDER NOTE - OBJECTIVE STATEMENT
89F htn hld afib no a/c presents to the ED for sob - worse over the past 2 days. last night tonight became much so brought in for eval. associated nausea and epigastric discomfort. no fevers cough dysuria leg swelling. no chest pain. 4/10 constant non-radiating. on arrival O2 on RA 82% afib rvr to 140s.

## 2020-01-31 NOTE — ED PROVIDER NOTE - PHYSICAL EXAMINATION
Constitutional: moderate distress AAOx3  Eyes: PERRLA EOMI  Head: Normocephalic atraumatic  Mouth: MMM  Cardiac: regular rate   Resp: rales b/l with diminished b/s b/l - moderate respiratory distress.   GI: Abd s/nt/nd  Neuro: CN2-12 intact  Skin: No rashes

## 2020-01-31 NOTE — ED PROVIDER NOTE - NS ED ROS FT
Constitutional: No fever or chills  Eyes: No visual changes  HEENT: No throat pain  CV: No chest pain + epigastric pain  Resp: + SOB no cough  GI: No abd pain, nausea or vomiting  : No dysuria  MSK: No musculoskeletal pain  Skin: No rash  Neuro: No headache

## 2020-01-31 NOTE — ED ADULT NURSE NOTE - CAS EDN DISCHARGE ASSESSMENT
Bill 10793 For Specimen Handling/Conveyance To Laboratory?: no Dressing: pressure dressing Notification Instructions: Patient will be notified of biopsy results. However, patient instructed to call the office if not contacted within 2 weeks. Lab Facility: [facility] Size Of Lesion In Cm (Optional): 0 Body Location Override (Optional - Billing Will Still Be Based On Selected Body Map Location If Applicable): L lateral malar Anesthesia Type: 1% lidocaine with epinephrine Billing Type: [employer] Punch Size In Mm: 3 Detail Level: Detailed Post-Care Instructions: I reviewed with the patient in detail post-care instructions. Patient is to keep the biopsy site dry overnight, and then apply bacitracin twice daily until healed. Patient may apply hydrogen peroxide soaks to remove any crusting. Wound Care: Petrolatum Anesthesia Volume In Cc (Will Not Render If 0): 2 Home Suture Removal Text: Patient was provided a home suture removal kit and will remove their sutures at home. If they have any questions or difficulties they will call the office. Epidermal Sutures: 5-0 Fast Absorbing Gut Was A Bandage Applied: Yes Lab: [facility] Hemostasis: None Biopsy Type: H and E Consent: Written consent was obtained and risks were reviewed including but not limited to scarring, infection, bleeding, scabbing, incomplete removal, nerve damage and allergy to anesthesia. Alert and oriented to person, place and time/Patient baseline mental status

## 2020-01-31 NOTE — H&P ADULT - PROBLEM SELECTOR PLAN 1
likely 2/2 pulm edema,   admit to CICU for closer monitoring   improved symptomatically w/ bipap and iv lasix   will check ABG  pt's symptoms w/current tx, no e/o infection/pneumonia- no wbc, no fever, no cough- but monitor if symptoms arise, until then will hold off any abx   if pt continues to do well in next 1-2 hrs, will observe off bipap  cs pulm for further input

## 2020-01-31 NOTE — H&P ADULT - ASSESSMENT
89F w/PMH afib (not on AC d/t fall risk), HTN, presents c/o worsening sob over the past 1day, worse this AM.  Pt is AAOX3 and offers her own history and family at bedside graciously assisting w/ further history.  Earlier in the week, pt was not feeling well, having some weakness.  Yesterday, per family, her visiting NP stated "shes stable" but to consider further medical eval.  Per pt she was having some intermittent "fluttering" in her chest yesterday, but no pain.  At about 0600 today, she was severely sob and brought into ED when she was found to be hypoxic w/ sats in low 80's.  Pt started on BIPAP, cxr revealed b/l pulm edema, given lasix IV.  She was also in Afib rvr in 140's and given dilt 10mg IV x 1.  At the time of my exam, pt felt much better, pleasant and able to complete sentences, -120's.    [facility] d/w pt along w/ Dr Moreland, ED MD- he explained to her regarding intubation and she verbalized understanding and stated she would NOT want that.  He further asked her wishes if her heart were to stop  and she endorsed "just let me go, don't do anything".  When family arrived I d/w them her wishes and reiterated the same responses when I asked her.  MOLST form signed in family's presence.  pt is DNR/DNI. 89F w/PMH afib (not on AC d/t fall risk), HTN, presents c/o worsening sob over the past 1day, worse this AM.    In ED,  hypoxic w/ sats in low 80's.  Pt started on BIPAP, cxr revealed b/l pulm edema, given lasix IV.  She was also in Afib rvr in 140's and given dilt 10mg IV x 1.  At the time of my exam, pt felt much better, pleasant and able to complete sentences, -120's.  In ED, as above, Trop neg, cbc/cmp wnl. rvp neg.     [facility] d/w pt along w/ Dr Moreland, ED MD- explained to her regarding intubation and she verbalized understanding and stated she would NOT want that.  further asked her wishes if her heart were to stop  and she endorsed "just let me go, don't do anything".  When family arrived I d/w them her wishes and she reiterated the same responses.   MOLST form signed in family's presence.  pt is DNR/DNI.

## 2020-01-31 NOTE — H&P ADULT - PROBLEM SELECTOR PLAN 3
will give another dose of IV dilt 5mg x1 and monitor HR and bp  will resume pt's PO dilt once she's off bipap   further per cardio input

## 2020-01-31 NOTE — H&P ADULT - HISTORY OF PRESENT ILLNESS
HPI:        Imaging Personally Reviewed:  cxr- pulm edema, b/l pleural effusion R>L    EKG Personally Reviewed:  afib rvr 140's      PAST MEDICAL & SURGICAL HISTORY:  HLD (hyperlipidemia)  Glaucoma      Review of Systems:   CONSTITUTIONAL: No fever.  EYES: No eye pain or discharge.  ENMT:  No sinus or throat pain  NECK: No pain or stiffness  RESPIRATORY: No cough, wheezing, chills or hemoptysis; ++ shortness of breath  CARDIOVASCULAR: No chest pain, ++fluttering/palpitations, NO dizziness, or leg swelling  GASTROINTESTINAL: No abdominal or epigastric pain. No nausea, vomiting, or hematemesis; No diarrhea or constipation. No melena or hematochezia.  GENITOURINARY: No dysuria or incontinence  NEUROLOGICAL: No headaches, memory loss, loss of strength, numbness, or tremors  SKIN: No rashes.  MUSCULOSKELETAL: No joint pain or swelling; No muscle, back, or extremity pain  PSYCHIATRIC: No depression, anxiety, mood swings, or difficulty sleeping      Allergies  atenolol (Other)  sulfa drugs (Other)      Social History:   lives in her own home,  w/ dementia  both have caregivers  pt requires full assist  w/ ADLs    FAMILY HISTORY:  cannot determine d/t pt's condition.     Home Medications:  simvastatin 10 mg oral tablet: 1 tab(s) orally once a day (at bedtime) (31 Jan 2020 10:33)      MEDICATIONS  (STANDING):  diltiazem Injectable 5 milliGRAM(s) IV Push once  furosemide   Injectable 40 milliGRAM(s) IV Push every 12 hours  heparin  Injectable 5000 Unit(s) SubCutaneous two times a day  simvastatin 10 milliGRAM(s) Oral at bedtime    MEDICATIONS  (PRN):  ondansetron Injectable 4 milliGRAM(s) IV Push every 6 hours PRN Nausea      PHYSICAL EXAM:  Vital Signs Last 24 Hrs  T(C): 37.3 (31 Jan 2020 07:03), Max: 37.3 (31 Jan 2020 07:03)  T(F): 99.2 (31 Jan 2020 07:03), Max: 99.2 (31 Jan 2020 07:03)  HR: 133 (31 Jan 2020 06:48) (75 - 133)  BP: 146/75 (31 Jan 2020 06:48) (146/75 - 146/75)  RR: 28 (31 Jan 2020 06:48) (28 - 28)  SpO2: 92% (31 Jan 2020 06:48) (92% - 92%)  GENERAL: NAD, FRAIL   HEAD:  Atraumatic, Normocephalic  EYES: EOMI, PERRLA, conjunctiva and sclera clear  ENT: normal hearing, no nasal discharge, CANNOT COMPLETELY ASSESS AS PT HAS BIPAP  NECK: Supple, No JVD, no LAD, no thyromegaly   CHEST/LUNG: +B/L CRACKLES,  No wheeze, respirations unlabored, COMPLETING SENTENCES ON BIPAP  HEART: IRREG IRREG TACHY  ABDOMEN: Soft, Nontender, Nondistended; Bowel sounds present, no HSM  EXTREMITIES:  2+ Peripheral Pulses, No clubbing, cyanosis, or edema  PSYCH: AAOx3, normal behavior  NEUROLOGY: non-focal, sensory and cn 2-12 intact, speech/language intact  SKIN: No visible rashes or lesions    LABS:                        12.9   9.18  )-----------( 281      ( 31 Jan 2020 06:57 )             39.8     01-31    136  |  104  |  21  ----------------------------<  162<H>  4.3   |  25  |  0.96    Ca    9.0      31 Jan 2020 06:57  Mg     2.4     01-31    TPro  7.5  /  Alb  3.5  /  TBili  0.5  /  DBili  x   /  AST  18  /  ALT  24  /  AlkPhos  79  01-31    PT/INR - ( 31 Jan 2020 06:57 )   PT: 12.6 sec;   INR: 1.13 ratio         PTT - ( 31 Jan 2020 06:57 )  PTT:27.5 sec  CARDIAC MARKERS ( 31 Jan 2020 06:57 )  0.025 ng/mL / x     / x     / x     / x          RADIOLOGY & ADDITIONAL TESTS:  Imaging Personally Reviewed:  cxr- pulm edema, b/l pleural effusion R>L    EKG Personally Reviewed:  afib rvr 140's HPI:  89F w/PMH afib (not on AC d/t fall risk), HTN, presents c/o worsening sob over the past 1day, worse this AM.  Pt is AAOX3 and offers her own history and family at bedside graciously assisting w/ further history.  Earlier in the week, pt was not feeling well, having some weakness.  Yesterday, per family, her visiting NP stated "shes stable" but to consider further medical eval.  Per pt she was having some intermittent "fluttering" in her chest yesterday, but no pain.  At about 0600 today, she was severely sob and brought into ED when she was found to be hypoxic w/ sats in low 80's.  Pt started on BIPAP, cxr revealed b/l pulm edema, given lasix IV.  She was also in Afib rvr in 140's and given dilt 10mg IV x 1.  At the time of my exam, pt felt much better, pleasant and able to complete sentences, -120's.    [facility] d/w pt along w/ Dr Moreland, ED MD- he explained to her regarding intubation and she verbalized understanding and stated she would NOT want that.  He further asked her wishes if her heart were to stop  and she endorsed "just let me go, don't do anything".  When family arrived I d/w them her wishes and reiterated the same responses when I asked her.  MOLST form signed in family's presence.  pt is DNR/DNI.    In ED, as above, Trop neg, cbc/cmp wnl.     Imaging Personally Reviewed:  cxr- pulm edema, b/l pleural effusion R>L    EKG Personally Reviewed:  afib rvr 140's      PAST MEDICAL & SURGICAL HISTORY:  HLD (hyperlipidemia)  Glaucoma      Review of Systems:   CONSTITUTIONAL: No fever.  EYES: No eye pain or discharge.  ENMT:  No sinus or throat pain  NECK: No pain or stiffness  RESPIRATORY: No cough, wheezing, chills or hemoptysis; ++ shortness of breath  CARDIOVASCULAR: No chest pain, ++fluttering/palpitations, NO dizziness, or leg swelling  GASTROINTESTINAL: No abdominal or epigastric pain. No nausea, vomiting, or hematemesis; No diarrhea or constipation. No melena or hematochezia.  GENITOURINARY: No dysuria or incontinence  NEUROLOGICAL: No headaches, memory loss, loss of strength, numbness, or tremors  SKIN: No rashes.  MUSCULOSKELETAL: No joint pain or swelling; No muscle, back, or extremity pain  PSYCHIATRIC: No depression, anxiety, mood swings, or difficulty sleeping      Allergies  atenolol (Other)  sulfa drugs (Other)      Social History:   lives in her own home,  w/ dementia  both have caregivers  pt requires full assist  w/ ADLs    FAMILY HISTORY:  cannot determine d/t pt's condition.     Home Medications:  simvastatin 10 mg oral tablet: 1 tab(s) orally once a day (at bedtime) (31 Jan 2020 10:33)      MEDICATIONS  (STANDING):  diltiazem Injectable 5 milliGRAM(s) IV Push once  furosemide   Injectable 40 milliGRAM(s) IV Push every 12 hours  heparin  Injectable 5000 Unit(s) SubCutaneous two times a day  simvastatin 10 milliGRAM(s) Oral at bedtime    MEDICATIONS  (PRN):  ondansetron Injectable 4 milliGRAM(s) IV Push every 6 hours PRN Nausea      PHYSICAL EXAM:  Vital Signs Last 24 Hrs  T(C): 37.3 (31 Jan 2020 07:03), Max: 37.3 (31 Jan 2020 07:03)  T(F): 99.2 (31 Jan 2020 07:03), Max: 99.2 (31 Jan 2020 07:03)  HR: 133 (31 Jan 2020 06:48) (75 - 133)  BP: 146/75 (31 Jan 2020 06:48) (146/75 - 146/75)  RR: 28 (31 Jan 2020 06:48) (28 - 28)  SpO2: 92% (31 Jan 2020 06:48) (92% - 92%)  GENERAL: NAD, FRAIL   HEAD:  Atraumatic, Normocephalic  EYES: EOMI, PERRLA, conjunctiva and sclera clear  ENT: normal hearing, no nasal discharge, CANNOT COMPLETELY ASSESS AS PT HAS BIPAP  NECK: Supple, No JVD, no LAD, no thyromegaly   CHEST/LUNG: +B/L CRACKLES,  No wheeze, respirations unlabored, COMPLETING SENTENCES ON BIPAP  HEART: IRREG IRREG TACHY  ABDOMEN: Soft, Nontender, Nondistended; Bowel sounds present, no HSM  EXTREMITIES:  2+ Peripheral Pulses, No clubbing, cyanosis, or edema  PSYCH: AAOx3, normal behavior  NEUROLOGY: non-focal, sensory and cn 2-12 intact, speech/language intact  SKIN: No visible rashes or lesions    LABS:                        12.9   9.18  )-----------( 281      ( 31 Jan 2020 06:57 )             39.8     01-31    136  |  104  |  21  ----------------------------<  162<H>  4.3   |  25  |  0.96    Ca    9.0      31 Jan 2020 06:57  Mg     2.4     01-31    TPro  7.5  /  Alb  3.5  /  TBili  0.5  /  DBili  x   /  AST  18  /  ALT  24  /  AlkPhos  79  01-31    PT/INR - ( 31 Jan 2020 06:57 )   PT: 12.6 sec;   INR: 1.13 ratio         PTT - ( 31 Jan 2020 06:57 )  PTT:27.5 sec  CARDIAC MARKERS ( 31 Jan 2020 06:57 )  0.025 ng/mL / x     / x     / x     / x          RADIOLOGY & ADDITIONAL TESTS:  Imaging Personally Reviewed:  cxr- pulm edema, b/l pleural effusion R>L    EKG Personally Reviewed:  afib rvr 140's HPI:  89F w/PMH afib (not on AC d/t fall risk), HTN, presents c/o worsening sob over the past 1day, worse this AM.  Pt is AAOX3 and offers her own history and family at bedside graciously assisting w/ further history.  Earlier in the week, pt was not feeling well, having some weakness.  Yesterday, per family, her visiting NP stated "shes stable" but to consider further medical eval.  Per pt she was having some intermittent "fluttering" in her chest yesterday, but no pain.  At about 0600 today, she was severely sob and brought into ED when she was found to be hypoxic w/ sats in low 80's.  Pt started on BIPAP, cxr revealed b/l pulm edema, given lasix IV.  She was also in Afib rvr in 140's and given dilt 10mg IV x 1.  At the time of my exam, pt felt much better, pleasant and able to complete sentences, -120's.    [facility] d/w pt along w/ Dr Moreland, ED MD- he explained to her regarding intubation and she verbalized understanding and stated she would NOT want that.  He further asked her wishes if her heart were to stop  and she endorsed "just let me go, don't do anything".  When family arrived I d/w them her wishes and reiterated the same responses when I asked her.  MOLST form signed in family's presence.  pt is DNR/DNI.    In ED, as above, Trop neg, cbc/cmp wnl. rvp neg.     PAST MEDICAL & SURGICAL HISTORY:  HLD (hyperlipidemia)  Glaucoma  HTN  Afib  Fall recurrent  s/p Rt periprosthetic hip fracture ORIF (8/19)      Review of Systems:   CONSTITUTIONAL: No fever.  EYES: No eye pain or discharge.  ENMT:  No sinus or throat pain  NECK: No pain or stiffness  RESPIRATORY: No cough, wheezing, chills or hemoptysis; ++ shortness of breath  CARDIOVASCULAR: No chest pain, ++fluttering/palpitations, NO dizziness, or leg swelling  GASTROINTESTINAL: No abdominal or epigastric pain. No nausea, vomiting, or hematemesis; No diarrhea or constipation. No melena or hematochezia.  GENITOURINARY: No dysuria or incontinence  NEUROLOGICAL: No headaches, memory loss, loss of strength, numbness, or tremors  SKIN: No rashes.  MUSCULOSKELETAL: No joint pain or swelling; No muscle, back, or extremity pain  PSYCHIATRIC: No depression, anxiety, mood swings, or difficulty sleeping      Allergies  atenolol (Other)  sulfa drugs (Other)      Social History:   lives in her own home,  w/ dementia  both have caregivers  pt requires full assist  w/ ADLs    FAMILY HISTORY:  cannot determine d/t pt's condition.     Home Medications:  simvastatin 10 mg oral tablet: 1 tab(s) orally once a day (at bedtime) (31 Jan 2020 10:33)      MEDICATIONS  (STANDING):  diltiazem Injectable 5 milliGRAM(s) IV Push once  furosemide   Injectable 40 milliGRAM(s) IV Push every 12 hours  heparin  Injectable 5000 Unit(s) SubCutaneous two times a day  simvastatin 10 milliGRAM(s) Oral at bedtime    MEDICATIONS  (PRN):  ondansetron Injectable 4 milliGRAM(s) IV Push every 6 hours PRN Nausea      PHYSICAL EXAM:  Vital Signs Last 24 Hrs  T(C): 37.3 (31 Jan 2020 07:03), Max: 37.3 (31 Jan 2020 07:03)  T(F): 99.2 (31 Jan 2020 07:03), Max: 99.2 (31 Jan 2020 07:03)  HR: 133 (31 Jan 2020 06:48) (75 - 133)  BP: 146/75 (31 Jan 2020 06:48) (146/75 - 146/75)  RR: 28 (31 Jan 2020 06:48) (28 - 28)  SpO2: 92% (31 Jan 2020 06:48) (92% - 92%)  GENERAL: NAD, FRAIL   HEAD:  Atraumatic, Normocephalic  EYES: EOMI, PERRLA, conjunctiva and sclera clear  ENT: normal hearing, no nasal discharge, CANNOT COMPLETELY ASSESS AS PT HAS BIPAP  NECK: Supple, No JVD, no LAD, no thyromegaly   CHEST/LUNG: +B/L CRACKLES,  No wheeze, respirations unlabored, COMPLETING SENTENCES ON BIPAP  HEART: IRREG IRREG TACHY  ABDOMEN: Soft, Nontender, Nondistended; Bowel sounds present, no HSM  EXTREMITIES:  2+ Peripheral Pulses, No clubbing, cyanosis, or edema  PSYCH: AAOx3, normal behavior  NEUROLOGY: non-focal, sensory and cn 2-12 intact, speech/language intact  SKIN: No visible rashes or lesions    LABS:                        12.9   9.18  )-----------( 281      ( 31 Jan 2020 06:57 )             39.8     01-31    136  |  104  |  21  ----------------------------<  162<H>  4.3   |  25  |  0.96    Ca    9.0      31 Jan 2020 06:57  Mg     2.4     01-31    TPro  7.5  /  Alb  3.5  /  TBili  0.5  /  DBili  x   /  AST  18  /  ALT  24  /  AlkPhos  79  01-31    PT/INR - ( 31 Jan 2020 06:57 )   PT: 12.6 sec;   INR: 1.13 ratio         PTT - ( 31 Jan 2020 06:57 )  PTT:27.5 sec  CARDIAC MARKERS ( 31 Jan 2020 06:57 )  0.025 ng/mL / x     / x     / x     / x          RADIOLOGY & ADDITIONAL TESTS:  Imaging Personally Reviewed:  cxr- pulm edema, b/l pleural effusion R>L    EKG Personally Reviewed:  afib rvr 140's HPI:  89F w/PMH afib (not on AC d/t fall risk), HTN, presents c/o worsening sob over the past 1day, worse this AM.  Pt is AAOX3 and offers her own history and family at bedside graciously assisting w/ further history.  Earlier in the week, pt was not feeling well, having some weakness.  Yesterday, per family, her visiting NP stated "shes stable" but to consider further medical eval.  Per pt she was having some intermittent "fluttering" in her chest yesterday, but no pain.  At about 0600 today, she was severely sob and brought into ED when she was found to be hypoxic w/ sats in low 80's.  Pt started on BIPAP, cxr revealed b/l pulm edema, given lasix IV.  She was also in Afib rvr in 140's and given dilt 10mg IV x 1.  At the time of my exam, pt felt much better, pleasant and able to complete sentences, -120's.    [facility] d/w pt along w/ Dr Moreland, ED MD- he explained to her regarding intubation and she verbalized understanding and stated she would NOT want that.  He further asked her wishes if her heart were to stop  and she endorsed "just let me go, don't do anything".  When family arrived I d/w them her wishes and reiterated the same responses when I asked her.  MOLST form signed in family's presence.  pt is DNR/DNI.    In ED, as above, Trop neg, cbc/cmp wnl. rvp neg.     PAST MEDICAL & SURGICAL HISTORY:  HLD (hyperlipidemia)  Glaucoma  HTN  Afib  Fall recurrent  s/p Rt periprosthetic hip fracture ORIF (8/19)      Review of Systems:   CONSTITUTIONAL: No fever.  EYES: No eye pain or discharge.  ENMT:  No sinus or throat pain  NECK: No pain or stiffness  RESPIRATORY: No cough, wheezing, chills or hemoptysis; ++ shortness of breath  CARDIOVASCULAR: No chest pain, ++fluttering/palpitations, NO dizziness, or leg swelling  GASTROINTESTINAL: No abdominal or epigastric pain. No nausea, vomiting, or hematemesis; No diarrhea or constipation. No melena or hematochezia.  GENITOURINARY: No dysuria or incontinence  NEUROLOGICAL: No headaches, memory loss, loss of strength, numbness, or tremors  SKIN: No rashes.  MUSCULOSKELETAL: No joint pain or swelling; No muscle, back, or extremity pain  PSYCHIATRIC: No depression, anxiety, mood swings, or difficulty sleeping      Allergies  atenolol (Other)  sulfa drugs (Other)      Social History:   lives in her own home,  w/ dementia  both have caregivers  pt requires full assist  w/ ADLs    FAMILY HISTORY:  cannot determine d/t pt's condition.     Home Medications:  aspirin 81 mg oral tablet: 1 tab(s) orally once a day (31 Jan 2020 11:45)  atenolol:  (31 Jan 2020 11:45)  atorvastatin 10 mg oral tablet: 1 tab(s) orally once a day (31 Jan 2020 11:45)  DilTIAZem Hydrochloride  mg/24 hours oral capsule, extended release: 1 cap(s) orally once a day (31 Jan 2020 11:45)  famotidine 20 mg oral tablet: 1 tab(s) orally 2 times a day (31 Jan 2020 11:45)  furosemide 20 mg oral tablet: 1 tab(s) orally once a day (31 Jan 2020 11:45)  potassium chloride 10 mEq oral capsule, extended release: 1 cap(s) orally once a day (31 Jan 2020 11:45)  Vitamin D3 5000 intl units (125 mcg) oral tablet: 1 tab(s) orally once a day (31 Jan 2020 11:45)    MEDICATIONS  (STANDING):  aspirin  chewable 81 milliGRAM(s) Oral daily  atorvastatin 10 milliGRAM(s) Oral at bedtime  diltiazem    milliGRAM(s) Oral daily  furosemide   Injectable 40 milliGRAM(s) IV Push every 12 hours  heparin  Injectable 5000 Unit(s) SubCutaneous two times a day  simvastatin 10 milliGRAM(s) Oral at bedtime    MEDICATIONS  (PRN):  ondansetron Injectable 4 milliGRAM(s) IV Push every 6 hours PRN Nausea      PHYSICAL EXAM:  Vital Signs Last 24 Hrs  T(C): 37.3 (31 Jan 2020 07:03), Max: 37.3 (31 Jan 2020 07:03)  T(F): 99.2 (31 Jan 2020 07:03), Max: 99.2 (31 Jan 2020 07:03)  HR: 133 (31 Jan 2020 06:48) (75 - 133)  BP: 146/75 (31 Jan 2020 06:48) (146/75 - 146/75)  RR: 28 (31 Jan 2020 06:48) (28 - 28)  SpO2: 92% (31 Jan 2020 06:48) (92% - 92%)  GENERAL: NAD, FRAIL   HEAD:  Atraumatic, Normocephalic  EYES: EOMI, PERRLA, conjunctiva and sclera clear  ENT: normal hearing, no nasal discharge, CANNOT COMPLETELY ASSESS AS PT HAS BIPAP  NECK: Supple, No JVD, no LAD, no thyromegaly   CHEST/LUNG: +B/L CRACKLES,  No wheeze, respirations unlabored, COMPLETING SENTENCES ON BIPAP  HEART: IRREG IRREG TACHY  ABDOMEN: Soft, Nontender, Nondistended; Bowel sounds present, no HSM  EXTREMITIES:  2+ Peripheral Pulses, No clubbing, cyanosis, or edema  PSYCH: AAOx3, normal behavior  NEUROLOGY: non-focal, sensory and cn 2-12 intact, speech/language intact  SKIN: No visible rashes or lesions    LABS:                        12.9   9.18  )-----------( 281      ( 31 Jan 2020 06:57 )             39.8     01-31    136  |  104  |  21  ----------------------------<  162<H>  4.3   |  25  |  0.96    Ca    9.0      31 Jan 2020 06:57  Mg     2.4     01-31    TPro  7.5  /  Alb  3.5  /  TBili  0.5  /  DBili  x   /  AST  18  /  ALT  24  /  AlkPhos  79  01-31    PT/INR - ( 31 Jan 2020 06:57 )   PT: 12.6 sec;   INR: 1.13 ratio         PTT - ( 31 Jan 2020 06:57 )  PTT:27.5 sec  CARDIAC MARKERS ( 31 Jan 2020 06:57 )  0.025 ng/mL / x     / x     / x     / x          RADIOLOGY & ADDITIONAL TESTS:  Imaging Personally Reviewed:  cxr- pulm edema, b/l pleural effusion R>L    EKG Personally Reviewed:  afib rvr 140's

## 2020-01-31 NOTE — ED ADULT NURSE NOTE - NSIMPLEMENTINTERV_GEN_ALL_ED
Implemented All Fall with Harm Risk Interventions:  [place] to call system. Call bell, personal items and telephone within reach. Instruct patient to call for assistance. Room bathroom lighting operational. Non-slip footwear when patient is off stretcher. Physically safe environment: no spills, clutter or unnecessary equipment. Stretcher in lowest position, wheels locked, appropriate side rails in place. Provide visual cue, wrist band, yellow gown, etc. Monitor gait and stability. Monitor for mental status changes and reorient to person, place, and time. Review medications for side effects contributing to fall risk. Reinforce activity limits and safety measures with patient and family. Provide visual clues: red socks.

## 2020-02-01 LAB
ADD ON TEST-SPECIMEN IN LAB: SIGNIFICANT CHANGE UP
ANION GAP SERPL CALC-SCNC: 4 MMOL/L — LOW (ref 5–17)
APPEARANCE UR: CLEAR — SIGNIFICANT CHANGE UP
BILIRUB UR-MCNC: NEGATIVE — SIGNIFICANT CHANGE UP
BUN SERPL-MCNC: 22 MG/DL — SIGNIFICANT CHANGE UP (ref 7–23)
CALCIUM SERPL-MCNC: 8.9 MG/DL — SIGNIFICANT CHANGE UP (ref 8.5–10.1)
CHLORIDE SERPL-SCNC: 107 MMOL/L — SIGNIFICANT CHANGE UP (ref 96–108)
CO2 SERPL-SCNC: 27 MMOL/L — SIGNIFICANT CHANGE UP (ref 22–31)
COLOR SPEC: YELLOW — SIGNIFICANT CHANGE UP
CREAT SERPL-MCNC: 0.97 MG/DL — SIGNIFICANT CHANGE UP (ref 0.5–1.3)
DIFF PNL FLD: ABNORMAL
GLUCOSE SERPL-MCNC: 96 MG/DL — SIGNIFICANT CHANGE UP (ref 70–99)
GLUCOSE UR QL: NEGATIVE MG/DL — SIGNIFICANT CHANGE UP
HCT VFR BLD CALC: 37.6 % — SIGNIFICANT CHANGE UP (ref 34.5–45)
HGB BLD-MCNC: 11.5 G/DL — SIGNIFICANT CHANGE UP (ref 11.5–15.5)
KETONES UR-MCNC: NEGATIVE — SIGNIFICANT CHANGE UP
LEUKOCYTE ESTERASE UR-ACNC: ABNORMAL
MCHC RBC-ENTMCNC: 27.1 PG — SIGNIFICANT CHANGE UP (ref 27–34)
MCHC RBC-ENTMCNC: 30.6 GM/DL — LOW (ref 32–36)
MCV RBC AUTO: 88.7 FL — SIGNIFICANT CHANGE UP (ref 80–100)
NITRITE UR-MCNC: NEGATIVE — SIGNIFICANT CHANGE UP
PH UR: 5 — SIGNIFICANT CHANGE UP (ref 5–8)
PLATELET # BLD AUTO: 207 K/UL — SIGNIFICANT CHANGE UP (ref 150–400)
POTASSIUM SERPL-MCNC: 4.4 MMOL/L — SIGNIFICANT CHANGE UP (ref 3.5–5.3)
POTASSIUM SERPL-SCNC: 4.4 MMOL/L — SIGNIFICANT CHANGE UP (ref 3.5–5.3)
PROT UR-MCNC: 15 MG/DL
RBC # BLD: 4.24 M/UL — SIGNIFICANT CHANGE UP (ref 3.8–5.2)
RBC # FLD: 15.9 % — HIGH (ref 10.3–14.5)
SODIUM SERPL-SCNC: 138 MMOL/L — SIGNIFICANT CHANGE UP (ref 135–145)
SP GR SPEC: 1.02 — SIGNIFICANT CHANGE UP (ref 1.01–1.02)
UROBILINOGEN FLD QL: NEGATIVE MG/DL — SIGNIFICANT CHANGE UP
WBC # BLD: 5.9 K/UL — SIGNIFICANT CHANGE UP (ref 3.8–10.5)
WBC # FLD AUTO: 5.9 K/UL — SIGNIFICANT CHANGE UP (ref 3.8–10.5)

## 2020-02-01 PROCEDURE — 99233 SBSQ HOSP IP/OBS HIGH 50: CPT

## 2020-02-01 PROCEDURE — 93306 TTE W/DOPPLER COMPLETE: CPT | Mod: 26

## 2020-02-01 PROCEDURE — 71250 CT THORAX DX C-: CPT | Mod: 26

## 2020-02-01 RX ORDER — CARVEDILOL PHOSPHATE 80 MG/1
3.12 CAPSULE, EXTENDED RELEASE ORAL EVERY 12 HOURS
Refills: 0 | Status: DISCONTINUED | OUTPATIENT
Start: 2020-02-01 | End: 2020-02-07

## 2020-02-01 RX ORDER — FUROSEMIDE 40 MG
20 TABLET ORAL DAILY
Refills: 0 | Status: DISCONTINUED | OUTPATIENT
Start: 2020-02-01 | End: 2020-02-01

## 2020-02-01 RX ORDER — FUROSEMIDE 40 MG
20 TABLET ORAL
Refills: 0 | Status: DISCONTINUED | OUTPATIENT
Start: 2020-02-01 | End: 2020-02-02

## 2020-02-01 RX ADMIN — CARVEDILOL PHOSPHATE 3.12 MILLIGRAM(S): 80 CAPSULE, EXTENDED RELEASE ORAL at 17:11

## 2020-02-01 RX ADMIN — HEPARIN SODIUM 5000 UNIT(S): 5000 INJECTION INTRAVENOUS; SUBCUTANEOUS at 05:30

## 2020-02-01 RX ADMIN — Medication 81 MILLIGRAM(S): at 11:28

## 2020-02-01 RX ADMIN — Medication 30 MILLIGRAM(S): at 05:31

## 2020-02-01 RX ADMIN — HEPARIN SODIUM 5000 UNIT(S): 5000 INJECTION INTRAVENOUS; SUBCUTANEOUS at 17:11

## 2020-02-01 RX ADMIN — Medication 0.12 MILLIGRAM(S): at 05:30

## 2020-02-01 RX ADMIN — Medication 20 MILLIGRAM(S): at 17:11

## 2020-02-01 RX ADMIN — ATORVASTATIN CALCIUM 10 MILLIGRAM(S): 80 TABLET, FILM COATED ORAL at 21:11

## 2020-02-01 RX ADMIN — SIMVASTATIN 10 MILLIGRAM(S): 20 TABLET, FILM COATED ORAL at 21:11

## 2020-02-01 RX ADMIN — Medication 20 MILLIGRAM(S): at 11:28

## 2020-02-01 NOTE — PROGRESS NOTE ADULT - SUBJECTIVE AND OBJECTIVE BOX
CHIEF COMPLAINT: Patient is a 89y old  Female who presents with a chief complaint of sob (31 Jan 2020 17:40)      HPI:  HPI:  89F w/PMH afib (not on AC d/t fall risk), HTN, presents c/o worsening sob over the past 1day, worse this AM.  Pt is AAOX3 and offers her own history and family at bedside graciously assisting w/ further history.  Earlier in the week, pt was not feeling well, having some weakness.  Yesterday, per family, her visiting NP stated "shes stable" but to consider further medical eval.  Per pt she was having some intermittent "fluttering" in her chest yesterday, but no pain.  At about 0600 today, she was severely sob and brought into ED when she was found to be hypoxic w/ sats in low 80's.  Pt started on BIPAP, cxr revealed b/l pulm edema, given lasix IV.  She was also in Afib rvr in 140's and given dilt 10mg IV x 1.  At the time of my exam, pt felt much better, pleasant and able to complete sentences, -120's.    [facility] d/w pt along w/ Dr Moreland, ED MD- he explained to her regarding intubation and she verbalized understanding and stated she would NOT want that.  He further asked her wishes if her heart were to stop  and she endorsed "just let me go, don't do anything".  When family arrived I d/w them her wishes and reiterated the same responses when I asked her.  MOLST form signed in family's presence.  pt is DNR/DNI.    In ED, as above, Trop neg, cbc/cmp wnl. rvp neg.     02/01-patient brought to  for SOB/hypoxia, CT with fluid overload vs possible pneumonia.  No WBC elevation, no fever and BNP in the 6000's.  ECHO done today with EF severe segmental wall motion abnormality and EF 20-25%.   Now down to 3lNC with O2 st 98%, but due to low BP-no lasix today.  Not on AC for significant fall risk.      PAST MEDICAL & SURGICAL HISTORY:  HLD (hyperlipidemia)  Glaucoma  HTN  Afib  Fall recurrent  s/p Rt periprosthetic hip fracture ORIF (8/19      REVIEW OF SYSTEMS:    CONSTITUTIONAL: No weakness, fevers or chills  EYES/ENT: No visual changes;  No vertigo or throat pain   NECK: No pain or stiffness  RESPIRATORY: shortness of breath  CARDIOVASCULAR:  palpitations  GASTROINTESTINAL: No abdominal or epigastric pain. No nausea, vomiting, or hematemesis; No diarrhea or constipation. No melena or hematochezia.  GENITOURINARY: No dysuria, frequency or hematuria    Vital Signs Last 24 Hrs  T(C): 36.4 (01 Feb 2020 04:57), Max: 36.5 (31 Jan 2020 11:06)  T(F): 97.6 (01 Feb 2020 04:57), Max: 97.7 (31 Jan 2020 11:06)  HR: 93 (01 Feb 2020 07:00) (93 - 120)  BP: 96/55 (01 Feb 2020 07:00) (78/55 - 120/81)  BP(mean): 65 (01 Feb 2020 07:00) (53 - 100)  RR: 19 (01 Feb 2020 07:00) (11 - 22)  SpO2: 97% (01 Feb 2020 07:00) (89% - 100%)    I&O's Summary    31 Jan 2020 07:01  -  01 Feb 2020 07:00  --------------------------------------------------------  IN: 0 mL / OUT: 200 mL / NET: -200 mL            PHYSICAL EXAM:   Constitutional: NAD, awake and alert, well-developed  HEENT: PERR, EOMI, Normal Hearing, MMM  Neck:  JVD  Respiratory: Breath sounds wheezing, rales or rhonchi  Cardiovascular: S1 and S2, regular rate and rhythm,  Murmurs,  Gastrointestinal: Bowel Sounds present, soft, nontender, nondistended, no guarding, no rebound  Extremities:  peripheral edema  Vascular: 2+ peripheral pulses      MEDICATIONS:  MEDICATIONS  (STANDING):  aspirin  chewable 81 milliGRAM(s) Oral daily  atorvastatin 10 milliGRAM(s) Oral at bedtime  cefTRIAXone Injectable. 1000 milliGRAM(s) IV Push every 24 hours  digoxin     Tablet 0.125 milliGRAM(s) Oral daily  diltiazem    Tablet 30 milliGRAM(s) Oral every 6 hours  furosemide   Injectable 40 milliGRAM(s) IV Push every 12 hours  furosemide   Injectable 40 milliGRAM(s) IV Push daily  heparin  Injectable 5000 Unit(s) SubCutaneous two times a day  simvastatin 10 milliGRAM(s) Oral at bedtime      LABS: All Labs Reviewed:                        11.5   5.90  )-----------( 207      ( 01 Feb 2020 06:48 )             37.6     02-01    138  |  107  |  22  ----------------------------<  96  4.4   |  27  |  0.97    Ca    8.9      01 Feb 2020 06:48  Mg     2.4     01-31    TPro  7.5  /  Alb  3.5  /  TBili  0.5  /  DBili  x   /  AST  18  /  ALT  24  /  AlkPhos  79  01-31    PT/INR - ( 31 Jan 2020 06:57 )   PT: 12.6 sec;   INR: 1.13 ratio         PTT - ( 31 Jan 2020 06:57 )  PTT:27.5 sec  CARDIAC MARKERS ( 31 Jan 2020 10:20 )  0.079 ng/mL / x     / x     / x     / x      CARDIAC MARKERS ( 31 Jan 2020 06:57 )  0.025 ng/mL / x     / x     / x     / x          Blood Culture:       BNP Serum Pro-Brain Natriuretic Peptide: 6280 pg/mL (01-31-20 @ 06:57)      RADIOLOGY:  < from: Xray Chest 1 View-PORTABLE IMMEDIATE (01.31.20 @ 07:50) >  Clinical statement: Chest pain    There are EKG wires overlying the chest. Tubes are seen overlying the right side of the chest.    Bones appear osteopenic. Old left proximal humeral fracture. Degenerative changes of bone. Vascular calcifications. There is limited evaluation of the heart size and mediastinum on portable technique.. Interval development of prominent perihilar and basilar lung markings with mild patchy consolidations and lower lungs. This may be due to fluid overload and/or infection. A small right pleural effusion. No discrete mass or pneumothorax is seen.    Impression:    Interval development of prominent perihilar and basilar lung markings with patchy opacities at the bases which may be due to infection and/or fluid overload. Please correlate clinically. Small right pleural effusion.    < end of copied text >    EKG:      Telemetry:  atrial flutter, 100bpm,     ECHO:  preliminary reading-EF 20-25% with severe segmental wall motion abnl

## 2020-02-01 NOTE — PROGRESS NOTE ADULT - ASSESSMENT
89F w/PMH afib (not on AC d/t fall risk), HTN, presents c/o worsening sob.  As per patient, she was having increased weakness, palpitations, and SOB worsening for the last few days.  Her SOB significantly worsened in the last 24 hours and she came to the ER for further evaluation.  In ER, she was found to be hypoxic in low 80's.  CXR with CHF.  She was started on BIPAP in the ER and given IV lasix.  She was also in afib with 's and treated with IV diltiazem.      #Acute hypoxic and hypercarbic respiratory Failure due to Acute on Chronic Systolic CHF exacerbation:    Overall improved with lasix/ BIPAP.    Cont lasix as able with BP/ hypotensive.    CXR/ CT chest wtih CHF and b/l effusions.    Cont NC o2 @3 L.  Imrpoved from BIPAP.    F/u ECHO but noted ? new EF 20%.    Initial hypercarbic respiratory failure as well as hyoxic wtih elevated pCO2 but now improved.    CHF exacerbated by afib RVR.      #Afib with RVR:    Cardizem stopped with low EF and hypotension.    D/w cardiology.    Start IV dig for rate control.    Start low dose coreg with low EF/ afib.    Follow hemodynamics/ BPs.    No AC in the past due to fall risk.    Would not attempt JENIFFER/ Cardioversion as cannot tolerate AC.      #R/o PNA:    Check CT chest.    CT chest noted-- all CHF.    Stop ABX.  No PNA.      #No UTI.  UA negative.      #Positive trop:    Suspect more demand ischemia related to AFIB / CHF.    No evidence of acute MI.    Unclear if ischemic work up necessary or medical management.    ASA/ statin/ BB.      #DVT proph:  heparin SQ.      #Code Status:  DNR/ DNI as per patient.    D/w family at bedside.

## 2020-02-01 NOTE — PROGRESS NOTE ADULT - SUBJECTIVE AND OBJECTIVE BOX
CC:  SOB    89F w/PMH afib (not on AC d/t fall risk), HTN, presents c/o worsening sob.  As per patient, she was having increased weakness, palpitations, and SOB worsening for the last few days.  Her SOB significantly worsened in the last 24 hours and she came to the ER for further evaluation.  In ER, she was found to be hypoxic in low 80's.  CXR with CHF.  She was started on BIPAP in the ER and given IV lasix.  She was also in afib with 's and treated with IV diltiazem.      :  Pt seen.  Feeling much better than yesterday.  Still with some SOB but now tolerating 3 L NC.      ROS:   All 10 systems reviewed and found to be negative with the exception of what has been described above.    T(C): 36.3 (20 @ 16:31), Max: 36.4 (20 @ 21:12)  HR: 112 (20 @ 18:00) (93 - 116)  BP: 110/62 (20 @ 18:00) (81/49 - 110/62)  RR: 21 (20 @ 18:00) (11 - 25)  SpO2: 97% (20 @ 16:00) (92% - 99%)    PHYSICAL EXAM:  GENERAL: Comfortable, no acute distress  HEAD:  Atraumatic, Normocephalic  EYES: EOMI, PERRLA  HEENT: Moist mucous membranes  NECK: Supple, No JVD  NERVOUS SYSTEM:  Alert & Oriented X3, Motor Strength 5/5 B/L upper and lower extremities  CHEST/LUNG: decreased BS at bases.    HEART: irregularly irregular  ABDOMEN: Soft, Nontender, Nondistended; Bowel sounds present  GENITOURINARY- Voiding, no palpable bladder  EXTREMITIES:  No clubbing, cyanosis, or edema  MUSCULOSKELTAL- No muscle tenderness, No joint tenderness  SKIN-no rash    LABS:                        11.5   5.90  )-----------( 207      ( 2020 06:48 )             37.6         138  |  107  |  22  ----------------------------<  96  4.4   |  27  |  0.97    Ca    8.9      2020 06:48  Mg     2.4         TPro  7.5  /  Alb  3.5  /  TBili  0.5  /  DBili  x   /  AST  18  /  ALT  24  /  AlkPhos  79      PT/INR - ( 2020 06:57 )   PT: 12.6 sec;   INR: 1.13 ratio      PTT - ( 2020 06:57 )  PTT:27.5 sec  Urinalysis Basic - ( 2020 08:30 )  Color: Yellow / Appearance: Clear / S.025 / pH: x  Gluc: x / Ketone: Negative  / Bili: Negative / Urobili: Negative mg/dL   Blood: x / Protein: 15 mg/dL / Nitrite: Negative   Leuk Esterase: Trace / RBC: 0-2 /HPF / WBC 0-2   Sq Epi: x / Non Sq Epi: Occasional / Bacteria: Occasional      CARDIAC MARKERS ( 2020 06:48 )  0.108 ng/mL / x     / x     / x     / x      CARDIAC MARKERS ( 2020 10:20 )  0.079 ng/mL / x     / x     / x     / x      CARDIAC MARKERS ( 2020 06:57 )  0.025 ng/mL / x     / x     / x     / x        < from: CT Chest No Cont (20 @ 11:49) >    IMPRESSION:     Pulmonary edema and moderate effusions.    < end of copied text >    MEDICATIONS  (STANDING):  aspirin  chewable 81 milliGRAM(s) Oral daily  atorvastatin 10 milliGRAM(s) Oral at bedtime  carvedilol 3.125 milliGRAM(s) Oral every 12 hours  digoxin     Tablet 0.125 milliGRAM(s) Oral daily  furosemide    Tablet 20 milliGRAM(s) Oral two times a day  heparin  Injectable 5000 Unit(s) SubCutaneous two times a day  simvastatin 10 milliGRAM(s) Oral at bedtime    MEDICATIONS  (PRN):  ondansetron Injectable 4 milliGRAM(s) IV Push every 6 hours PRN Nausea

## 2020-02-01 NOTE — PROGRESS NOTE ADULT - ASSESSMENT
patient with CHF and hypotension.  Diuresed and now off Bipap; however lasix held due to hypotension,  1-CHF-severe segmental wall motion abnl; will stop cardizem and start coreg.  Currently on digoxin-no load and normal renal function.  will try low dos lasix 20mg daily

## 2020-02-02 LAB
ANION GAP SERPL CALC-SCNC: 6 MMOL/L — SIGNIFICANT CHANGE UP (ref 5–17)
BUN SERPL-MCNC: 21 MG/DL — SIGNIFICANT CHANGE UP (ref 7–23)
CALCIUM SERPL-MCNC: 8.4 MG/DL — LOW (ref 8.5–10.1)
CHLORIDE SERPL-SCNC: 105 MMOL/L — SIGNIFICANT CHANGE UP (ref 96–108)
CO2 SERPL-SCNC: 26 MMOL/L — SIGNIFICANT CHANGE UP (ref 22–31)
CREAT SERPL-MCNC: 0.74 MG/DL — SIGNIFICANT CHANGE UP (ref 0.5–1.3)
GLUCOSE SERPL-MCNC: 84 MG/DL — SIGNIFICANT CHANGE UP (ref 70–99)
POTASSIUM SERPL-MCNC: 3.7 MMOL/L — SIGNIFICANT CHANGE UP (ref 3.5–5.3)
POTASSIUM SERPL-SCNC: 3.7 MMOL/L — SIGNIFICANT CHANGE UP (ref 3.5–5.3)
SODIUM SERPL-SCNC: 137 MMOL/L — SIGNIFICANT CHANGE UP (ref 135–145)

## 2020-02-02 PROCEDURE — 99233 SBSQ HOSP IP/OBS HIGH 50: CPT

## 2020-02-02 RX ORDER — FUROSEMIDE 40 MG
20 TABLET ORAL
Refills: 0 | Status: DISCONTINUED | OUTPATIENT
Start: 2020-02-02 | End: 2020-02-06

## 2020-02-02 RX ADMIN — HEPARIN SODIUM 5000 UNIT(S): 5000 INJECTION INTRAVENOUS; SUBCUTANEOUS at 05:56

## 2020-02-02 RX ADMIN — Medication 81 MILLIGRAM(S): at 11:07

## 2020-02-02 RX ADMIN — CARVEDILOL PHOSPHATE 3.12 MILLIGRAM(S): 80 CAPSULE, EXTENDED RELEASE ORAL at 05:55

## 2020-02-02 RX ADMIN — HEPARIN SODIUM 5000 UNIT(S): 5000 INJECTION INTRAVENOUS; SUBCUTANEOUS at 18:09

## 2020-02-02 RX ADMIN — Medication 20 MILLIGRAM(S): at 18:06

## 2020-02-02 RX ADMIN — CARVEDILOL PHOSPHATE 3.12 MILLIGRAM(S): 80 CAPSULE, EXTENDED RELEASE ORAL at 18:06

## 2020-02-02 RX ADMIN — Medication 0.12 MILLIGRAM(S): at 05:55

## 2020-02-02 RX ADMIN — Medication 20 MILLIGRAM(S): at 05:55

## 2020-02-02 RX ADMIN — ATORVASTATIN CALCIUM 10 MILLIGRAM(S): 80 TABLET, FILM COATED ORAL at 21:06

## 2020-02-02 NOTE — PROGRESS NOTE ADULT - ASSESSMENT
89F w/PMH afib (not on AC d/t fall risk), HTN, presents c/o worsening sob.  As per patient, she was having increased weakness, palpitations, and SOB worsening for the last few days.  Her SOB significantly worsened in the last 24 hours and she came to the ER for further evaluation.  In ER, she was found to be hypoxic in low 80's.  CXR with CHF.  She was started on BIPAP in the ER and given IV lasix.  She was also in afib with 's and treated with IV diltiazem.      #Acute hypoxic and hypercarbic respiratory Failure due to Acute on Chronic Systolic CHF exacerbation:    Overall improved with lasix/ BIPAP.    BP slightly improved-- change back to IV lasix 20 BID.    Coreg started by cardio.    Cont dig for afib/ rate control.    ? New low EF 20%.    CXR/ CT chest with CHF and b/l effusions.    Cont NC o2 @3 L.  Imrpoved from BIPAP.    Initial hypercarbic respiratory failure as well as hyoxic wtih elevated pCO2 but now improved.    CHF exacerbated by afib RVR.      #Afib with RVR:    Cardizem stopped with low EF and hypotension.    Cont dig/ coreg for rate control.    HR overall improved 80-90.    No AC in the past due to fall risk.    Would not attempt JENIFFER/ Cardioversion as cannot tolerate AC.    Cardio f/u.      #R/o PNA:    CT chest with CHF/ effusions. No PNA.  Stop ABX.     #No UTI.  UA negative.      #Positive trop:    Suspect more demand ischemia related to AFIB / CHF.    No evidence of acute MI.    Unclear if ischemic work up necessary or medical management.    ASA/ statin/ BB.      #DVT proph:  heparin SQ.      #Code Status:  DNR/ DNI as per patient.    D/w family at bedside.      DISPO:    Cont present tx for afib/ CHF.  PT eval.

## 2020-02-02 NOTE — PROGRESS NOTE ADULT - ASSESSMENT
- hypoxemic respiratory failure with the new diffuse bilateral perihilar lung infiltrates likely secondary to pulmonary edema .  - no pneumonia with the ct scan chest except compressive atelectasis   - bilateral pleural effusions with the chf mild to moderate   - afib with the RVR with the rate better controlled   - systolic HF with the low EF official echo pending     PLAN     - agree with the discontinuation of the antibiotics    - keep in negative balance with the diuretic  as tolerated by the B.P   - rate control of afib as per the cardiology   - patient currently able to maintain the sat the nasal canula with out the need for the bipap  - effusions are small to moderate and would continue to monitor for now while on the diuretics on board and might consider thoracentesis if increase in the size and unable to diurese with the diuretics

## 2020-02-02 NOTE — PROGRESS NOTE ADULT - SUBJECTIVE AND OBJECTIVE BOX
SUBJECTIVE     patient feels weak and comfortable breathing on the nasal canula   did not need bipap last night   ct findings consistent with the chf with the pulmonary edema and effusions are small to moderate   rate is better controlled   B.P on the low side   off the antibiotics after the ct scan chest       PAST MEDICAL & SURGICAL HISTORY:  HLD (hyperlipidemia)  Glaucoma    OBJECTIVE   Vital Signs Last 24 Hrs  T(C): 36.8 (02 Feb 2020 05:50), Max: 36.8 (02 Feb 2020 05:50)  T(F): 98.2 (02 Feb 2020 05:50), Max: 98.2 (02 Feb 2020 05:50)  HR: 89 (02 Feb 2020 11:00) (87 - 112)  BP: 88/53 (02 Feb 2020 11:00) (84/62 - 118/70)  BP(mean): 62 (02 Feb 2020 11:00) (56 - 81)  RR: 15 (02 Feb 2020 11:00) (13 - 25)  SpO2: 100% (02 Feb 2020 08:00) (92% - 100%)    PHYSICAL EXAM:  Constitutional: , awake and alert, not in distress on the nasal canula   HEENT: Normo cephalic atraumatic  Neck: Soft and supple, No J.V.D   Respiratory: vesicular breathing , No wheezing, rales or rhonchi and has decreased breathing sounds in the bases   Cardiovascular: S1 and S2, iregular rate .   Gastrointestinal:  soft, nontender,   Extremities: No  edema or calf tenderness .  Neurological: No new  focal deficits.    MEDICATIONS  (STANDING):  aspirin  chewable 81 milliGRAM(s) Oral daily  atorvastatin 10 milliGRAM(s) Oral at bedtime  carvedilol 3.125 milliGRAM(s) Oral every 12 hours  digoxin     Tablet 0.125 milliGRAM(s) Oral daily  furosemide    Tablet 20 milliGRAM(s) Oral two times a day  heparin  Injectable 5000 Unit(s) SubCutaneous two times a day  simvastatin 10 milliGRAM(s) Oral at bedtime                            11.5   5.90  )-----------( 207      ( 01 Feb 2020 06:48 )             37.6     02-02    137  |  105  |  21  ----------------------------<  84  3.7   |  26  |  0.74    Ca    8.4<L>      02 Feb 2020 08:16      < from: CT Chest No Cont (02.01.20 @ 11:49) >  ROCEDURE DATE:  02/01/2020          INTERPRETATION:  CT CHEST    CLINICAL INFORMATION:  cough, short of breath    TECHNIQUE: Noncontrast CT of the chest was performed. Coronal and sagittal images were reconstructed. This study was performed using automatic exposure control (radiation dose reduction software) to obtain a diagnostic image quality scan with patient dose as low as reasonably achievable.    COMPARISON: CXR one day prior, CT 9/24/2017    FINDINGS:    LUNGS, AIRWAYS: The central airways are patent. pulmonary edema.    PLEURA: MOderate effusions.    VESSELS: Aortic atherosclerosis without aneurysm.    HEART: Big heart size. No pericardial effusion. Coronary and mitral annular calcification.    MEDIASTINUM AND SAUMYA: No adenopathy.    UPPER ABDOMEN: Limited visualization is unremarkable.    BONES AND SOFT TISSUES: No acute bony abnormality.    IMPRESSION:     Pulmonary edema and moderate effusions.

## 2020-02-02 NOTE — PROGRESS NOTE ADULT - SUBJECTIVE AND OBJECTIVE BOX
CC:  SOB    89F w/PMH afib (not on AC d/t fall risk), HTN, presents c/o worsening sob.  As per patient, she was having increased weakness, palpitations, and SOB worsening for the last few days.  Her SOB significantly worsened in the last 24 hours and she came to the ER for further evaluation.  In ER, she was found to be hypoxic in low 80's.  CXR with CHF.  She was started on BIPAP in the ER and given IV lasix.  She was also in afib with 's and treated with IV diltiazem.      :  Pt seen.  Feeling much better than yesterday.  Still with some SOB but now tolerating 3 L NC.    :  Pt seen.  More awake and alert.  States she feels better.  Confused.  HR improved 80-90's.      ROS:   All 10 systems reviewed and found to be negative with the exception of what has been described above.    Vital Signs Last 24 Hrs  T(C): 36.8 (2020 05:50), Max: 36.8 (2020 05:50)  T(F): 98.2 (2020 05:50), Max: 98.2 (2020 05:50)  HR: 100 (2020 13:00) (87 - 112)  BP: 120/65 (2020 13:00) (84/62 - 120/65)  BP(mean): 80 (2020 13:00) (56 - 81)  RR: 17 (2020 13:00) (13 - 25)  SpO2: 100% (2020 12:00) (92% - 100%)    PHYSICAL EXAM:  GENERAL: Comfortable, no acute distress  HEAD:  Atraumatic, Normocephalic  EYES: EOMI, PERRLA  HEENT: Moist mucous membranes  NECK: Supple, No JVD  NERVOUS SYSTEM:  Alert & Oriented X3, Motor Strength 5/5 B/L upper and lower extremities  CHEST/LUNG: decreased BS at bases.    HEART: irregularly irregular  ABDOMEN: Soft, Nontender, Nondistended; Bowel sounds present  GENITOURINARY- Voiding, no palpable bladder  EXTREMITIES:  No clubbing, cyanosis, or edema  MUSCULOSKELTAL- No muscle tenderness, No joint tenderness  SKIN-no rash    LABS:      137  |  105  |  21  ----------------------------<  84  3.7   |  26  |  0.74    Ca    8.4<L>      2020 08:16                        11.5   5.90  )-----------( 207      ( 2020 06:48 )             37.6     CARDIAC MARKERS ( 2020 06:48 )  0.108 ng/mL / x     / x     / x     / x        Urinalysis Basic - ( 2020 08:30 )  Color: Yellow / Appearance: Clear / S.025 / pH: x  Gluc: x / Ketone: Negative  / Bili: Negative / Urobili: Negative mg/dL   Blood: x / Protein: 15 mg/dL / Nitrite: Negative   Leuk Esterase: Trace / RBC: 0-2 /HPF / WBC 0-2   Sq Epi: x / Non Sq Epi: Occasional / Bacteria: Occasional    < from: CT Chest No Cont (20 @ 11:49) >    IMPRESSION:     Pulmonary edema and moderate effusions.    < end of copied text >      MEDICATIONS  (STANDING):  aspirin  chewable 81 milliGRAM(s) Oral daily  atorvastatin 10 milliGRAM(s) Oral at bedtime  carvedilol 3.125 milliGRAM(s) Oral every 12 hours  digoxin     Tablet 0.125 milliGRAM(s) Oral daily  furosemide   Injectable 20 milliGRAM(s) IV Push two times a day  heparin  Injectable 5000 Unit(s) SubCutaneous two times a day  simvastatin 10 milliGRAM(s) Oral at bedtime    MEDICATIONS  (PRN):  ondansetron Injectable 4 milliGRAM(s) IV Push every 6 hours PRN Nausea

## 2020-02-02 NOTE — PROGRESS NOTE ADULT - SUBJECTIVE AND OBJECTIVE BOX
CHIEF COMPLAINT: Patient is a 89y old  Female who presents with a chief complaint of sob (01 Feb 2020 18:21)      HPI:  HPI:  89F w/PMH afib (not on AC d/t fall risk), HTN, presents c/o worsening sob over the past 1day, worse this AM.  Pt is AAOX3 and offers her own history and family at bedside graciously assisting w/ further history.  Earlier in the week, pt was not feeling well, having some weakness.  Yesterday, per family, her visiting NP stated "shes stable" but to consider further medical eval.  Per pt she was having some intermittent "fluttering" in her chest yesterday, but no pain.  At about 0600 today, she was severely sob and brought into ED when she was found to be hypoxic w/ sats in low 80's.  Pt started on BIPAP, cxr revealed b/l pulm edema, given lasix IV.  She was also in Afib rvr in 140's and given dilt 10mg IV x 1.  At the time of my exam, pt felt much better, pleasant and able to complete sentences, -120's.    GO d/w pt along w/ Dr Moreland, ED MD- he explained to her regarding intubation and she verbalized understanding and stated she would NOT want that.  He further asked her wishes if her heart were to stop  and she endorsed "just let me go, don't do anything".  When family arrived I d/w them her wishes and reiterated the same responses when I asked her.  MOLST form signed in family's presence.  pt is DNR/DNI.    In ED, as above, Trop neg, cbc/cmp wnl. rvp neg.     02/01-patient brought to  for SOB/hypoxia, CT with fluid overload vs possible pneumonia.  No WBC elevation, no fever and BNP in the 6000's.  ECHO done today with EF severe segmental wall motion abnormality and EF 20-25%.   Now down to 3lNC with O2 st 98%, but due to low BP-no lasix today.  Not on AC for significant fall risk.  CHF-severe segmental wall motion abnl; will stop cardizem and start coreg.  Currently on digoxin-no load and normal renal function.  Will try low dose lasix 20mg daily    02/02-patient with severe CM most probably ischemic; d/w family poor prognosis and they were asking if patient could go home.  Patient needs more tuning up.  DNR/DNI precludes any defibrillator or aggressive intervention(as does her age and physical status)  Tolerating medications right now.  O2 sat 93% this morning and BP.  Tolerating lasix coreg and digoxin right now with 's this am.  Patient in AFib with rates 100.  Not on Entresto, ARB or ACE.        PAST MEDICAL & SURGICAL HISTORY:  HLD (hyperlipidemia)  Glaucoma  HTN  Afib  Fall recurrent  s/p Rt periprosthetic hip fracture ORIF (8/19)    REVIEW OF SYSTEMS:    CONSTITUTIONAL: No weakness, fevers or chills  EYES/ENT: No visual changes;  No vertigo or throat pain   NECK: No pain or stiffness  RESPIRATORY: No cough, wheezing, hemoptysis; No shortness of breath  CARDIOVASCULAR: No chest pain or palpitations  GASTROINTESTINAL: No abdominal or epigastric pain. No nausea, vomiting, or hematemesis; No diarrhea or constipation. No melena or hematochezia.  GENITOURINARY: No dysuria, frequency or hematuria  NEUROLOGICAL: No numbness or weakness  SKIN: No itching, burning, rashes, or lesions   All other review of systems is negative unless indicated above    Vital Signs Last 24 Hrs  T(C): 36.8 (02 Feb 2020 05:50), Max: 36.8 (02 Feb 2020 05:50)  T(F): 98.2 (02 Feb 2020 05:50), Max: 98.2 (02 Feb 2020 05:50)  HR: 87 (02 Feb 2020 07:00) (87 - 112)  BP: 91/45 (02 Feb 2020 07:00) (84/62 - 118/70)  BP(mean): 56 (02 Feb 2020 07:00) (56 - 81)  RR: 15 (02 Feb 2020 07:00) (13 - 25)  SpO2: 99% (02 Feb 2020 07:00) (92% - 100%)    I&O's Summary    01 Feb 2020 07:01  -  02 Feb 2020 07:00  --------------------------------------------------------  IN: 0 mL / OUT: 800 mL / NET: -800 mL            PHYSICAL EXAM:   Constitutional: NAD, awake and alert, well-developed  HEENT: PERR, EOMI, Normal Hearing, MMM  Neck: JVD  Respiratory: Breath sounds are clear bilaterally, No wheezing, rales or rhonchi  Cardiovascular: S1 and S2, irregular rate and rhythm, Murmurs,   Gastrointestinal: Bowel Sounds present, soft, nontender, nondistended, no guarding, no rebound  Extremities: No peripheral edema  Vascular: 2+ peripheral pulses  Neurological: A/O x 3, no focal deficits      MEDICATIONS:  MEDICATIONS  (STANDING):  aspirin  chewable 81 milliGRAM(s) Oral daily  atorvastatin 10 milliGRAM(s) Oral at bedtime  carvedilol 3.125 milliGRAM(s) Oral every 12 hours  digoxin     Tablet 0.125 milliGRAM(s) Oral daily  furosemide    Tablet 20 milliGRAM(s) Oral two times a day  heparin  Injectable 5000 Unit(s) SubCutaneous two times a day  simvastatin 10 milliGRAM(s) Oral at bedtime      LABS: All Labs Reviewed:                        11.5   5.90  )-----------( 207      ( 01 Feb 2020 06:48 )             37.6     02-01    138  |  107  |  22  ----------------------------<  96  4.4   |  27  |  0.97    Ca    8.9      01 Feb 2020 06:48        CARDIAC MARKERS ( 01 Feb 2020 06:48 )  0.108 ng/mL / x     / x     / x     / x      CARDIAC MARKERS ( 31 Jan 2020 10:20 )  0.079 ng/mL / x     / x     / x     / x          Blood Culture:       BNP Serum Pro-Brain Natriuretic Peptide: 6280 pg/mL (01-31-20 @ 06:57)      RADIOLOGY:    EKG:      Telemetry:    ECHO:

## 2020-02-02 NOTE — PROGRESS NOTE ADULT - ASSESSMENT
patient with CHF and severe segmental LV dysfucntion, now on coreg, digoxin and lasix with BP in the 100's  1-CHF-should be on entresto ARB or ACE; as BP runs low, concern for hypotension and EF 20% to handle entresto, would consider slow titration of ramipril or enalapril at low doses, starting tomorrow  if BP is stable today  2-CAD-on coreg.  Would consider ASA 81mg and statin if no bleeding  3-AFib-no AC due to fall risk-  Dr. Villa to manage AC and CHF going forward

## 2020-02-03 LAB
ANION GAP SERPL CALC-SCNC: 4 MMOL/L — LOW (ref 5–17)
BUN SERPL-MCNC: 21 MG/DL — SIGNIFICANT CHANGE UP (ref 7–23)
CALCIUM SERPL-MCNC: 8.7 MG/DL — SIGNIFICANT CHANGE UP (ref 8.5–10.1)
CHLORIDE SERPL-SCNC: 104 MMOL/L — SIGNIFICANT CHANGE UP (ref 96–108)
CO2 SERPL-SCNC: 30 MMOL/L — SIGNIFICANT CHANGE UP (ref 22–31)
CREAT SERPL-MCNC: 0.61 MG/DL — SIGNIFICANT CHANGE UP (ref 0.5–1.3)
GLUCOSE SERPL-MCNC: 92 MG/DL — SIGNIFICANT CHANGE UP (ref 70–99)
POTASSIUM SERPL-MCNC: 3.8 MMOL/L — SIGNIFICANT CHANGE UP (ref 3.5–5.3)
POTASSIUM SERPL-SCNC: 3.8 MMOL/L — SIGNIFICANT CHANGE UP (ref 3.5–5.3)
SODIUM SERPL-SCNC: 138 MMOL/L — SIGNIFICANT CHANGE UP (ref 135–145)

## 2020-02-03 PROCEDURE — 99233 SBSQ HOSP IP/OBS HIGH 50: CPT

## 2020-02-03 RX ORDER — SENNA PLUS 8.6 MG/1
1 TABLET ORAL AT BEDTIME
Refills: 0 | Status: DISCONTINUED | OUTPATIENT
Start: 2020-02-03 | End: 2020-02-05

## 2020-02-03 RX ADMIN — HEPARIN SODIUM 5000 UNIT(S): 5000 INJECTION INTRAVENOUS; SUBCUTANEOUS at 17:26

## 2020-02-03 RX ADMIN — Medication 2.5 MILLIGRAM(S): at 17:26

## 2020-02-03 RX ADMIN — HEPARIN SODIUM 5000 UNIT(S): 5000 INJECTION INTRAVENOUS; SUBCUTANEOUS at 05:45

## 2020-02-03 RX ADMIN — ATORVASTATIN CALCIUM 10 MILLIGRAM(S): 80 TABLET, FILM COATED ORAL at 21:09

## 2020-02-03 RX ADMIN — CARVEDILOL PHOSPHATE 3.12 MILLIGRAM(S): 80 CAPSULE, EXTENDED RELEASE ORAL at 05:45

## 2020-02-03 RX ADMIN — Medication 20 MILLIGRAM(S): at 10:19

## 2020-02-03 RX ADMIN — CARVEDILOL PHOSPHATE 3.12 MILLIGRAM(S): 80 CAPSULE, EXTENDED RELEASE ORAL at 17:26

## 2020-02-03 RX ADMIN — Medication 20 MILLIGRAM(S): at 17:26

## 2020-02-03 RX ADMIN — Medication 81 MILLIGRAM(S): at 10:19

## 2020-02-03 RX ADMIN — Medication 0.12 MILLIGRAM(S): at 05:45

## 2020-02-03 NOTE — PROGRESS NOTE ADULT - ASSESSMENT
- hypoxemic respiratory failure with the new diffuse bilateral perihilar lung infiltrates likely secondary to pulmonary edema .  - no pneumonia with the ct scan chest except compressive atelectasis   - bilateral pleural effusions with the chf mild to moderate   - afib with the RVR with the rate better controlled   - systolic HF with the low EF official echo pending     PLAN     - agree with the discontinuation of the antibiotics    - keep in negative balance with the diuretic  as tolerated by the B.P  patient currently getting IV Lasix  - rate control of afib as per the cardiology   - patient currently able to maintain the sat the nasal canula with out the need for the bipap  - effusions are small to moderate and would continue to monitor for now while on the diuretics on board and might consider thoracentesis if increase in the size and unable to diurese with the diuretics   -   will repeat chest x-ray for continued improvement in the pulmonary edema with the continuation of diuretics in the A.M   - mobilization

## 2020-02-03 NOTE — PROGRESS NOTE ADULT - SUBJECTIVE AND OBJECTIVE BOX
CHIEF COMPLAINT: Patient is a 89y old  Female who presents with a chief complaint of sob (01 Feb 2020 18:21)      HPI:  HPI:  89F w/PMH afib (not on AC d/t fall risk), HTN, presents c/o worsening sob over the past 1day, worse this AM.  Pt is AAOX3 and offers her own history and family at bedside graciously assisting w/ further history.  Earlier in the week, pt was not feeling well, having some weakness.  Yesterday, per family, her visiting NP stated "shes stable" but to consider further medical eval.  Per pt she was having some intermittent "fluttering" in her chest yesterday, but no pain.  At about 0600 today, she was severely sob and brought into ED when she was found to be hypoxic w/ sats in low 80's.  Pt started on BIPAP, cxr revealed b/l pulm edema, given lasix IV.  She was also in Afib rvr in 140's and given dilt 10mg IV x 1.  At the time of my exam, pt felt much better, pleasant and able to complete sentences, -120's.    GO d/w pt along w/ Dr Moreland, ED MD- he explained to her regarding intubation and she verbalized understanding and stated she would NOT want that.  He further asked her wishes if her heart were to stop  and she endorsed "just let me go, don't do anything".  When family arrived I d/w them her wishes and reiterated the same responses when I asked her.  MOLST form signed in family's presence.  pt is DNR/DNI.    In ED, as above, Trop neg, cbc/cmp wnl. rvp neg.     02/01-patient brought to  for SOB/hypoxia, CT with fluid overload vs possible pneumonia.  No WBC elevation, no fever and BNP in the 6000's.  ECHO done today with EF severe segmental wall motion abnormality and EF 20-25%.   Now down to 3lNC with O2 st 98%, but due to low BP-no lasix today.  Not on AC for significant fall risk.  CHF-severe segmental wall motion abnl; will stop cardizem and start coreg.  Currently on digoxin-no load and normal renal function.  Will try low dose lasix 20mg daily    02/02-patient with severe CM most probably ischemic; d/w family poor prognosis and they were asking if patient could go home.  Patient needs more tuning up.  DNR/DNI precludes any defibrillator or aggressive intervention(as does her age and physical status)  Tolerating medications right now.  O2 sat 93% this morning and BP.  Tolerating lasix coreg and digoxin right now with 's this am.  Patient in AFib with rates 100.  Not on Entresto, ARB or ACE.      2/3 improved hemodynamics today-       PAST MEDICAL & SURGICAL HISTORY:  HLD (hyperlipidemia)  Glaucoma  HTN  Afib  Fall recurrent  s/p Rt periprosthetic hip fracture ORIF (8/19)    REVIEW OF SYSTEMS:    CONSTITUTIONAL: No weakness, fevers or chills  EYES/ENT: No visual changes;  No vertigo or throat pain   NECK: No pain or stiffness  RESPIRATORY: No cough, wheezing, hemoptysis; No shortness of breath  CARDIOVASCULAR: No chest pain or palpitations  GASTROINTESTINAL: No abdominal or epigastric pain. No nausea, vomiting, or hematemesis; No diarrhea or constipation. No melena or hematochezia.  GENITOURINARY: No dysuria, frequency or hematuria  NEUROLOGICAL: No numbness or weakness  SKIN: No itching, burning, rashes, or lesions   All other review of systems is negative unless indicated above    ICU Vital Signs Last 24 Hrs  T(C): 36.2 (03 Feb 2020 05:42), Max: 36.9 (02 Feb 2020 14:37)  T(F): 97.2 (03 Feb 2020 05:42), Max: 98.5 (02 Feb 2020 14:37)  HR: 101 (03 Feb 2020 03:00) (87 - 107)  BP: 122/67 (03 Feb 2020 03:00) (86/50 - 122/67)  BP(mean): 77 (03 Feb 2020 03:00) (54 - 94)  ABP: --  ABP(mean): --  RR: 16 (03 Feb 2020 03:00) (13 - 22)  SpO2: 100% (03 Feb 2020 03:00) (96% - 100%)            PHYSICAL EXAM:   Constitutional: NAD, awake and alert, well-developed  HEENT: PERR, EOMI, Normal Hearing, MMM  Neck: JVD  Respiratory: Breath sounds are clear bilaterally, No wheezing, rales or rhonchi  Cardiovascular: S1 and S2, irregular rate and rhythm, Murmurs,   Gastrointestinal: Bowel Sounds present, soft, nontender, nondistended, no guarding, no rebound  Extremities: No peripheral edema  Vascular: 2+ peripheral pulses  Neurological: A/O x 3, no focal deficits      MEDICATIONS:  MEDICATIONS  (STANDING):  aspirin  chewable 81 milliGRAM(s) Oral daily  atorvastatin 10 milliGRAM(s) Oral at bedtime  carvedilol 3.125 milliGRAM(s) Oral every 12 hours  digoxin     Tablet 0.125 milliGRAM(s) Oral daily  furosemide    Tablet 20 milliGRAM(s) Oral two times a day  heparin  Injectable 5000 Unit(s) SubCutaneous two times a day  simvastatin 10 milliGRAM(s) Oral at bedtime      LABS: All Labs Reviewed:                  02-02    137  |  105  |  21  ----------------------------<  84  3.7   |  26  |  0.74    Ca    8.4<L>      02 Feb 2020 08:16        CARDIAC MARKERS ( 01 Feb 2020 06:48 )  0.108 ng/mL / x     / x     / x     / x      CARDIAC MARKERS ( 31 Jan 2020 10:20 )  0.079 ng/mL / x     / x     / x     / x          BNP Serum Pro-Brain Natriuretic Peptide: 6280 pg/mL (01-31-20 @ 06:57)        EKG:  < from: 12 Lead ECG (01.31.20 @ 06:55) >    Diagnosis Line Atrial fibrillation with rapid ventricular response with premature ventricular or aberrantly conducted complexes  Left axis deviation  Inferior infarct (cited on or before 11-AUG-2019)  Anteroseptal infarct (cited on or before 10-NOV-2018)  ST & T wave abnormality, consider lateral ischemia  Abnormal ECG    < end of copied text >      Telemetry: AF HR      ECHO: pending

## 2020-02-03 NOTE — PROGRESS NOTE ADULT - SUBJECTIVE AND OBJECTIVE BOX
SUBJECTIVE       PAST MEDICAL & SURGICAL HISTORY:  HLD (hyperlipidemia)  Glaucoma    OBJECTIVE   Vital Signs Last 24 Hrs  T(C): 36.2 (03 Feb 2020 05:42), Max: 36.9 (02 Feb 2020 14:37)  T(F): 97.2 (03 Feb 2020 05:42), Max: 98.5 (02 Feb 2020 14:37)  HR: 101 (03 Feb 2020 06:00) (89 - 107)  BP: 124/57 (03 Feb 2020 06:00) (86/50 - 124/57)  BP(mean): 64 (03 Feb 2020 06:00) (54 - 94)  RR: 23 (03 Feb 2020 06:00) (13 - 23)  SpO2: 100% (03 Feb 2020 06:00) (96% - 100%)    PHYSICAL EXAM:  Constitutional: , awake and alert, not in distress.  HEENT: Normo cephalic atraumatic  Neck: Soft and supple, No J.V.D   Respiratory: vesicular breathing , No wheezing, rales or rhonchi.   Cardiovascular: S1 and S2, regular rate .   Gastrointestinal:  soft, nontender,   Extremities: No  edema or calf tenderness .  Neurological: No new  focal deficits.    MEDICATIONS  (STANDING):  aspirin  chewable 81 milliGRAM(s) Oral daily  atorvastatin 10 milliGRAM(s) Oral at bedtime  carvedilol 3.125 milliGRAM(s) Oral every 12 hours  digoxin     Tablet 0.125 milliGRAM(s) Oral daily  enalapril 2.5 milliGRAM(s) Oral every 12 hours  furosemide   Injectable 20 milliGRAM(s) IV Push two times a day  heparin  Injectable 5000 Unit(s) SubCutaneous two times a day  simvastatin 10 milliGRAM(s) Oral at bedtime        02-03    138  |  104  |  21  ----------------------------<  92  3.8   |  30  |  0.61    Ca    8.7      03 Feb 2020 07:08 SUBJECTIVE      patient seen in the a.m. breathing comfortably the on the nasal cannula   remained off the antibiotics without fever spikes   says constipated    PAST MEDICAL & SURGICAL HISTORY:  HLD (hyperlipidemia)  Glaucoma    OBJECTIVE   Vital Signs Last 24 Hrs  T(C): 36.2 (03 Feb 2020 05:42), Max: 36.9 (02 Feb 2020 14:37)  T(F): 97.2 (03 Feb 2020 05:42), Max: 98.5 (02 Feb 2020 14:37)  HR: 101 (03 Feb 2020 06:00) (89 - 107)  BP: 124/57 (03 Feb 2020 06:00) (86/50 - 124/57)  BP(mean): 64 (03 Feb 2020 06:00) (54 - 94)  RR: 23 (03 Feb 2020 06:00) (13 - 23)  SpO2: 100% (03 Feb 2020 06:00) (96% - 100%)    PHYSICAL EXAM:  Constitutional: , awake and alert, not in distress. on the nasal cannula with sats of 100%  HEENT: Normo cephalic atraumatic  Neck: Soft and supple, No J.V.D   Respiratory: vesicular breathing , No wheezing, rales or rhonchi.  and has decreased breath sounds at the bases  Cardiovascular: S1 and S2, regular rate .   Gastrointestinal:  soft, nontender,   Extremities: No  edema or calf tenderness .  Neurological: No new  focal deficits.    MEDICATIONS  (STANDING):  aspirin  chewable 81 milliGRAM(s) Oral daily  atorvastatin 10 milliGRAM(s) Oral at bedtime  carvedilol 3.125 milliGRAM(s) Oral every 12 hours  digoxin     Tablet 0.125 milliGRAM(s) Oral daily  enalapril 2.5 milliGRAM(s) Oral every 12 hours  furosemide   Injectable 20 milliGRAM(s) IV Push two times a day  heparin  Injectable 5000 Unit(s) SubCutaneous two times a day  simvastatin 10 milliGRAM(s) Oral at bedtime        02-03    138  |  104  |  21  ----------------------------<  92  3.8   |  30  |  0.61    Ca    8.7      03 Feb 2020 07:08

## 2020-02-03 NOTE — PHYSICAL THERAPY INITIAL EVALUATION ADULT - GENERAL OBSERVATIONS, REHAB EVAL
pt received supine in bed in CICU.  pt [place], pleasant and cooperative.  pt left sitting in bedside chair, call bell in reach, bed alarm on.

## 2020-02-03 NOTE — PROGRESS NOTE ADULT - ASSESSMENT
89F w/PMH afib (not on AC d/t fall risk), HTN, presents c/o worsening sob.  As per patient, she was having increased weakness, palpitations, and SOB worsening for the last few days.  Her SOB significantly worsened in the last 24 hours and she came to the ER for further evaluation.  In ER, she was found to be hypoxic in low 80's.  CXR with CHF.  She was started on BIPAP in the ER and given IV lasix.  She was also in afib with 's and treated with IV diltiazem.      #Acute hypoxic and hypercarbic respiratory Failure due to Acute on Chronic Systolic CHF exacerbation:    Overall improved with lasix/ BIPAP.    BP slightly improved-- change back to IV lasix 20 BID.    Coreg started by cardio.    Cont dig for afib/ rate control.    ? New low EF 20%.    CXR/ CT chest with CHF and b/l effusions.    Cont NC o2 @3 L.  Imrpoved from BIPAP.    Initial hypercarbic respiratory failure as well as hyoxic wtih elevated pCO2 but now improved.    CHF exacerbated by afib RVR.      #Afib with RVR:    Cardizem stopped with low EF and hypotension.    Cont dig/ coreg for rate control.    HR overall improved 80-90.    No AC in the past due to fall risk.    Would not attempt JENIFFER/ Cardioversion as cannot tolerate AC.    Cardio f/u.      #R/o PNA:    CT chest with CHF/ effusions. No PNA.  Stop ABX.     #No UTI.  UA negative.      #Positive trop:    Suspect more demand ischemia related to AFIB / CHF.    No evidence of acute MI.    Unclear if ischemic work up necessary or medical management.    ASA/ statin/ BB.      #DVT proph:  heparin SQ.      #Code Status:  DNR/ DNI as per patient.    D/w family at bedside.      DISPO:    Cont present tx for afib/ CHF.  PT eval. 89F w/PMH afib (not on AC d/t fall risk), HTN,        #Acute hypoxic and hypercarbic respiratory Failure due to Acute on Chronic Systolic CHF exacerbation.    Overall improved with lasix/ BIPAP.    BP stable  C/w  IV lasix 20 BID.    started on Coreg   Low dose ACEI started, monito BP cosely  Cont dig for afib/ rate control.    ECHO:  New low EF 20%.    CXR/ CT chest with CHF and b/l effusions.    Cont NC o2 @3 L.    Initial hypercarbic respiratory failure as well as hypoxic wtih elevated pCO2 but now improved.    Cardiology f/u appreciated     #Afib with RVR:    Off Cardizem,  stopped with low EF and hypotension.    Cont dig/ coreg for rate control.    HR overall improved     No AC in the past due to fall risk.    No  JENIFFER/ Cardioversion as cannot tolerate AC.    Cardio f/u.      #  PNA ruled out   CT chest with CHF/ effusions. No PNA. off ABX.        #Positive trop:    Suspect more demand ischemia related to AFIB / CHF.    No evidence of acute MI.    ASA/ statin/ BB.    No ischemic work up recommended by cardio, c/w medical management     #DVT proph:  heparin SQ.      #Code Status:  DNR/ DNI      DISPO:  monitor BP, c/w tele

## 2020-02-03 NOTE — PROGRESS NOTE ADULT - SUBJECTIVE AND OBJECTIVE BOX
CC: sob (03 Feb 2020 09:40)    HPI:  HPI:  89F w/PMH afib (not on AC d/t fall risk), HTN, presents c/o worsening sob over the past 1day, worse this AM.  Pt is AAOX3 and offers her own history and family at bedside graciously assisting w/ further history.  Earlier in the week, pt was not feeling well, having some weakness.  Yesterday, per family, her visiting NP stated "shes stable" but to consider further medical eval.  Per pt she was having some intermittent "fluttering" in her chest yesterday, but no pain.  At about 0600 today, she was severely sob and brought into ED when she was found to be hypoxic w/ sats in low 80's.  Pt started on BIPAP, cxr revealed b/l pulm edema, given lasix IV.  She was also in Afib rvr in 140's and given dilt 10mg IV x 1.  At the time of my exam, pt felt much better, pleasant and able to complete sentences, -120's.    [facility] d/w pt along w/ Dr Moreland, ED MD- he explained to her regarding intubation and she verbalized understanding and stated she would NOT want that.  He further asked her wishes if her heart were to stop  and she endorsed "just let me go, don't do anything".  When family arrived I d/w them her wishes and reiterated the same responses when I asked her.  MOLST form signed in family's presence.  pt is DNR/DNI.    In ED, as above, Trop neg, cbc/cmp wnl. rvp neg.     PAST MEDICAL & SURGICAL HISTORY:  HLD (hyperlipidemia)  Glaucoma  HTN  Afib  Fall recurrent  s/p Rt periprosthetic hip fracture ORIF (8/19)      Review of Systems:   CONSTITUTIONAL: No fever.  EYES: No eye pain or discharge.  ENMT:  No sinus or throat pain  NECK: No pain or stiffness  RESPIRATORY: No cough, wheezing, chills or hemoptysis; ++ shortness of breath  CARDIOVASCULAR: No chest pain, ++fluttering/palpitations, NO dizziness, or leg swelling  GASTROINTESTINAL: No abdominal or epigastric pain. No nausea, vomiting, or hematemesis; No diarrhea or constipation. No melena or hematochezia.  GENITOURINARY: No dysuria or incontinence  NEUROLOGICAL: No headaches, memory loss, loss of strength, numbness, or tremors  SKIN: No rashes.  MUSCULOSKELETAL: No joint pain or swelling; No muscle, back, or extremity pain  PSYCHIATRIC: No depression, anxiety, mood swings, or difficulty sleeping      Allergies  atenolol (Other)  sulfa drugs (Other)      Social History:   lives in her own home,  w/ dementia  both have caregivers  pt requires full assist  w/ ADLs    FAMILY HISTORY:  cannot determine d/t pt's condition.     Home Medications:  aspirin 81 mg oral tablet: 1 tab(s) orally once a day (31 Jan 2020 11:45)  atenolol:  (31 Jan 2020 11:45)  atorvastatin 10 mg oral tablet: 1 tab(s) orally once a day (31 Jan 2020 11:45)  DilTIAZem Hydrochloride  mg/24 hours oral capsule, extended release: 1 cap(s) orally once a day (31 Jan 2020 11:45)  famotidine 20 mg oral tablet: 1 tab(s) orally 2 times a day (31 Jan 2020 11:45)  furosemide 20 mg oral tablet: 1 tab(s) orally once a day (31 Jan 2020 11:45)  potassium chloride 10 mEq oral capsule, extended release: 1 cap(s) orally once a day (31 Jan 2020 11:45)  Vitamin D3 5000 intl units (125 mcg) oral tablet: 1 tab(s) orally once a day (31 Jan 2020 11:45)    MEDICATIONS  (STANDING):  aspirin  chewable 81 milliGRAM(s) Oral daily  atorvastatin 10 milliGRAM(s) Oral at bedtime  diltiazem    milliGRAM(s) Oral daily  furosemide   Injectable 40 milliGRAM(s) IV Push every 12 hours  heparin  Injectable 5000 Unit(s) SubCutaneous two times a day  simvastatin 10 milliGRAM(s) Oral at bedtime    MEDICATIONS  (PRN):  ondansetron Injectable 4 milliGRAM(s) IV Push every 6 hours PRN Nausea      PHYSICAL EXAM:  Vital Signs Last 24 Hrs  T(C): 37.3 (31 Jan 2020 07:03), Max: 37.3 (31 Jan 2020 07:03)  T(F): 99.2 (31 Jan 2020 07:03), Max: 99.2 (31 Jan 2020 07:03)  HR: 133 (31 Jan 2020 06:48) (75 - 133)  BP: 146/75 (31 Jan 2020 06:48) (146/75 - 146/75)  RR: 28 (31 Jan 2020 06:48) (28 - 28)  SpO2: 92% (31 Jan 2020 06:48) (92% - 92%)  GENERAL: NAD, FRAIL   HEAD:  Atraumatic, Normocephalic  EYES: EOMI, PERRLA, conjunctiva and sclera clear  ENT: normal hearing, no nasal discharge, CANNOT COMPLETELY ASSESS AS PT HAS BIPAP  NECK: Supple, No JVD, no LAD, no thyromegaly   CHEST/LUNG: +B/L CRACKLES,  No wheeze, respirations unlabored, COMPLETING SENTENCES ON BIPAP  HEART: IRREG IRREG TACHY  ABDOMEN: Soft, Nontender, Nondistended; Bowel sounds present, no HSM  EXTREMITIES:  2+ Peripheral Pulses, No clubbing, cyanosis, or edema  PSYCH: AAOx3, normal behavior  NEUROLOGY: non-focal, sensory and cn 2-12 intact, speech/language intact  SKIN: No visible rashes or lesions    LABS:                        12.9   9.18  )-----------( 281      ( 31 Jan 2020 06:57 )             39.8     01-31    136  |  104  |  21  ----------------------------<  162<H>  4.3   |  25  |  0.96    Ca    9.0      31 Jan 2020 06:57  Mg     2.4     01-31    TPro  7.5  /  Alb  3.5  /  TBili  0.5  /  DBili  x   /  AST  18  /  ALT  24  /  AlkPhos  79  01-31    PT/INR - ( 31 Jan 2020 06:57 )   PT: 12.6 sec;   INR: 1.13 ratio         PTT - ( 31 Jan 2020 06:57 )  PTT:27.5 sec  CARDIAC MARKERS ( 31 Jan 2020 06:57 )  0.025 ng/mL / x     / x     / x     / x          RADIOLOGY & ADDITIONAL TESTS:  Imaging Personally Reviewed:  cxr- pulm edema, b/l pleural effusion R>L    EKG Personally Reviewed:  afib rvr 140's (31 Jan 2020 10:38)    INTERVAL HPI/OVERNIGHT EVENTS:    Vital Signs Last 24 Hrs  T(C): 36.2 (03 Feb 2020 05:42), Max: 36.9 (02 Feb 2020 14:37)  T(F): 97.2 (03 Feb 2020 05:42), Max: 98.5 (02 Feb 2020 14:37)  HR: 92 (03 Feb 2020 10:20) (89 - 107)  BP: 102/61 (03 Feb 2020 10:20) (88/63 - 124/57)  BP(mean): 71 (03 Feb 2020 10:20) (54 - 94)  RR: 21 (03 Feb 2020 10:20) (13 - 23)  SpO2: 100% (03 Feb 2020 10:20) (89% - 100%)  I&O's Detail    02 Feb 2020 07:01  -  03 Feb 2020 07:00  --------------------------------------------------------  IN:  Total IN: 0 mL    OUT:    Voided: 1450 mL  Total OUT: 1450 mL    Total NET: -1450 mL        REVIEW OF SYSTEMS:    CONSTITUTIONAL: No weakness, fevers or chills  EYES/ENT: No visual changes;  No vertigo or throat pain   NECK: No pain or stiffness  RESPIRATORY: No cough, wheezing, hemoptysis; No shortness of breath  CARDIOVASCULAR: No chest pain or palpitations  GASTROINTESTINAL: No abdominal or epigastric pain. No nausea, vomiting, or hematemesis; No diarrhea or constipation. No melena or hematochezia.  GENITOURINARY: No dysuria, frequency or hematuria  NEUROLOGICAL: No numbness or weakness  SKIN: No itching, burning, rashes, or lesions   All other review of systems is negative unless indicated above.  PHYSICAL EXAM:    General: Well developed; well nourished; in no acute distress  Eyes: PERRLA, EOMI; conjunctiva and sclera clear  Head: Normocephalic; atraumatic  ENMT: No nasal discharge; airway clear  Neck: Supple; non tender; no masses  Respiratory: No wheezes, rales or rhonchi  Cardiovascular: Regular rate and rhythm. S1 and S2 Normal; No murmurs, gallops or rubs  Gastrointestinal: Soft non-tender non-distended; Normal bowel sounds  Genitourinary: No  suprapubic  tenderness  Extremities: Normal range of motion, No clubbing, cyanosis or edema  Vascular: Peripheral pulses palpable 2+ bilaterally  Neurological: Alert and oriented x4  Skin: Warm and dry. No acute rash  Lymph Nodes: No acute cervical adenopathy  Musculoskeletal: Normal muscle tone, without deformities  Psychiatric: Cooperative and appropriate        03 Feb 2020 07:08    138    |  104    |  21     ----------------------------<  92     3.8     |  30     |  0.61     Ca    8.7        03 Feb 2020 07:08            MEDICATIONS  (STANDING):  aspirin  chewable 81 milliGRAM(s) Oral daily  atorvastatin 10 milliGRAM(s) Oral at bedtime  carvedilol 3.125 milliGRAM(s) Oral every 12 hours  digoxin     Tablet 0.125 milliGRAM(s) Oral daily  enalapril 2.5 milliGRAM(s) Oral every 12 hours  furosemide   Injectable 20 milliGRAM(s) IV Push two times a day  heparin  Injectable 5000 Unit(s) SubCutaneous two times a day  simvastatin 10 milliGRAM(s) Oral at bedtime    MEDICATIONS  (PRN):  ondansetron Injectable 4 milliGRAM(s) IV Push every 6 hours PRN Nausea      RADIOLOGY & ADDITIONAL TESTS: CC: sob (03 Feb 2020 09:40)    HPI:  HPI:  89F w/PMH afib (not on AC d/t fall risk), HTN, presents c/o worsening sob over the past 1day, worse this AM.  Pt is AAOX3 and offers her own history and family at bedside graciously assisting w/ further history.  Earlier in the week, pt was not feeling well, having some weakness.  Yesterday, per family, her visiting NP stated "shes stable" but to consider further medical eval.  Per pt she was having some intermittent "fluttering" in her chest yesterday, but no pain.  At about 0600 today, she was severely sob and brought into ED when she was found to be hypoxic w/ sats in low 80's.  Pt started on BIPAP, cxr revealed b/l pulm edema, given lasix IV.  She was also in Afib rvr in 140's and given dilt 10mg IV x 1.  At the time of my exam, pt felt much better, pleasant and able to complete sentences, -120's.      INTERVAL HPI/ OVERNIGHT EVENTS: Chart reviewed, Pt was seen and examined, looks comfortable on NC, reports SOB improving.  No fevers, no CP.  Daughter at bedside. Results and POC discussed     Vital Signs Last 24 Hrs  T(C): 36.2 (03 Feb 2020 05:42), Max: 36.9 (02 Feb 2020 14:37)  T(F): 97.2 (03 Feb 2020 05:42), Max: 98.5 (02 Feb 2020 14:37)  HR: 92 (03 Feb 2020 10:20) (89 - 107)  BP: 102/61 (03 Feb 2020 10:20) (88/63 - 124/57)  BP(mean): 71 (03 Feb 2020 10:20) (54 - 94)  RR: 21 (03 Feb 2020 10:20) (13 - 23)  SpO2: 100% (03 Feb 2020 10:20) (89% - 100%)      REVIEW OF SYSTEMS:  All other review of systems is negative unless indicated above.        PHYSICAL EXAM:  General: Well developed; malnourished;  in no acute distress  Eyes: PERRLA, EOMI; conjunctiva and sclera clear  Head: Normocephalic; atraumatic  ENMT: No nasal discharge; airway clear  Neck: Supple;  no masses  Respiratory: Decreased BS at bases. No wheezes, rales or rhonchi  Cardiovascular: Irregular rate and rhythm. + S1 and S2 Normal;   Gastrointestinal: Soft non-tender non-distended; Normal bowel sounds  Genitourinary: No  suprapubic  tenderness  Extremities: +  edema  Vascular: Peripheral pulses palpable 2+ bilaterally  Neurological: Alert and oriented x2, non focal   Skin: Warm and dry. No acute rash   Lymph Nodes: No acute cervical adenopathy  Musculoskeletal: Normal muscle tone, no joint effusion or erythema   Psychiatric: Cooperative       LABS:   03 Feb 2020 07:08    138    |  104    |  21     ----------------------------<  92     3.8     |  30     |  0.61     Ca    8.7        03 Feb 2020 07:08        MEDICATIONS  (STANDING):  aspirin  chewable 81 milliGRAM(s) Oral daily  atorvastatin 10 milliGRAM(s) Oral at bedtime  carvedilol 3.125 milliGRAM(s) Oral every 12 hours  digoxin     Tablet 0.125 milliGRAM(s) Oral daily  enalapril 2.5 milliGRAM(s) Oral every 12 hours  furosemide   Injectable 20 milliGRAM(s) IV Push two times a day  heparin  Injectable 5000 Unit(s) SubCutaneous two times a day  simvastatin 10 milliGRAM(s) Oral at bedtime    MEDICATIONS  (PRN):  ondansetron Injectable 4 milliGRAM(s) IV Push every 6 hours PRN Nausea      RADIOLOGY & ADDITIONAL TESTS:    EXAM:  CT CHEST                        PROCEDURE DATE:  02/01/2020      >  FINDINGS:    LUNGS, AIRWAYS: The central airways are patent. pulmonary edema.    PLEURA: MOderate effusions.    VESSELS: Aortic atherosclerosis without aneurysm.    HEART: Big heart size. No pericardial effusion. Coronary and mitral annular calcification.    MEDIASTINUM AND SAUMYA: No adenopathy.    UPPER ABDOMEN: Limited visualization is unremarkable.    BONES AND SOFT TISSUES: No acute bony abnormality.    IMPRESSION:   Pulmonary edema and moderate effusions. CC: sob (03 Feb 2020 09:40)    HPI:  HPI:  89F w/PMH afib (not on AC d/t fall risk), HTN, presents c/o worsening sob over the past 1day, worse this AM.  Pt is AAOX3 and offers her own history and family at bedside graciously assisting w/ further history.  Earlier in the week, pt was not feeling well, having some weakness.  Yesterday, per family, her visiting NP stated "shes stable" but to consider further medical eval.  Per pt she was having some intermittent "fluttering" in her chest yesterday, but no pain.  At about 0600 today, she was severely sob and brought into ED when she was found to be hypoxic w/ sats in low 80's.  Pt started on BIPAP, cxr revealed b/l pulm edema, given lasix IV.  She was also in Afib rvr in 140's and given dilt 10mg IV x 1.  At the time of my exam, pt felt much better, pleasant and able to complete sentences, -120's.      INTERVAL HPI/ OVERNIGHT EVENTS: Chart reviewed, Pt was seen and examined, looks comfortable on NC, reports SOB improving.  No fevers, no CP.  Daughter at bedside. Results and POC discussed     Vital Signs Last 24 Hrs  T(C): 36.2 (03 Feb 2020 05:42), Max: 36.9 (02 Feb 2020 14:37)  T(F): 97.2 (03 Feb 2020 05:42), Max: 98.5 (02 Feb 2020 14:37)  HR: 92 (03 Feb 2020 10:20) (89 - 107)  BP: 102/61 (03 Feb 2020 10:20) (88/63 - 124/57)  BP(mean): 71 (03 Feb 2020 10:20) (54 - 94)  RR: 21 (03 Feb 2020 10:20) (13 - 23)  SpO2: 100% (03 Feb 2020 10:20) (89% - 100%)      REVIEW OF SYSTEMS:  All other review of systems is negative unless indicated above.        PHYSICAL EXAM:  General: Well developed; malnourished;  in no acute distress  Eyes: PERRLA, EOMI; conjunctiva and sclera clear  Head: Normocephalic; atraumatic  ENMT: No nasal discharge; airway clear  Neck: Supple;  no masses  Respiratory: Decreased BS at bases. No wheezes, rales or rhonchi  Cardiovascular: Irregular rate and rhythm. + S1 and S2 Normal;   Gastrointestinal: Soft non-tender non-distended; Normal bowel sounds  Genitourinary: No  suprapubic  tenderness  Extremities: +  edema  Vascular: Peripheral pulses palpable 2+ bilaterally  Neurological: Alert and oriented x2, non focal   Skin: Warm and dry. No acute rash   Lymph Nodes: No acute cervical adenopathy  Musculoskeletal: Normal muscle tone, no joint effusion or erythema   Psychiatric: Cooperative       LABS:   03 Feb 2020 07:08    138    |  104    |  21     ----------------------------<  92     3.8     |  30     |  0.61     Ca    8.7        03 Feb 2020 07:08        MEDICATIONS  (STANDING):  aspirin  chewable 81 milliGRAM(s) Oral daily  atorvastatin 10 milliGRAM(s) Oral at bedtime  carvedilol 3.125 milliGRAM(s) Oral every 12 hours  digoxin     Tablet 0.125 milliGRAM(s) Oral daily  enalapril 2.5 milliGRAM(s) Oral every 12 hours  furosemide   Injectable 20 milliGRAM(s) IV Push two times a day  heparin  Injectable 5000 Unit(s) SubCutaneous two times a day  simvastatin 10 milliGRAM(s) Oral at bedtime    MEDICATIONS  (PRN):  ondansetron Injectable 4 milliGRAM(s) IV Push every 6 hours PRN Nausea      RADIOLOGY & ADDITIONAL TESTS:    EXAM:  CT CHEST                        PROCEDURE DATE:  02/01/2020    FINDINGS:    LUNGS, AIRWAYS: The central airways are patent. pulmonary edema.    PLEURA: MOderate effusions.    VESSELS: Aortic atherosclerosis without aneurysm.    HEART: Big heart size. No pericardial effusion. Coronary and mitral annular calcification.    MEDIASTINUM AND SAUMYA: No adenopathy.    UPPER ABDOMEN: Limited visualization is unremarkable.    BONES AND SOFT TISSUES: No acute bony abnormality.    IMPRESSION:   Pulmonary edema and moderate effusions.

## 2020-02-03 NOTE — PROGRESS NOTE ADULT - ASSESSMENT
patient with CHF and severe segmental LV dysfucntion, now on coreg, digoxin and lasix with BP in the 100's  1-CHF-should be on entresto ARB or ACE; as BP runs low, concern for hypotension and EF 20% to handle entresto, would consider slow titration of ramipril or enalapril at low doses, starting tomorrow  if BP is stable today  2-CAD-on coreg.  Would consider ASA 81mg and statin if no bleeding  3-AFib-no AC due to fall risk- patient with CHF and severe segmental LV dysfucntion, now on coreg, digoxin and lasix with BP in the 100's  1-CHF- will add low dose enalapril; as BP runs low, concern for hypotension - will monitor closely - continue lasix as tolerated  2-CAD-on coreg.   on ASA 81mg and statin   3-AFib-no AC due to fall risk- coreg and dig

## 2020-02-04 LAB
ALBUMIN SERPL ELPH-MCNC: 3.1 G/DL — LOW (ref 3.3–5)
ALP SERPL-CCNC: 68 U/L — SIGNIFICANT CHANGE UP (ref 40–120)
ALT FLD-CCNC: 15 U/L — SIGNIFICANT CHANGE UP (ref 12–78)
ANION GAP SERPL CALC-SCNC: 3 MMOL/L — LOW (ref 5–17)
ANION GAP SERPL CALC-SCNC: 9 MMOL/L — SIGNIFICANT CHANGE UP (ref 5–17)
AST SERPL-CCNC: 24 U/L — SIGNIFICANT CHANGE UP (ref 15–37)
BILIRUB SERPL-MCNC: 0.5 MG/DL — SIGNIFICANT CHANGE UP (ref 0.2–1.2)
BUN SERPL-MCNC: 19 MG/DL — SIGNIFICANT CHANGE UP (ref 7–23)
BUN SERPL-MCNC: 19 MG/DL — SIGNIFICANT CHANGE UP (ref 7–23)
CALCIUM SERPL-MCNC: 9 MG/DL — SIGNIFICANT CHANGE UP (ref 8.5–10.1)
CALCIUM SERPL-MCNC: 9.3 MG/DL — SIGNIFICANT CHANGE UP (ref 8.5–10.1)
CHLORIDE SERPL-SCNC: 100 MMOL/L — SIGNIFICANT CHANGE UP (ref 96–108)
CHLORIDE SERPL-SCNC: 92 MMOL/L — LOW (ref 96–108)
CO2 SERPL-SCNC: 30 MMOL/L — SIGNIFICANT CHANGE UP (ref 22–31)
CO2 SERPL-SCNC: 36 MMOL/L — HIGH (ref 22–31)
CREAT SERPL-MCNC: 0.62 MG/DL — SIGNIFICANT CHANGE UP (ref 0.5–1.3)
CREAT SERPL-MCNC: 0.65 MG/DL — SIGNIFICANT CHANGE UP (ref 0.5–1.3)
GLUCOSE SERPL-MCNC: 126 MG/DL — HIGH (ref 70–99)
GLUCOSE SERPL-MCNC: 97 MG/DL — SIGNIFICANT CHANGE UP (ref 70–99)
HCT VFR BLD CALC: 40.8 % — SIGNIFICANT CHANGE UP (ref 34.5–45)
HGB BLD-MCNC: 13.1 G/DL — SIGNIFICANT CHANGE UP (ref 11.5–15.5)
LACTATE SERPL-SCNC: 1.3 MMOL/L — SIGNIFICANT CHANGE UP (ref 0.7–2)
MCHC RBC-ENTMCNC: 27 PG — SIGNIFICANT CHANGE UP (ref 27–34)
MCHC RBC-ENTMCNC: 32.1 GM/DL — SIGNIFICANT CHANGE UP (ref 32–36)
MCV RBC AUTO: 84 FL — SIGNIFICANT CHANGE UP (ref 80–100)
PLATELET # BLD AUTO: 276 K/UL — SIGNIFICANT CHANGE UP (ref 150–400)
POTASSIUM SERPL-MCNC: 3.5 MMOL/L — SIGNIFICANT CHANGE UP (ref 3.5–5.3)
POTASSIUM SERPL-MCNC: 3.6 MMOL/L — SIGNIFICANT CHANGE UP (ref 3.5–5.3)
POTASSIUM SERPL-SCNC: 3.5 MMOL/L — SIGNIFICANT CHANGE UP (ref 3.5–5.3)
POTASSIUM SERPL-SCNC: 3.6 MMOL/L — SIGNIFICANT CHANGE UP (ref 3.5–5.3)
PROT SERPL-MCNC: 7 GM/DL — SIGNIFICANT CHANGE UP (ref 6–8.3)
RBC # BLD: 4.86 M/UL — SIGNIFICANT CHANGE UP (ref 3.8–5.2)
RBC # FLD: 15 % — HIGH (ref 10.3–14.5)
SODIUM SERPL-SCNC: 131 MMOL/L — LOW (ref 135–145)
SODIUM SERPL-SCNC: 139 MMOL/L — SIGNIFICANT CHANGE UP (ref 135–145)
WBC # BLD: 13.83 K/UL — HIGH (ref 3.8–10.5)
WBC # FLD AUTO: 13.83 K/UL — HIGH (ref 3.8–10.5)

## 2020-02-04 PROCEDURE — 74176 CT ABD & PELVIS W/O CONTRAST: CPT | Mod: 26

## 2020-02-04 PROCEDURE — 99232 SBSQ HOSP IP/OBS MODERATE 35: CPT

## 2020-02-04 PROCEDURE — 71045 X-RAY EXAM CHEST 1 VIEW: CPT | Mod: 26

## 2020-02-04 RX ORDER — KETOROLAC TROMETHAMINE 30 MG/ML
15 SYRINGE (ML) INJECTION ONCE
Refills: 0 | Status: DISCONTINUED | OUTPATIENT
Start: 2020-02-04 | End: 2020-02-04

## 2020-02-04 RX ORDER — POLYETHYLENE GLYCOL 3350 17 G/17G
17 POWDER, FOR SOLUTION ORAL
Refills: 0 | Status: DISCONTINUED | OUTPATIENT
Start: 2020-02-04 | End: 2020-02-05

## 2020-02-04 RX ORDER — MINERAL OIL
133 OIL (ML) MISCELLANEOUS ONCE
Refills: 0 | Status: COMPLETED | OUTPATIENT
Start: 2020-02-04 | End: 2020-02-04

## 2020-02-04 RX ADMIN — Medication 2.5 MILLIGRAM(S): at 05:35

## 2020-02-04 RX ADMIN — Medication 81 MILLIGRAM(S): at 12:33

## 2020-02-04 RX ADMIN — Medication 133 MILLILITER(S): at 18:00

## 2020-02-04 RX ADMIN — Medication 10 MILLIGRAM(S): at 16:00

## 2020-02-04 RX ADMIN — CARVEDILOL PHOSPHATE 3.12 MILLIGRAM(S): 80 CAPSULE, EXTENDED RELEASE ORAL at 19:06

## 2020-02-04 RX ADMIN — CARVEDILOL PHOSPHATE 3.12 MILLIGRAM(S): 80 CAPSULE, EXTENDED RELEASE ORAL at 05:35

## 2020-02-04 RX ADMIN — Medication 2.5 MILLIGRAM(S): at 19:06

## 2020-02-04 RX ADMIN — Medication 0.12 MILLIGRAM(S): at 05:36

## 2020-02-04 RX ADMIN — Medication 15 MILLIGRAM(S): at 23:04

## 2020-02-04 RX ADMIN — HEPARIN SODIUM 5000 UNIT(S): 5000 INJECTION INTRAVENOUS; SUBCUTANEOUS at 19:05

## 2020-02-04 RX ADMIN — Medication 15 MILLIGRAM(S): at 21:45

## 2020-02-04 RX ADMIN — SENNA PLUS 1 TABLET(S): 8.6 TABLET ORAL at 02:24

## 2020-02-04 RX ADMIN — HEPARIN SODIUM 5000 UNIT(S): 5000 INJECTION INTRAVENOUS; SUBCUTANEOUS at 05:36

## 2020-02-04 RX ADMIN — Medication 20 MILLIGRAM(S): at 05:36

## 2020-02-04 RX ADMIN — Medication 20 MILLIGRAM(S): at 18:00

## 2020-02-04 NOTE — PROVIDER CONTACT NOTE (CHANGE IN STATUS NOTIFICATION) - BACKGROUND
pt complaining of constipation - received fleet enema, dulcolax, senna and Miralax during day shift. OOB to commode with small BM / minimal relief

## 2020-02-04 NOTE — PROGRESS NOTE ADULT - ASSESSMENT
89F w/PMH afib (not on AC d/t fall risk), HTN,        #Acute hypoxic and hypercarbic respiratory Failure due to Acute on Chronic Systolic CHF exacerbation.    Overall improved with lasix/ BIPAP.    BP stable  C/w  IV lasix 20 BID.    started on Coreg   Low dose ACEI started, monito BP cosely  Cont dig for afib/ rate control.    ECHO:  New low EF 20%.    CXR/ CT chest with CHF and b/l effusions.    Cont NC o2 @3 L.    Initial hypercarbic respiratory failure as well as hypoxic wtih elevated pCO2 but now improved.    Cardiology f/u appreciated     #Afib with RVR:    Off Cardizem,  stopped with low EF and hypotension.    Cont dig/ coreg for rate control.    HR overall improved     No AC in the past due to fall risk.    No  JENIFFER/ Cardioversion as cannot tolerate AC.    Cardio f/u.      #  PNA ruled out   CT chest with CHF/ effusions. No PNA. off ABX.        #Positive trop:    Suspect more demand ischemia related to AFIB / CHF.    No evidence of acute MI.    ASA/ statin/ BB.    No ischemic work up recommended by cardio, c/w medical management     #DVT proph:  heparin SQ.      #Code Status:  DNR/ DNI      DISPO:  monitor BP, c/w tele 89F w/PMH afib (not on AC d/t fall risk), HTN,        #Acute hypoxic and hypercarbic respiratory Failure due to Acute on Chronic Systolic CHF exacerbation.    Overall improved with lasix/ BIPAP.    BP stable  C/w  IV lasix 20 BID.    started on Coreg   Low dose ACEI started, monito BP cosely  Cont dig for afib/ rate control.    ECHO:  New low EF 20%.    CXR/ CT chest with CHF and b/l effusions.    Cont NC o2 @3 L.    Initial hypercarbic respiratory failure as well as hypoxic wtih elevated pCO2 but now improved.    CXR reviewed with Dr Marcos, still elevated BNP, but clinically respiratory status better   Will check pulse ox on ambulation       #Afib with RVR:    Off Cardizem,  stopped with low EF and hypotension.    Cont dig/ coreg for rate control.    HR overall improved     No AC in the past due to fall risk.    No  JENIFFER/ Cardioversion as cannot tolerate AC.    Cardio f/u.      #  PNA ruled out   CT chest with CHF/ effusions. No PNA. off ABX.        #Positive trop:    Suspect more demand ischemia related to AFIB / CHF.    No evidence of acute MI.    ASA/ statin/ BB.    No ischemic work up recommended by cardio, c/w medical management       # Constipation  Started on bowel regiment  Will give enema as needed       #DVT proph:  heparin SQ.      #Code Status:  DNR/ DNI      DISPO:  monitor BP, may d/c tele.  Dc planning

## 2020-02-04 NOTE — PROGRESS NOTE ADULT - SUBJECTIVE AND OBJECTIVE BOX
SUBJECTIVE       PAST MEDICAL & SURGICAL HISTORY:  HLD (hyperlipidemia)  Glaucoma    OBJECTIVE   Vital Signs Last 24 Hrs  T(C): 36.2 (04 Feb 2020 05:34), Max: 36.7 (04 Feb 2020 00:09)  T(F): 97.1 (04 Feb 2020 05:34), Max: 98 (04 Feb 2020 00:09)  HR: 87 (04 Feb 2020 09:00) (81 - 108)  BP: 97/44 (04 Feb 2020 09:00) (89/58 - 150/67)  BP(mean): 54 (04 Feb 2020 09:00) (54 - 85)  RR: 18 (04 Feb 2020 09:00) (16 - 21)  SpO2: 99% (04 Feb 2020 09:00) (89% - 100%)    PHYSICAL EXAM:  Constitutional: , awake and alert, not in distress.  HEENT: Normo cephalic atraumatic  Neck: Soft and supple, No J.V.D   Respiratory: vesicular breathing , No wheezing, rales or rhonchi.   Cardiovascular: S1 and S2, regular rate .   Gastrointestinal:  soft, nontender,   Extremities: No  edema or calf tenderness .  Neurological: No new  focal deficits.    MEDICATIONS  (STANDING):  aspirin  chewable 81 milliGRAM(s) Oral daily  atorvastatin 10 milliGRAM(s) Oral at bedtime  carvedilol 3.125 milliGRAM(s) Oral every 12 hours  digoxin     Tablet 0.125 milliGRAM(s) Oral daily  enalapril 2.5 milliGRAM(s) Oral every 12 hours  furosemide   Injectable 20 milliGRAM(s) IV Push two times a day  heparin  Injectable 5000 Unit(s) SubCutaneous two times a day  simvastatin 10 milliGRAM(s) Oral at bedtime        02-04    139  |  100  |  19  ----------------------------<  97  3.6   |  36<H>  |  0.62    Ca    9.0      04 Feb 2020 07:02 SUBJECTIVE      patient was seen in the morning breathing comfortably on the oxygen by the nasal cannula with no fever   no wheezing says  she is constipated has difficulty in moving the bowels     PAST MEDICAL & SURGICAL HISTORY:  HLD (hyperlipidemia)  Glaucoma    OBJECTIVE   Vital Signs Last 24 Hrs  T(C): 36.2 (04 Feb 2020 05:34), Max: 36.7 (04 Feb 2020 00:09)  T(F): 97.1 (04 Feb 2020 05:34), Max: 98 (04 Feb 2020 00:09)  HR: 87 (04 Feb 2020 09:00) (81 - 108)  BP: 97/44 (04 Feb 2020 09:00) (89/58 - 150/67)  BP(mean): 54 (04 Feb 2020 09:00) (54 - 85)  RR: 18 (04 Feb 2020 09:00) (16 - 21)  SpO2: 99% (04 Feb 2020 09:00) (89% - 100%)    PHYSICAL EXAM:  Constitutional: , awake and alert, not in distress  on a nasal cannula  HEENT: Normo cephalic atraumatic  Neck: Soft and supple, No J.V.D   Respiratory: vesicular breathing ,  has decreased breath sounds in the left basis  Cardiovascular: S1 and S2, iregular rate .   Gastrointestinal:  soft, nontender,   Extremities: No  edema or calf tenderness .  Neurological: No new  focal deficits.    MEDICATIONS  (STANDING):  aspirin  chewable 81 milliGRAM(s) Oral daily  atorvastatin 10 milliGRAM(s) Oral at bedtime  carvedilol 3.125 milliGRAM(s) Oral every 12 hours  digoxin     Tablet 0.125 milliGRAM(s) Oral daily  enalapril 2.5 milliGRAM(s) Oral every 12 hours  furosemide   Injectable 20 milliGRAM(s) IV Push two times a day  heparin  Injectable 5000 Unit(s) SubCutaneous two times a day  simvastatin 10 milliGRAM(s) Oral at bedtime        02-04    139  |  100  |  19  ----------------------------<  97  3.6   |  36<H>  |  0.62    Ca    9.0      04 Feb 2020 07:02

## 2020-02-04 NOTE — PROGRESS NOTE ADULT - ASSESSMENT
patient with CHF and severe segmental LV dysfucntion, now on coreg, digoxin and lasix with BP in the 100's  1-CHF- systolic - tolerating low dose enalapril and coreg=- will monitor closely - continue lasix as tolerated  2-CAD-on coreg.   on ASA 81mg and statin   3-AFib-no AC due to fall risk- coreg and dig aspirin    Discussed with zafar- she doesnt want any further procedures at this time (stress test, angiogram etc)- she just wants medications to help "feel better"     recommend daily weights and PT     DC planning

## 2020-02-04 NOTE — CHART NOTE - NSCHARTNOTEFT_GEN_A_CORE
Called by RN to evaluate pt with c/o abdominal pain and constipation. Pt had multiple bowel regimen today without relief. Unknown when was pt last BM. Patient seen and examined at bedside with c/o lower abdominal pain that started earlier today. Pain is described as sharp with severity of 9/10, non-radiating. Nothing makes pain better or worse. Denies fever, chills, vomiting, diarrhea or rectal bleed.     Vital Signs  T(F): 97.4 (04 Feb 2020 19:32), Max: 98 (04 Feb 2020 00:09)  HR: 116 (04 Feb 2020 17:00) (81 - 116)  BP: 114/91 (04 Feb 2020 17:00) (97/42 - 120/67)  BP(mean): 105 (04 Feb 2020 17:00) (54 - 105)  RR: 98 (04 Feb 2020 17:00) (16 - 98)  SpO2: 99% (04 Feb 2020 09:00) (97% - 99%)    Gen: alert and oriented x 3  Cardiac: S1 s2 regular  Lungs: good air b/l  Abd: + hyperactive BS, firm, + lower quadrant tenderness    Abdominal pain and constipation   -Will obtain STAT CT of abd and pelvis r/o obstruction   -If CT is negative, will give Gas X and Miralax for constipation   -CBC, CMP, lactate  -Toradol for pain Called by RN to evaluate pt with c/o abdominal pain and constipation. Pt had multiple bowel regimen today without relief. Unknown when was pt last BM. Patient seen and examined at bedside with c/o lower abdominal pain that started earlier today. Pain is described as sharp with severity of 9/10, non-radiating. Nothing makes pain better or worse. Denies fever, chills, vomiting, diarrhea or rectal bleed.     Vital Signs  T(F): 97.4 (04 Feb 2020 19:32), Max: 98 (04 Feb 2020 00:09)  HR: 116 (04 Feb 2020 17:00) (81 - 116)  BP: 114/91 (04 Feb 2020 17:00) (97/42 - 120/67)  BP(mean): 105 (04 Feb 2020 17:00) (54 - 105)  RR: 98 (04 Feb 2020 17:00) (16 - 98)  SpO2: 99% (04 Feb 2020 09:00) (97% - 99%)    Gen: alert and oriented x 3  Cardiac: S1 s2 regular  Lungs: good air b/l  Abd: + hyperactive BS, firm, + lower quadrant tenderness    Abdominal pain and constipation   -Will obtain STAT CT of abd and pelvis r/o obstruction   -If CT is negative, will give Gas X and Miralax for constipation   -CBC, CMP, lactate  -Toradol for pain  -Pt has a small BM and abdominal pain improved

## 2020-02-04 NOTE — PROGRESS NOTE ADULT - SUBJECTIVE AND OBJECTIVE BOX
CHIEF COMPLAINT: Patient is a 89y old  Female who presents with a chief complaint of sob (01 Feb 2020 18:21)      HPI:  HPI:  89F w/PMH afib (not on AC d/t fall risk), HTN, presents c/o worsening sob over the past 1day, worse this AM.  Pt is AAOX3 and offers her own history and family at bedside graciously assisting w/ further history.  Earlier in the week, pt was not feeling well, having some weakness.  Yesterday, per family, her visiting NP stated "shes stable" but to consider further medical eval.  Per pt she was having some intermittent "fluttering" in her chest yesterday, but no pain.  At about 0600 today, she was severely sob and brought into ED when she was found to be hypoxic w/ sats in low 80's.  Pt started on BIPAP, cxr revealed b/l pulm edema, given lasix IV.  She was also in Afib rvr in 140's and given dilt 10mg IV x 1.  At the time of my exam, pt felt much better, pleasant and able to complete sentences, -120's.    GO d/w pt along w/ Dr Moreland, ED MD- he explained to her regarding intubation and she verbalized understanding and stated she would NOT want that.  He further asked her wishes if her heart were to stop  and she endorsed "just let me go, don't do anything".  When family arrived I d/w them her wishes and reiterated the same responses when I asked her.  MOLST form signed in family's presence.  pt is DNR/DNI.    In ED, as above, Trop neg, cbc/cmp wnl. rvp neg.     02/01-patient brought to  for SOB/hypoxia, CT with fluid overload vs possible pneumonia.  No WBC elevation, no fever and BNP in the 6000's.  ECHO done today with EF severe segmental wall motion abnormality and EF 20-25%.   Now down to 3lNC with O2 st 98%, but due to low BP-no lasix today.  Not on AC for significant fall risk.  CHF-severe segmental wall motion abnl; will stop cardizem and start coreg.  Currently on digoxin-no load and normal renal function.  Will try low dose lasix 20mg daily    02/02-patient with severe CM most probably ischemic; d/w family poor prognosis and they were asking if patient could go home.  Patient needs more tuning up.  DNR/DNI precludes any defibrillator or aggressive intervention(as does her age and physical status)  Tolerating medications right now.  O2 sat 93% this morning and BP.  Tolerating lasix coreg and digoxin right now with 's this am.  Patient in AFib with rates 100.  Not on Entresto, ARB or ACE.      2/3 improved hemodynamics today-     2/4 heart rate improved      PAST MEDICAL & SURGICAL HISTORY:  HLD (hyperlipidemia)  Glaucoma  HTN  Afib  Fall recurrent  s/p Rt periprosthetic hip fracture ORIF (8/19)    REVIEW OF SYSTEMS:    CONSTITUTIONAL: No weakness, fevers or chills  EYES/ENT: No visual changes;  No vertigo or throat pain   NECK: No pain or stiffness  RESPIRATORY: No cough, wheezing, hemoptysis; No shortness of breath  CARDIOVASCULAR: No chest pain or palpitations  GASTROINTESTINAL: No abdominal or epigastric pain. No nausea, vomiting, or hematemesis; No diarrhea or constipation. No melena or hematochezia.  GENITOURINARY: No dysuria, frequency or hematuria  NEUROLOGICAL: No numbness or weakness  SKIN: No itching, burning, rashes, or lesions   All other review of systems is negative unless indicated above    ICU Vital Signs Last 24 Hrs  T(C): 36.2 (04 Feb 2020 05:34), Max: 36.7 (04 Feb 2020 00:09)  T(F): 97.1 (04 Feb 2020 05:34), Max: 98 (04 Feb 2020 00:09)  HR: 88 (04 Feb 2020 06:00) (81 - 108)  BP: 108/70 (04 Feb 2020 06:00) (89/58 - 150/67)  BP(mean): 76 (04 Feb 2020 06:00) (57 - 85)  ABP: --  ABP(mean): --  RR: 17 (04 Feb 2020 06:00) (16 - 21)  SpO2: 99% (04 Feb 2020 06:00) (89% - 100%)            PHYSICAL EXAM:   Constitutional: NAD, awake and alert, well-developed  HEENT: PERR, EOMI, Normal Hearing, MMM  Neck: JVD  Respiratory: Breath sounds are clear bilaterally, No wheezing, rales or rhonchi  Cardiovascular: S1 and S2, irregular rate and rhythm, Murmurs,   Gastrointestinal: Bowel Sounds present, soft, nontender, nondistended, no guarding, no rebound  Extremities: No peripheral edema  Vascular: 2+ peripheral pulses  Neurological: A/O x 3, no focal deficits      MEDICATIONS  (STANDING):  aspirin  chewable 81 milliGRAM(s) Oral daily  atorvastatin 10 milliGRAM(s) Oral at bedtime  carvedilol 3.125 milliGRAM(s) Oral every 12 hours  digoxin     Tablet 0.125 milliGRAM(s) Oral daily  enalapril 2.5 milliGRAM(s) Oral every 12 hours  furosemide   Injectable 20 milliGRAM(s) IV Push two times a day  heparin  Injectable 5000 Unit(s) SubCutaneous two times a day  simvastatin 10 milliGRAM(s) Oral at bedtime        LABS: All Labs Reviewed:                  02-02    137  |  105  |  21  ----------------------------<  84  3.7   |  26  |  0.74    Ca    8.4<L>      02 Feb 2020 08:16        CARDIAC MARKERS ( 01 Feb 2020 06:48 )  0.108 ng/mL / x     / x     / x     / x      CARDIAC MARKERS ( 31 Jan 2020 10:20 )  0.079 ng/mL / x     / x     / x     / x          BNP Serum Pro-Brain Natriuretic Peptide: 6280 pg/mL (01-31-20 @ 06:57)        EKG:  < from: 12 Lead ECG (01.31.20 @ 06:55) >    Diagnosis Line Atrial fibrillation with rapid ventricular response with premature ventricular or aberrantly conducted complexes  Left axis deviation  Inferior infarct (cited on or before 11-AUG-2019)  Anteroseptal infarct (cited on or before 10-NOV-2018)  ST & T wave abnormality, consider lateral ischemia  Abnormal ECG    < end of copied text >      Telemetry: AF HR      ECHO: < from: Transthoracic Echocardiogram (02.01.20 @ 09:13) >     Summary     Mild to moderate mitral annular calcification is present.   Fibrocalcific changes noted to the mitral valve leaflets with preserved   leaflet excursion.   Moderate to severe (3+) mitral regurgitation is present.   Fibrocalcific changes noted to the Aortic valve leaflets with preserved   leaflet excursion.   Mild (1+) aortic regurgitation is present.   The tricuspid valve leaflets are thin and pliable; valve motion is normal.   Mild to moderate tricuspid valve regurgitation is present.   Mild pulmonary hypertension.   Pulmonic valve not well seen.   Mild pulmonic valvular regurgitation (1+) is present.   The left atrium is moderately dilated.   The interventricular septum is hypokinetic.   Estimated left ventricular ejection fraction is 35-40 %.   The right atrium is at the upper limits of normal.   Normal appearing right ventricle structure and function.   The IVC appears normal.   Pleural effusion - is present..     Signature     ----------------------------------------------------------------   Electronically signed by Edna Watson MD(Interpreting   physician) on 02/03/2020 10:18 AM   ----------------------------------------------------------------    < end of copied text >

## 2020-02-04 NOTE — PROGRESS NOTE ADULT - SUBJECTIVE AND OBJECTIVE BOX
CC: sob (03 Feb 2020 09:40)    HPI:  HPI:  89F w/PMH afib (not on AC d/t fall risk), HTN, presents c/o worsening sob over the past 1day, worse this AM.  Pt is AAOX3 and offers her own history and family at bedside graciously assisting w/ further history.  Earlier in the week, pt was not feeling well, having some weakness.  Yesterday, per family, her visiting NP stated "shes stable" but to consider further medical eval.  Per pt she was having some intermittent "fluttering" in her chest yesterday, but no pain.  At about 0600 today, she was severely sob and brought into ED when she was found to be hypoxic w/ sats in low 80's.  Pt started on BIPAP, cxr revealed b/l pulm edema, given lasix IV.  She was also in Afib rvr in 140's and given dilt 10mg IV x 1.  At the time of my exam, pt felt much better, pleasant and able to complete sentences, -120's.      INTERVAL HPI/ OVERNIGHT EVENTS: Chart reviewed, Pt was seen and examined, looks comfortable on NC, reports SOB improving.  No fevers, no CP.  Daughter at bedside. Results and POC discussed     Vital Signs Last 24 Hrs  T(C): 36.2 (04 Feb 2020 05:34), Max: 36.7 (04 Feb 2020 00:09)  T(F): 97.1 (04 Feb 2020 05:34), Max: 98 (04 Feb 2020 00:09)  HR: 87 (04 Feb 2020 09:00) (81 - 108)  BP: 97/44 (04 Feb 2020 09:00) (89/58 - 150/67)  BP(mean): 54 (04 Feb 2020 09:00) (54 - 85)  RR: 18 (04 Feb 2020 09:00) (16 - 21)  SpO2: 99% (04 Feb 2020 09:00) (97% - 99%)      REVIEW OF SYSTEMS:  All other review of systems is negative unless indicated above.        PHYSICAL EXAM:  General: Well developed; malnourished;  in no acute distress  Eyes: PERRLA, EOMI; conjunctiva and sclera clear  Head: Normocephalic; atraumatic  ENMT: No nasal discharge; airway clear  Neck: Supple;  no masses  Respiratory: Decreased BS at bases. No wheezes, rales or rhonchi  Cardiovascular: Irregular rate and rhythm. + S1 and S2 Normal;   Gastrointestinal: Soft non-tender non-distended; Normal bowel sounds  Genitourinary: No  suprapubic  tenderness  Extremities: +  edema  Vascular: Peripheral pulses palpable 2+ bilaterally  Neurological: Alert and oriented x2, non focal   Skin: Warm and dry. No acute rash   Lymph Nodes: No acute cervical adenopathy  Musculoskeletal: Normal muscle tone, no joint effusion or erythema   Psychiatric: Cooperative       LABS:   03 Feb 2020 07:08    138    |  104    |  21     ----------------------------<  92     3.8     |  30     |  0.61     Ca    8.7        03 Feb 2020 07:08        MEDICATIONS  (STANDING):  aspirin  chewable 81 milliGRAM(s) Oral daily  atorvastatin 10 milliGRAM(s) Oral at bedtime  carvedilol 3.125 milliGRAM(s) Oral every 12 hours  digoxin     Tablet 0.125 milliGRAM(s) Oral daily  enalapril 2.5 milliGRAM(s) Oral every 12 hours  furosemide   Injectable 20 milliGRAM(s) IV Push two times a day  heparin  Injectable 5000 Unit(s) SubCutaneous two times a day  polyethylene glycol 3350 17 Gram(s) Oral two times a day  simvastatin 10 milliGRAM(s) Oral at bedtime    MEDICATIONS  (PRN):  ondansetron Injectable 4 milliGRAM(s) IV Push every 6 hours PRN Nausea  senna 1 Tablet(s) Oral at bedtime PRN Constipation  senna 1 Tablet(s) Oral at bedtime PRN Constipation      RADIOLOGY & ADDITIONAL TESTS:    EXAM:  CT CHEST                        PROCEDURE DATE:  02/01/2020    FINDINGS:    LUNGS, AIRWAYS: The central airways are patent. pulmonary edema.    PLEURA: MOderate effusions.    VESSELS: Aortic atherosclerosis without aneurysm.    HEART: Big heart size. No pericardial effusion. Coronary and mitral annular calcification.    MEDIASTINUM AND SAUMYA: No adenopathy.    UPPER ABDOMEN: Limited visualization is unremarkable.    BONES AND SOFT TISSUES: No acute bony abnormality.    IMPRESSION:   Pulmonary edema and moderate effusions. CC: sob (03 Feb 2020 09:40)    HPI:  HPI:  89F w/PMH afib (not on AC d/t fall risk), HTN, presents c/o worsening sob over the past 1day, worse this AM.  Pt is AAOX3 and offers her own history and family at bedside graciously assisting w/ further history.  Earlier in the week, pt was not feeling well, having some weakness.  Yesterday, per family, her visiting NP stated "shes stable" but to consider further medical eval.  Per pt she was having some intermittent "fluttering" in her chest yesterday, but no pain.  At about 0600 today, she was severely sob and brought into ED when she was found to be hypoxic w/ sats in low 80's.  Pt started on BIPAP, cxr revealed b/l pulm edema, given lasix IV.  She was also in Afib rvr in 140's and given dilt 10mg IV x 1.  At the time of my exam, pt felt much better, pleasant and able to complete sentences, -120's.      INTERVAL HPI/ OVERNIGHT EVENTS:  Pt was seen and examined, OOB  to commode, report sdid ok with transfer, no SOB, but c/o constipation       Vital Signs Last 24 Hrs  T(C): 36.2 (04 Feb 2020 05:34), Max: 36.7 (04 Feb 2020 00:09)  T(F): 97.1 (04 Feb 2020 05:34), Max: 98 (04 Feb 2020 00:09)  HR: 87 (04 Feb 2020 09:00) (81 - 108)  BP: 97/44 (04 Feb 2020 09:00) (89/58 - 150/67)  BP(mean): 54 (04 Feb 2020 09:00) (54 - 85)  RR: 18 (04 Feb 2020 09:00) (16 - 21)  SpO2: 99% (04 Feb 2020 09:00) (97% - 99%)      REVIEW OF SYSTEMS:  All other review of systems is negative unless indicated above.        PHYSICAL EXAM:  General: Well developed; malnourished;  in no acute distress  Eyes: PERRLA, EOMI; conjunctiva and sclera clear  Head: Normocephalic; atraumatic  ENMT: No nasal discharge; airway clear  Neck: Supple;  no masses  Respiratory: Decreased BS at bases. No wheezes, rales or rhonchi  Cardiovascular: Irregular rate and rhythm. + S1 and S2 Normal;   Gastrointestinal: Soft non-tender non-distended; Normal bowel sounds  Genitourinary: No  suprapubic  tenderness  Extremities: +  edema  Vascular: Peripheral pulses palpable 2+ bilaterally  Neurological: Alert and oriented x2, non focal   Skin: Warm and dry. No acute rash   Lymph Nodes: No acute cervical adenopathy  Musculoskeletal: Normal muscle tone, no joint effusion or erythema   Psychiatric: Cooperative       LABS:     02-04    139  |  100  |  19  ----------------------------<  97  3.6   |  36<H>  |  0.62    Ca    9.0      04 Feb 2020 07:02        03 Feb 2020 07:08    138    |  104    |  21     ----------------------------<  92     3.8     |  30     |  0.61     Ca    8.7        03 Feb 2020 07:08      MEDICATIONS  (STANDING):  aspirin  chewable 81 milliGRAM(s) Oral daily  atorvastatin 10 milliGRAM(s) Oral at bedtime  carvedilol 3.125 milliGRAM(s) Oral every 12 hours  digoxin     Tablet 0.125 milliGRAM(s) Oral daily  enalapril 2.5 milliGRAM(s) Oral every 12 hours  furosemide   Injectable 20 milliGRAM(s) IV Push two times a day  heparin  Injectable 5000 Unit(s) SubCutaneous two times a day  polyethylene glycol 3350 17 Gram(s) Oral two times a day  simvastatin 10 milliGRAM(s) Oral at bedtime    MEDICATIONS  (PRN):  ondansetron Injectable 4 milliGRAM(s) IV Push every 6 hours PRN Nausea  senna 1 Tablet(s) Oral at bedtime PRN Constipation  senna 1 Tablet(s) Oral at bedtime PRN Constipation      RADIOLOGY & ADDITIONAL TESTS:    EXAM:  CT CHEST                        PROCEDURE DATE:  02/01/2020    FINDINGS:    LUNGS, AIRWAYS: The central airways are patent. pulmonary edema.    PLEURA: MOderate effusions.    VESSELS: Aortic atherosclerosis without aneurysm.    HEART: Big heart size. No pericardial effusion. Coronary and mitral annular calcification.    MEDIASTINUM AND SAUMYA: No adenopathy.    UPPER ABDOMEN: Limited visualization is unremarkable.    BONES AND SOFT TISSUES: No acute bony abnormality.    IMPRESSION:   Pulmonary edema and moderate effusions.

## 2020-02-05 PROCEDURE — 99223 1ST HOSP IP/OBS HIGH 75: CPT

## 2020-02-05 PROCEDURE — 99233 SBSQ HOSP IP/OBS HIGH 50: CPT

## 2020-02-05 RX ORDER — SENNA PLUS 8.6 MG/1
2 TABLET ORAL AT BEDTIME
Refills: 0 | Status: DISCONTINUED | OUTPATIENT
Start: 2020-02-05 | End: 2020-02-07

## 2020-02-05 RX ORDER — LACTULOSE 10 G/15ML
10 SOLUTION ORAL
Refills: 0 | Status: DISCONTINUED | OUTPATIENT
Start: 2020-02-05 | End: 2020-02-07

## 2020-02-05 RX ADMIN — Medication 81 MILLIGRAM(S): at 11:51

## 2020-02-05 RX ADMIN — HEPARIN SODIUM 5000 UNIT(S): 5000 INJECTION INTRAVENOUS; SUBCUTANEOUS at 06:50

## 2020-02-05 RX ADMIN — CARVEDILOL PHOSPHATE 3.12 MILLIGRAM(S): 80 CAPSULE, EXTENDED RELEASE ORAL at 06:50

## 2020-02-05 RX ADMIN — Medication 20 MILLIGRAM(S): at 06:50

## 2020-02-05 RX ADMIN — SENNA PLUS 2 TABLET(S): 8.6 TABLET ORAL at 22:31

## 2020-02-05 RX ADMIN — HEPARIN SODIUM 5000 UNIT(S): 5000 INJECTION INTRAVENOUS; SUBCUTANEOUS at 18:42

## 2020-02-05 RX ADMIN — Medication 2.5 MILLIGRAM(S): at 06:50

## 2020-02-05 RX ADMIN — ATORVASTATIN CALCIUM 10 MILLIGRAM(S): 80 TABLET, FILM COATED ORAL at 22:31

## 2020-02-05 RX ADMIN — LACTULOSE 10 GRAM(S): 10 SOLUTION ORAL at 18:41

## 2020-02-05 RX ADMIN — Medication 0.12 MILLIGRAM(S): at 06:50

## 2020-02-05 NOTE — PROGRESS NOTE ADULT - SUBJECTIVE AND OBJECTIVE BOX
SUBJECTIVE     Patient breathing comfortably with out sob   ct abdomen findings noted   has constipation and received enema   no fever spikes has mildly elevated WBC   ct abdomen shows only compressive atelectasis with the effusions small to moderate     PAST MEDICAL & SURGICAL HISTORY:  HLD (hyperlipidemia)  Glaucoma  chf and afib     OBJECTIVE   Vital Signs Last 24 Hrs  T(C): 36.3 (05 Feb 2020 04:30), Max: 36.7 (04 Feb 2020 17:00)  T(F): 97.4 (05 Feb 2020 04:30), Max: 98 (04 Feb 2020 17:00)  HR: 95 (05 Feb 2020 06:27) (87 - 116)  BP: 116/50 (05 Feb 2020 06:27) (97/44 - 160/70)  BP(mean): 66 (05 Feb 2020 06:27) (54 - 105)  RR: 95 (05 Feb 2020 02:25) (16 - 98)  SpO2: 99% (05 Feb 2020 06:27) (99% - 99%)    PHYSICAL EXAM:  Constitutional: , awake and alert, not in distress on the room air   HEENT: Normo cephalic atraumatic  Neck: Soft and supple, No J.V.D   Respiratory: vesicular breathing , has decreased breath sounds in the bases   Cardiovascular: S1 and S2, regular rate .   Gastrointestinal:  soft, nontender,   Extremities: No  edema or calf tenderness .  Neurological: No new  focal deficits.    MEDICATIONS  (STANDING):  aspirin  chewable 81 milliGRAM(s) Oral daily  atorvastatin 10 milliGRAM(s) Oral at bedtime  carvedilol 3.125 milliGRAM(s) Oral every 12 hours  digoxin     Tablet 0.125 milliGRAM(s) Oral daily  enalapril 2.5 milliGRAM(s) Oral every 12 hours  furosemide   Injectable 20 milliGRAM(s) IV Push two times a day  heparin  Injectable 5000 Unit(s) SubCutaneous two times a day  polyethylene glycol 3350 17 Gram(s) Oral two times a day  simvastatin 10 milliGRAM(s) Oral at bedtime                            13.1   13.83 )-----------( 276      ( 04 Feb 2020 21:34 )             40.8     02-04    131<L>  |  92<L>  |  19  ----------------------------<  126<H>  3.5   |  30  |  0.65    Ca    9.3      04 Feb 2020 21:34    TPro  7.0  /  Alb  3.1<L>  /  TBili  0.5  /  DBili  x   /  AST  24  /  ALT  15  /  AlkPhos  68  02-04       < from: CT Abdomen and Pelvis No Cont (02.04.20 @ 22:09) >  ECHNIQUE:   Imaging protocol: Computed tomography of the abdomen and pelvis without   contrast.     COMPARISON:   CT ABDOMEN AND PELVIS WITH ORAL CONTRAST WITH IV CONTRAST 10/15/2017 6:22 PM     FINDINGS:   Pleural space: Small to moderate bilateral pleural effusions. Moderate patchy   bibasilar airspace disease and/or atelectasis.   Heart: Moderate cardiomegaly.     Liver: Punctate calcifications at the liver consistent with remote   granulomatous organism exposure.   Gallbladder and bile ducts: Normal. No calcified stones. No ductal dilation.   Pancreas: Normal. No ductal dilation.   Spleen: Normal. No splenomegaly.   Adrenals: Normal. No mass.   Kidneys and ureters: Focal right renal hypodensity that cannot be further   characterized on the current examination. Mild bilateral hydronephrosis. No   ureteral stones.   Stomach and bowel: Diffuse wall thickening of the stomach suggestive of a   nonspecific gastritis, versus artifact related to underdistention. Correlate   clinically. Above average stool throughout the colon, with the rectosigmoid   distended up to 8.2 cm. Associated wall thickening and surrounding edematous   change at the rectosigmoid, and stercoral colitis is a consideration. Correlate   clinically. Colonic diverticula present.   Appendix: No evidence of appendicitis.   Intraperitoneal space: Unremarkable. No free air. No significant fluid   collection.   Vasculature: Coronary artery calcifications noted. Aorta demonstrates moderate   atherosclerotic calcification.   Lymph nodes: Unremarkable. Noenlarged lymph nodes.     Bladder: Unremarkable as visualized.   Reproductive: Unremarkable as visualized.   Bones/joints: Old L1 compression fracture. Previous right hip replacement. L3   compression fracture which appears nonacute.   Soft tissues: Left inguinal hernia containing nonobstructed bowel. No evidence     < end of copied text >

## 2020-02-05 NOTE — DIETITIAN INITIAL EVALUATION ADULT. - PERTINENT MEDS FT
MEDICATIONS  (STANDING):  aspirin  chewable 81 milliGRAM(s) Oral daily  atorvastatin 10 milliGRAM(s) Oral at bedtime  carvedilol 3.125 milliGRAM(s) Oral every 12 hours  digoxin     Tablet 0.125 milliGRAM(s) Oral daily  enalapril 2.5 milliGRAM(s) Oral every 12 hours  furosemide   Injectable 20 milliGRAM(s) IV Push two times a day  heparin  Injectable 5000 Unit(s) SubCutaneous two times a day  lactulose Syrup 10 Gram(s) Oral two times a day  senna 2 Tablet(s) Oral at bedtime  simvastatin 10 milliGRAM(s) Oral at bedtime    MEDICATIONS  (PRN):  ondansetron Injectable 4 milliGRAM(s) IV Push every 6 hours PRN Nausea

## 2020-02-05 NOTE — DIETITIAN INITIAL EVALUATION ADULT. - PERTINENT LABORATORY DATA
02-04 Na131 mmol/L<L> Glu 126 mg/dL<H> K+ 3.5 mmol/L Cr  0.65 mg/dL BUN 19 mg/dL Phos n/a   Alb 3.1 g/dL<L> PAB n/a

## 2020-02-05 NOTE — DIETITIAN INITIAL EVALUATION ADULT. - OTHER INFO
89yo female with PMH significant for HTN, Afib p/w worsening SOB.  Pt admitted with dyspnea, acute on chronic resp failure 2/2 CHF.  Pt with constipation on 2/4, now s/p BM without abd pain.  CT showed possible stercoral proctitis.  GI eval'd and rec'd starting lactulose.    Wt Hx per EMR:  124.1# (8/12/19)

## 2020-02-05 NOTE — CHART NOTE - NSCHARTNOTEFT_GEN_A_CORE
Upon Nutritional Assessment by the Registered Dietitian your patient was determined to meet criteria / has evidence of the following diagnosis/diagnoses:          [ ]  Mild Protein Calorie Malnutrition        [x]  Moderate Protein Calorie Malnutrition        [ ] Severe Protein Calorie Malnutrition        [ ] Unspecified Protein Calorie Malnutrition        [ ] Underweight / BMI <19        [ ] Morbid Obesity / BMI > 40      Findings:  moderate malnutrition in acute on chronic illness r/t decreased appetite 2/2 SOB and constipation AEB meeting <75% of ENN x 6 days and severe muscle/mod fat wasting.    Findings as based on:  •  Comprehensive nutrition assessment and consultation  •  Calorie counts (nutrient intake analysis)  •  Food acceptance and intake status from observations by staff  •  Follow up  •  Patient education  •  Intervention secondary to interdisciplinary rounds  •   concerns      Treatment:    The following diet has been recommended:  1) add gelatein BID to optimize PO intake   2) add MVI with minerals daily to ensure 100% RDI met   3) daily wt checks   4) monitor PO intake/tolerance    PROVIDER Section:     By signing this assessment you are acknowledging and agree with the diagnosis/diagnoses assigned by the Registered Dietitian    Comments:

## 2020-02-05 NOTE — PROGRESS NOTE ADULT - SUBJECTIVE AND OBJECTIVE BOX
CHIEF COMPLAINT: Patient is a 89y old  Female who presents with a chief complaint of sob (01 Feb 2020 18:21)      HPI:  HPI:  89F w/PMH afib (not on AC d/t fall risk), HTN, presents c/o worsening sob over the past 1day, worse this AM.  Pt is AAOX3 and offers her own history and family at bedside graciously assisting w/ further history.  Earlier in the week, pt was not feeling well, having some weakness.  Yesterday, per family, her visiting NP stated "shes stable" but to consider further medical eval.  Per pt she was having some intermittent "fluttering" in her chest yesterday, but no pain.  At about 0600 today, she was severely sob and brought into ED when she was found to be hypoxic w/ sats in low 80's.  Pt started on BIPAP, cxr revealed b/l pulm edema, given lasix IV.  She was also in Afib rvr in 140's and given dilt 10mg IV x 1.  At the time of my exam, pt felt much better, pleasant and able to complete sentences, -120's.    GO d/w pt along w/ Dr Moreland, ED MD- he explained to her regarding intubation and she verbalized understanding and stated she would NOT want that.  He further asked her wishes if her heart were to stop  and she endorsed "just let me go, don't do anything".  When family arrived I d/w them her wishes and reiterated the same responses when I asked her.  MOLST form signed in family's presence.  pt is DNR/DNI.    In ED, as above, Trop neg, cbc/cmp wnl. rvp neg.     02/01-patient brought to  for SOB/hypoxia, CT with fluid overload vs possible pneumonia.  No WBC elevation, no fever and BNP in the 6000's.  ECHO done today with EF severe segmental wall motion abnormality and EF 20-25%.   Now down to 3lNC with O2 st 98%, but due to low BP-no lasix today.  Not on AC for significant fall risk.  CHF-severe segmental wall motion abnl; will stop cardizem and start coreg.  Currently on digoxin-no load and normal renal function.  Will try low dose lasix 20mg daily    02/02-patient with severe CM most probably ischemic; d/w family poor prognosis and they were asking if patient could go home.  Patient needs more tuning up.  DNR/DNI precludes any defibrillator or aggressive intervention(as does her age and physical status)  Tolerating medications right now.  O2 sat 93% this morning and BP.  Tolerating lasix coreg and digoxin right now with 's this am.  Patient in AFib with rates 100.  Not on Entresto, ARB or ACE.      2/3 improved hemodynamics today-     2/4 heart rate improved    2/5 HR controlled, pulse ox [id number]      PAST MEDICAL & SURGICAL HISTORY:  HLD (hyperlipidemia)  Glaucoma  HTN  Afib  Fall recurrent  s/p Rt periprosthetic hip fracture ORIF (8/19)    REVIEW OF SYSTEMS:    CONSTITUTIONAL: No weakness, fevers or chills  EYES/ENT: No visual changes;  No vertigo or throat pain   NECK: No pain or stiffness  RESPIRATORY: No cough, wheezing, hemoptysis; No shortness of breath  CARDIOVASCULAR: No chest pain or palpitations  GASTROINTESTINAL: No abdominal or epigastric pain. No nausea, vomiting, or hematemesis; No diarrhea or constipation. No melena or hematochezia.  GENITOURINARY: No dysuria, frequency or hematuria  NEUROLOGICAL: No numbness or weakness  SKIN: No itching, burning, rashes, or lesions   All other review of systems is negative unless indicated above    ICU Vital Signs Last 24 Hrs  T(C): 36.3 (05 Feb 2020 04:30), Max: 36.7 (04 Feb 2020 17:00)  T(F): 97.4 (05 Feb 2020 04:30), Max: 98 (04 Feb 2020 17:00)  HR: 103 (05 Feb 2020 02:25) (82 - 116)  BP: 160/70 (05 Feb 2020 02:25) (97/44 - 160/70)  BP(mean): 82 (05 Feb 2020 02:25) (54 - 105)  ABP: --  ABP(mean): --  RR: 95 (05 Feb 2020 02:25) (16 - 98)  SpO2: 99% (04 Feb 2020 09:00) (98% - 99%)            PHYSICAL EXAM:   Constitutional: NAD, awake and alert, well-developed  HEENT: PERR, EOMI, Normal Hearing, MMM  Neck: JVD  Respiratory: rales  Cardiovascular: S1 and S2, irregular rate and rhythm, Murmurs,   Gastrointestinal: Bowel Sounds present, soft, nontender, nondistended, no guarding, no rebound  Extremities: No peripheral edema  Vascular: 2+ peripheral pulses  Neurological: A/O x 3, no focal deficits    MEDICATIONS  (STANDING):  aspirin  chewable 81 milliGRAM(s) Oral daily  atorvastatin 10 milliGRAM(s) Oral at bedtime  carvedilol 3.125 milliGRAM(s) Oral every 12 hours  digoxin     Tablet 0.125 milliGRAM(s) Oral daily  enalapril 2.5 milliGRAM(s) Oral every 12 hours  furosemide   Injectable 20 milliGRAM(s) IV Push two times a day  heparin  Injectable 5000 Unit(s) SubCutaneous two times a day  polyethylene glycol 3350 17 Gram(s) Oral two times a day  simvastatin 10 milliGRAM(s) Oral at bedtime        LABS: All Labs Reviewed:                                       13.1   13.83 )-----------( 276      ( 04 Feb 2020 21:34 )             40.8   02-04    131<L>  |  92<L>  |  19  ----------------------------<  126<H>  3.5   |  30  |  0.65    Ca    9.3      04 Feb 2020 21:34    TPro  7.0  /  Alb  3.1<L>  /  TBili  0.5  /  DBili  x   /  AST  24  /  ALT  15  /  AlkPhos  68  02-04      BNP Serum Pro-Brain Natriuretic Peptide: 6280 pg/mL (01-31-20 @ 06:57)        EKG:  < from: 12 Lead ECG (01.31.20 @ 06:55) >    Diagnosis Line Atrial fibrillation with rapid ventricular response with premature ventricular or aberrantly conducted complexes  Left axis deviation  Inferior infarct (cited on or before 11-AUG-2019)  Anteroseptal infarct (cited on or before 10-NOV-2018)  ST & T wave abnormality, consider lateral ischemia  Abnormal ECG    < end of copied text >      Telemetry: AF HR      ECHO: < from: Transthoracic Echocardiogram (02.01.20 @ 09:13) >     Summary     Mild to moderate mitral annular calcification is present.   Fibrocalcific changes noted to the mitral valve leaflets with preserved   leaflet excursion.   Moderate to severe (3+) mitral regurgitation is present.   Fibrocalcific changes noted to the Aortic valve leaflets with preserved   leaflet excursion.   Mild (1+) aortic regurgitation is present.   The tricuspid valve leaflets are thin and pliable; valve motion is normal.   Mild to moderate tricuspid valve regurgitation is present.   Mild pulmonary hypertension.   Pulmonic valve not well seen.   Mild pulmonic valvular regurgitation (1+) is present.   The left atrium is moderately dilated.   The interventricular septum is hypokinetic.   Estimated left ventricular ejection fraction is 35-40 %.   The right atrium is at the upper limits of normal.   Normal appearing right ventricle structure and function.   The IVC appears normal.   Pleural effusion - is present..     Signature     ----------------------------------------------------------------   Electronically signed by Edna Watson MD(Interpreting   physician) on 02/03/2020 10:18 AM   ----------------------------------------------------------------    < end of copied text >

## 2020-02-05 NOTE — DIETITIAN INITIAL EVALUATION ADULT. - MALNUTRITION
moderate malnutrition in acute on chronic illness r/t decreased appetite 2/2 SOB and constipation AEB meeting <75% of ENN x 6 days and severe muscle/mod fat wasting. moderate malnutrition in acute on chronic illness

## 2020-02-05 NOTE — CONSULT NOTE ADULT - ASSESSMENT
- hypoxemic respiratory failure with the new diffuse bilateral perihilar lung infiltrates likely secondary to pulmonary edema .  - doubtful of pneumonia as the symptoms are acute and patient improved quickly with the therapy with no fever or elevated WBC  - afib with the RVR with the rate better controlled   - systolic HF with the low EF official echo pending     PLAN     - will review the ct requested   - keep in negative balance with the diuretic  as tolerated by the B.P   - rate control of afib as per the cardiology   - patient currently able to maintain the sat the nasal canula with out the need for the bipap  - if the infiltrates improves quickly iv antibiotics can be discontinued   - monitor WBC and fever spikes
89F with constipation and fecal impaction    Recommend:  -start lactulose   -agree with tap water enema as ordered  -reg diet   -AXR tomorrow, may need manual disimpaction if no better
HPI: Pt is a 89y old Female with a PMH Afib (not on AC d/t fall risk), HTN, presents c/o worsening sob. AM. Earlier in the week, pt was not feeling well, having some weakness. Per pt she was having some intermittent "fluttering" in her chest yesterday, but no pain. In the ED when she was found to be hypoxic with desaturation.  Pt started on BIPAP, CXR revealed b/l pulm edema.  She was also in Afib  in 140's. Palliative Medicine Consult to establish GOC as pt has been seen by our service.    2/5/2020 Seen and examined at bedside with no family present. OOB/chair with no C/O pain or dyspnea.     Assessment and Plan:    1) Dyspnea  -Improved  -Supplemental O2 PRN  -CT chest pulm edema and mod effusions  -S/P BiPap support    2) Acute on chronic resp failure  -Pulm edema  -Cardiology eval noted  -Diuresis as tolerated    3) Debility  -OOB/chair  -PT eval  -Poor PO intake  -overall deconditioned    4) Advanced Directives  -Pt with capacity  -KATHI DNR on chart  -Will schedule GOC discussion with daughter Velia
89 F with  shortness of breath, and AF with RVR      Shortness of breath - WIll treat for PNA given  shortness of breath , abnormal CXR and AMS     will also treat for diastolic failure- which in the setting of AF and elevated HR can cause for fluid overload state- continue lasix daily      AF-  will attempt to rate control with q6 hr Dilt and add one dose of digoxin     will g et 2 d echo to re evalaute EF, and valve disease

## 2020-02-05 NOTE — DIETITIAN INITIAL EVALUATION ADULT. - ADD RECOMMEND
1) add gelatein BID to optimize PO intake 2) add MVI with minerals daily to ensure 100% RDI met 3) daily wt checks 4) monitor PO intake/tolerance

## 2020-02-05 NOTE — PROGRESS NOTE ADULT - SUBJECTIVE AND OBJECTIVE BOX
CC: sob (03 Feb 2020 09:40)    HPI: 89F w/PMH afib (not on AC d/t fall risk), HTN, presents c/o worsening sob over the past 1day, worse this AM.  Pt is AAOX3 and offers her own history and family at bedside graciously assisting w/ further history.  Earlier in the week, pt was not feeling well, having some weakness.  Yesterday, per family, her visiting NP stated "shes stable" but to consider further medical eval.  Per pt she was having some intermittent "fluttering" in her chest yesterday, but no pain.  At about 0600 today, she was severely sob and brought into ED when she was found to be hypoxic w/ sats in low 80's.  Pt started on BIPAP, cxr revealed b/l pulm edema, given lasix IV.  She was also in Afib rvr in 140's and given dilt 10mg IV x 1.  At the time of my exam, pt felt much better, pleasant and able to complete sentences, -120's.      INTERVAL HPI/ OVERNIGHT EVENTS:  Pt was seen and examined, transferred to , looks well. Reports  that didn't eat much for breakfast and has no appetite.  HAs small BM this am.  No SOB, on RA     Vital Signs Last 24 Hrs  T(C): 36.6 (05 Feb 2020 11:20), Max: 36.7 (04 Feb 2020 17:00)  T(F): 97.9 (05 Feb 2020 11:20), Max: 98 (04 Feb 2020 17:00)  HR: 105 (05 Feb 2020 11:20) (85 - 116)  BP: 103/59 (05 Feb 2020 11:20) (102/81 - 160/70)  BP(mean): 86 (05 Feb 2020 08:15) (66 - 105)  RR: 18 (05 Feb 2020 11:20) (18 - 98)  SpO2: 97% (05 Feb 2020 11:20) (92% - 99%)      REVIEW OF SYSTEMS:  All other review of systems is negative unless indicated above.        PHYSICAL EXAM:  General: Well developed; malnourished;  in no acute distress  Eyes: PERRLA, EOMI; conjunctiva and sclera clear  Head: Normocephalic; atraumatic  ENMT: No nasal discharge; airway clear  Neck: Supple;  no masses  Respiratory: Decreased BS at bases. No wheezes, rales or rhonchi  Cardiovascular: Irregular rate and rhythm. + S1 and S2 Normal;   Gastrointestinal: Soft non-tender non-distended; Normal bowel sounds  Genitourinary: No  suprapubic  tenderness  Extremities: +  edema, improved   Vascular: Peripheral pulses palpable 2+ bilaterally  Neurological: Alert and oriented x2, non focal   Skin: Warm and dry. No acute rash   Lymph Nodes: No acute cervical adenopathy  Musculoskeletal: Normal muscle tone, no joint effusion or erythema   Psychiatric: Cooperative       LABS:       02-04    139  |  100  |  19  ----------------------------<  97  3.6   |  36<H>  |  0.62    Ca    9.0      04 Feb 2020 07:02        03 Feb 2020 07:08    138    |  104    |  21     ----------------------------<  92     3.8     |  30     |  0.61     Ca    8.7        03 Feb 2020 07:08      MEDICATIONS  (STANDING):  aspirin  chewable 81 milliGRAM(s) Oral daily  atorvastatin 10 milliGRAM(s) Oral at bedtime  carvedilol 3.125 milliGRAM(s) Oral every 12 hours  digoxin     Tablet 0.125 milliGRAM(s) Oral daily  enalapril 2.5 milliGRAM(s) Oral every 12 hours  furosemide   Injectable 20 milliGRAM(s) IV Push two times a day  heparin  Injectable 5000 Unit(s) SubCutaneous two times a day  polyethylene glycol 3350 17 Gram(s) Oral two times a day  simvastatin 10 milliGRAM(s) Oral at bedtime    MEDICATIONS  (PRN):  ondansetron Injectable 4 milliGRAM(s) IV Push every 6 hours PRN Nausea  senna 1 Tablet(s) Oral at bedtime PRN Constipation  senna 1 Tablet(s) Oral at bedtime PRN Constipation      RADIOLOGY & ADDITIONAL TESTS:    EXAM:  CT CHEST                        PROCEDURE DATE:  02/01/2020    FINDINGS:    LUNGS, AIRWAYS: The central airways are patent. pulmonary edema.    PLEURA: MOderate effusions.    VESSELS: Aortic atherosclerosis without aneurysm.    HEART: Big heart size. No pericardial effusion. Coronary and mitral annular calcification.    MEDIASTINUM AND SAUMYA: No adenopathy.    UPPER ABDOMEN: Limited visualization is unremarkable.    BONES AND SOFT TISSUES: No acute bony abnormality.    IMPRESSION:   Pulmonary edema and moderate effusions.      < from: CT Abdomen and Pelvis No Cont (02.04.20 @ 22:09) >    EXAM:  CT ABDOMEN AND PELVIS                            PROCEDURE DATE:  02/04/2020          INTERPRETATION:  CLINICAL INFORMATION: Acute abdominal pain    COMPARISON: October 15, 2017    PROCEDURE:   CT of the Abdomen and Pelvis was performed without intravenous contrast.   Intravenous contrast: None.  Oral contrast: None.  Sagittal and coronal reformats were performed.    FINDINGS:    LOWER CHEST: New bilateral pleural effusions, moderate in size. Bilateral lower lobe consolidation including groundglass opacity. Increased groundglass opacities also seen in the posterior aspect of the RIGHT middle lobe.  Coronary artery calcifications as well as aortic root calcifications. Calcification of the LEFT ventricular papillary muscle.    LIVER: Stable hypodense lesion in the LEFT lobe of the liver with central chunky calcification.  BILE DUCTS: Normal caliber.  GALLBLADDER: Elongated distended gallbladder without change from prior study. No evidence of acute cholecystitis.  SPLEEN: Within normal limits.  PANCREAS: Within normal limits.  ADRENALS: Within normal limits.  KIDNEYS/URETERS: Stable RIGHT renal cyst. Stable mild LEFT hydronephrosis and hydroureter. Phlebolith adjacent to the mid LEFT ureter (2-56).    BLADDER: Unremarkable, partially obscured by artifact from RIGHT hip arthroplasty.  REPRODUCTIVE ORGANS: No uterine or adnexal masses.    BOWEL: Large amount of fecal material within the rectal vault with mild rectal wall thickening and perirectal fat stranding suggestive of stercoral colitis. Moderate stool burden throughout the remainder of the colon. Diverticulosis of the sigmoid colon without acute diverticulitis.  LEFT inguinal hernia containing a loop of bowel, likely small bowel. No evidence of obstruction. Evaluation limited by lack of oral contrast material.  Mild gastric wall thickening may represent mild gastritis.  Appendix is not visualized. No evidence of inflammation in the pericecal region.  PERITONEUM: Diffuse increased fat stranding of the mesentery without focal lesion.  VESSELS: Diffuse atherosclerotic disease of the aorta. Evaluation limited by lack of IV contrast material.  RETROPERITONEUM/LYMPH NODES: No lymphadenopathy.    ABDOMINAL WALL: LEFT inguinal hernia containing a loop of bowel, likely small bowel.  BONES: There is a new compression fracture of L3 when compared to the prior study of October 15, 2017. There is mild retropulsion of the superior aspect with narrowing of the thecal sac. Stable compression deformity of L1 without change from prior study.    IMPRESSION:     1.  Rectum distended by significant stool with perirectal fat stranding suggesting stercoral colitis.  2.  LEFT inguinal hernia containing a nonobstructed nondilated loop of small bowel.  3.  L3 compression fracture which is new from October 15, 2017 but reported on prior CT of the lumbar spine from September 21, 2018. There has been further loss of height when compared to the 2018 study.  4.  Bilateral pleural effusions with bilateral lower lobe consolidation.

## 2020-02-05 NOTE — DIETITIAN INITIAL EVALUATION ADULT. - NAME AND PHONE
Kristina Villafana MA, RDN, CDN, [facility]  [phone] (office number)  [phone] (pager number)

## 2020-02-05 NOTE — DIETITIAN INITIAL EVALUATION ADULT. - PROBLEM SELECTOR PLAN 2
per pt, no h/o chf although she's on lasix   check daily wts, strict i/o's  restrict fluid/sodium  cardio cs  check TTE  will place on IV lasix 40mg BID for at least next 24 hrs, as bp tolerates, and titrate accordingly

## 2020-02-05 NOTE — PROGRESS NOTE ADULT - ASSESSMENT
patient with CHF and severe segmental LV dysfucntion, now on coreg, digoxin and lasix with BP in the 100's  1-CHF- systolic - tolerating low dose enalapril and coreg=- will monitor closely - continue lasix as tolerated- mild hyponatremia could be due to abd pain and SIADH or diuresis, continue diresis for now and monitor BNP  2-CAD-on coreg.   on ASA 81mg and statin   3-AFib-no AC due to fall risk- coreg and dig aspirin    Discussed with patient - she doesn't want any further procedures at this time (stress test, angiogram etc)- she just wants medications to help "feel better"     recommend daily weights and PT     DC planning

## 2020-02-05 NOTE — PROGRESS NOTE ADULT - ASSESSMENT
89F w/PMH afib (not on AC d/t fall risk), HTN,        #Acute hypoxic and hypercarbic respiratory Failure due to Acute on Chronic Systolic CHF exacerbation.    Overall improved with lasix/ BIPAP.    BP stable  C/w  IV lasix 20 BID.    C/w  Coreg   Low dose ACEI started, monito BP cosely.    ECHO:  New low EF 20%.    CXR/ CT chest with CHF and b/l effusions.    Cont NC o2 @3 L PRN, tolerates RA   Initial hypercarbic respiratory failure as well as hypoxic with elevated pCO2 but now improved.    Will check pulse ox on ambulation  CT abd noted, doubt PNA, no changes from CT  chest        #Afib with RVR:    Off Cardizem,  stopped with low EF and hypotension.    Cont dig/ coreg for rate control.    HR overall improved     No AC in the past due to fall risk.    No  JENIFFER/ Cardioversion as cannot tolerate AC.    Cardio f/u outPt     #  PNA unlikely   CT chest with CHF/ effusions. No PNA. off ABX.        #Positive trop:    Suspect more demand ischemia related to AFIB / CHF.    No evidence of acute MI.    ASA/ statin/ BB.    No ischemic work up recommended by cardio, c/w medical management       # Constipation, Stool impaction   C/w  bowel regiment  S/p enema , started on LActilose   As per RN had multiple BMs   CT  reviewed  GI eval appreciated     # leukocytosis   Likely reactive  Monitor for fevers  Repeat labs in am        #DVT proph:  heparin SQ.      #Code Status:  DNR/ DNI      DISPO:  C/w current  care, check Pulse ox on ambulation in am. D/c planning home with home PT when stable

## 2020-02-05 NOTE — DIETITIAN INITIAL EVALUATION ADULT. - PHYSICAL APPEARANCE
underweight/other (specify) NFPE significant for severe muscle wasting: temporal, clavicle, and scapula.  moderate fat wasting: tricep, orbital, and rib.  (+1) Lt/Rt ankle edema.  isma score of 11, no PU noted.

## 2020-02-05 NOTE — PROGRESS NOTE ADULT - ASSESSMENT
- hypoxemic respiratory failure with the new diffuse bilateral perihilar lung infiltrates likely secondary to pulmonary edema . patient clinically improving .   - bilateral pleural effusions with the chf mild to moderate so far no acute symptoms    - afib with the RVR with the rate better controlled   - systolic HF with the low EF     PLAN       - keep in negative balance with the diuretic as tolerated by the B.P  patient currently getting IV Lasix  - rate control of afib as per the cardiology   - patient currently able to maintain the sat with out indication for the 02 and effusion are small to  moderate and patient is not symptomatic of the effusions at rest   - treatment of underlying constipation and stercoral colitis as per medicine   - for now continue to observe effusions and if symptomatic would consider drainage .  - management of chf and afib as per the cardiology   - monitor electrolytes and renal function while on the diuretics

## 2020-02-05 NOTE — CONSULT NOTE ADULT - SUBJECTIVE AND OBJECTIVE BOX
GI consult    HPI:  89F w/PMH afib (not on AC d/t fall risk), HTN, presents admitted 1/31/20 c/o worsening sob and was found to be hypoxic w/ sats in low 80's.  Pt started on BIPAP, cxr revealed b/l pulm edema, given lasix IV.  She was also in Afib rvr in 140's. Called to evaluate fecal retention seen on CT. Pt states she was constipated but yesterday had large BM and now without abd pain. Denies rectal bleeding. Possible stercoral proctitis seen on CT.     PAST MEDICAL & SURGICAL HISTORY:  HLD (hyperlipidemia)  Glaucoma  HTN  Afib  Fall recurrent  s/p Rt periprosthetic hip fracture ORIF (8/19)    MEDICATIONS  (STANDING):  aspirin  chewable 81 milliGRAM(s) Oral daily  atorvastatin 10 milliGRAM(s) Oral at bedtime  carvedilol 3.125 milliGRAM(s) Oral every 12 hours  digoxin     Tablet 0.125 milliGRAM(s) Oral daily  enalapril 2.5 milliGRAM(s) Oral every 12 hours  furosemide   Injectable 20 milliGRAM(s) IV Push two times a day  heparin  Injectable 5000 Unit(s) SubCutaneous two times a day  polyethylene glycol 3350 17 Gram(s) Oral two times a day  simvastatin 10 milliGRAM(s) Oral at bedtime    Allergies  atenolol (Other)  sulfa drugs (Other)    Social History:   lives in her own home,  w/ dementia  both have caregivers  pt requires full assist  w/ ADLs    FAMILY HISTORY:  cannot determine d/t pt's condition.     Review of Systems:   CONSTITUTIONAL: No fever.  EYES: No eye pain or discharge.  ENMT:  No sinus or throat pain  NECK: No pain or stiffness  RESPIRATORY: No cough, wheezing, chills or hemoptysis; ++ shortness of breath  CARDIOVASCULAR: No chest pain, ++fluttering/palpitations, NO dizziness, or leg swelling  GENITOURINARY: No dysuria or incontinence  NEUROLOGICAL: No headaches, memory loss, loss of strength, numbness, or tremors  SKIN: No rashes.  MUSCULOSKELETAL: No joint pain or swelling; No muscle, back, or extremity pain  PSYCHIATRIC: No depression, anxiety, mood swings, or difficulty sleeping    PHYSICAL EXAM:  Vital Signs Last 24 Hrs  T(C): 36.6 (05 Feb 2020 11:20), Max: 36.7 (04 Feb 2020 17:00)  T(F): 97.9 (05 Feb 2020 11:20), Max: 98 (04 Feb 2020 17:00)  HR: 105 (05 Feb 2020 11:20) (85 - 116)  BP: 103/59 (05 Feb 2020 11:20) (102/81 - 160/70)  BP(mean): 86 (05 Feb 2020 08:15) (66 - 105)  RR: 18 (05 Feb 2020 11:20) (18 - 98)  SpO2: 97% (05 Feb 2020 11:20) (92% - 99%)  GENERAL: NAD, FRAIL   EYES: EOMI, PERRLA, conjunctiva and sclera clear  ENT: normal hearing  NECK: Supple, No JVD, no LAD, no thyromegaly   CHEST/LUNG: No wheeze, respirations unlabored  HEART: IRREG IRREG  ABDOMEN: Soft, Nontender, Nondistended; Bowel sounds present, no HSM  EXTREMITIES:  2+ Peripheral Pulses, No clubbing, cyanosis, or edema  PSYCH: AAOx3, normal behavior  NEUROLOGY: non-focal  SKIN: No visible rashes or lesions    LABS:                                              13.1   13.83 )-----------( 276      ( 04 Feb 2020 21:34 )             40.8     02-04    131<L>  |  92<L>  |  19  ----------------------------<  126<H>  3.5   |  30  |  0.65    Ca    9.3      04 Feb 2020 21:34    TPro  7.0  /  Alb  3.1<L>  /  TBili  0.5  /  DBili  x   /  AST  24  /  ALT  15  /  AlkPhos  68  02-04    CT reviewed in PACS, fecal retention, incontinence and stercoral proctitis noted
HPI: Pt is a 89y old Female with a PMH afib (not on AC d/t fall risk), HTN, presents c/o worsening sob. AM. Earlier in the week, pt was not feeling well, having some weakness. Per pt she was having some intermittent "fluttering" in her chest yesterday, but no pain. In the ED when she was found to be hypoxic with desatuartion.  Pt started on BIPAP, CXR revealed b/l pulm edema.  She was also in Afib  in 140's. Palliative medicine Consult to establish GOC as pt has been seen by our service on previous hospitalization.    2020 Seen and examined at bedside with no family present. OOB/chair with no C/O pain or dyspnea.     PAIN: ( X)Yes   ( )No  Level: mild-mod  Location: lower ext  Intensity:   4 /10  Quality: intermittent  Impact on ADLs: mod    DYSPNEA: ( ) Yes  (X ) No  Level: improved at rest    PAST MEDICAL & SURGICAL HISTORY:  HLD (hyperlipidemia)  Glaucoma      SOCIAL HX:  Lives home with  and aide support  Hx opiate tolerance ( )YES  (X )NO    Baseline ADLs  (Prior to Admission)  (X ) Independent   ( )Dependent    FAMILY HISTORY:  No family hx pertinent    Review of Systems:    Anxiety-denies  Depression-situational  Physical Discomfort-mild  Dyspnea-none at present  Constipation-chronic  Diarrhea-denies  Fatigue-denies  Weakness-denies    All other systems reviewed and negative      PHYSICAL EXAM:    Vital Signs Last 24 Hrs  T(C): 36.4 (2020 08:18), Max: 36.7 (2020 17:00)  T(F): 97.6 (2020 08:18), Max: 98 (2020 17:00)  HR: 85 (2020 08:15) (85 - 116)  BP: 102/81 (2020 08:15) (102/81 - 160/70)  BP(mean): 86 (2020 08:15) (66 - 105)  RR: 18 (2020 08:15) (18 - 98)  SpO2: 92% (2020 08:15) (92% - 99%)   Daily Weight in k.2 (2020 08:18)    PPSV2: 40 %      General: Elderly female in chair in no distress  Mental Status: A&O X4  HEENT: oral mucosa moist  Lungs: clear to auscultation mile  Cardiac: S1S2+  GI: abd soft NT ND + BS  : voids  Musculoskeletal: TIAN =strength  Neuro: no focal def      LABS:                        13.1   13.83 )-----------( 276      ( 2020 21:34 )             40.8         131<L>  |  92<L>  |  19  ----------------------------<  126<H>  3.5   |  30  |  0.65    Ca    9.3      2020 21:34    TPro  7.0  /  Alb  3.1<L>  /  TBili  0.5  /  DBili  x   /  AST  24  /  ALT  15  /  AlkPhos  68        Albumin: Albumin, Serum: 3.1 g/dL ( @ 21:34)      Allergies    atenolol (Other)  sulfa drugs (Other)    Intolerances      MEDICATIONS  (STANDING):  aspirin  chewable 81 milliGRAM(s) Oral daily  atorvastatin 10 milliGRAM(s) Oral at bedtime  carvedilol 3.125 milliGRAM(s) Oral every 12 hours  digoxin     Tablet 0.125 milliGRAM(s) Oral daily  enalapril 2.5 milliGRAM(s) Oral every 12 hours  furosemide   Injectable 20 milliGRAM(s) IV Push two times a day  heparin  Injectable 5000 Unit(s) SubCutaneous two times a day  polyethylene glycol 3350 17 Gram(s) Oral two times a day  simvastatin 10 milliGRAM(s) Oral at bedtime    MEDICATIONS  (PRN):  ondansetron Injectable 4 milliGRAM(s) IV Push every 6 hours PRN Nausea  senna 1 Tablet(s) Oral at bedtime PRN Constipation  senna 1 Tablet(s) Oral at bedtime PRN Constipation      RADIOLOGY/ADDITIONAL STUDIES:    < from: CT Abdomen and Pelvis No Cont (20 @ 22:09) >    EXAM:  CT ABDOMEN AND PELVIS                            PROCEDURE DATE:  2020          INTERPRETATION:  CLINICAL INFORMATION: Acute abdominal pain    COMPARISON: October 15, 2017    PROCEDURE:   CT of the Abdomen and Pelvis was performed without intravenous contrast.   Intravenous contrast: None.  Oral contrast: None.  Sagittal and coronal reformats were performed.    FINDINGS:    LOWER CHEST: New bilateral pleural effusions, moderate in size. Bilateral lower lobe consolidation including groundglass opacity. Increased groundglass opacities also seen in the posterior aspect of the RIGHT middle lobe.  Coronary artery calcifications as well as aortic root calcifications. Calcification of the LEFT ventricular papillary muscle.    LIVER: Stable hypodense lesion in the LEFT lobe of the liver with central chunky calcification.  BILE DUCTS: Normal caliber.  GALLBLADDER: Elongated distended gallbladder without change from prior study. No evidence of acute cholecystitis.  SPLEEN: Within normal limits.  PANCREAS: Within normal limits.  ADRENALS: Within normal limits.  KIDNEYS/URETERS: Stable RIGHT renal cyst. Stable mild LEFT hydronephrosis and hydroureter. Phlebolith adjacent to the mid LEFT ureter (2-56).    BLADDER: Unremarkable, partially obscured by artifact from RIGHT hip arthroplasty.  REPRODUCTIVE ORGANS: No uterine or adnexal masses.    BOWEL: Large amount of fecal material within the rectal vault with mild rectal wall thickening and perirectal fat stranding suggestive of stercoral colitis. Moderate stool burden throughout the remainder of the colon. Diverticulosis of the sigmoid colon without acute diverticulitis.  LEFT inguinal hernia containing a loop of bowel, likely small bowel. No evidence of obstruction. Evaluation limited by lack of oral contrast material.  Mild gastric wall thickening may represent mild gastritis.  Appendix is not visualized. No evidence of inflammation in the pericecal region.  PERITONEUM: Diffuse increased fat stranding of the mesentery without focal lesion.  VESSELS: Diffuse atherosclerotic disease of the aorta. Evaluation limited by lack of IV contrast material.  RETROPERITONEUM/LYMPH NODES: No lymphadenopathy.    ABDOMINAL WALL: LEFT inguinal hernia containing a loop of bowel, likely small bowel.  BONES: There is a new compression fracture of L3 when compared to the prior study of October 15, 2017. There is mild retropulsion of the superior aspect with narrowing of the thecal sac. Stable compression deformity of L1 without change from prior study.    IMPRESSION:     1.  Rectum distended by significant stool with perirectal fat stranding suggesting stercoral colitis.  2.  LEFT inguinal hernia containing a nonobstructed nondilated loop of small bowel.  3.  L3 compression fracture which is new from October 15, 2017 but reported on prior CT of the lumbar spine from 2018. There has been further loss of height when compared to the 2018 study.  4.  Bilateral pleural effusions with bilateral lower lobe consolidation.        < from: CT Chest No Cont (02.01.20 @ 11:49) >    EXAM:  CT CHEST                            PROCEDURE DATE:  2020          INTERPRETATION:  CT CHEST    CLINICAL INFORMATION:  cough, short of breath    TECHNIQUE: Noncontrast CT of the chest was performed. Coronal and sagittal images were reconstructed. This study was performed using automatic exposure control (radiation dose reduction software) to obtain a diagnostic image quality scan with patient dose as low as reasonably achievable.    COMPARISON: CXR one day prior, CT 2017    FINDINGS:    LUNGS, AIRWAYS: The central airways are patent. pulmonary edema.    PLEURA: MOderate effusions.    VESSELS: Aortic atherosclerosis without aneurysm.    HEART: Big heart size. No pericardial effusion. Coronary and mitral annular calcification.    MEDIASTINUM AND SAUMYA: No adenopathy.    UPPER ABDOMEN: Limited visualization is unremarkable.    BONES AND SOFT TISSUES: No acute bony abnormality.    IMPRESSION:     Pulmonary edema and moderate effusions.
Pulmonary Consult    History of Present Illness:  Reason for Admission: sob	  History of Present Illness: 	  HPI:  89F w/PMH afib (not on AC d/t fall risk), HTN, presents c/o worsening sob over the past 1day, worse this AM.  Pt is AAOX3 and offers her own history and family at bedside graciously assisting w/ further history.  Earlier in the week, pt was not feeling well, having some weakness.  Yesterday, per family, her visiting NP stated "shes stable" but to consider further medical eval.  Per pt she was having some intermittent "fluttering" in her chest yesterday, but no pain.  At about 0600 today, she was severely sob and brought into ED when she was found to be hypoxic w/ sats in low 80's.  Pt started on BIPAP, cxr revealed b/l pulm edema, given lasix IV.  She was also in Afib rvr in 140's and given dilt 10mg IV x 1.  At the time of my exam, pt felt much better, pleasant and able to complete sentences, -120's.    [facility] d/w pt along w/ Dr Moreland, ED MD- he explained to her regarding intubation and she verbalized understanding and stated she would NOT want that.  He further asked her wishes if her heart were to stop  and she endorsed "just let me go, don't do anything".  When family arrived I d/w them her wishes and reiterated the same responses when I asked her.  MOLST form signed in family's presence.  pt is DNR/DNI.    In ED, as above, Trop neg, cbc/cmp wnl. rvp neg.     patient seen today and says her breathing is better   denies any cough or wheeze or smoking history   no copd or asthma   transiently required bipap now off the bipap   rate better controlled   on the nasal canula started on the antibiotics for the possible pneumonia       PAST MEDICAL & SURGICAL HISTORY:  HLD (hyperlipidemia)  Glaucoma        FAMILY HISTORY:    non contributory to the illness       SOCIAL HISTORY:    No smoking no drug or alcohol abuse .     Allergies    atenolol (Other)  sulfa drugs (Other)    Intolerances              REVIEW OF SYSTEMS:  Constitutional: No fevers or chills or weight loss.   Eyes: No itching or discharge from the eyes  ENT:  No post nasal drip. No epistaxis. No throat pain. . No difficulty swallowing.   CV: No chest pain. No palpitations.  or dizziness.   Res sob and hypoxia   GI: No nausea. No vomiting. No diarrhea or abdominal pain   MSK: No joint pain or pain in any extremities  Integumentary: No skin lesions. No pedal edema.  Neurological: No gross motor weakness. No sensory changes.      OBJECTIVE:  Vital Signs Last 24 Hrs  T(C): 36.4 (2020 04:57), Max: 36.4 (2020 21:12)  T(F): 97.6 (2020 04:57), Max: 97.6 (2020 21:12)  HR: 107 (2020 10:00) (93 - 120)  BP: 93/48 (2020 10:00) (78/55 - 110/87)  BP(mean): 60 (2020 10:00) (53 - 92)  RR: 19 (2020 10:00) (11 - 22)  SpO2: 95% (2020 10:00) (89% - 98%)      PHYSICAL EXAM:  General: Awake, alert, oriented X 3.   HEENT: Atraumatic, normocephalic.   Neck: No JVD no lymphadenopathy   Respiratory: normal vesicular breathing with bilateral rales over the bases   Cardiovascular: S1 S2 normal. No murmurs, rubs or gallops.   Abdomen: Soft, non-tender, non-distended. No organomegaly.  Extremities: Warm to touch. Peripheral pulse palpable. has minimal edema   Skin: No rashes or skin lesions  Neurological: Motor and sensory examination equal and normal in all four extremities.  Psychiatry: Appropriate mood and affect and forgerful     HOSPITAL MEDICATIONS:  MEDICATIONS  (STANDING):  aspirin  chewable 81 milliGRAM(s) Oral daily  atorvastatin 10 milliGRAM(s) Oral at bedtime  carvedilol 3.125 milliGRAM(s) Oral every 12 hours  cefTRIAXone Injectable. 1000 milliGRAM(s) IV Push every 24 hours  digoxin     Tablet 0.125 milliGRAM(s) Oral daily  furosemide    Tablet 20 milliGRAM(s) Oral daily  heparin  Injectable 5000 Unit(s) SubCutaneous two times a day  simvastatin 10 milliGRAM(s) Oral at bedtime    MEDICATIONS  (PRN):  ondansetron Injectable 4 milliGRAM(s) IV Push every 6 hours PRN Nausea      LABS:                        11.5   5.90  )-----------( 207      ( 2020 06:48 )             37.6         138  |  107  |  22  ----------------------------<  96  4.4   |  27  |  0.97    Ca    8.9      2020 06:48  Mg     2.4         TPro  7.5  /  Alb  3.5  /  TBili  0.5  /  DBili  x   /  AST  18  /  ALT  24  /  AlkPhos  79      PT/INR - ( 2020 06:57 )   PT: 12.6 sec;   INR: 1.13 ratio         PTT - ( 2020 06:57 )  PTT:27.5 sec  Urinalysis Basic - ( 2020 08:30 )    Color: Yellow / Appearance: Clear / S.025 / pH: x  Gluc: x / Ketone: Negative  / Bili: Negative / Urobili: Negative mg/dL   Blood: x / Protein: 15 mg/dL / Nitrite: Negative   Leuk Esterase: Trace / RBC: 0-2 /HPF / WBC 0-2   Sq Epi: x / Non Sq Epi: Occasional / Bacteria: Occasional        Venous Blood Gas:   @ 10:20  7.29/57/57//83  VBG Lactate: --      < from: Xray Chest 1 View-PORTABLE IMMEDIATE (20 @ 07:50) >  XAM:  XR CHEST PORTABLE IMMED 1V                            PROCEDURE DATE:  2020          INTERPRETATION:  Frontal chest x-ray performed on 2020 compared with 8/10/2019.    Clinical statement: Chest pain    There are EKG wires overlying the chest. Tubes are seen overlying the right side of the chest.    Bones appear osteopenic. Old left proximal humeral fracture. Degenerative changes of bone. Vascular calcifications. There is limited evaluation of the heart size and mediastinum on portable technique.. Interval development of prominent perihilar and basilar lung markings with mild patchy consolidations and lower lungs. This may be due to fluid overload and/or infection. A small right pleural effusion. No discrete mass or pneumothorax is seen.    Impression:    Interval development of prominent perihilar and basilar lung markings with patchy opacities at the bases which may be due to infection and/or fluid overload. Please correlate clinically. Small right pleural effusion.
CHIEF COMPLAINT: Patient is a 89y old  Female who presents with a chief complaint of sob (31 Jan 2020 10:38)      HPI:  HPI:  89F w/PMH afib (not on AC d/t fall risk), HTN, presents c/o worsening sob over the past 1day, worse this AM.  Pt is AAOX3 and offers her own history and family at bedside graciously assisting w/ further history.  Earlier in the week, pt was not feeling well, having some weakness.  Yesterday, per family, her visiting NP stated "shes stable" but to consider further medical eval.  Per pt she was having some intermittent "fluttering" in her chest yesterday, but no pain.  At about 0600 today, she was severely sob and brought into ED when she was found to be hypoxic w/ sats in low 80's.  Pt started on BIPAP, cxr revealed b/l pulm edema, given lasix IV.  She was also in Afib rvr in 140's and given dilt 10mg IV x 1.  At the time of my exam, pt felt much better, pleasant and able to complete sentences, -120's.    [facility] d/w pt along w/ Dr Moreland, ED MD- he explained to her regarding intubation and she verbalized understanding and stated she would NOT want that.  He further asked her wishes if her heart were to stop  and she endorsed "just let me go, don't do anything".  When family arrived I d/w them her wishes and reiterated the same responses when I asked her.  MOLST form signed in family's presence.  pt is DNR/DNI.    In ED, as above, Trop neg, cbc/cmp wnl. rvp neg.     PAST MEDICAL & SURGICAL HISTORY:  HLD (hyperlipidemia)  Glaucoma  HTN  Afib  Fall recurrent  s/p Rt periprosthetic hip fracture ORIF (8/19)      Review of Systems:   CONSTITUTIONAL: No fever.  EYES: No eye pain or discharge.  ENMT:  No sinus or throat pain  NECK: No pain or stiffness  RESPIRATORY: No cough, wheezing, chills or hemoptysis; ++ shortness of breath  CARDIOVASCULAR: No chest pain, ++fluttering/palpitations, NO dizziness, or leg swelling  GASTROINTESTINAL: No abdominal or epigastric pain. No nausea, vomiting, or hematemesis; No diarrhea or constipation. No melena or hematochezia.  GENITOURINARY: No dysuria or incontinence  NEUROLOGICAL: No headaches, memory loss, loss of strength, numbness, or tremors  SKIN: No rashes.  MUSCULOSKELETAL: No joint pain or swelling; No muscle, back, or extremity pain  PSYCHIATRIC: No depression, anxiety, mood swings, or difficulty sleeping      Allergies  atenolol (Other)  sulfa drugs (Other)      Social History:   lives in her own home,  w/ dementia  both have caregivers  pt requires full assist  w/ ADLs    FAMILY HISTORY:  cannot determine d/t pt's condition.     Home Medications:  aspirin 81 mg oral tablet: 1 tab(s) orally once a day (31 Jan 2020 11:45)  atenolol:  (31 Jan 2020 11:45)  atorvastatin 10 mg oral tablet: 1 tab(s) orally once a day (31 Jan 2020 11:45)  DilTIAZem Hydrochloride  mg/24 hours oral capsule, extended release: 1 cap(s) orally once a day (31 Jan 2020 11:45)  famotidine 20 mg oral tablet: 1 tab(s) orally 2 times a day (31 Jan 2020 11:45)  furosemide 20 mg oral tablet: 1 tab(s) orally once a day (31 Jan 2020 11:45)  potassium chloride 10 mEq oral capsule, extended release: 1 cap(s) orally once a day (31 Jan 2020 11:45)  Vitamin D3 5000 intl units (125 mcg) oral tablet: 1 tab(s) orally once a day (31 Jan 2020 11:45)    MEDICATIONS  (STANDING):  aspirin  chewable 81 milliGRAM(s) Oral daily  atorvastatin 10 milliGRAM(s) Oral at bedtime  diltiazem    milliGRAM(s) Oral daily  furosemide   Injectable 40 milliGRAM(s) IV Push every 12 hours  heparin  Injectable 5000 Unit(s) SubCutaneous two times a day  simvastatin 10 milliGRAM(s) Oral at bedtime    MEDICATIONS  (PRN):  ondansetron Injectable 4 milliGRAM(s) IV Push every 6 hours PRN Nausea      PHYSICAL EXAM:  Vital Signs Last 24 Hrs  T(C): 37.3 (31 Jan 2020 07:03), Max: 37.3 (31 Jan 2020 07:03)  T(F): 99.2 (31 Jan 2020 07:03), Max: 99.2 (31 Jan 2020 07:03)  HR: 133 (31 Jan 2020 06:48) (75 - 133)  BP: 146/75 (31 Jan 2020 06:48) (146/75 - 146/75)  RR: 28 (31 Jan 2020 06:48) (28 - 28)  SpO2: 92% (31 Jan 2020 06:48) (92% - 92%)  GENERAL: NAD, FRAIL   HEAD:  Atraumatic, Normocephalic  EYES: EOMI, PERRLA, conjunctiva and sclera clear  ENT: normal hearing, no nasal discharge, CANNOT COMPLETELY ASSESS AS PT HAS BIPAP  NECK: Supple, No JVD, no LAD, no thyromegaly   CHEST/LUNG: +B/L CRACKLES,  No wheeze, respirations unlabored, COMPLETING SENTENCES ON BIPAP  HEART: IRREG IRREG TACHY  ABDOMEN: Soft, Nontender, Nondistended; Bowel sounds present, no HSM  EXTREMITIES:  2+ Peripheral Pulses, No clubbing, cyanosis, or edema  PSYCH: AAOx3, normal behavior  NEUROLOGY: non-focal, sensory and cn 2-12 intact, speech/language intact  SKIN: No visible rashes or lesions    LABS:                        12.9   9.18  )-----------( 281      ( 31 Jan 2020 06:57 )             39.8     01-31    136  |  104  |  21  ----------------------------<  162<H>  4.3   |  25  |  0.96    Ca    9.0      31 Jan 2020 06:57  Mg     2.4     01-31    TPro  7.5  /  Alb  3.5  /  TBili  0.5  /  DBili  x   /  AST  18  /  ALT  24  /  AlkPhos  79  01-31    PT/INR - ( 31 Jan 2020 06:57 )   PT: 12.6 sec;   INR: 1.13 ratio         PTT - ( 31 Jan 2020 06:57 )  PTT:27.5 sec  CARDIAC MARKERS ( 31 Jan 2020 06:57 )  0.025 ng/mL / x     / x     / x     / x          RADIOLOGY & ADDITIONAL TESTS:  Imaging Personally Reviewed:  cxr- pulm edema, b/l pleural effusion R>L    EKG Personally Reviewed:  afib rvr 140's (31 Jan 2020 10:38)      PMHx: PAST MEDICAL & SURGICAL HISTORY:  HLD (hyperlipidemia)  Glaucoma        Soc Hx:       Allergies: Allergies    atenolol (Other)  sulfa drugs (Other)    Intolerances          REVIEW OF SYSTEMS:    CONSTITUTIONAL: No weakness, fevers or chills  EYES/ENT: No visual changes;  No vertigo or throat pain   NECK: No pain or stiffness  RESPIRATORY: No cough, wheezing, hemoptysis; No shortness of breath  CARDIOVASCULAR: No chest pain or palpitations  GASTROINTESTINAL: No abdominal or epigastric pain. No nausea, vomiting, or hematemesis; No diarrhea or constipation. No melena or hematochezia.  GENITOURINARY: No dysuria, frequency or hematuria  NEUROLOGICAL: No numbness or weakness  SKIN: No itching, burning, rashes, or lesions   All other review of systems is negative unless indicated above    Vital Signs Last 24 Hrs  T(C): 36.2 (31 Jan 2020 16:20), Max: 37.3 (31 Jan 2020 07:03)  T(F): 97.2 (31 Jan 2020 16:20), Max: 99.2 (31 Jan 2020 07:03)  HR: 113 (31 Jan 2020 16:00) (75 - 133)  BP: 104/71 (31 Jan 2020 16:00) (78/55 - 146/75)  BP(mean): 80 (31 Jan 2020 16:00) (53 - 100)  RR: 20 (31 Jan 2020 16:00) (14 - 28)  SpO2: 89% (31 Jan 2020 12:00) (89% - 100%)    I&O's Summary          PHYSICAL EXAM:   ConstitutionalSOB confused   HEENT: PERR, EOMI, Normal Hearing, MMM  Neck: Soft and supple, No LAD, No JVD  Respiratory: Breath sounds are clear bilaterally, No wheezing, rales or rhonchi  Cardiovascular: S1 and S2, irregular rate and rhythm, no Murmurs, gallops or rubs  Gastrointestinal: Bowel Sounds present, soft, nontender, nondistended, no guarding, no rebound  Extremities: trace peripheral edema  Vascular: 2+ peripheral pulses  Neurological:confused   Musculoskeletal: 5/5 strength b/l upper and lower extremities  Skin: No rashes    MEDICATIONS:  MEDICATIONS  (STANDING):  aspirin  chewable 81 milliGRAM(s) Oral daily  atorvastatin 10 milliGRAM(s) Oral at bedtime  diltiazem    milliGRAM(s) Oral daily  furosemide   Injectable 40 milliGRAM(s) IV Push every 12 hours  heparin  Injectable 5000 Unit(s) SubCutaneous two times a day  simvastatin 10 milliGRAM(s) Oral at bedtime      LABS: All Labs Reviewed:                        12.9   9.18  )-----------( 281      ( 31 Jan 2020 06:57 )             39.8     01-31    136  |  104  |  21  ----------------------------<  162<H>  4.3   |  25  |  0.96    Ca    9.0      31 Jan 2020 06:57  Mg     2.4     01-31    TPro  7.5  /  Alb  3.5  /  TBili  0.5  /  DBili  x   /  AST  18  /  ALT  24  /  AlkPhos  79  01-31    PT/INR - ( 31 Jan 2020 06:57 )   PT: 12.6 sec;   INR: 1.13 ratio         PTT - ( 31 Jan 2020 06:57 )  PTT:27.5 sec  CARDIAC MARKERS ( 31 Jan 2020 10:20 )  0.079 ng/mL / x     / x     / x     / x      CARDIAC MARKERS ( 31 Jan 2020 06:57 )  0.025 ng/mL / x     / x     / x     / x          Serum Pro-Brain Natriuretic Peptide: 6280 pg/mL (01-31 @ 06:57)    Blood Culture:   BNP       EKG: AF RVR iLBBB , LAD     Telemetry: AF with rapid rates     ECHO:pending

## 2020-02-06 ENCOUNTER — TRANSCRIPTION ENCOUNTER (OUTPATIENT)
Age: 85
End: 2020-02-06

## 2020-02-06 LAB
ANION GAP SERPL CALC-SCNC: 7 MMOL/L — SIGNIFICANT CHANGE UP (ref 5–17)
BUN SERPL-MCNC: 21 MG/DL — SIGNIFICANT CHANGE UP (ref 7–23)
CALCIUM SERPL-MCNC: 8.8 MG/DL — SIGNIFICANT CHANGE UP (ref 8.5–10.1)
CHLORIDE SERPL-SCNC: 92 MMOL/L — LOW (ref 96–108)
CO2 SERPL-SCNC: 34 MMOL/L — HIGH (ref 22–31)
CREAT SERPL-MCNC: 0.9 MG/DL — SIGNIFICANT CHANGE UP (ref 0.5–1.3)
GLUCOSE SERPL-MCNC: 111 MG/DL — HIGH (ref 70–99)
HCT VFR BLD CALC: 39.7 % — SIGNIFICANT CHANGE UP (ref 34.5–45)
HGB BLD-MCNC: 12.8 G/DL — SIGNIFICANT CHANGE UP (ref 11.5–15.5)
MAGNESIUM SERPL-MCNC: 2 MG/DL — SIGNIFICANT CHANGE UP (ref 1.6–2.6)
MCHC RBC-ENTMCNC: 26.9 PG — LOW (ref 27–34)
MCHC RBC-ENTMCNC: 32.2 GM/DL — SIGNIFICANT CHANGE UP (ref 32–36)
MCV RBC AUTO: 83.6 FL — SIGNIFICANT CHANGE UP (ref 80–100)
PHOSPHATE SERPL-MCNC: 3.5 MG/DL — SIGNIFICANT CHANGE UP (ref 2.5–4.5)
PLATELET # BLD AUTO: 268 K/UL — SIGNIFICANT CHANGE UP (ref 150–400)
POTASSIUM SERPL-MCNC: 3.6 MMOL/L — SIGNIFICANT CHANGE UP (ref 3.5–5.3)
POTASSIUM SERPL-SCNC: 3.6 MMOL/L — SIGNIFICANT CHANGE UP (ref 3.5–5.3)
RBC # BLD: 4.75 M/UL — SIGNIFICANT CHANGE UP (ref 3.8–5.2)
RBC # FLD: 15.3 % — HIGH (ref 10.3–14.5)
SODIUM SERPL-SCNC: 133 MMOL/L — LOW (ref 135–145)
WBC # BLD: 7.84 K/UL — SIGNIFICANT CHANGE UP (ref 3.8–10.5)
WBC # FLD AUTO: 7.84 K/UL — SIGNIFICANT CHANGE UP (ref 3.8–10.5)

## 2020-02-06 PROCEDURE — 99232 SBSQ HOSP IP/OBS MODERATE 35: CPT

## 2020-02-06 PROCEDURE — 74019 RADEX ABDOMEN 2 VIEWS: CPT | Mod: 26

## 2020-02-06 RX ADMIN — Medication 2.5 MILLIGRAM(S): at 06:52

## 2020-02-06 RX ADMIN — Medication 20 MILLIGRAM(S): at 06:50

## 2020-02-06 RX ADMIN — Medication 0.12 MILLIGRAM(S): at 06:52

## 2020-02-06 RX ADMIN — SENNA PLUS 2 TABLET(S): 8.6 TABLET ORAL at 22:10

## 2020-02-06 RX ADMIN — HEPARIN SODIUM 5000 UNIT(S): 5000 INJECTION INTRAVENOUS; SUBCUTANEOUS at 17:29

## 2020-02-06 RX ADMIN — Medication 2.5 MILLIGRAM(S): at 17:29

## 2020-02-06 RX ADMIN — ATORVASTATIN CALCIUM 10 MILLIGRAM(S): 80 TABLET, FILM COATED ORAL at 22:10

## 2020-02-06 RX ADMIN — CARVEDILOL PHOSPHATE 3.12 MILLIGRAM(S): 80 CAPSULE, EXTENDED RELEASE ORAL at 06:52

## 2020-02-06 RX ADMIN — Medication 81 MILLIGRAM(S): at 11:19

## 2020-02-06 RX ADMIN — LACTULOSE 10 GRAM(S): 10 SOLUTION ORAL at 06:52

## 2020-02-06 RX ADMIN — CARVEDILOL PHOSPHATE 3.12 MILLIGRAM(S): 80 CAPSULE, EXTENDED RELEASE ORAL at 17:29

## 2020-02-06 RX ADMIN — HEPARIN SODIUM 5000 UNIT(S): 5000 INJECTION INTRAVENOUS; SUBCUTANEOUS at 06:54

## 2020-02-06 NOTE — PROGRESS NOTE ADULT - ASSESSMENT
- hypoxemic respiratory failure with the new diffuse bilateral perihilar lung infiltrates likely secondary to pulmonary edema . patient clinically improving off the 02   - bilateral pleural effusions with the chf mild to moderate so far no acute symptoms    - afib with the RVR with the rate better controlled   - systolic HF with the low EF     PLAN       - keep in negative balance with the diuretic as tolerated by the B.P  patient currently getting IV Lasix and can be changed to po at the discharge   - rate control of afib as per the cardiology   - patient currently able to maintain the sat with out indication for the 02 and effusion are small to  moderate and patient is not symptomatic of the effusions at rest   - treatment of underlying constipation and stercoral colitis as per medicine   - for now continue to observe effusions and if symptomatic would consider drainage .  - management of chf and afib as per the cardiology   - monitor electrolytes and renal function while on the diuretics   - ambulation to see the sat on ambulation if patient can do it

## 2020-02-06 NOTE — DISCHARGE NOTE NURSING/CASE MANAGEMENT/SOCIAL WORK - PATIENT PORTAL LINK FT
You can access the FollowMyHealth Patient Portal offered by [facility] by registering at the following website: [email or web address]. By joining [facility]’s FollowMyHealth portal, you will also be able to view your health information using other applications (apps) compatible with our system.

## 2020-02-06 NOTE — PROGRESS NOTE ADULT - SUBJECTIVE AND OBJECTIVE BOX
SUBJECTIVE     Patient denies any sob or cough or wheezing   off the 02   no fever or wheezing     PAST MEDICAL & SURGICAL HISTORY:  HLD (hyperlipidemia)  Glaucoma    OBJECTIVE   Vital Signs Last 24 Hrs  T(C): 36.4 (06 Feb 2020 05:27), Max: 36.8 (05 Feb 2020 21:01)  T(F): 97.6 (06 Feb 2020 05:27), Max: 98.3 (05 Feb 2020 21:01)  HR: 78 (06 Feb 2020 05:27) (69 - 105)  BP: 105/65 (06 Feb 2020 05:27) (92/50 - 116/59)  BP(mean): --  RR: 17 (06 Feb 2020 05:27) (17 - 18)  SpO2: 94% (06 Feb 2020 05:27) (94% - 97%)    PHYSICAL EXAM:  Constitutional: , awake and alert, not in distress off the 02   HEENT: Normo cephalic atraumatic  Neck: Soft and supple, No J.V.D   Respiratory: vesicular breathing ,with the decreased breath sounds in the bases   Cardiovascular: S1 and S2, regular rate .   Gastrointestinal:  soft, nontender,   Extremities: No  edema or calf tenderness .  Neurological: No new  focal deficits.    MEDICATIONS  (STANDING):  aspirin  chewable 81 milliGRAM(s) Oral daily  atorvastatin 10 milliGRAM(s) Oral at bedtime  carvedilol 3.125 milliGRAM(s) Oral every 12 hours  digoxin     Tablet 0.125 milliGRAM(s) Oral daily  enalapril 2.5 milliGRAM(s) Oral every 12 hours  furosemide   Injectable 20 milliGRAM(s) IV Push two times a day  heparin  Injectable 5000 Unit(s) SubCutaneous two times a day  lactulose Syrup 10 Gram(s) Oral two times a day  senna 2 Tablet(s) Oral at bedtime                            12.8   7.84  )-----------( 268      ( 06 Feb 2020 07:27 )             39.7     02-06    133<L>  |  92<L>  |  21  ----------------------------<  111<H>  3.6   |  34<H>  |  0.90    Ca    8.8      06 Feb 2020 07:27  Phos  3.5     02-06  Mg     2.0     02-06    TPro  7.0  /  Alb  3.1<L>  /  TBili  0.5  /  DBili  x   /  AST  24  /  ALT  15  /  AlkPhos  68  02-04

## 2020-02-06 NOTE — CHART NOTE - NSCHARTNOTEFT_GEN_A_CORE
HPI:  HPI:  89F w/PMH afib (not on AC d/t fall risk), HTN, presents c/o worsening sob over the past 1day, worse this AM.  Pt is AAOX3 and offers her own history and family at bedside graciously assisting w/ further history.  Earlier in the week, pt was not feeling well, having some weakness.   (31 Jan 2020 10:38)      PERTINENT PMH REVIEWED:  [ X ] YES [ ] NO           Primary Contact: Velia Zeng daughter [phone]    HCP [  X ] Surrogate [   ] Guardian [   ]    Mental Status: [ X ] Alert  [ X ] Oriented [  ] Confused [  ] Lethargic [  ]  Concerns of Depression [  ]-none identified   Anxiety [   ]- none identified   Baseline ADLs (prior to admission):  Independent [ ] moderately [ ] fully   Dependent   [ X ] moderately [ ]fully    Family Meeting attendees: Pt, pts daughter Velia via phone     Anticipated Grief: Patient [ ] Family [ X ]    Caregiver [place] Assessed: Yes [ X ] No [  ]    Goals of Care: returning home with home Palliative Care, not ready for comfort or hospice at this time     Previous Services: 24 hour aides at home     ADVANCE DIRECTIVES:  [ X] YES [ ] NO   DNR [ X ] YES [ ] NO  DNI [  X ] YES [ ] NO                  MOLST    [ X  ] YES [ ] NO      Anticipated D/C Plan: Home with 24 hour aides and [facility]                      Summary: HPI:  HPI:  89F w/PMH afib (not on AC d/t fall risk), HTN, presents c/o worsening sob over the past 1day, worse this AM.  Pt is AAOX3 and offers her own history and family at bedside graciously assisting w/ further history.  Earlier in the week, pt was not feeling well, having some weakness.   (31 Jan 2020 10:38)      PERTINENT PMH REVIEWED:  [ X ] YES [ ] NO           Primary Contact: Velia Zeng daughter [phone]    HCP [  X ] Surrogate [   ] Guardian [   ]    Mental Status: [ X ] Alert  [ X ] Oriented [  ] Confused [  ] Lethargic [  ]  Concerns of Depression [  ]-none identified   Anxiety [   ]- none identified   Baseline ADLs (prior to admission):  Independent [ ] moderately [ ] fully   Dependent   [ X ] moderately [ ]fully    Family Meeting attendees: Pt, pts daughter Velia via phone     Anticipated Grief: Patient [ ] Family [ X ]    Caregiver [place] Assessed: Yes [ X ] No [  ]    Goals of Care: returning home with home Palliative Care, not ready for comfort or hospice at this time     Previous Services: 24 hour aides at home     ADVANCE DIRECTIVES:  [ X] YES [ ] NO   DNR [ X ] YES [ ] NO  DNI [  X ] YES [ ] NO                  MOLST    [ X  ] YES [ ] NO      Anticipated D/C Plan: Home with 24 hour aides and [facility]                      Summary:    Team met with pt. at bedside. Pt. is alert and oriented and was very pleasant. Pt. discussed feeling weak on her legs. She is from home with 24 hour aides. This SW then spoke with her daughter Velia via phone. Pts daughter is requesting d/c over the weekend as she needs time to prepare for pt. come home to food shop for her low sodium diet etc. This SW explained that it is likely pt. is getting ready for d/c as there is not more that they are doing for her here however, it is up to the Hospitalist to decide on d/c. Pts daughter will speak with floor SW Daron regarding d/c. Pts daughter reports pt. had [facility] and this will be reinstated.    We discussed the option of home palliative care which she is in agreement with. This SW also discussed hospice services for the future if pt. was at a point where she only wanted to focus on comfort. Pts daughter was receptive to this information but is clear pt. is not ready for a comfort focus and hospice services at this time. She is in agreement with home palliative care through [facility]. Floor SW Daron made aware.    This SW confirmed DNR/DNI. Pts daughter repots that this is how it stands currently but pt. does often changes her mind however, at this time pt. is DNR/DNI.     Plan at this time is for pt. to return home with 24 hour aides and [facility]. Emotional support provided. Our team will sign off as pt. is not symptomatic and goals of care are clear.

## 2020-02-06 NOTE — CHART NOTE - NSCHARTNOTEFT_GEN_A_CORE
This SW left message for pts daughter Velia [phone] to schedule a family meeting. Awaiting a call back. Our team will continue to follow.

## 2020-02-06 NOTE — PROGRESS NOTE ADULT - ASSESSMENT
HPI: Pt is a 89y old Female with a PMH Afib (not on AC d/t fall risk), HTN, presents c/o worsening sob. AM. Earlier in the week, pt was not feeling well, having some weakness. Per pt she was having some intermittent "fluttering" in her chest yesterday, but no pain. In the ED when she was found to be hypoxic with desaturation.  Pt started on BIPAP, CXR revealed b/l pulm edema.  She was also in Afib  in 140's. Palliative Medicine Consult to establish GOC as pt has been seen by our service.    2/5/2020 Seen and examined at bedside with no family present. OOB/chair with no C/O pain or dyspnea.     Assessment and Plan:    1) Dyspnea  -Improved  -Supplemental O2 PRN  -CT chest pulm edema and mod effusions  -S/P BiPap support    2) Acute on chronic resp failure  -Pulm edema  -Cardiology eval noted  -Diuresis as tolerated    3) Debility  -OOB/chair  -PT eval  -Poor PO intake  -overall deconditioned    4) Advanced Directives  -Pt with capacity  -MOLST DNR/DNI on chart  -GOC discussion with daughter Velia today via phone with pall SW  -Plan to return home with 24 hr aides and [facility] home care  -Will sign off

## 2020-02-06 NOTE — PROGRESS NOTE ADULT - ASSESSMENT
89F with constipation and fecal impaction; improved with bowel regimen.    Recommend:  -cont lactulose, hold for diarrhea  -reg diet   -palliative care plan noted  -will see as needed, please call with any GI issues

## 2020-02-06 NOTE — PROGRESS NOTE ADULT - SUBJECTIVE AND OBJECTIVE BOX
HPI: Pt is a 89y old Female with a PMH afib (not on AC d/t fall risk), HTN, presents c/o worsening sob. AM. Earlier in the week, pt was not feeling well, having some weakness. Per pt she was having some intermittent "fluttering" in her chest yesterday, but no pain. In the ED when she was found to be hypoxic with desatuartion.  Pt started on BIPAP, CXR revealed b/l pulm edema.  She was also in Afib  in 140's. Palliative medicine Consult to establish GOC as pt has been seen by our service on previous hospitalization.    2/5/2020 Seen and examined at bedside with no family present. OOB/chair with no C/O pain or dyspnea.   2/6/2020 Seen and examined at bedside  with granddaughter present. Pt OOB/chair C/O feeling weak however denies pain or dyspnea.      PAIN: ( X)Yes   ( )No  Level: mild-mod  Location: lower ext  Intensity:   4 /10  Quality: intermittent  Impact on ADLs: mod    DYSPNEA: ( ) Yes  (X ) No  Level: improved at rest    PAST MEDICAL & SURGICAL HISTORY:  HLD (hyperlipidemia)  Glaucoma      SOCIAL HX:  Lives home with  and aide support  Hx opiate tolerance ( )YES  (X )NO    Baseline ADLs  (Prior to Admission)  (X ) Independent   ( )Dependent    FAMILY HISTORY:  No family hx pertinent    Review of Systems:    Anxiety-denies  Depression-situational  Physical Discomfort-mild  Dyspnea-none at present  Constipation-chronic  Diarrhea-denies  Fatigue-denies  Weakness-denies    All other systems reviewed and negative      PHYSICAL EXAM:    ICU Vital Signs Last 24 Hrs  T(C): 36.4 (06 Feb 2020 11:47), Max: 36.8 (05 Feb 2020 21:01)  T(F): 97.5 (06 Feb 2020 11:47), Max: 98.3 (05 Feb 2020 21:01)  HR: 99 (06 Feb 2020 11:47) (69 - 99)  BP: 100/51 (06 Feb 2020 11:47) (92/50 - 116/59)  RR: 16 (06 Feb 2020 11:47) (16 - 18)  SpO2: 96% (06 Feb 2020 11:47) (94% - 97%)    PPSV2: 40 %      General: Elderly female in chair in no distress  Mental Status: A&O X4  HEENT: oral mucosa moist  Lungs: clear to auscultation mile  Cardiac: S1S2+  GI: abd soft NT ND + BS  : voids  Musculoskeletal: TIAN =strength  Neuro: no focal def      LABS:                              12.8   7.84  )-----------( 268      ( 06 Feb 2020 07:27 )             39.7                   02-06    133<L>  |  92<L>  |  21  ----------------------------<  111<H>  3.6   |  34<H>  |  0.90    Ca    8.8      06 Feb 2020 07:27  Phos  3.5     02-06  Mg     2.0     02-06    Albumin: Albumin, Serum: 3.1 g/dL (02-04 @ 21:34)      Allergies    atenolol (Other)  sulfa drugs (Other)    Intolerances      MEDICATIONS  (STANDING):

## 2020-02-06 NOTE — PROGRESS NOTE ADULT - ASSESSMENT
89F w/PMH afib (not on AC d/t fall risk), HTN,        #Acute hypoxic and hypercarbic respiratory Failure due to Acute on Chronic Systolic CHF exacerbation.    S/p BiPAP on admission  Overall improved with lasix IV, tolerates RA   Monitor Pulse ox and supplement with NC PRN. Check O2 on ambulation   BP stable  Hold   IV lasix 20 BID du eto lightheadedness, will check Orthostatic VS   C/w  Coreg   Low dose ACEI   ECHO:  New low EF 20%.    CXR/ CT chest with CHF and b/l effusions.     Will check pulse ox on ambulation  CT abd noted, doubt PNA, no changes from CT  chest    D/w Dr zapata       #Afib with RVR:    Off Cardizem,  stopped with low EF and hypotension.    Cont dig/ coreg for rate control.    HR overall improved     No AC in the past due to fall risk.    No  JENIFFER/ Cardioversion as cannot tolerate AC.    Cardio f/u outPt     #  PNA unlikely   CT chest with CHF/ effusions. No PNA. off ABX.        #Positive trop:    Suspect more demand ischemia related to AFIB / CHF.    No evidence of acute MI.    ASA/ statin/ BB.    No ischemic work up recommended by cardio, c/w medical management       # Constipation, Stool impaction. Improved   C/w  bowel regiment  S/p enema , started on LActilose   As per RN had multiple BMs   CT  reviewed      # leukocytosis, resolved   Likely reactive  Monitor for fevers  Repeat labs in am        #DVT proph:  heparin SQ.      #Code Status:  DNR/ DNI    Palliative team eval     DISPO:  C/w current  care, check Pulse ox on ambulation in am. D/c planning home with HC  when stable

## 2020-02-06 NOTE — PROGRESS NOTE ADULT - SUBJECTIVE AND OBJECTIVE BOX
CC: sob (03 Feb 2020 09:40)    HPI: 89F w/PMH afib (not on AC d/t fall risk), HTN, presents c/o worsening sob over the past 1day, worse this AM.  Pt is AAOX3 and offers her own history and family at bedside graciously assisting w/ further history.  Earlier in the week, pt was not feeling well, having some weakness.  Yesterday, per family, her visiting NP stated "shes stable" but to consider further medical eval.  Per pt she was having some intermittent "fluttering" in her chest yesterday, but no pain.  At about 0600 today, she was severely sob and brought into ED when she was found to be hypoxic w/ sats in low 80's.  Pt started on BIPAP, cxr revealed b/l pulm edema, given lasix IV.  She was also in Afib rvr in 140's and given dilt 10mg IV x 1.  At the time of my exam, pt felt much better, pleasant and able to complete sentences, -120's.      INTERVAL HPI/ OVERNIGHT EVENTS:  Pt was seen and examined,  reports feeling dizzy today, but no SOB. Grand daughter and Palliative team at bedside     Vital Signs Last 24 Hrs  T(C): 36.4 (06 Feb 2020 11:47), Max: 36.8 (05 Feb 2020 21:01)  T(F): 97.5 (06 Feb 2020 11:47), Max: 98.3 (05 Feb 2020 21:01)  HR: 99 (06 Feb 2020 11:47) (69 - 99)  BP: 100/51 (06 Feb 2020 11:47) (92/50 - 116/59)  RR: 16 (06 Feb 2020 11:47) (16 - 18)  SpO2: 96% (06 Feb 2020 11:47) (94% - 97%)      REVIEW OF SYSTEMS:  All other review of systems is negative unless indicated above.        PHYSICAL EXAM:  General: Well developed; malnourished;  in no acute distress, on RA  Eyes: PERRLA, EOMI; conjunctiva and sclera clear  Head: Normocephalic; atraumatic  ENMT: No nasal discharge; airway clear  Neck: Supple;  no masses  Respiratory: Decreased BS at bases. No wheezes, rales or rhonchi  Cardiovascular: Irregular rate and rhythm. + S1 and S2 Normal;   Gastrointestinal: Soft non-tender non-distended; Normal bowel sounds  Genitourinary: No  suprapubic  tenderness  Extremities: +  edema, improved   Vascular: Peripheral pulses palpable 2+ bilaterally  Neurological: Alert and oriented x2, non focal, baseline confused   Skin: Warm and dry. No acute rash   Lymph Nodes: No acute cervical adenopathy  Musculoskeletal: Normal muscle tone, no joint effusion or erythema   Psychiatric: Cooperative       LABS:                         12.8   7.84  )-----------( 268      ( 06 Feb 2020 07:27 )             39.7     02-06    133<L>  |  92<L>  |  21  ----------------------------<  111<H>  3.6   |  34<H>  |  0.90    Ca    8.8      06 Feb 2020 07:27  Phos  3.5     02-06  Mg     2.0     02-06          02-04    139  |  100  |  19  ----------------------------<  97  3.6   |  36<H>  |  0.62    Ca    9.0      04 Feb 2020 07:02        03 Feb 2020 07:08    138    |  104    |  21     ----------------------------<  92     3.8     |  30     |  0.61     Ca    8.7        03 Feb 2020 07:08      MEDICATIONS  (STANDING):  aspirin  chewable 81 milliGRAM(s) Oral daily  atorvastatin 10 milliGRAM(s) Oral at bedtime  carvedilol 3.125 milliGRAM(s) Oral every 12 hours  digoxin     Tablet 0.125 milliGRAM(s) Oral daily  enalapril 2.5 milliGRAM(s) Oral every 12 hours  heparin  Injectable 5000 Unit(s) SubCutaneous two times a day  lactulose Syrup 10 Gram(s) Oral two times a day  senna 2 Tablet(s) Oral at bedtime    MEDICATIONS  (PRN):  ondansetron Injectable 4 milliGRAM(s) IV Push every 6 hours PRN Nausea      RADIOLOGY & ADDITIONAL TESTS:    EXAM:  CT CHEST                        PROCEDURE DATE:  02/01/2020    FINDINGS:    LUNGS, AIRWAYS: The central airways are patent. pulmonary edema.    PLEURA: MOderate effusions.    VESSELS: Aortic atherosclerosis without aneurysm.    HEART: Big heart size. No pericardial effusion. Coronary and mitral annular calcification.    MEDIASTINUM AND SAUMYA: No adenopathy.    UPPER ABDOMEN: Limited visualization is unremarkable.    BONES AND SOFT TISSUES: No acute bony abnormality.    IMPRESSION:   Pulmonary edema and moderate effusions.      < from: CT Abdomen and Pelvis No Cont (02.04.20 @ 22:09) >    EXAM:  CT ABDOMEN AND PELVIS                            PROCEDURE DATE:  02/04/2020          INTERPRETATION:  CLINICAL INFORMATION: Acute abdominal pain    COMPARISON: October 15, 2017    PROCEDURE:   CT of the Abdomen and Pelvis was performed without intravenous contrast.   Intravenous contrast: None.  Oral contrast: None.  Sagittal and coronal reformats were performed.    FINDINGS:    LOWER CHEST: New bilateral pleural effusions, moderate in size. Bilateral lower lobe consolidation including groundglass opacity. Increased groundglass opacities also seen in the posterior aspect of the RIGHT middle lobe.  Coronary artery calcifications as well as aortic root calcifications. Calcification of the LEFT ventricular papillary muscle.    LIVER: Stable hypodense lesion in the LEFT lobe of the liver with central chunky calcification.  BILE DUCTS: Normal caliber.  GALLBLADDER: Elongated distended gallbladder without change from prior study. No evidence of acute cholecystitis.  SPLEEN: Within normal limits.  PANCREAS: Within normal limits.  ADRENALS: Within normal limits.  KIDNEYS/URETERS: Stable RIGHT renal cyst. Stable mild LEFT hydronephrosis and hydroureter. Phlebolith adjacent to the mid LEFT ureter (2-56).    BLADDER: Unremarkable, partially obscured by artifact from RIGHT hip arthroplasty.  REPRODUCTIVE ORGANS: No uterine or adnexal masses.    BOWEL: Large amount of fecal material within the rectal vault with mild rectal wall thickening and perirectal fat stranding suggestive of stercoral colitis. Moderate stool burden throughout the remainder of the colon. Diverticulosis of the sigmoid colon without acute diverticulitis.  LEFT inguinal hernia containing a loop of bowel, likely small bowel. No evidence of obstruction. Evaluation limited by lack of oral contrast material.  Mild gastric wall thickening may represent mild gastritis.  Appendix is not visualized. No evidence of inflammation in the pericecal region.  PERITONEUM: Diffuse increased fat stranding of the mesentery without focal lesion.  VESSELS: Diffuse atherosclerotic disease of the aorta. Evaluation limited by lack of IV contrast material.  RETROPERITONEUM/LYMPH NODES: No lymphadenopathy.    ABDOMINAL WALL: LEFT inguinal hernia containing a loop of bowel, likely small bowel.  BONES: There is a new compression fracture of L3 when compared to the prior study of October 15, 2017. There is mild retropulsion of the superior aspect with narrowing of the thecal sac. Stable compression deformity of L1 without change from prior study.    IMPRESSION:     1.  Rectum distended by significant stool with perirectal fat stranding suggesting stercoral colitis.  2.  LEFT inguinal hernia containing a nonobstructed nondilated loop of small bowel.  3.  L3 compression fracture which is new from October 15, 2017 but reported on prior CT of the lumbar spine from September 21, 2018. There has been further loss of height when compared to the 2018 study.  4.  Bilateral pleural effusions with bilateral lower lobe consolidation.

## 2020-02-06 NOTE — PROGRESS NOTE ADULT - SUBJECTIVE AND OBJECTIVE BOX
GI     HPI:  sleeping comfortably  notes reviewed  had several BMs  comfortable  planning on pall care going forward    Review of Systems:   not obtained, sleeping    PHYSICAL EXAM:  Vital Signs Last 24 Hrs  T(C): 36.4 (06 Feb 2020 22:12), Max: 36.4 (06 Feb 2020 05:27)  T(F): 97.5 (06 Feb 2020 22:12), Max: 97.6 (06 Feb 2020 05:27)  HR: 85 (06 Feb 2020 22:12) (78 - 99)  BP: 102/55 (06 Feb 2020 22:12) (100/51 - 105/65)  RR: 16 (06 Feb 2020 22:12) (16 - 17)  SpO2: 97% (06 Feb 2020 22:12) (94% - 97%)    GENERAL: NAD, FRAIL   EYES: EOMI, PERRLA, conjunctiva and sclera clear  ENT: normal hearing  NECK: Supple, No JVD, no LAD, no thyromegaly   CHEST/LUNG: No wheeze, respirations unlabored  HEART: IRREG IRREG  ABDOMEN: Soft, Nontender, Nondistended; Bowel sounds present, no HSM  EXTREMITIES:  2+ Peripheral Pulses, No clubbing, cyanosis, or edema  NEUROLOGY: non-focal  SKIN: No visible rashes or lesions    LABS:                                             AXR shows stool throughout colon, reviewed in PACS

## 2020-02-07 ENCOUNTER — TRANSCRIPTION ENCOUNTER (OUTPATIENT)
Age: 85
End: 2020-02-07

## 2020-02-07 VITALS
SYSTOLIC BLOOD PRESSURE: 102 MMHG | HEART RATE: 89 BPM | RESPIRATION RATE: 16 BRPM | DIASTOLIC BLOOD PRESSURE: 44 MMHG | OXYGEN SATURATION: 96 % | TEMPERATURE: 98 F

## 2020-02-07 LAB
ANION GAP SERPL CALC-SCNC: 6 MMOL/L — SIGNIFICANT CHANGE UP (ref 5–17)
BUN SERPL-MCNC: 30 MG/DL — HIGH (ref 7–23)
CALCIUM SERPL-MCNC: 8.5 MG/DL — SIGNIFICANT CHANGE UP (ref 8.5–10.1)
CHLORIDE SERPL-SCNC: 96 MMOL/L — SIGNIFICANT CHANGE UP (ref 96–108)
CO2 SERPL-SCNC: 33 MMOL/L — HIGH (ref 22–31)
CREAT SERPL-MCNC: 0.79 MG/DL — SIGNIFICANT CHANGE UP (ref 0.5–1.3)
GLUCOSE SERPL-MCNC: 93 MG/DL — SIGNIFICANT CHANGE UP (ref 70–99)
NT-PROBNP SERPL-SCNC: 4052 PG/ML — HIGH (ref 0–450)
POTASSIUM SERPL-MCNC: 3.6 MMOL/L — SIGNIFICANT CHANGE UP (ref 3.5–5.3)
POTASSIUM SERPL-SCNC: 3.6 MMOL/L — SIGNIFICANT CHANGE UP (ref 3.5–5.3)
SODIUM SERPL-SCNC: 135 MMOL/L — SIGNIFICANT CHANGE UP (ref 135–145)

## 2020-02-07 PROCEDURE — 99239 HOSP IP/OBS DSCHRG MGMT >30: CPT

## 2020-02-07 RX ORDER — CARVEDILOL PHOSPHATE 80 MG/1
1 CAPSULE, EXTENDED RELEASE ORAL
Qty: 60 | Refills: 0
Start: 2020-02-07 | End: 2020-03-07

## 2020-02-07 RX ORDER — DILTIAZEM HCL 120 MG
1 CAPSULE, EXT RELEASE 24 HR ORAL
Qty: 0 | Refills: 0 | DISCHARGE

## 2020-02-07 RX ORDER — ATENOLOL 25 MG/1
0 TABLET ORAL
Qty: 0 | Refills: 0 | DISCHARGE

## 2020-02-07 RX ORDER — DIGOXIN 250 MCG
1 TABLET ORAL
Qty: 30 | Refills: 0
Start: 2020-02-07 | End: 2020-03-07

## 2020-02-07 RX ORDER — SENNA PLUS 8.6 MG/1
2 TABLET ORAL
Qty: 0 | Refills: 0 | DISCHARGE
Start: 2020-02-07

## 2020-02-07 RX ADMIN — Medication 2.5 MILLIGRAM(S): at 18:44

## 2020-02-07 RX ADMIN — HEPARIN SODIUM 5000 UNIT(S): 5000 INJECTION INTRAVENOUS; SUBCUTANEOUS at 05:33

## 2020-02-07 RX ADMIN — CARVEDILOL PHOSPHATE 3.12 MILLIGRAM(S): 80 CAPSULE, EXTENDED RELEASE ORAL at 05:32

## 2020-02-07 RX ADMIN — Medication 2.5 MILLIGRAM(S): at 05:33

## 2020-02-07 RX ADMIN — ATORVASTATIN CALCIUM 10 MILLIGRAM(S): 80 TABLET, FILM COATED ORAL at 18:43

## 2020-02-07 RX ADMIN — CARVEDILOL PHOSPHATE 3.12 MILLIGRAM(S): 80 CAPSULE, EXTENDED RELEASE ORAL at 18:43

## 2020-02-07 RX ADMIN — Medication 81 MILLIGRAM(S): at 12:55

## 2020-02-07 RX ADMIN — Medication 0.12 MILLIGRAM(S): at 05:32

## 2020-02-07 NOTE — DISCHARGE NOTE PROVIDER - CARE PROVIDER_API CALL
Horacio Villa)  Cardiovascular Disease; Internal Medicine; Nuclear Cardiology  [address]  [place] [address]  Phone: [phone]  Fax: [phone]  Follow Up Time: 1 week    Ingrid Manley)  Internal Medicine  [address], [address]  [place] [address]  Phone: [phone]  Fax: [phone]  Follow Up Time: 1 week

## 2020-02-07 NOTE — DISCHARGE NOTE PROVIDER - HOSPITAL COURSE
89F w/PMH afib (not on AC d/t fall risk), HTN, presents c/o worsening sob over the past 1day, worse this AM.  Pt is AAOX3 and offers her own history and family at bedside graciously assisting w/ further history.  Earlier in the week, pt was not feeling well, having some weakness.  Yesterday, per family, her visiting NP stated "shes stable" but to consider further medical eval.  Per pt she was having some intermittent "fluttering" in her chest yesterday, but no pain.  At about 0600 today, she was severely sob and brought into ED when she was found to be hypoxic w/ sats in low 80's.  Pt started on BIPAP, cxr revealed b/l pulm edema, given lasix IV.  She was also in Afib rvr in 140's and given dilt 10mg IV x 1.  At the time of my exam, pt felt much better, pleasant and able to complete sentences, -120's.        Vital Signs Last 24 Hrs    T(C): 36.9 (07 Feb 2020 11:17), Max: 36.9 (07 Feb 2020 11:17)    T(F): 98.4 (07 Feb 2020 11:17), Max: 98.4 (07 Feb 2020 11:17)    HR: 78 (07 Feb 2020 05:57) (78 - 85)    BP: 123/62 (07 Feb 2020 05:57) (102/55 - 123/62)    RR: 16 (07 Feb 2020 11:17) (16 - 16)    SpO2: 97% (07 Feb 2020 11:17) (94% - 97%)        PHYSICAL EXAM:    General: Well developed; malnourished;  in no acute distress, on RA    Eyes: PERRLA, EOMI; conjunctiva and sclera clear    Head: Normocephalic; atraumatic    ENMT: No nasal discharge; airway clear    Neck: Supple;  no masses    Respiratory: Decreased BS at bases. No wheezes, rales or rhonchi    Cardiovascular: Irregular rate and rhythm. + S1 and S2 Normal;     Gastrointestinal: Soft non-tender non-distended; Normal bowel sounds    Genitourinary: No  suprapubic  tenderness    Extremities: +  edema, improved     Vascular: Peripheral pulses palpable 2+ bilaterally    Neurological: Alert and oriented x2, non focal, baseline confused     Skin: Warm and dry. No acute rash     Lymph Nodes: No acute cervical adenopathy    Musculoskeletal: Normal muscle tone, no joint effusion or erythema     Psychiatric: Cooperative 89F w/PMH afib (not on AC d/t fall risk), HTN, presents c/o worsening sob over the past 1day, worse this AM.  Pt is AAOX3 and offers her own history and family at bedside graciously assisting w/ further history.  Earlier in the week, pt was not feeling well, having some weakness.  Yesterday, per family, her visiting NP stated "shes stable" but to consider further medical eval.  Per pt she was having some intermittent "fluttering" in her chest yesterday, but no pain.  At about 0600 today, she was severely sob and brought into ED when she was found to be hypoxic w/ sats in low 80's.  Pt started on BIPAP, cxr revealed b/l pulm edema, given lasix IV.  She was also in Afib rvr in 140's and given dilt 10mg IV x 1.  At the time of my exam, pt felt much better, pleasant and able to complete sentences, -120's.    Pt was Tx with Iv LAsix, repeat imaging with improvement, repository status improved. Pt was evaluated for home O2 but did not qualify. pulse ox o ambulation above 90%     Also Pts AFIB meds adjusted by cardiology, now off Cardizem , HR controlled on Coreg and Dig.     Hospital course significant for  constipation, had imaging done + stool impaction. Was seen by GI,, managed with enema and lactulose. Now has  multiple loose stools, lactulose stopped.     Today Pt reports feels well, just some skin irritation from multiple BMs. Pt denies SOB or dizziness.  D/c planning discussed         Vital Signs Last 24 Hrs    T(C): 36.9 (07 Feb 2020 11:17), Max: 36.9 (07 Feb 2020 11:17)    T(F): 98.4 (07 Feb 2020 11:17), Max: 98.4 (07 Feb 2020 11:17)    HR: 78 (07 Feb 2020 05:57) (78 - 85)    BP: 123/62 (07 Feb 2020 05:57) (102/55 - 123/62)    RR: 16 (07 Feb 2020 11:17) (16 - 16)    SpO2: 97% (07 Feb 2020 11:17) (94% - 97%)        PHYSICAL EXAM:    General: Well developed; malnourished;  in no acute distress, on RA    Eyes: PERRLA, EOMI; conjunctiva and sclera clear    Head: Normocephalic; atraumatic    ENMT: No nasal discharge; airway clear    Neck: Supple;  no masses    Respiratory: Decreased BS at bases. No wheezes, rales or rhonchi    Cardiovascular: Irregular rate and rhythm. + S1 and S2 Normal;     Gastrointestinal: Soft non-tender non-distended; Normal bowel sounds    Genitourinary: No  suprapubic  tenderness    Extremities:  edema improved     Vascular: Peripheral pulses palpable 2+ bilaterally    Neurological: Alert and oriented x2, non focal, baseline confused     Skin: Warm and dry. No acute rash     Lymph Nodes: No acute cervical adenopathy    Musculoskeletal: Normal muscle tone, no joint effusion or erythema     Psychiatric: Cooperative         89F w/PMH afib (not on AC d/t fall risk), HTN,              #Acute hypoxic and hypercarbic respiratory Failure due to Acute on Chronic Systolic CHF exacerbation.      S/p BiPAP on admission    Overall improved with lasix IV, tolerates RA     Monitor Pulse ox and supplement with NC PRN. Check O2 on ambulation     BP stable    Hold   IV lasix 20 BID, change to PO 20mg QD    checked  Orthostatic VS, negative     C/w  Coreg     Low dose ACEI     ECHO:  New low EF 20%.      CXR/ CT chest with CHF and b/l effusions, no PNA .       D/w Dr zapata agrees with D/c, will follow outPt             #Afib with RVR:      HR stable now     Off Cardizem,  stopped with low EF and hypotension.      Cont dig/ coreg for rate control.      HR overall improved       No AC in the past due to fall risk.      No  JENIFFER/ Cardioversion as cannot tolerate AC.      Cardio f/u outPt         #  PNA unlikely     CT chest with CHF/ effusions. No PNA. off ABX.              #Positive trop:      Suspect more demand ischemia related to AFIB / CHF.      No evidence of acute MI.      ASA/ statin/ BB.      No ischemic work up recommended by cardio, c/w medical management             # Constipation, Stool impaction. Resolved     C/w  bowel regiment PRN     S/p enema , started on LActilose     As per RN had multiple BMs                 # leukocytosis, resolved     Likely reactive    Monitor for fevers    Repeat labs in am              #DVT proph:  heparin SQ.          #Code Status:  DNR/ DNI.         Palliative team eval appreciated, plan to D/c home with PAlliative care         DISPO:  Stable to d/c home today         Total; tome 36 min         Fax d/c summary to PCP

## 2020-02-07 NOTE — PROGRESS NOTE ADULT - ASSESSMENT
- hypoxemic respiratory failure with the new diffuse bilateral perihilar lung infiltrates likely secondary to pulmonary edema . patient clinically improving off the 02   - bilateral pleural effusions with the chf mild to moderate so far no acute symptoms    - afib with the RVR with the rate better controlled   - systolic HF with the low EF     PLAN       - from pulmonary stand point patient remained stable with out need for the thoracentesis   - optimize chf management as per the cardiology .  - might consider low dose diuretic as an out patient once symptoms of dizziness is  resolved   - patient moving the bowels now and follow up with the G.I

## 2020-02-07 NOTE — PROVIDER CONTACT NOTE (OTHER) - SITUATION
Notified Dr. Marcos office regarding consult spoke with Morteza from answering service.
Notified Dr. Villa office regarding consult spoke with Jael from answering service.
spoke with Magi, appt will be 2/11/2020 @ 12:30pm with Dr. Villa. charted in patients discharge papers, Tasia andres.
spoke with Melita, office made aware of consult

## 2020-02-07 NOTE — DISCHARGE NOTE PROVIDER - NSDCMRMEDTOKEN_GEN_ALL_CORE_FT
aspirin 81 mg oral tablet: 1 tab(s) orally once a day  atenolol:   atorvastatin 10 mg oral tablet: 1 tab(s) orally once a day  DilTIAZem Hydrochloride  mg/24 hours oral capsule, extended release: 1 cap(s) orally once a day  famotidine 20 mg oral tablet: 1 tab(s) orally 2 times a day  furosemide 20 mg oral tablet: 1 tab(s) orally once a day  potassium chloride 10 mEq oral capsule, extended release: 1 cap(s) orally once a day  Vitamin D3 5000 intl units (125 mcg) oral tablet: 1 tab(s) orally once a day aspirin 81 mg oral tablet: 1 tab(s) orally once a day  atorvastatin 10 mg oral tablet: 1 tab(s) orally once a day  famotidine 20 mg oral tablet: 1 tab(s) orally 2 times a day  furosemide 20 mg oral tablet: 1 tab(s) orally once a day  MiraLax oral powder for reconstitution: 17 gram(s) orally once a day, as needed   potassium chloride 10 mEq oral capsule, extended release: 1 cap(s) orally once a day  senna oral tablet: 2 tab(s) orally once a day (at bedtime)  Vitamin D3 5000 intl units (125 mcg) oral tablet: 1 tab(s) orally once a day aspirin 81 mg oral tablet: 1 tab(s) orally once a day  atorvastatin 10 mg oral tablet: 1 tab(s) orally once a day  carvedilol 3.125 mg oral tablet: 1 tab(s) orally every 12 hours  digoxin 125 mcg (0.125 mg) oral tablet: 1 tab(s) orally once a day  enalapril 2.5 mg oral tablet: 1 tab(s) orally every 12 hours  famotidine 20 mg oral tablet: 1 tab(s) orally 2 times a day  furosemide 20 mg oral tablet: 1 tab(s) orally once a day  MiraLax oral powder for reconstitution: 17 gram(s) orally once a day, as needed   potassium chloride 10 mEq oral capsule, extended release: 1 cap(s) orally once a day  senna oral tablet: 2 tab(s) orally once a day (at bedtime)  Vitamin D3 5000 intl units (125 mcg) oral tablet: 1 tab(s) orally once a day

## 2020-02-07 NOTE — DISCHARGE NOTE PROVIDER - NSDCCPCAREPLAN_GEN_ALL_CORE_FT
PRINCIPAL DISCHARGE DIAGNOSIS  Diagnosis: CHF (congestive heart failure)  Assessment and Plan of Treatment: c/w lasix orally  monitor weight  low sodium diet   F/u with CArdiology

## 2020-02-07 NOTE — DISCHARGE NOTE PROVIDER - PROVIDER TOKENS
PROVIDER:[TOKEN:[[id number]],FOLLOWUP:[1 week]],PROVIDER:[TOKEN:[[id number]],FOLLOWUP:[1 week]]

## 2020-02-07 NOTE — PROGRESS NOTE ADULT - SUBJECTIVE AND OBJECTIVE BOX
SUBJECTIVE     Patient breathing comfortably with out sob or cough and wheezing   lasix was held yesterday due to the dizziness   evaluated by the palliative care   moving the bowels now     PAST MEDICAL & SURGICAL HISTORY:  HLD (hyperlipidemia)  Glaucoma    OBJECTIVE   Vital Signs Last 24 Hrs  T(C): 36.5 (07 Feb 2020 05:57), Max: 36.5 (07 Feb 2020 05:57)  T(F): 97.7 (07 Feb 2020 05:57), Max: 97.7 (07 Feb 2020 05:57)  HR: 78 (07 Feb 2020 05:57) (78 - 99)  BP: 123/62 (07 Feb 2020 05:57) (100/51 - 123/62)  BP(mean): --  RR: 16 (07 Feb 2020 05:57) (16 - 16)  SpO2: 94% (07 Feb 2020 05:57) (94% - 97%)    PHYSICAL EXAM:  Constitutional: , awake and alert, not in distress.  HEENT: Normo cephalic atraumatic  Neck: Soft and supple, No J.V.D   Respiratory: vesicular breathing has mildly decreased breath sounds in the bases   Cardiovascular: S1 and S2, regular rate .   Gastrointestinal:  soft, nontender,   Extremities: No  edema or calf tenderness .  Neurological: No new  focal deficits.    MEDICATIONS  (STANDING):  aspirin  chewable 81 milliGRAM(s) Oral daily  atorvastatin 10 milliGRAM(s) Oral at bedtime  carvedilol 3.125 milliGRAM(s) Oral every 12 hours  digoxin     Tablet 0.125 milliGRAM(s) Oral daily  enalapril 2.5 milliGRAM(s) Oral every 12 hours  heparin  Injectable 5000 Unit(s) SubCutaneous two times a day  lactulose Syrup 10 Gram(s) Oral two times a day  senna 2 Tablet(s) Oral at bedtime                            12.8   7.84  )-----------( 268      ( 06 Feb 2020 07:27 )             39.7     02-07    135  |  96  |  30<H>  ----------------------------<  93  3.6   |  33<H>  |  0.79    Ca    8.5      07 Feb 2020 07:12  Phos  3.5     02-06  Mg     2.0     02-06

## 2020-02-11 LAB — DIGITOXIN SERPL-MCNC: SIGNIFICANT CHANGE UP NG/ML

## 2020-02-12 DIAGNOSIS — Z88.2 ALLERGY STATUS TO SULFONAMIDES: ICD-10-CM

## 2020-02-12 DIAGNOSIS — I11.0 HYPERTENSIVE HEART DISEASE WITH HEART FAILURE: ICD-10-CM

## 2020-02-12 DIAGNOSIS — Z88.8 ALLERGY STATUS TO OTHER DRUGS, MEDICAMENTS AND BIOLOGICAL SUBSTANCES STATUS: ICD-10-CM

## 2020-02-12 DIAGNOSIS — Z66 DO NOT RESUSCITATE: ICD-10-CM

## 2020-02-12 DIAGNOSIS — E44.0 MODERATE PROTEIN-CALORIE MALNUTRITION: ICD-10-CM

## 2020-02-12 DIAGNOSIS — I50.23 ACUTE ON CHRONIC SYSTOLIC (CONGESTIVE) HEART FAILURE: ICD-10-CM

## 2020-02-12 DIAGNOSIS — J96.02 ACUTE RESPIRATORY FAILURE WITH HYPERCAPNIA: ICD-10-CM

## 2020-02-12 DIAGNOSIS — K59.00 CONSTIPATION, UNSPECIFIED: ICD-10-CM

## 2020-02-12 DIAGNOSIS — I48.91 UNSPECIFIED ATRIAL FIBRILLATION: ICD-10-CM

## 2020-02-12 DIAGNOSIS — H40.9 UNSPECIFIED GLAUCOMA: ICD-10-CM

## 2020-02-12 DIAGNOSIS — J96.01 ACUTE RESPIRATORY FAILURE WITH HYPOXIA: ICD-10-CM

## 2020-02-12 DIAGNOSIS — I24.8 OTHER FORMS OF ACUTE ISCHEMIC HEART DISEASE: ICD-10-CM

## 2020-02-12 DIAGNOSIS — I25.10 ATHEROSCLEROTIC HEART DISEASE OF NATIVE CORONARY ARTERY WITHOUT ANGINA PECTORIS: ICD-10-CM

## 2020-02-12 DIAGNOSIS — R29.6 REPEATED FALLS: ICD-10-CM

## 2020-02-12 DIAGNOSIS — E87.1 HYPO-OSMOLALITY AND HYPONATREMIA: ICD-10-CM

## 2020-02-12 DIAGNOSIS — E78.5 HYPERLIPIDEMIA, UNSPECIFIED: ICD-10-CM

## 2020-02-18 DIAGNOSIS — Z86.69 PERSONAL HISTORY OF OTHER DISEASES OF THE NERVOUS SYSTEM AND SENSE ORGANS: ICD-10-CM

## 2020-02-18 DIAGNOSIS — Z86.39 PERSONAL HISTORY OF OTHER ENDOCRINE, NUTRITIONAL AND METABOLIC DISEASE: ICD-10-CM

## 2020-02-18 DIAGNOSIS — Z86.79 PERSONAL HISTORY OF OTHER DISEASES OF THE CIRCULATORY SYSTEM: ICD-10-CM

## 2020-02-18 DIAGNOSIS — Z87.19 PERSONAL HISTORY OF OTHER DISEASES OF THE DIGESTIVE SYSTEM: ICD-10-CM

## 2020-02-20 ENCOUNTER — APPOINTMENT (OUTPATIENT)
Age: 85
End: 2020-02-20
Payer: MEDICARE

## 2020-02-20 DIAGNOSIS — Z86.79 PERSONAL HISTORY OF OTHER DISEASES OF THE CIRCULATORY SYSTEM: ICD-10-CM

## 2020-02-20 DIAGNOSIS — I50.9 HEART FAILURE, UNSPECIFIED: ICD-10-CM

## 2020-02-20 PROCEDURE — 99496 TRANSJ CARE MGMT HIGH F2F 7D: CPT

## 2020-02-21 VITALS
RESPIRATION RATE: 20 BRPM | SYSTOLIC BLOOD PRESSURE: 126 MMHG | HEART RATE: 64 BPM | DIASTOLIC BLOOD PRESSURE: 64 MMHG | OXYGEN SATURATION: 97 % | WEIGHT: 106 LBS | HEIGHT: 64 IN | BODY MASS INDEX: 18.1 KG/M2

## 2020-02-21 PROBLEM — I50.9 ACUTE CONGESTIVE HEART FAILURE, UNSPECIFIED HEART FAILURE TYPE: Status: ACTIVE | Noted: 2020-02-18

## 2020-02-21 PROBLEM — Z86.79 HISTORY OF ATRIAL FIBRILLATION: Status: RESOLVED | Noted: 2020-02-18 | Resolved: 2020-02-21

## 2020-02-21 RX ORDER — ASPIRIN ENTERIC COATED TABLETS 81 MG 81 MG/1
81 TABLET, DELAYED RELEASE ORAL DAILY
Qty: 100 | Refills: 2 | Status: ACTIVE | COMMUNITY
Start: 2020-02-18

## 2020-02-21 RX ORDER — POTASSIUM CHLORIDE 750 MG/1
10 CAPSULE, EXTENDED RELEASE ORAL
Qty: 30 | Refills: 5 | Status: ACTIVE | COMMUNITY
Start: 2020-02-18

## 2020-02-21 RX ORDER — SENNOSIDES 8.6 MG TABLETS 8.6 MG/1
8.6 TABLET ORAL
Qty: 180 | Refills: 1 | Status: ACTIVE | COMMUNITY
Start: 2020-02-18

## 2020-02-21 RX ORDER — DIGOXIN 125 UG/1
125 TABLET ORAL DAILY
Qty: 90 | Refills: 2 | Status: DISCONTINUED | COMMUNITY
Start: 2020-02-18 | End: 2020-02-21

## 2020-02-21 RX ORDER — FAMOTIDINE 20 MG/1
20 TABLET, FILM COATED ORAL DAILY
Refills: 0 | Status: ACTIVE | COMMUNITY
Start: 2020-02-18

## 2020-02-21 RX ORDER — ATORVASTATIN CALCIUM 10 MG/1
10 TABLET, FILM COATED ORAL
Qty: 1 | Refills: 1 | Status: ACTIVE | COMMUNITY
Start: 2020-02-18

## 2020-02-21 NOTE — PLAN
[FreeTextEntry1] : CHF\par DIscussed importance of weighing patient daily if possible with assistance. Advise to call for weight gain greater than 2 lbs in a day or 5 lbs in a week. HHA will d/w daughter as she does not know where scale is in the house.\par Adhere to low salt diet and educated patient on foods that should be  avoided such as processed or fast food.\par Limit fluids to 1 liter a day which is 4-5 glasses.\par Continue medications  as ordered. Discussed importance and Lasix.\par Follow up with Cardiologist with appt and BW results.\par \par Afib\par Rate well controlled today, c/w current medications including diltiazem and aspirin\par .\par TCM program reinforced and 24/7 contact number of CN provided. Pt advised to call for any worsening symptoms, questions or concerns. Pt verbalized understanding.\par

## 2020-02-21 NOTE — PHYSICAL EXAM
[No Acute Distress] : no acute distress [Supple] : supple [Normal Voice/Communication] : normal voice/communication [No Accessory Muscle Use] : no accessory muscle use [No Respiratory Distress] : no respiratory distress  [Clear to Auscultation] : lungs were clear to auscultation bilaterally [Normal Rate] : normal rate  [Irregularly Irregular] : irregularly irregular [Normal S1, S2] : normal S1 and S2 [Pedal Pulses Present] : the pedal pulses are present [III] : a grade 3 [No Varicosities] : no varicosities [No Extremity Clubbing/Cyanosis] : no extremity clubbing/cyanosis [No Edema] : there was no peripheral edema [Soft] : abdomen soft [Non-distended] : non-distended [Non Tender] : non-tender [Normal Bowel Sounds] : normal bowel sounds [No Rash] : no rash [No Skin Lesions] : no skin lesions [Normal] : the cranial nerves were intact [Limited Balance] : the patient's balance was impaired [Speech Grossly Normal] : speech grossly normal [Normal Affect] : the affect was normal [Normal Mood] : the mood was normal [Normal Insight/Judgement] : insight and judgment were intact [de-identified] : elderly, frail

## 2020-02-21 NOTE — PHYSICAL EXAM
[No Acute Distress] : no acute distress [Normal Voice/Communication] : normal voice/communication [Supple] : supple [No Accessory Muscle Use] : no accessory muscle use [Clear to Auscultation] : lungs were clear to auscultation bilaterally [No Respiratory Distress] : no respiratory distress  [Normal Rate] : normal rate  [Irregularly Irregular] : irregularly irregular [Normal S1, S2] : normal S1 and S2 [Pedal Pulses Present] : the pedal pulses are present [No Varicosities] : no varicosities [III] : a grade 3 [No Extremity Clubbing/Cyanosis] : no extremity clubbing/cyanosis [No Edema] : there was no peripheral edema [Non-distended] : non-distended [Non Tender] : non-tender [Soft] : abdomen soft [Normal Bowel Sounds] : normal bowel sounds [No Skin Lesions] : no skin lesions [No Rash] : no rash [Normal] : the cranial nerves were intact [Limited Balance] : the patient's balance was impaired [Speech Grossly Normal] : speech grossly normal [Normal Mood] : the mood was normal [Normal Affect] : the affect was normal [Normal Insight/Judgement] : insight and judgment were intact [de-identified] : elderly, frail

## 2020-02-21 NOTE — HISTORY OF PRESENT ILLNESS
[Spouse] : spouse [Formal Caregiver] : formal caregiver [FreeTextEntry1] : Follow up visit for recent hospitalization at [facility] for CHF 1/31/20 -2/7/20..  Pt is temporarily unable to leave home as it requires a considerable and taxing effort, currently exhibits an unsteady gait and requires assistive device to leave home.  \par \par  [de-identified] :  89F w/PMH afib (not on AC d/t fall risk), HTN, presented to [facility] on 1/31/20 for c/ SOB and weakness and  intermittent "fluttering" in her chest. In the ED when she was found to be hypoxic w/ sats in low 80's. Pt started on BIPAP, cxr revealed b/l pulm edema, given lasix IV. She was also in Afib rvr in 140's and was treated with Diltiazem IV with good response.  Pt was Tx with Iv LAsix, repeat imaging with improvement, repository status improved. Pt was evaluated for home O2 but did not qualify. pulse ox o ambulation above 90% \par Also Pts AFIB meds adjusted by cardiology, now off Cardizem , HR controlled on Coreg and Digoxin. She was medically cleared for discharge on 2/7/20. Upon entering home, pt is resting in recliner chair watching TV. She lives with her  who is bedbound and non verbal. Both the patient and  have a 24/hr a day HHA and both are present during the visit. Pt appears pleasant and comfortable. SHe reports that she is feeling much better since hospitalization but she is still fatigued. She denies CP, SOB, increasing weight or edema, fever, N/V/C/D. SHe reports her appetite has been good and she is eating and drinking without difficulty. She requires assistance and DME for ambulation. HHA prepares meals and walks with her for toileting and assists with all ADLs. The daughter Velia is very involved in her care but does not live in home. HHA reports she does not weigh her daily for safety. She cannot step up on scale and stand without assistance. Discussed adhering to a low salt diet and fluid restriction of 1 liter. She follows up with Dr Villa. According to daughter Velia, pt has been confused at times, worse at night. She d/w Dr Villa and he ordered blood work which was done today and Digoxin was discontinued.

## 2020-02-21 NOTE — REVIEW OF SYSTEMS
[Fatigue] : fatigue [Dyspnea on Exertion] : dyspnea on exertion [Constipation] : constipation [Muscle Weakness] : muscle weakness [Confusion] : confusion [Memory Loss] : memory loss [Negative] : Heme/Lymph [de-identified] : confused at times worse at night [FreeTextEntry5] : Afib CHF HTN

## 2020-02-21 NOTE — ASSESSMENT
[FreeTextEntry1] : 89 y/o female with recent hospitalization for CHF,and Afib with RVR now appears stable, euvolemic  and rate is well controlled.

## 2020-02-21 NOTE — ASSESSMENT
[FreeTextEntry1] : 89 y/o female with recent hospitalization for CHF,and Afib with RVR now appears stable, euvolemic  and rate is well controlled.

## 2020-02-21 NOTE — REVIEW OF SYSTEMS
[Fatigue] : fatigue [Dyspnea on Exertion] : dyspnea on exertion [Constipation] : constipation [Muscle Weakness] : muscle weakness [Confusion] : confusion [Memory Loss] : memory loss [Negative] : Heme/Lymph [de-identified] : confused at times worse at night [FreeTextEntry5] : Afib CHF HTN

## 2020-11-19 NOTE — ED PROVIDER NOTE - CLINICAL SUMMARY MEDICAL DECISION MAKING FREE TEXT BOX
here with nausea, SOB, as well increase confusion, found to be in A-fib RVR, will r/o cardiac vs infectious source

## 2020-11-19 NOTE — ED PROVIDER NOTE - NS ED ROS FT
Constitutional: No fever.  Neurological: No headache.  Eyes: No vision changes.   Ears, Nose, Mouth, Throat: No congestion.  Cardiovascular: No chest pain.  Respiratory: +difficulty breathing.  Gastrointestinal: +nausea, no vomiting.  Genitourinary: No dysuria.  Musculoskeletal: No joint pain.  Integumentary (skin and/or breast): No rash. Constitutional: No fever.  Neurological: No headache.  Eyes: No vision changes.   Ears, Nose, Mouth, Throat: No congestion.  Cardiovascular: No chest pain.  Respiratory: +difficulty breathing.  Gastrointestinal: +abdominal pain, +nausea, no vomiting.  Genitourinary: No dysuria.  Musculoskeletal: No joint pain.  Integumentary (skin and/or breast): No rash.

## 2020-11-19 NOTE — H&P ADULT - HISTORY OF PRESENT ILLNESS
89 y.o. female with PMHx of Permanent Afib off AC due to fall risks, HTN, HFrEF p/w SOB, abdominal pain. Pt feels better now.  Abdominal pain resolved, no acute SOB or CP.

## 2020-11-19 NOTE — H&P ADULT - NSHPPHYSICALEXAM_GEN_ALL_CORE
PHYSICAL EXAM:    General: elderly female in no acute distress  Eyes: PERRLA, EOMI; conjunctiva and sclera clear  Head: Normocephalic; atraumatic  ENMT: No nasal discharge; airway clear  Neck: Supple; non tender; no masses  Respiratory: decreased BS at bases R>L with crackles  Cardiovascular: S1, S2 irregular with II/VI GEOVANNY  Gastrointestinal: Soft abd, NT, + BS  Genitourinary: No costovertebral angle tenderness  Extremities: No clubbing, cyanosis or edema  Vascular: Peripheral pulses palpable 2+ bilaterally  Neurological: Alert and oriented x4  Skin: Warm and dry. No acute rash  Lymph Nodes: No acute cervical adenopathy  Musculoskeletal: Normal tone, without deformities  Psychiatric: Cooperative and appropriate

## 2020-11-19 NOTE — ED PROVIDER NOTE - NS_ ATTENDINGSCRIBEDETAILS _ED_A_ED_FT
I, Daniel Shay DO,  performed the initial face to face bedside interview with this patient regarding history of present illness, review of symptoms and relevant past medical, social and family history. I completed an independent physical examination. I was the initial provider who evaluated this patient.    The history, relevant review of systems, past medical and surgical history, medical decision making, and physical examination was documented by the scribe in my presence and I attest to the accuracy of the documentation.

## 2020-11-19 NOTE — ED ADULT TRIAGE NOTE - CHIEF COMPLAINT QUOTE
pt presents to ED due to nausea and vomiting pt presents to ED due to nausea and vomiting since this AM lives at home with her daughter

## 2020-11-19 NOTE — H&P ADULT - NSICDXPASTMEDICALHX_GEN_ALL_CORE_FT
PAST MEDICAL HISTORY:  A-fib     CHF (congestive heart failure)     Glaucoma     HLD (hyperlipidemia)

## 2020-11-19 NOTE — PROVIDER CONTACT NOTE (OTHER) - NAME OF MD/NP/PA/DO NOTIFIED:
Consult called in for Dr. Villa. [place] [facility] [place]

## 2020-11-19 NOTE — ED ADULT TRIAGE NOTE - AS TEMP SITE
[facility]  [id number] [facility]  [place] [address]  Phone: [phone]  Fax: [phone]                  Amauri Lara   2017 8:15 AM   Anti-coag visit    Description:  Male : 1929   Provider:  Anamaria Santos PharmD   Department:  [facility] - Coumadin           Diagnoses this Visit        Comments    Long term (current) use of anticoagulants    -  Primary     History of pulmonary embolism                To Do List           Future Appointments        Provider Department Dept Phone    3/14/2017 10:40 AM [facility] [phone]    3/28/2017 8:15 AM Anamaria Santos PharmD [facility] Coumadin [phone]    2017 8:40 AM LABORATORY, [address] [facility] [phone]    2017 1:40 PM Stefan Vera MD [facility] Internal Medicine [phone]    2017 9:00 AM NANNETTE Townsend [facility] Ophthalmology [phone]      Goals (5 Years of Data)     None      [facility]     [facility] -  Assistance  Registered nurses in the [facility] provide clinical advisement, health education, appointment booking, and other advisory services.  Call for this free service at [phone].             Medications           Message regarding Medications     Verify the changes and/or additions to your medication regime listed below are the same as discussed with your clinician today.  If any of these changes or additions are incorrect, please notify your healthcare provider.             Verify that the below list of medications is an accurate representation of the medications you are currently taking.  If none reported, the list may be blank. If incorrect, please contact your healthcare provider. Carry this list with you in case of emergency.           Current Medications     allopurinol (ZYLOPRIM) 300 MG tablet TAKE ONE TABLET BY MOUTH EVERY DAY    finasteride (PROSCAR) 5 mg tablet Take 1  tablet (5 mg total) by mouth once daily.    glipiZIDE (GLUCOTROL) 10 MG TR24     hydrocodone-acetaminophen 7.5-325mg (NORCO) 7.5-325 mg per tablet Take 1 tablet by mouth every 6 (six) hours as needed for Pain.    lisinopril-hydrochlorothiazide (PRINZIDE,ZESTORETIC) 20-12.5 mg per tablet TAKE 1 TABLET BY MOUTH 2 TIMES DAILY    lovastatin (MEVACOR) 40 MG tablet TAKE ONE TABLET BY MOUTH EVERY EVENING    metformin (GLUCOPHAGE) 850 MG tablet ONE BY MOUTH DAILY    metoprolol tartrate (LOPRESSOR) 25 MG tablet     PROAIR HFA 90 mcg/actuation inhaler     SSD 1 % cream     tamsulosin (FLOMAX) 0.4 mg Cp24 Take 1 capsule (0.4 mg total) by mouth once daily.    warfarin (COUMADIN) 4 MG tablet Take one-half tablet (2mg) by mouth every evening along with 5mg tablet as directed by the [facility].    warfarin (COUMADIN) 5 MG tablet Take 1 tablet by mouth every evening along with 2mg (one-half of 4mg) as directed by the [facility].           Clinical Reference Information           Allergies as of 2/21/2017     No Known Allergies      Immunizations Administered on Date of Encounter - 2/21/2017     None      Orders Placed During Today's Visit      Normal Orders This Visit    POCT INR          2/21/2017  8:21 AM - Anamaria Santos, PharmD      Component Results     Component Value Flag Ref Range Units Status    INR 2.8  2.0 - 3.0  Final      January 2017 Details    Sun Mon Tue Wed Thu Fri Sat     1               2               3               4               5               6               7                 8               9               10               11               12               13               14                 15               16               17   2.1   7 mg   See details      18      7 mg         19      7 mg         20      7 mg         21      7 mg           22      7 mg         23      7 mg         24      7 mg         25      7 mg         26      7 mg         27   2.8   7 mg   See details      28   2.5    7 mg   See details        29      7 mg         30      7 mg         31      7 mg              Date Details   01/17 Last INR check   INR: 2.1      01/27 INR: 2.8   Coumadin Therapy: 2.0 - 3.0 for INR for all indicators except mechanical heart valves and antiphospholipid syndromes which should use 2.5 - 3.5.       01/28 INR: 2.5   Coumadin Therapy: 2.0 - 3.0 for INR for all indicators except mechanical heart valves and antiphospholipid syndromes which should use 2.5 - 3.5.                  February 2017 Details    Sun Mon Tue Wed Thu Fri Sat        1      7 mg         2      7 mg         3      7 mg         4      7 mg           5      7 mg         6      7 mg         7      7 mg         8      7 mg         9      7 mg         10      7 mg         11      7 mg           12      7 mg         13      7 mg         14      7 mg         15      7 mg         16      7 mg         17      7 mg         18      7 mg           19      7 mg         20      7 mg         21   2.8   7 mg   See details      22      7 mg         23      7 mg         24      7 mg         25      7 mg           26      7 mg         27      7 mg         28      7 mg              Date Details   02/21 This INR check   INR: 2.8                     How to take your warfarin dose     To take:  7 mg Take 1 of the 5 mg tablets and 0.5 of a 4 mg tablet.           March 2017 Details    Sun Mon Tue Wed Thu Fri Sat        1      7 mg         2      7 mg         3      7 mg         4      7 mg           5      7 mg         6      7 mg         7      7 mg         8      7 mg         9      7 mg         10      7 mg         11      7 mg           12      7 mg         13      7 mg         14      7 mg         15      7 mg         16      7 mg         17      7 mg         18      7 mg           19      7 mg         20      7 mg         21      7 mg         22      7 mg         23      7 mg         24      7 mg         25      7 mg           26      7 mg         27       7 mg         28 29               30               31                 Date Details   No additional details    Date of next INR:  3/28/2017         How to take your warfarin dose     To take:  7 mg Take 1 of the 5 mg tablets and 0.5 of a 4 mg tablet.           Anticoagulation Summary as of 2/21/2017     Maintenance plan 7 mg (5 mg x 1 and 4 mg x 0.5) every day    Full instructions 7 mg every day    Next INR check 3/28/2017      Anticoagulation Episode Summary     Comments       Patient Findings     Positives Change in health, Change in medications, Hospital admission    Negatives Signs/symptoms of thrombosis, Signs/symptoms of bleeding, Laboratory test error suspected, Change in alcohol use, Change in activity, Upcoming invasive procedure, Emergency department visit, Upcoming dental procedure, Missed doses, Extra doses, Change in diet/appetite, Bruising, Other complaints      Language Assistance Services     ATTENTION: Language assistance services are available, free of charge. Please call [phone].      ATENCIÓN: Si maria elena avalos, tiene a majano disposición servicios gratuitos de asistencia lingüística. Llame al [phone].     CHÚ Ý: N?u b?n nói Ti?ng Vi?t, có các d?ch v? h? tr? ngôn ng? mi?n phí dành cho b?n. G?i s? [phone].         [facility] complies with applicable Federal civil rights laws and does not discriminate on the basis of race, color, national origin, age, disability, or sex.         oral

## 2020-11-19 NOTE — PHARMACOTHERAPY INTERVENTION NOTE - COMMENTS
med history complete, reviewed medications with patient and daughter confirmed with doctor first med profile, all medications related questions answered

## 2020-11-19 NOTE — H&P ADULT - NSHPPOAPULMEMBOLUS_GEN_A_CORE
Basic Information  Admitted From:  Date/Time of Birth: 2020       _ Emergency Department     x Labor and Delivery     _  Nursery     _ Referring Hospital: _  Reason for NICU Consult:  Decels, nuchal cord x2  REID/EDC:  2020  Gestational age by dates:  39 weeks, 4 days  Maternal History:    Prenatal medications:  _ Magnesium sulfate+     _ Indomethacin+     X Prenatal Vitamins  Antibiotics:  none  Corticosteroids:  none  Past History:  Obstetrical:   Medical:  _  Social History:  _      _ Single     X Denies alcohol, tobacco and drug use  _ Abuse concerns     _ Alcohol use+     _ Drug use+     _ Tobacco use  Family History:  in paper chart  Prenatal labs: _  Blood type: A+  RPR: NR  Hepatitis B: Neg  Hepatitis C: _  HIV: Neg  Group B Strep: Neg  GC Testing: _  Rubella: Imm  TB: _   History:  X Full term     x Normal vaginal delivery     _ Uncomplicated  delivery  Birth:  X This facility     _ Other facility: _     _ Delivering provider _     _ Referring provider _  Labor: _ Induced     X Spontaneous     _ Augmented  Analgesic: Epidural     # of doses received: _     Last dose received: _  Membranes:  Method of rupture: Spontaneous  Date time of rupture: _  Length of time ruptured: 18 hours   Nature of fluid:  X clear     _ meconium stained     _ bloody   Information:  Delivery: Single liveborn infant delivered vaginally  Management:  Routine warm, dry, clear airway, tactile stimulation  Suctioned:  X bulb syringe     X wall suction     _ meconium suctioned from below the cords     _ fluid  APGAR score 1 minute: Total score 7 /10  APGAR score 5 minute: Total score 9 /10  APGAR score 10 minute: Total score _ /10  Cord pH obtained:  pH = _    Narrative Summary  Neonatology called to delivery due to decels around time of delivery, also noted to have nuchal cord x2, by time of my arrival around 1 minute, baby pink with good respiratory effort, HR>100, suctioned with bulb  and deepx 2    Physical Examination  Gestational Age/Matias Score  Neuromuscular maturity score: _  Maturity rating: score/get age in weeks: _  VS/Measurements: _  Growth Parameters at birth: _  Weight: 3030 grams, _ kg  Length: _cms  Head circumference: _cms  Infant blood type: _  Jorge (direct): _        Normal Exam Notable Findings   General:   No acute distress.  X No distress   Eye: PERRL, normal conjunctiva, red reflex+, anterior vascular capsule.  X   RR present bilaterally ( 2020)    HENT:   Normocephalic, nares present, palate intact, TM's clear, normal hearing, moist oral mucosa, no pharyngeal erythema, anterior fontanelle open/soft/flat, ear canals present, ears normally set and rotated, no sinus tenderness.  X    Neck: Supple, nontender, no carotid bruit, no lymphadenopathy, no thyromegaly, full range of motion, clavicles intact.  X    Respiratory:  Lungs CTA, non-labored respiration, BS equal, symmetrical expansion, no chest wall tenderness.   X    Cardiovascular:  Normal rate, regular rhythm, no murmur, no gallop, good pulses in all extremities, normal peripheral perfusion, no edema.  X    Gastrointestinal:  Soft, non-tender, non-distended, normal bowel sounds, no organomegaly, 3 vessel umbilical cord, anus patent.   X    Genitourinary: No CVA tenderness, normal genitalia for age & sex, no scrotal tenderness, no inguinal tenderness, no lesions.   X    Musculoskeletal:  Normal ROM, normal strength, no tenderness, no swelling, no deformity, no hip clicks, normal Walker's, normal Ortolani's.   X    Integumentary:  Warm, dry, pink, intact, moist, no pallor, no rash.   X _ Cyanotic        Neurologic:  Alert, normal sensory, normal motor, moves all extremities appropriately, no focal deficits, CN II-XII intact, gag reflex normal, normal DTR's.   X         Review/Management  Results: _  Interpretation:  _    Impression and Plan  :  Diagnosis: Single liveborn infant delivered  vaginally  Condition:      X Stable     _ Fair     _ Guarded     _ Serious     _ Critical     _ Unchanged  Admission:  Admit to: Mother-baby  Plan: Routine  care  Antibiotics: Prolonged ROM (>18 hours), EOS of 0.2, no further workup  Consults: None  Education and Follow-Up:   Counseled: None          Electronically Signed On 2020 01:10  ___________________________________________________   Tushar Durham MD   no

## 2020-11-19 NOTE — ED PROVIDER NOTE - PHYSICAL EXAMINATION
Vital signs as available reviewed.  General:  Comfortable, no acute distress.  Head:  Normocephalic, atraumatic.  Eyes:  Conjunctiva pink, no icterus.  Cardiovascular: tachycardic, irregular rhythm  Respiratory:  b/l crackles to the bases  Abdomen:  Soft, non-tender.  Musculoskeletal:  No deformity or calf tenderness. +1 b/l pitting edema   Neurologic: Alert and oriented, moving all extremities.  Skin:  Warm and dry.

## 2020-11-19 NOTE — ED ADULT NURSE NOTE - OBJECTIVE STATEMENT
89 y/o F a&0x3. Pt presents to the ED c/o nausea, vomiting and abdominal pain. Pt with hearing aids in place. Pt with HR at 150 upon arrival. Family at the beside.

## 2020-11-19 NOTE — ED PROVIDER NOTE - OBJECTIVE STATEMENT
89 y/o female with a PMHx of A-fib, CHF, HTN on Carvedilol and Enalapril, HLD on Atorvastatin, on Furosemide, 81mg ASA, presents to the ED BIBA and daughter, c/o shortness of breath. Daughter states pt has mild dementia, but the last few days with increase confusion. States home aide reported that pt was c/o abdominal pain "all night long" with nausea but no vomiting. This morning, aide reports pt c/o shortness of breath. EMS was called and found pt to be in A-fib and brought to the ED. Pt with recent stress from her  passing away recently. Pt currently c/o difficulty breathing and denies CP.   Cardiologist: Dr. Horacio Villa  PMD: Dr. Garcia but mostly sees NP Claudette Ryan 89 y/o female with a PMHx of A-fib, CHF, HTN on Carvedilol and Enalapril, HLD on Atorvastatin, on Furosemide, 81mg ASA, presents to the ED BIBA and daughter, c/o shortness of breath since this morning. Daughter states pt has mild dementia, but the last few days with increase confusion. States home aide reported that pt was c/o abdominal pain "all night long" with nausea but no vomiting. This morning, aide reports pt c/o shortness of breath. EMS was called and found pt to be in A-fib so was brought to the ED. Pt with recent stress from her  passing away recently. Pt currently c/o difficulty breathing and denies CP.   Cardiologist: Dr. Horacio Villa  PMD: Dr. Garcia but mostly sees NP Claudette Ryan 89 y/o female with a PMHx of A-fib, CHF, HTN on Carvedilol and Enalapril, HLD on Atorvastatin, on Furosemide, 81mg ASA, presents to the ED BIBA and daughter, c/o shortness of breath since this morning. Daughter states pt has mild dementia, but the last few days with increase confusion. States home aide reported that pt was c/o abdominal pain "all night long" with nausea but no vomiting. This morning, aide reports pt c/o shortness of breath. EMS was called and found pt to be in A-fib so was brought to the ED. Pt currently c/o difficulty breathing, nausea, abdominal pain but denies CP. Pt with recent stress from her  passing away recently.   Cardiologist: Dr. Horacio Villa  PMD: Dr. Garcia but mostly sees NP Claudette Ryan 89 y/o female with a PMHx of A-fib, CHF, HTN on Carvedilol and Enalapril, HLD on Atorvastatin, on Furosemide, 81mg ASA, presents to the ED BIBA and daughter, c/o shortness of breath since this morning. Daughter states pt has mild dementia, but the last few days with increase confusion. States home aide reported that pt was c/o abdominal pain "all night long" with nausea but no vomiting. This morning, aide reports pt c/o shortness of breath. EMS was called and found pt to be in A-fib RVR so was brought to the ED. Pt currently c/o difficulty breathing, nausea, abdominal pain but denies CP. Pt with recent stress from her  passing away recently.   Cardiologist: Dr. Horacio Villa  PMD: Dr. Garcia but mostly sees NP Claudette Ryan 89 y/o female with a PMHx of A-fib, CHF, HTN on Carvedilol and Enalapril, HLD on Atorvastatin, presents to the ED BIBA and daughter, c/o shortness of breath since this morning. Daughter states pt has mild dementia, but the last few days with increase confusion. States home aide reported that pt was c/o abdominal pain "all night long" with nausea but no vomiting. This morning, aide reports pt c/o shortness of breath. EMS was called and found pt to be in A-fib RVR so was brought to the ED. Pt currently c/o difficulty breathing, nausea, abdominal pain but denies CP. Pt with recent stress from her  passing away recently.   Cardiologist: Dr. Horacio Villa  PMD: Dr. Garcia but mostly sees NP Claudette Ryan

## 2020-11-19 NOTE — H&P ADULT - ASSESSMENT
89 y.o. female with PMHx of Permanent Afib off AC due to fall risks, HTN, HFrEF p/w SOB, abdominal pain    1. Permanent Afib with RVR off AC due to frequent falls and off digoxin since last discharge   - rate control with cardizem, may need restart digoxin    2. ADHFrEF - IV lasix, strict I&O, daily weight, TTE, cardiology eval    3. UTI - ceftriaxone, f/u UCx    4. HTN - cont current meds, monitor    5. VTE proph - UFH    Pt is full code

## 2020-11-20 NOTE — CONSULT NOTE ADULT - ASSESSMENT
89 F with systolic heart failure, AF presents with  UTI     AF- not on AC due to fall and bleeding risk- rate control  with coreg and cardizem - continue tele monitoring     systolic heart failure-   continue coreg, and enalapril continue with lasix for now- would decrease dose to daily and monitor chem and daily weights.     follow up echo      recheck chem as sodium and potassium were abnormal-     Abx duration as per medicine     will follow

## 2020-11-20 NOTE — PROVIDER CONTACT NOTE (OTHER) - NAME OF MD/NP/PA/DO NOTIFIED:
Consult called in for Dr. Marcos. DANIEL Esposito [place] [facility] [place]

## 2020-11-20 NOTE — PROGRESS NOTE ADULT - ASSESSMENT
89F w/PMH afib (not on AC d/t fall risk), HTN,        #Acute hypoxic and hypercarbic respiratory Failure due to Acute on Chronic Systolic CHF exacerbation.    S/p BiPAP on admission  Overall improved with lasix IV, tolerates RA   Monitor Pulse ox and supplement with NC PRN. Check O2 on ambulation   BP stable  Hold   IV lasix 20 BID du eto lightheadedness, will check Orthostatic VS   C/w  Coreg   Low dose ACEI   ECHO:  New low EF 20%.    CXR/ CT chest with CHF and b/l effusions.     Will check pulse ox on ambulation  CT abd noted, doubt PNA, no changes from CT  chest    D/w Dr zapata       #Afib with RVR:    Off Cardizem,  stopped with low EF and hypotension.    Cont dig/ coreg for rate control.    HR overall improved     No AC in the past due to fall risk.    No  JENIFFER/ Cardioversion as cannot tolerate AC.    Cardio f/u outPt     #  PNA unlikely   CT chest with CHF/ effusions. No PNA. off ABX.        #Positive trop:    Suspect more demand ischemia related to AFIB / CHF.    No evidence of acute MI.    ASA/ statin/ BB.    No ischemic work up recommended by cardio, c/w medical management       # Constipation, Stool impaction. Improved   C/w  bowel regiment  S/p enema , started on LActilose   As per RN had multiple BMs   CT  reviewed      # leukocytosis, resolved   Likely reactive  Monitor for fevers  Repeat labs in am        #DVT proph:  heparin SQ.      #Code Status:  DNR/ DNI    Palliative team eval     DISPO:  C/w current  care, check Pulse ox on ambulation in am. D/c planning home with HC  when stable 89F w/PMH afib (not on AC d/t fall risk), HTN admitted for:     89 y.o. female with PMHx of Permanent Afib off AC due to fall risks, HTN, HFrEF p/w SOB, abdominal pain    1. Permanent Afib with RVR off AC due to frequent falls  off digoxin since last discharge  On presentation AFIB with HR 140s   Was started on Po Cardizem, will decrease dose to 30mg Q8h due to BP  C/w Coreg with parameters   On ASA  Monitor on tele       2. Acute hypoxic respiratory failure 2/2  Acute on Chronic Systolic CHF   CT chest with moderate effusion   Last ECHO 2/2020: EF 35-40%  repeat ECHO pending   On IV lasix, decrease dose to 40mg IV QD  Monitor BP might need to further adjust dose   Cardio f/u     3. Abd pain  Likely due to UTI and constipation   F/u UCX  C/w Ceftriaxone  CT abd/pelvis with  distended rectum and stercoral colitis   start bowel regiment  GI eval       4. HTN  - BP borderline  -D/c ACEI  -decrease dose of Lasix and cardizem   meds with parameters     5. PNA  with pleural effusions  On Ceftriaxone, add Azithromycin  Pulm eval     6.  VTE proph - UFH    Pt is full code         89F w/PMH afib (not on AC d/t fall risk), HTN admitted for:     89 y.o. female with PMHx of Permanent Afib off AC due to fall risks, HTN, HFrEF p/w SOB, abdominal pain    1. Permanent Afib with RVR off AC due to frequent falls  off digoxin since last discharge  On presentation AFIB with HR 140s   Was started on Po Cardizem, will decrease dose to 30mg Q8h due to BP  C/w Coreg with parameters   On ASA  Monitor on tele       2. Acute hypoxic respiratory failure 2/2  Acute on Chronic Systolic CHF   CT chest with moderate effusion   Last ECHO 2/2020: EF 35-40%  repeat ECHO pending   On IV lasix, decrease dose to 40mg IV QD  Monitor BP might need to further adjust dose   Cardio f/u     3. Abd pain  Likely due to UTI and constipation   F/u UCX  C/w Ceftriaxone  CT abd/pelvis with  distended rectum and stercoral colitis   start bowel regiment  GI eval       4. HTN  - BP borderline  -D/c ACEI  -decrease dose of Lasix and cardizem   meds with parameters     5. PNA  with pleural effusions  On Ceftriaxone, add Azithromycin  Pulm eval     6.  Hyponatremia, hypervolemic   better on lasix, monitor closely     7.   Hyperkalemia  resolved with lasix and Ca gluconate       8. VTE proph - UFH    Pt is full code

## 2020-11-20 NOTE — PROGRESS NOTE ADULT - SUBJECTIVE AND OBJECTIVE BOX
CC: sob (03 Feb 2020 09:40)    HPI: 89F w/PMH afib (not on AC d/t fall risk), HTN, presents c/o worsening sob over the past 1day, worse this AM.  Pt is AAOX3 and offers her own history and family at bedside graciously assisting w/ further history.  Earlier in the week, pt was not feeling well, having some weakness.  Yesterday, per family, her visiting NP stated "shes stable" but to consider further medical eval.  Per pt she was having some intermittent "fluttering" in her chest yesterday, but no pain.  At about 0600 today, she was severely sob and brought into ED when she was found to be hypoxic w/ sats in low 80's.  Pt started on BIPAP, cxr revealed b/l pulm edema, given lasix IV.  She was also in Afib rvr in 140's and given dilt 10mg IV x 1.  At the time of my exam, pt felt much better, pleasant and able to complete sentences, -120's.      INTERVAL HPI/ OVERNIGHT EVENTS:  Pt was seen and examined,  reports feeling dizzy today, but no SOB. Grand daughter and Palliative team at bedside     Vital Signs Last 24 Hrs  T(C): 36.4 (20 Nov 2020 09:26), Max: 36.8 (19 Nov 2020 22:06)  T(F): 97.6 (20 Nov 2020 09:26), Max: 98.2 (19 Nov 2020 22:06)  HR: 76 (20 Nov 2020 09:26) (56 - 141)  BP: 105/51 (20 Nov 2020 09:26) (91/33 - 130/94)  BP(mean): 43 (20 Nov 2020 06:18) (43 - 89)  RR: 18 (20 Nov 2020 09:26) (16 - 28)  SpO2: 99% (20 Nov 2020 09:26) (92% - 100%)      REVIEW OF SYSTEMS:  All other review of systems is negative unless indicated above.        PHYSICAL EXAM:  General: Well developed; malnourished;  in no acute distress, on RA  Eyes: PERRLA, EOMI; conjunctiva and sclera clear  Head: Normocephalic; atraumatic  ENMT: No nasal discharge; airway clear  Neck: Supple;  no masses  Respiratory: Decreased BS at bases. No wheezes, rales or rhonchi  Cardiovascular: Irregular rate and rhythm. + S1 and S2 Normal;   Gastrointestinal: Soft non-tender non-distended; Normal bowel sounds  Genitourinary: No  suprapubic  tenderness  Extremities: +  edema, improved   Vascular: Peripheral pulses palpable 2+ bilaterally  Neurological: Alert and oriented x2, non focal, baseline confused   Skin: Warm and dry. No acute rash   Lymph Nodes: No acute cervical adenopathy  Musculoskeletal: Normal muscle tone, no joint effusion or erythema   Psychiatric: Cooperative       LABS:                         12.8   7.84  )-----------( 268      ( 06 Feb 2020 07:27 )             39.7     02-06    133<L>  |  92<L>  |  21  ----------------------------<  111<H>  3.6   |  34<H>  |  0.90    Ca    8.8      06 Feb 2020 07:27  Phos  3.5     02-06  Mg     2.0     02-06          02-04    139  |  100  |  19  ----------------------------<  97  3.6   |  36<H>  |  0.62    Ca    9.0      04 Feb 2020 07:02        03 Feb 2020 07:08    138    |  104    |  21     ----------------------------<  92     3.8     |  30     |  0.61     Ca    8.7        03 Feb 2020 07:08      MEDICATIONS  (STANDING):  aspirin  chewable 81 milliGRAM(s) Oral daily  atorvastatin 10 milliGRAM(s) Oral at bedtime  carvedilol 3.125 milliGRAM(s) Oral every 12 hours  digoxin     Tablet 0.125 milliGRAM(s) Oral daily  enalapril 2.5 milliGRAM(s) Oral every 12 hours  heparin  Injectable 5000 Unit(s) SubCutaneous two times a day  lactulose Syrup 10 Gram(s) Oral two times a day  senna 2 Tablet(s) Oral at bedtime    MEDICATIONS  (PRN):  ondansetron Injectable 4 milliGRAM(s) IV Push every 6 hours PRN Nausea      RADIOLOGY & ADDITIONAL TESTS:    EXAM:  CT CHEST                        PROCEDURE DATE:  02/01/2020    FINDINGS:    LUNGS, AIRWAYS: The central airways are patent. pulmonary edema.    PLEURA: MOderate effusions.    VESSELS: Aortic atherosclerosis without aneurysm.    HEART: Big heart size. No pericardial effusion. Coronary and mitral annular calcification.    MEDIASTINUM AND SAUMYA: No adenopathy.    UPPER ABDOMEN: Limited visualization is unremarkable.    BONES AND SOFT TISSUES: No acute bony abnormality.    IMPRESSION:   Pulmonary edema and moderate effusions.      < from: CT Abdomen and Pelvis No Cont (02.04.20 @ 22:09) >    EXAM:  CT ABDOMEN AND PELVIS                            PROCEDURE DATE:  02/04/2020          INTERPRETATION:  CLINICAL INFORMATION: Acute abdominal pain    COMPARISON: October 15, 2017    PROCEDURE:   CT of the Abdomen and Pelvis was performed without intravenous contrast.   Intravenous contrast: None.  Oral contrast: None.  Sagittal and coronal reformats were performed.    FINDINGS:    LOWER CHEST: New bilateral pleural effusions, moderate in size. Bilateral lower lobe consolidation including groundglass opacity. Increased groundglass opacities also seen in the posterior aspect of the RIGHT middle lobe.  Coronary artery calcifications as well as aortic root calcifications. Calcification of the LEFT ventricular papillary muscle.    LIVER: Stable hypodense lesion in the LEFT lobe of the liver with central chunky calcification.  BILE DUCTS: Normal caliber.  GALLBLADDER: Elongated distended gallbladder without change from prior study. No evidence of acute cholecystitis.  SPLEEN: Within normal limits.  PANCREAS: Within normal limits.  ADRENALS: Within normal limits.  KIDNEYS/URETERS: Stable RIGHT renal cyst. Stable mild LEFT hydronephrosis and hydroureter. Phlebolith adjacent to the mid LEFT ureter (2-56).    BLADDER: Unremarkable, partially obscured by artifact from RIGHT hip arthroplasty.  REPRODUCTIVE ORGANS: No uterine or adnexal masses.    BOWEL: Large amount of fecal material within the rectal vault with mild rectal wall thickening and perirectal fat stranding suggestive of stercoral colitis. Moderate stool burden throughout the remainder of the colon. Diverticulosis of the sigmoid colon without acute diverticulitis.  LEFT inguinal hernia containing a loop of bowel, likely small bowel. No evidence of obstruction. Evaluation limited by lack of oral contrast material.  Mild gastric wall thickening may represent mild gastritis.  Appendix is not visualized. No evidence of inflammation in the pericecal region.  PERITONEUM: Diffuse increased fat stranding of the mesentery without focal lesion.  VESSELS: Diffuse atherosclerotic disease of the aorta. Evaluation limited by lack of IV contrast material.  RETROPERITONEUM/LYMPH NODES: No lymphadenopathy.    ABDOMINAL WALL: LEFT inguinal hernia containing a loop of bowel, likely small bowel.  BONES: There is a new compression fracture of L3 when compared to the prior study of October 15, 2017. There is mild retropulsion of the superior aspect with narrowing of the thecal sac. Stable compression deformity of L1 without change from prior study.    IMPRESSION:     1.  Rectum distended by significant stool with perirectal fat stranding suggesting stercoral colitis.  2.  LEFT inguinal hernia containing a nonobstructed nondilated loop of small bowel.  3.  L3 compression fracture which is new from October 15, 2017 but reported on prior CT of the lumbar spine from September 21, 2018. There has been further loss of height when compared to the 2018 study.  4.  Bilateral pleural effusions with bilateral lower lobe consolidation.       CC: sob (03 Feb 2020 09:40)    HPI: 89 y.o. female with PMHx of Permanent Afib off AC due to fall risks, HTN, HFrEF p/w SOB, abdominal pain. Pt feels better now. Abdominal pain resolved, no acute SOB or CP.  In ED was found to have low Sodium and  elevated potassium, elevated BNP           INTERVAL HPI/ OVERNIGHT EVENTS:  Pt was seen and examined,  reports SOB better tody, no CP. Was urinating a lot after med. No CP       Vital Signs Last 24 Hrs  T(C): 36.4 (20 Nov 2020 09:26), Max: 36.8 (19 Nov 2020 22:06)  T(F): 97.6 (20 Nov 2020 09:26), Max: 98.2 (19 Nov 2020 22:06)  HR: 76 (20 Nov 2020 09:26) (56 - 141)  BP: 105/51 (20 Nov 2020 09:26) (91/33 - 130/94)  BP(mean): 43 (20 Nov 2020 06:18) (43 - 89)  RR: 18 (20 Nov 2020 09:26) (16 - 28)  SpO2: 99% (20 Nov 2020 09:26) (92% - 100%)      REVIEW OF SYSTEMS:  All other review of systems is negative unless indicated above.        PHYSICAL EXAM:  General: Well developed; malnourished;  in no acute distress  Eyes: PERRLA, EOMI; conjunctiva and sclera clear  Head: Normocephalic; atraumatic  ENMT: No nasal discharge; airway clear  Neck: Supple;  no masses  Respiratory: Decreased BS at bases.  With  R>L basilar  rales   Cardiovascular: Irregular rate and rhythm. + S1 and S2 Normal;   Gastrointestinal: Soft non-tender non-distended; Normal bowel sounds  Genitourinary: No  suprapubic  tenderness  Extremities: +  edema, improved   Vascular: Peripheral pulses palpable 2+ bilaterally  Neurological: Alert and oriented x2, non focal, baseline confused   Skin: Warm and dry. No acute rash   Lymph Nodes: No acute cervical adenopathy  Musculoskeletal: Normal muscle tone, no joint effusion or erythema   Psychiatric: Cooperative       LABS:                           12.8   7.84  )-----------( 268      ( 06 Feb 2020 07:27 )             39.7     02-06    133<L>  |  92<L>  |  21  ----------------------------<  111<H>  3.6   |  34<H>  |  0.90    Ca    8.8      06 Feb 2020 07:27  Phos  3.5     02-06  Mg     2.0     02-06          02-04    139  |  100  |  19  ----------------------------<  97  3.6   |  36<H>  |  0.62    Ca    9.0      04 Feb 2020 07:02        03 Feb 2020 07:08    138    |  104    |  21     ----------------------------<  92     3.8     |  30     |  0.61     Ca    8.7        03 Feb 2020 07:08      MEDICATIONS  (STANDING):  aspirin  chewable 81 milliGRAM(s) Oral daily  atorvastatin 10 milliGRAM(s) Oral at bedtime  carvedilol 3.125 milliGRAM(s) Oral every 12 hours  cefTRIAXone Injectable. 1000 milliGRAM(s) IV Push every 24 hours  diltiazem    Tablet 60 milliGRAM(s) Oral every 6 hours  heparin   Injectable 5000 Unit(s) SubCutaneous every 12 hours  senna 2 Tablet(s) Oral at bedtime    MEDICATIONS  (PRN):    RADIOLOGY & ADDITIONAL TESTS:    EXAM:  CT CHEST                        PROCEDURE DATE:  02/01/2020    FINDINGS:    LUNGS, AIRWAYS: The central airways are patent. pulmonary edema.    PLEURA: MOderate effusions.    VESSELS: Aortic atherosclerosis without aneurysm.    HEART: Big heart size. No pericardial effusion. Coronary and mitral annular calcification.    MEDIASTINUM AND SAUMYA: No adenopathy.    UPPER ABDOMEN: Limited visualization is unremarkable.    BONES AND SOFT TISSUES: No acute bony abnormality.    IMPRESSION:   Pulmonary edema and moderate effusions.      < from: CT Abdomen and Pelvis No Cont (02.04.20 @ 22:09) >    EXAM:  CT ABDOMEN AND PELVIS                            PROCEDURE DATE:  02/04/2020          INTERPRETATION:  CLINICAL INFORMATION: Acute abdominal pain    COMPARISON: October 15, 2017    PROCEDURE:   CT of the Abdomen and Pelvis was performed without intravenous contrast.   Intravenous contrast: None.  Oral contrast: None.  Sagittal and coronal reformats were performed.    FINDINGS:    LOWER CHEST: New bilateral pleural effusions, moderate in size. Bilateral lower lobe consolidation including groundglass opacity. Increased groundglass opacities also seen in the posterior aspect of the RIGHT middle lobe.  Coronary artery calcifications as well as aortic root calcifications. Calcification of the LEFT ventricular papillary muscle.    LIVER: Stable hypodense lesion in the LEFT lobe of the liver with central chunky calcification.  BILE DUCTS: Normal caliber.  GALLBLADDER: Elongated distended gallbladder without change from prior study. No evidence of acute cholecystitis.  SPLEEN: Within normal limits.  PANCREAS: Within normal limits.  ADRENALS: Within normal limits.  KIDNEYS/URETERS: Stable RIGHT renal cyst. Stable mild LEFT hydronephrosis and hydroureter. Phlebolith adjacent to the mid LEFT ureter (2-56).    BLADDER: Unremarkable, partially obscured by artifact from RIGHT hip arthroplasty.  REPRODUCTIVE ORGANS: No uterine or adnexal masses.    BOWEL: Large amount of fecal material within the rectal vault with mild rectal wall thickening and perirectal fat stranding suggestive of stercoral colitis. Moderate stool burden throughout the remainder of the colon. Diverticulosis of the sigmoid colon without acute diverticulitis.  LEFT inguinal hernia containing a loop of bowel, likely small bowel. No evidence of obstruction. Evaluation limited by lack of oral contrast material.  Mild gastric wall thickening may represent mild gastritis.  Appendix is not visualized. No evidence of inflammation in the pericecal region.  PERITONEUM: Diffuse increased fat stranding of the mesentery without focal lesion.  VESSELS: Diffuse atherosclerotic disease of the aorta. Evaluation limited by lack of IV contrast material.  RETROPERITONEUM/LYMPH NODES: No lymphadenopathy.    ABDOMINAL WALL: LEFT inguinal hernia containing a loop of bowel, likely small bowel.  BONES: There is a new compression fracture of L3 when compared to the prior study of October 15, 2017. There is mild retropulsion of the superior aspect with narrowing of the thecal sac. Stable compression deformity of L1 without change from prior study.    IMPRESSION:     1.  Rectum distended by significant stool with perirectal fat stranding suggesting stercoral colitis.  2.  LEFT inguinal hernia containing a nonobstructed nondilated loop of small bowel.  3.  L3 compression fracture which is new from October 15, 2017 but reported on prior CT of the lumbar spine from September 21, 2018. There has been further loss of height when compared to the 2018 study.  4.  Bilateral pleural effusions with bilateral lower lobe consolidation.

## 2020-11-20 NOTE — PHARMACOTHERAPY INTERVENTION NOTE - COMMENTS
Recommended renal dose adjusting famotidine 20mg PO daily to famotidine 10mg PO daily based on CrCl 26 mL/min

## 2020-11-20 NOTE — CONSULT NOTE ADULT - SUBJECTIVE AND OBJECTIVE BOX
CHIEF COMPLAINT: Patient is a 89y old  Female who presents with a chief complaint of SOB, abd pain (19 Nov 2020 17:10)      HPI:  89 y.o. female with PMHx of Permanent Afib off AC due to fall risks, HTN, HFrEF p/w SOB, abdominal pain. Pt feels better now.  Abdominal pain resolved, no acute SOB or CP. (19 Nov 2020 17:10)       no acute issues overnight     PMHx: PAST MEDICAL & SURGICAL HISTORY:  CHF (congestive heart failure)    A-fib    HLD (hyperlipidemia)    Glaucoma    S/P hip replacement, right          Soc Hx:  no toxic habits       Allergies: Allergies    atenolol (Other)  sulfa drugs (Other)    Intolerances          REVIEW OF SYSTEMS:    CONSTITUTIONAL: No weakness, fevers or chills  EYES/ENT: No visual changes;  No vertigo or throat pain   NECK: No pain or stiffness  RESPIRATORY: No cough, wheezing, hemoptysis; No shortness of breath  CARDIOVASCULAR: No chest pain or palpitations  GENITOURINARY: No dysuria, frequency or hematuria  NEUROLOGICAL: No numbness or weakness  All other review of systems is negative unless indicated above    Vital Signs Last 24 Hrs  T(C): 36.3 (20 Nov 2020 06:00), Max: 36.8 (19 Nov 2020 22:06)  T(F): 97.4 (20 Nov 2020 06:00), Max: 98.2 (19 Nov 2020 22:06)  HR: 56 (20 Nov 2020 06:18) (56 - 141)  BP: 91/33 (20 Nov 2020 06:18) (91/33 - 130/94)  BP(mean): 43 (20 Nov 2020 06:18) (43 - 89)  RR: 18 (20 Nov 2020 06:18) (16 - 28)  SpO2: 97% (20 Nov 2020 06:18) (92% - 100%)    I&O's Summary    19 Nov 2020 07:01  -  20 Nov 2020 07:00  --------------------------------------------------------  IN: 240 mL / OUT: 650 mL / NET: -410 mL            PHYSICAL EXAM:   Constitutional: NAD, awake and alert, well-developed  HEENT: PERR, EOMI, Normal Hearing, MMM  Neck: Soft and supple, No LAD, No JVD  Respiratory: Breath sounds are clear bilaterally, No wheezing, rales or rhonchi  Cardiovascular: S1 and S2, irregular rate and rhythm  Gastrointestinal: Bowel Sounds present, soft, nontender, nondistended, no guarding, no rebound  Extremities: No peripheral edema  Vascular: 2+ peripheral pulses  Neurological: Alert, pleasant       MEDICATIONS:  MEDICATIONS  (STANDING):  aspirin  chewable 81 milliGRAM(s) Oral daily  atorvastatin 10 milliGRAM(s) Oral at bedtime  carvedilol 3.125 milliGRAM(s) Oral every 12 hours  cefTRIAXone Injectable. 1000 milliGRAM(s) IV Push every 24 hours  diltiazem    Tablet 60 milliGRAM(s) Oral every 6 hours  enalapril 2.5 milliGRAM(s) Oral every 12 hours  famotidine    Tablet 20 milliGRAM(s) Oral daily  furosemide   Injectable 40 milliGRAM(s) IV Push every 12 hours  heparin   Injectable 5000 Unit(s) SubCutaneous every 12 hours  senna 2 Tablet(s) Oral at bedtime      LABS: All Labs Reviewed:                        12.6   10.11 )-----------( 290      ( 19 Nov 2020 12:51 )             37.4     11-19    125<L>  |  94<L>  |  15  ----------------------------<  148<H>  5.5<H>   |  24  |  0.83    Ca    9.1      19 Nov 2020 12:51  Mg     2.1     11-19    TPro  7.6  /  Alb  3.3  /  TBili  0.9  /  DBili  x   /  AST  28  /  ALT  30  /  AlkPhos  66  11-19      CARDIAC MARKERS ( 19 Nov 2020 12:51 )  0.032 ng/mL / x     / x     / x     / x          Serum Pro-Brain Natriuretic Peptide: 00570 pg/mL (11-19 @ 12:51)        EKG: AF RVR     Telemetry: AF , mostly parish in the 50s, breifly in the 40s    ECHO: pending

## 2020-11-21 NOTE — PROGRESS NOTE ADULT - SUBJECTIVE AND OBJECTIVE BOX
CC: sob (03 Feb 2020 09:40)    HPI: 89 y.o. female with PMHx of Permanent Afib off AC due to fall risks, HTN, HFrEF p/w SOB, abdominal pain. Pt feels better now. Abdominal pain resolved, no acute SOB or CP.  In ED was found to have low Sodium and  elevated potassium, elevated BNP           INTERVAL HPI/ OVERNIGHT EVENTS:  Pt was seen and examined,   feels better, no SOB, no Abd pain, but still didnt have BM       Vital Signs Last 24 Hrs  T(C): 36.7 (21 Nov 2020 08:33), Max: 36.9 (21 Nov 2020 05:16)  T(F): 98.1 (21 Nov 2020 08:33), Max: 98.4 (21 Nov 2020 05:16)  HR: 94 (21 Nov 2020 08:33) (94 - 112)  BP: 114/50 (21 Nov 2020 08:33) (93/51 - 114/50)  BP(mean): 70 (21 Nov 2020 05:55) (70 - 71)  RR: 18 (21 Nov 2020 08:33) (18 - 18)  SpO2: 99% (21 Nov 2020 08:33) (97% - 99%)    REVIEW OF SYSTEMS:  All other review of systems is negative unless indicated above.        PHYSICAL EXAM:  General: Well developed; malnourished;  in no acute distress  Eyes: PERRLA, EOMI; conjunctiva and sclera clear  Head: Normocephalic; atraumatic  ENMT: No nasal discharge; airway clear  Neck: Supple;  no masses  Respiratory: Decreased BS at bases.  With  R>L basilar  rales   Cardiovascular: Irregular rate and rhythm. + S1 and S2 Normal;   Gastrointestinal: Soft non-tender  mildly distended; Normal bowel sounds  Genitourinary: No  suprapubic  tenderness  Extremities: +  edema, improved   Vascular: Peripheral pulses palpable 2+ bilaterally  Neurological: Alert and oriented x2, non focal, baseline confused   Skin: Warm and dry. No acute rash   Lymph Nodes: No acute cervical adenopathy  Musculoskeletal: Normal muscle tone, no joint effusion or erythema   Psychiatric: Cooperative       LABS:                         10.5   8.49  )-----------( 263      ( 21 Nov 2020 07:55 )             32.4     11-21    134<L>  |  101  |  28<H>  ----------------------------<  98  4.2   |  28  |  0.88    Ca    8.5      21 Nov 2020 07:55                              12.8   7.84  )-----------( 268      ( 06 Feb 2020 07:27 )             39.7     02-06    133<L>  |  92<L>  |  21  ----------------------------<  111<H>  3.6   |  34<H>  |  0.90    Ca    8.8      06 Feb 2020 07:27  Phos  3.5     02-06  Mg     2.0     02-06          02-04    139  |  100  |  19  ----------------------------<  97  3.6   |  36<H>  |  0.62    Ca    9.0      04 Feb 2020 07:02        03 Feb 2020 07:08    138    |  104    |  21     ----------------------------<  92     3.8     |  30     |  0.61     Ca    8.7        03 Feb 2020 07:08      MEDICATIONS  (STANDING):  aspirin  chewable 81 milliGRAM(s) Oral daily  atorvastatin 10 milliGRAM(s) Oral at bedtime  azithromycin   Tablet 250 milliGRAM(s) Oral daily  carvedilol 3.125 milliGRAM(s) Oral every 12 hours  cefTRIAXone Injectable. 1000 milliGRAM(s) IV Push every 24 hours  diltiazem    Tablet 30 milliGRAM(s) Oral every 8 hours  famotidine    Tablet 10 milliGRAM(s) Oral daily  furosemide   Injectable 40 milliGRAM(s) IV Push daily  heparin   Injectable 5000 Unit(s) SubCutaneous every 12 hours  polyethylene glycol 3350 17 Gram(s) Oral daily  senna 2 Tablet(s) Oral at bedtime    MEDICATIONS  (PRN):  bisacodyl Suppository 10 milliGRAM(s) Rectal daily PRN Constipation      RADIOLOGY & ADDITIONAL TESTS:    EXAM:  CT CHEST                        PROCEDURE DATE:  02/01/2020    FINDINGS:    LUNGS, AIRWAYS: The central airways are patent. pulmonary edema.    PLEURA: MOderate effusions.    VESSELS: Aortic atherosclerosis without aneurysm.    HEART: Big heart size. No pericardial effusion. Coronary and mitral annular calcification.    MEDIASTINUM AND SAUMYA: No adenopathy.    UPPER ABDOMEN: Limited visualization is unremarkable.    BONES AND SOFT TISSUES: No acute bony abnormality.    IMPRESSION:   Pulmonary edema and moderate effusions.          EXAM:  CT ABDOMEN AND PELVIS                        PROCEDURE DATE:  02/04/2020      FINDINGS:    LOWER CHEST: New bilateral pleural effusions, moderate in size. Bilateral lower lobe consolidation including groundglass opacity. Increased groundglass opacities also seen in the posterior aspect of the RIGHT middle lobe.  Coronary artery calcifications as well as aortic root calcifications. Calcification of the LEFT ventricular papillary muscle.    LIVER: Stable hypodense lesion in the LEFT lobe of the liver with central chunky calcification.  BILE DUCTS: Normal caliber.  GALLBLADDER: Elongated distended gallbladder without change from prior study. No evidence of acute cholecystitis.  SPLEEN: Within normal limits.  PANCREAS: Within normal limits.  ADRENALS: Within normal limits.  KIDNEYS/URETERS: Stable RIGHT renal cyst. Stable mild LEFT hydronephrosis and hydroureter. Phlebolith adjacent to the mid LEFT ureter (2-56).    BLADDER: Unremarkable, partially obscured by artifact from RIGHT hip arthroplasty.  REPRODUCTIVE ORGANS: No uterine or adnexal masses.    BOWEL: Large amount of fecal material within the rectal vault with mild rectal wall thickening and perirectal fat stranding suggestive of stercoral colitis. Moderate stool burden throughout the remainder of the colon. Diverticulosis of the sigmoid colon without acute diverticulitis.  LEFT inguinal hernia containing a loop of bowel, likely small bowel. No evidence of obstruction. Evaluation limited by lack of oral contrast material.  Mild gastric wall thickening may represent mild gastritis.  Appendix is not visualized. No evidence of inflammation in the pericecal region.  PERITONEUM: Diffuse increased fat stranding of the mesentery without focal lesion.  VESSELS: Diffuse atherosclerotic disease of the aorta. Evaluation limited by lack of IV contrast material.  RETROPERITONEUM/LYMPH NODES: No lymphadenopathy.    ABDOMINAL WALL: LEFT inguinal hernia containing a loop of bowel, likely small bowel.  BONES: There is a new compression fracture of L3 when compared to the prior study of October 15, 2017. There is mild retropulsion of the superior aspect with narrowing of the thecal sac. Stable compression deformity of L1 without change from prior study.    IMPRESSION:     1.  Rectum distended by significant stool with perirectal fat stranding suggesting stercoral colitis.  2.  LEFT inguinal hernia containing a nonobstructed nondilated loop of small bowel.  3.  L3 compression fracture which is new from October 15, 2017 but reported on prior CT of the lumbar spine from September 21, 2018. There has been further loss of height when compared to the 2018 study.  4.  Bilateral pleural effusions with bilateral lower lobe consolidation.       EXAM:  ECHO TTE WO CON COMP W DOPP         PROCEDURE DATE:  11/20/2020

## 2020-11-21 NOTE — CONSULT NOTE ADULT - ASSESSMENT
- CHF with the bilateral pleural effusions with the septal thickening   - mild peribronchial thickening can not rule out superimposed infectious process likely pneumonia   - small lung nodules bilateral reported on both   - AFIB with the RVR with the rate some what better controlled   - hypoxia likely secondary to chf   - stool impaction with the sterocoral coliits   - baseline H.F with the low EF 35 percent and echo moderate M.R and mild T.R and mild pulmonary HTn  - improving hyponatremia and hyperkalemia   - UTI with the E.coli     PLAN     - Continue with the diuretics iv as tolerated by the renal function   - effusions are moderate in the right would continue to monitor with out any indication for the draiange  - rate control of afib as per the cardio   - would repeat cxr in the next 24 to 48 hours to see for the improvement  - if unable to diurese would consider thoracentesis   - management of other medical issues as per the medicine including constipation    - follow up of the lung nodules and would consider follow up if the family choose to follow up and nodules are below 5 mm

## 2020-11-21 NOTE — PROGRESS NOTE ADULT - ASSESSMENT
89F w/PMH afib (not on AC d/t fall risk), HTN admitted for:     89 y.o. female with PMHx of Permanent Afib off AC due to fall risks, HTN, HFrEF p/w SOB, abdominal pain    1. Permanent Afib with RVR off AC due to frequent falls  off digoxin since last discharge  On presentation AFIB with HR 140s   C/w tele: AFIB 90-120s, had brief episode of 120   C/w Po Cardizem and metoprolol, VÍCTOR improved. Adjust dose as needed   C/w Coreg with parameters   On ASA  Monitor on tele   CArdio f/u appreciated        2. Acute hypoxic respiratory failure 2/2  Acute on Chronic Systolic CHF   CT chest with moderate effusion   Last ECHO 2/2020: EF 35-40%  repeat ECHO stable   C/w  IV lasix 40mg IV QD, reeval in am for possible switch to PO   Monitor BP might need to further adjust dose       3. Abd pain, resolved   Likely due to UTI and constipation   F/u UCX: + ECOLI   C/w Ceftriaxone  CT abd/pelvis with  distended rectum and stercoral colitis   start bowel regiment, had only small BOm will be given dulcolax, if no BM give enema   GI eval appreciated       4. HTN  - BP better   -Off ACEI  - C/w Cardizem   and coreg with   parameters     5. PNA  with pleural effusions  On Ceftriaxone, and Azithromycin  Pulm eval     6.  Hyponatremia, hypervolemic   better on lasix, monitor closely     7.   Hyperkalemia  resolved with lasix and Ca gluconate       8. VTE proph - UFH    Pt is full code      D/w Pts pako, updated on results and plan

## 2020-11-21 NOTE — PROGRESS NOTE ADULT - ASSESSMENT
89 yo female with multiple medical issues and ?fecal impaction. Would use regimen both oral and with enemas. Will follow - thanks!

## 2020-11-21 NOTE — CONSULT NOTE ADULT - SUBJECTIVE AND OBJECTIVE BOX
Pulmonary Consult    Patient is a 89y old  Female who presents with a chief complaint of SOB, abd pain (2020 10:01)      HPI:  89 y.o. female with PMHx of Permanent Afib off AC due to fall risks, HTN, HFrEF p/w SOB, abdominal pain. Pt feels better now.  Abdominal pain resolved, no acute SOB or CP. (2020 17:10)    20     patient is seen and above history of present illness noted   patient is poor historian and confused   ct findings noted with the chf and bilateral effusions   with the peribronchial thickening and compressive atelectasis at the bases   developed low B.P with the twice daily lasix   rate seems to be controlled       PAST MEDICAL & SURGICAL HISTORY:  CHF (congestive heart failure)    A-fib    HLD (hyperlipidemia)    Glaucoma    S/P hip replacement, right      FAMILY HISTORY:  Known health problems: none      Social History:  no tobacco, ETOH, IVDA (2020 17:10)    Allergies    atenolol (Other)  sulfa drugs (Other)    Intolerances      REVIEW OF SYSTEMS:  Constitutional: No fevers or chills or weight loss.   Eyes: No itching or discharge from the eyes  ENT:  No post nasal drip. No epistaxis. No throat pain. . No difficulty swallowing.   CV: No chest pain. No palpitations.  or dizziness.   Resp: positive for the abdominal pain   MSK: No joint pain or pain in any extremities  Integumentary: No skin lesions. No pedal edema.  Neurological: No gross motor weakness. No sensory changes.    PHYSICAL EXAM:  Vital Signs Last 24 Hrs  T(C): 36.7 (2020 08:33), Max: 36.9 (2020 05:16)  T(F): 98.1 (2020 08:33), Max: 98.4 (2020 05:16)  HR: 94 (2020 08:33) (82 - 112)  BP: 114/50 (2020 08:33) (78/43 - 114/50)  BP(mean): 70 (2020 05:55) (70 - 71)  RR: 18 (2020 08:33) (18 - 18)  SpO2: 99% (2020 08:33) (97% - 100%)  General: Awake, confused   HEENT: Atraumatic, normocephalic.   Neck: No JVD no lymphadenopathy   Respiratory: normal vesicular breathing with decreased breath sounds in the bases and no acute wheeze has few minimal rales over the bases   Cardiovascular: S1 S2 normal. No murmurs.   Abdomen: Soft, non-tender, non-distended. No organomegaly.  Extremities: No edema of calf tenderness   Skin: No rashes or skin lesions  Neurological: moving all the extremties with out weakness       HOSPITAL MEDICATIONS:  MEDICATIONS  (STANDING):  aspirin  chewable 81 milliGRAM(s) Oral daily  atorvastatin 10 milliGRAM(s) Oral at bedtime  azithromycin   Tablet 250 milliGRAM(s) Oral daily  carvedilol 3.125 milliGRAM(s) Oral every 12 hours  cefTRIAXone Injectable. 1000 milliGRAM(s) IV Push every 24 hours  diltiazem    Tablet 30 milliGRAM(s) Oral every 8 hours  famotidine    Tablet 10 milliGRAM(s) Oral daily  furosemide   Injectable 40 milliGRAM(s) IV Push daily  heparin   Injectable 5000 Unit(s) SubCutaneous every 12 hours  magnesium citrate Oral Solution 1 Bottle Oral once  polyethylene glycol 3350 17 Gram(s) Oral daily  senna 2 Tablet(s) Oral at bedtime    MEDICATIONS  (PRN):  bisacodyl Suppository 10 milliGRAM(s) Rectal daily PRN Constipation      LABS:                        10.5   8.49  )-----------( 263      ( 2020 07:55 )             32.4     11-    134<L>  |  101  |  28<H>  ----------------------------<  98  4.2   |  28  |  0.88    Ca    8.5      2020 07:55  Mg     2.1     -    TPro  7.6  /  Alb  3.3  /  TBili  0.9  /  DBili  x   /  AST  28  /  ALT  30  /  AlkPhos  66  -      Urinalysis Basic - ( 2020 16:20 )    Color: Yellow / Appearance: very cloudy / S.010 / pH: x  Gluc: x / Ketone: Negative  / Bili: Negative / Urobili: Negative mg/dL   Blood: x / Protein: 100 mg/dL / Nitrite: Negative   Leuk Esterase: Moderate / RBC: 0-2 /HPF / WBC >50   Sq Epi: x / Non Sq Epi: Occasional / Bacteria: Many              Culture - Urine (collected 2020 16:20)  Source: .Urine None  Preliminary Report (2020 09:52):    >100,000 CFU/ml Escherichia coli    Culture - Blood (collected 2020 12:51)  Source: .Blood Blood  Preliminary Report (2020 19:02):    No growth to date.    < from: CT Chest No Cont (20 @ 14:45) >  FINDINGS:    LUNGS AND AIRWAYS: PLEURA:  Moderate-sized bilateral pleural effusions with underlying compressive atelectasis, similar to prior study.  Intraluminal lobular septal thickening with mild dependent groundglass opacities, peribronchial thickening, likely reflecting interstitial pulmonary edema.    4 mm nodule apical right upper lobe, stable in appearance.  3 mm nodule medial aspect apical segment left upper lobe, slightly more conspicuous on current exam.  3 mm nodule periphery left upper lobe, stable in appearance.    MEDIASTINUM AND SAUMYA:  Small, subcentimeter shotty precarinal mediastinal lymph nodes, stable in appearance.    VESSELS: Atherosclerotic calcification of thoracic aorta with coronary artery calcifications.    HEART: Cardiomegaly. No pericardial effusion.    CHEST WALL AND LOWER NECK: Within normal limits.    VISUALIZED UPPER ABDOMEN:  Coarse capsular hepatic calcification.    BONES: Degenerative changes of spine and multilevel contiguous anterior spodylophytes, may reflect ankylosing spondylitis.    IMPRESSION:    Interstitial pulmonary edema with moderate bilateral pleural effusions and underlying compressive atelectasis, similar to prior exam.    Few pulmonary nodules as discussed above.    Other findings as discussed above.  < from: Xray Chest 1 View AP/PA. (20 @ 13:23) >  AM:  XR CHEST 1 VIEW                            PROCEDURE DATE:  2020          INTERPRETATION:  Portable chest radiograph    CLINICAL INFORMATION:   Short of breath.    TECHNIQUE:  Portable  AP view of the chest was obtained.    COMPARISON:2020 chest radiograph available for review.    FINDINGS:  The lungs show bibasilar airspace consolidations and/or effusions obscuring diaphragmatic contours. There is mild vascular congestion.  The  heart is enlarged in transverse diameter. No hilar mass.  Atherosclerotic calcified intimal wall plaques within nondilated thoracic aorta   . No pneumothorax.    The heart and mediastinum are within normal limits.    Visualized osseous structures are intact.        IMPRESSION:   Cardiomegaly, vascular congestion and bibasilar airspace consolidations and/or effusions.            < from: TTE Echo Complete w/o Contrast w/ Doppler (11.20.20 @ 09:29) >  mpression     Summary     Mild to moderate mitral annular calcification is present.   Fibrocalcific changes noted to the mitral valve leaflets with preserved   leaflet excursion.   Moderate to severe (3+) mitral regurgitationis present.   Fibrocalcific changes noted to the Aortic valve leaflets with preserved   leaflet excursion.   Mild to Moderate aortic regurgitation is present.   Mild to moderate tricuspid valve regurgitation is present.   Mild pulmonary hypertension.   Normal appearing pulmonic valve structure and function.   Mild pulmonic valvular regurgitation (1+) is present.   The left atrium is moderately dilated.   The interventricular septum appears hypokinetic.   Estimated left ventricular ejection fraction is 35-40 %.   The right atrium is at the upper limits of normal.   Pleural effusion - is present..

## 2020-11-21 NOTE — PROGRESS NOTE ADULT - ASSESSMENT
89 F with systolic heart failure, AF presents with  UTI    1. AF- not on AC due to fall and bleeding risk- rate controlled  with coreg and Cardizem - continue tele monitoring (goal HR is <110 which she is currently) No more episodes of bradycardia    2.  systolic heart failure-   continue coreg, and enalapril continue with lasix for now- Doing well on decreased dose of IV lasix but increasing BUN. Recommend trying to titrate off O2. If able to get off O2 today may want to consider transitioning to PO lasix for tomorrow.  -TTE is about stable when compared to the office TTE from 7/2020    3. Abx duration as per medicine     will follow 89 F with systolic heart failure, AF presents with  UTI    1. AF- not on AC due to fall and bleeding risk- rate controlled  with coreg and Cardizem - continue tele monitoring (goal HR is <110 which she is currently) No more episodes of bradycardia  -If continue to be stable with HRs <110s, recommend transitioning cardizem to long acting tomorrow morning    2.  systolic heart failure-   continue coreg, and enalapril continue with lasix for now- Doing well on decreased dose of IV lasix but increasing BUN. Recommend trying to titrate off O2. If able to get off O2 today may want to consider transitioning to PO lasix for tomorrow.  -TTE is about stable when compared to the office TTE from 7/2020    3. Abx duration as per medicine     will follow

## 2020-11-21 NOTE — PROGRESS NOTE ADULT - SUBJECTIVE AND OBJECTIVE BOX
Patient is a 89y old  Female who presents with a chief complaint of SOB, abd pain (20 Nov 2020 10:37)      HPI:  89 y.o. female with PMHx of Permanent Afib off AC due to fall risks, HTN, HFrEF p/w SOB, abdominal pain. Pt feels better now.  Abdominal pain resolved, no acute SOB or CP. (19 Nov 2020 17:10)    Patient noted to have large amount of stool in colon with ?stercoral colitis. Patient has no complaints of abdominal pain.       PAST MEDICAL & SURGICAL HISTORY:  CHF (congestive heart failure)    A-fib    HLD (hyperlipidemia)    Glaucoma    S/P hip replacement, right        MEDICATIONS  (STANDING):  aspirin  chewable 81 milliGRAM(s) Oral daily  atorvastatin 10 milliGRAM(s) Oral at bedtime  azithromycin   Tablet 250 milliGRAM(s) Oral daily  carvedilol 3.125 milliGRAM(s) Oral every 12 hours  cefTRIAXone Injectable. 1000 milliGRAM(s) IV Push every 24 hours  diltiazem    Tablet 30 milliGRAM(s) Oral every 8 hours  famotidine    Tablet 10 milliGRAM(s) Oral daily  furosemide   Injectable 40 milliGRAM(s) IV Push daily  heparin   Injectable 5000 Unit(s) SubCutaneous every 12 hours  polyethylene glycol 3350 17 Gram(s) Oral daily  senna 2 Tablet(s) Oral at bedtime    MEDICATIONS  (PRN):  bisacodyl Suppository 10 milliGRAM(s) Rectal daily PRN Constipation      Allergies    atenolol (Other)  sulfa drugs (Other)    Intolerances        SOCIAL HISTORY:    FAMILY HISTORY:  Known health problems: none          Vital Signs Last 24 Hrs  T(C): 36.7 (21 Nov 2020 08:33), Max: 36.9 (21 Nov 2020 05:16)  T(F): 98.1 (21 Nov 2020 08:33), Max: 98.4 (21 Nov 2020 05:16)  HR: 94 (21 Nov 2020 08:33) (82 - 112)  BP: 114/50 (21 Nov 2020 08:33) (78/43 - 114/50)  BP(mean): 70 (21 Nov 2020 05:55) (70 - 71)  RR: 18 (21 Nov 2020 08:33) (18 - 18)  SpO2: 99% (21 Nov 2020 08:33) (97% - 100%)    PHYSICAL EXAM:    chronically ill female in NAD  Respiratory: CTAB  Cardiovascular: S1 and S2, RRR, no M/G/R  Gastrointestinal: BS+, soft, NT/ND      LABS:                        10.5   8.49  )-----------( 263      ( 21 Nov 2020 07:55 )             32.4     11-21    134<L>  |  101  |  28<H>  ----------------------------<  98  4.2   |  28  |  0.88    Ca    8.5      21 Nov 2020 07:55  Mg     2.1     11-19    TPro  7.6  /  Alb  3.3  /  TBili  0.9  /  DBili  x   /  AST  28  /  ALT  30  /  AlkPhos  66  11-19      LIVER FUNCTIONS - ( 19 Nov 2020 12:51 )  Alb: 3.3 g/dL / Pro: 7.6 gm/dL / ALK PHOS: 66 U/L / ALT: 30 U/L / AST: 28 U/L / GGT: x             RADIOLOGY & ADDITIONAL STUDIES:

## 2020-11-21 NOTE — PROGRESS NOTE ADULT - SUBJECTIVE AND OBJECTIVE BOX
*Covering for Dr. Horacio Villa*    CHIEF COMPLAINT: Patient is a 89y old  Female who presents with a chief complaint of SOB, abd pain (19 Nov 2020 17:10)    HPI:  89 y.o. female with PMHx of Permanent Afib off AC due to fall risks, HTN, HFrEF p/w SOB, abdominal pain. Pt feels better now.  Abdominal pain resolved, no acute SOB or CP. (19 Nov 2020 17:10)      patient seen and examined at bedside. yesterday HRs were still up to the 120s but since this morning they have been more in the 80s. She is feeling much better and has no complaints. She denies fevers, chills, CP, palp, SOB, abd pain, N/V, dizziness, orthopnea, PND. Her only main complaint is that it is hard for her to walk    PMHx: PAST MEDICAL & SURGICAL HISTORY:  CHF (congestive heart failure)  A-fib  HLD (hyperlipidemia)  Glaucoma  S/P hip replacement, right      Soc Hx:  no toxic habits       Allergies: Allergies  atenolol (Other)  sulfa drugs (Other)    REVIEW OF SYSTEMS:    CONSTITUTIONAL: No weakness, fevers or chills  EYES/ENT: No visual changes;  No vertigo or throat pain   NECK: No pain or stiffness  RESPIRATORY: No cough, wheezing, hemoptysis; No shortness of breath  CARDIOVASCULAR: No chest pain or palpitations  GENITOURINARY: No dysuria, frequency or hematuria  NEUROLOGICAL: No numbness or weakness  All other review of systems is negative unless indicated above    Vital Signs Last 24 Hrs  T(C): 36.7 (21 Nov 2020 08:33), Max: 36.9 (21 Nov 2020 05:16)  T(F): 98.1 (21 Nov 2020 08:33), Max: 98.4 (21 Nov 2020 05:16)  HR: 94 (21 Nov 2020 08:33) (82 - 112)  BP: 114/50 (21 Nov 2020 08:33) (78/43 - 114/50)  BP(mean): 70 (21 Nov 2020 05:55) (70 - 71)  RR: 18 (21 Nov 2020 08:33) (18 - 18)  SpO2: 99% (21 Nov 2020 08:33) (97% - 100%)    I&O's Summary    19 Nov 2020 07:01  -  20 Nov 2020 07:00  --------------------------------------------------------  IN: 240 mL / OUT: 650 mL / NET: -410 mL      PHYSICAL EXAM:   Constitutional: NAD, awake and alert, well-developed  HEENT: PERR, EOMI, Normal Hearing, MMM  Neck: Soft and supple, No LAD, No JVD  Respiratory: Breath sounds are clear bilaterally, No wheezing, rales or rhonchi  Cardiovascular: variable S1 and S2, irregular rate and rhythm  Gastrointestinal: Bowel Sounds present, soft, nontender, nondistended, no guarding, no rebound  Extremities: No peripheral edema  Vascular: 2+ peripheral pulses  Neurological: Alert, pleasant       MEDICATIONS:  MEDICATIONS  (STANDING):  aspirin  chewable 81 milliGRAM(s) Oral daily  atorvastatin 10 milliGRAM(s) Oral at bedtime  azithromycin   Tablet 250 milliGRAM(s) Oral daily  carvedilol 3.125 milliGRAM(s) Oral every 12 hours  cefTRIAXone Injectable. 1000 milliGRAM(s) IV Push every 24 hours  diltiazem    Tablet 30 milliGRAM(s) Oral every 8 hours  famotidine    Tablet 10 milliGRAM(s) Oral daily  furosemide   Injectable 40 milliGRAM(s) IV Push daily  heparin   Injectable 5000 Unit(s) SubCutaneous every 12 hours  magnesium citrate Oral Solution 1 Bottle Oral once  polyethylene glycol 3350 17 Gram(s) Oral daily  senna 2 Tablet(s) Oral at bedtime      LABS: All Labs Reviewed:                         10.5   8.49  )-----------( 263      ( 21 Nov 2020 07:55 )             32.4     11-21    134<L>  |  101  |  28<H>  ----------------------------<  98  4.2   |  28  |  0.88    Ca    8.5      21 Nov 2020 07:55  Mg     2.1     11-19    TPro  7.6  /  Alb  3.3  /  TBili  0.9  /  DBili  x   /  AST  28  /  ALT  30  /  AlkPhos  66  11-19      CARDIAC MARKERS ( 19 Nov 2020 12:51 )  0.032 ng/mL / x     / x     / x     / x          Serum Pro-Brain Natriuretic Peptide: [id number] pg/mL (11-19 @ 12:51)        EKG: AF RVR     Telemetry: AF , HRs mostly 80s    ECHO:  EXAM:  ECHO TTE WO CON COMP W DOPP    PROCEDURE DATE:  11/20/2020     Summary     Mild to moderate mitral annular calcification is present.   Fibrocalcific changes noted to the mitral valve leaflets with preserved   leaflet excursion.   Moderate to severe (3+) mitral regurgitation is present.   Fibrocalcific changes noted to the Aortic valve leaflets with preserved   leaflet excursion.   Mild to Moderate aortic regurgitation is present.   Mild to moderate tricuspid valve regurgitation is present.   Mild pulmonary hypertension.   Normal appearing pulmonic valve structure and function.   Mild pulmonic valvular regurgitation (1+) is present.   The left atrium is moderately dilated.   The interventricular septum appears hypokinetic.   Estimated left ventricular ejection fraction is 35-40 %.   The right atrium is at the upper limits of normal.   Pleural effusion - is present..    Stable form TTE done in the office on 7/28/2020

## 2020-11-22 NOTE — PROGRESS NOTE ADULT - ASSESSMENT
- CHF with the bilateral pleural effusions with the septal thickening   - mild peribronchial thickening can not rule out superimposed infectious process likely pneumonia   - small lung nodules bilateral reported on both   - AFIB with the RVR with the rate some what better controlled   - hypoxia likely secondary to chf   - stool impaction with the sterocoral coliits   - baseline H.F with the low EF 35 percent and echo moderate M.R and mild T.R and mild pulmonary HTn  - improving hyponatremia and hyperkalemia   - UTI with the E.coli     PLAN     - Continue with the diuretics iv as tolerated by the renal function   - effusions are moderate in the right would continue to monitor with out any indication for the draiange  - rate control of afib as per the cardio   - would repeat cxr tomorrow   - if unable to diurese would consider thoracentesis   - management of other medical issues as per the medicine including constipation    - follow up of the lung nodules and would consider follow up if the family choose to follow up and nodules are below 5 mm

## 2020-11-22 NOTE — PROGRESS NOTE ADULT - SUBJECTIVE AND OBJECTIVE BOX
*Covering for Dr. Horacio Villa*    CHIEF COMPLAINT: Patient is a 89y old  Female who presents with a chief complaint of SOB, abd pain (19 Nov 2020 17:10)    HPI:  89 y.o. female with PMHx of Permanent Afib off AC due to fall risks, HTN, HFrEF p/w SOB, abdominal pain. Pt feels better now.  Abdominal pain resolved, no acute SOB or CP. (19 Nov 2020 17:10)    Patient has intermittent episodes of hypotension, so her ACEI has been D/Elio and her coreg sometimes gets held. Because of this her BB has been changed to metoprolol (plan for tartrate so can easily up titrate and then transition to succinate once on stable dosing) and her cardizem was D/Elio    HRs yesterday sometimes go up to 120s but now are 60-100s.    patient seen and examined at bedside. She is feeling much better and has no complaints.     She denies fevers, chills, CP, palp, SOB, abd pain, N/V, dizziness, orthopnea, PND. Her only main complaint is that it is hard for her to walk    PMHx: PAST MEDICAL & SURGICAL HISTORY:  CHF (congestive heart failure)  A-fib  HLD (hyperlipidemia)  Glaucoma  S/P hip replacement, right      Soc Hx:  no toxic habits       Allergies: Allergies  atenolol (Other)  sulfa drugs (Other)    REVIEW OF SYSTEMS:    CONSTITUTIONAL: No weakness, fevers or chills  EYES/ENT: No visual changes;  No vertigo or throat pain   NECK: No pain or stiffness  RESPIRATORY: No cough, wheezing, hemoptysis; No shortness of breath  CARDIOVASCULAR: No chest pain or palpitations  GENITOURINARY: No dysuria, frequency or hematuria  NEUROLOGICAL: No numbness or weakness  All other review of systems is negative unless indicated above    Vital Signs Last 24 Hrs  T(C): 36.4 (22 Nov 2020 05:19), Max: 36.6 (21 Nov 2020 21:11)  T(F): 97.6 (22 Nov 2020 05:19), Max: 97.8 (21 Nov 2020 21:11)  HR: 81 (22 Nov 2020 05:19) (81 - 101)  BP: 108/66 (22 Nov 2020 05:19) (108/66 - 114/52)  BP(mean): --  RR: 18 (21 Nov 2020 21:11) (18 - 18)  SpO2: 100% (22 Nov 2020 05:19) (100% - 100%)      PHYSICAL EXAM:   Constitutional: NAD, awake and alert, well-developed  HEENT: PERR, EOMI, Normal Hearing, MMM  Neck: Soft and supple, No LAD, No JVD  Respiratory: Breath sounds are clear bilaterally, No wheezing, rales or rhonchi  Cardiovascular: variable S1 and S2, irregular rate and rhythm  Gastrointestinal: Bowel Sounds present, soft, nontender, nondistended, no guarding, no rebound  Extremities: No peripheral edema  Vascular: 2+ peripheral pulses  Neurological: Alert, pleasant       MEDICATIONS:  MEDICATIONS  (STANDING):  aspirin  chewable 81 milliGRAM(s) Oral daily  atorvastatin 10 milliGRAM(s) Oral at bedtime  azithromycin   Tablet 250 milliGRAM(s) Oral daily  cefTRIAXone Injectable. 1000 milliGRAM(s) IV Push every 24 hours  famotidine    Tablet 10 milliGRAM(s) Oral daily  furosemide   Injectable 40 milliGRAM(s) IV Push daily  heparin   Injectable 5000 Unit(s) SubCutaneous every 12 hours  metoprolol tartrate 25 milliGRAM(s) Oral two times a day  polyethylene glycol 3350 17 Gram(s) Oral daily  senna 2 Tablet(s) Oral at bedtime        LABS: All Labs Reviewed:                         10.5   8.49  )-----------( 263      ( 21 Nov 2020 07:55 )             32.4     11-21    134<L>  |  101  |  28<H>  ----------------------------<  98  4.2   |  28  |  0.88    Ca    8.5      21 Nov 2020 07:55  Mg     2.1     11-19    TPro  7.6  /  Alb  3.3  /  TBili  0.9  /  DBili  x   /  AST  28  /  ALT  30  /  AlkPhos  66  11-19      CARDIAC MARKERS ( 19 Nov 2020 12:51 )  0.032 ng/mL / x     / x     / x     / x          Serum Pro-Brain Natriuretic Peptide: 29130 pg/mL (11-19 @ 12:51)        EKG: AF RVR     Telemetry: AF , HRs 60-120s, mostly now  with rare PVCs vs aberrantly conducted beats    ECHO:  EXAM:  ECHO TTE WO CON COMP W DOPP    PROCEDURE DATE:  11/20/2020     Summary     Mild to moderate mitral annular calcification is present.   Fibrocalcific changes noted to the mitral valve leaflets with preserved   leaflet excursion.   Moderate to severe (3+) mitral regurgitation is present.   Fibrocalcific changes noted to the Aortic valve leaflets with preserved   leaflet excursion.   Mild to Moderate aortic regurgitation is present.   Mild to moderate tricuspid valve regurgitation is present.   Mild pulmonary hypertension.   Normal appearing pulmonic valve structure and function.   Mild pulmonic valvular regurgitation (1+) is present.   The left atrium is moderately dilated.   The interventricular septum appears hypokinetic.   Estimated left ventricular ejection fraction is 35-40 %.   The right atrium is at the upper limits of normal.   Pleural effusion - is present..    Stable form TTE done in the office on 7/28/2020

## 2020-11-22 NOTE — PROGRESS NOTE ADULT - SUBJECTIVE AND OBJECTIVE BOX
CC: sob (03 Feb 2020 09:40)    HPI: 89 y.o. female with PMHx of Permanent Afib off AC due to fall risks, HTN, HFrEF p/w SOB, abdominal pain. Pt feels better now. Abdominal pain resolved, no acute SOB or CP.  In ED was found to have low Sodium and  elevated potassium, elevated BNP           INTERVAL HPI/ OVERNIGHT EVENTS:  Pt was seen and examined,   feels better, no SOB, no Abd pain, but still didnt have BM       Vital Signs Last 24 Hrs  T(C): 36.6 (22 Nov 2020 10:37), Max: 36.6 (21 Nov 2020 21:11)  T(F): 97.8 (22 Nov 2020 10:37), Max: 97.8 (21 Nov 2020 21:11)  HR: 87 (22 Nov 2020 10:37) (81 - 101)  BP: 109/46 (22 Nov 2020 10:37) (108/66 - 114/52)  RR: 18 (22 Nov 2020 10:37) (18 - 18)  SpO2: 96% (22 Nov 2020 10:37) (96% - 100%)    REVIEW OF SYSTEMS:  All other review of systems is negative unless indicated above.        PHYSICAL EXAM:  General: Well developed; malnourished;  in no acute distress  Eyes: PERRLA, EOMI; conjunctiva and sclera clear  Head: Normocephalic; atraumatic  ENMT: No nasal discharge; airway clear  Neck: Supple;  no masses  Respiratory: Decreased BS at bases.  With  R>L basilar  rales   Cardiovascular: Irregular rate and rhythm. + S1 and S2 Normal;   Gastrointestinal: Soft non-tender  mildly distended; Normal bowel sounds  Genitourinary: No  suprapubic  tenderness  Extremities: +  edema, improved   Vascular: Peripheral pulses palpable 2+ bilaterally  Neurological: Alert and oriented x2, non focal, baseline confused   Skin: Warm and dry. No acute rash   Lymph Nodes: No acute cervical adenopathy  Musculoskeletal: Normal muscle tone, no joint effusion or erythema   Psychiatric: Cooperative       LABS:                         10.5   8.49  )-----------( 263      ( 21 Nov 2020 07:55 )             32.4     11-21    134<L>  |  101  |  28<H>  ----------------------------<  98  4.2   |  28  |  0.88    Ca    8.5      21 Nov 2020 07:55                              12.8   7.84  )-----------( 268      ( 06 Feb 2020 07:27 )             39.7     02-06    133<L>  |  92<L>  |  21  ----------------------------<  111<H>  3.6   |  34<H>  |  0.90    Ca    8.8      06 Feb 2020 07:27  Phos  3.5     02-06  Mg     2.0     02-06          02-04    139  |  100  |  19  ----------------------------<  97  3.6   |  36<H>  |  0.62    Ca    9.0      04 Feb 2020 07:02        03 Feb 2020 07:08    138    |  104    |  21     ----------------------------<  92     3.8     |  30     |  0.61     Ca    8.7        03 Feb 2020 07:08      MEDICATIONS  (STANDING):  aspirin  chewable 81 milliGRAM(s) Oral daily  atorvastatin 10 milliGRAM(s) Oral at bedtime  azithromycin   Tablet 250 milliGRAM(s) Oral daily  carvedilol 3.125 milliGRAM(s) Oral every 12 hours  cefTRIAXone Injectable. 1000 milliGRAM(s) IV Push every 24 hours  diltiazem    Tablet 30 milliGRAM(s) Oral every 8 hours  famotidine    Tablet 10 milliGRAM(s) Oral daily  furosemide   Injectable 40 milliGRAM(s) IV Push daily  heparin   Injectable 5000 Unit(s) SubCutaneous every 12 hours  polyethylene glycol 3350 17 Gram(s) Oral daily  senna 2 Tablet(s) Oral at bedtime    MEDICATIONS  (PRN):  bisacodyl Suppository 10 milliGRAM(s) Rectal daily PRN Constipation      RADIOLOGY & ADDITIONAL TESTS:    EXAM:  CT CHEST                        PROCEDURE DATE:  02/01/2020    FINDINGS:    LUNGS, AIRWAYS: The central airways are patent. pulmonary edema.    PLEURA: MOderate effusions.    VESSELS: Aortic atherosclerosis without aneurysm.    HEART: Big heart size. No pericardial effusion. Coronary and mitral annular calcification.    MEDIASTINUM AND SAUMYA: No adenopathy.    UPPER ABDOMEN: Limited visualization is unremarkable.    BONES AND SOFT TISSUES: No acute bony abnormality.    IMPRESSION:   Pulmonary edema and moderate effusions.          EXAM:  CT ABDOMEN AND PELVIS                        PROCEDURE DATE:  02/04/2020      FINDINGS:    LOWER CHEST: New bilateral pleural effusions, moderate in size. Bilateral lower lobe consolidation including groundglass opacity. Increased groundglass opacities also seen in the posterior aspect of the RIGHT middle lobe.  Coronary artery calcifications as well as aortic root calcifications. Calcification of the LEFT ventricular papillary muscle.    LIVER: Stable hypodense lesion in the LEFT lobe of the liver with central chunky calcification.  BILE DUCTS: Normal caliber.  GALLBLADDER: Elongated distended gallbladder without change from prior study. No evidence of acute cholecystitis.  SPLEEN: Within normal limits.  PANCREAS: Within normal limits.  ADRENALS: Within normal limits.  KIDNEYS/URETERS: Stable RIGHT renal cyst. Stable mild LEFT hydronephrosis and hydroureter. Phlebolith adjacent to the mid LEFT ureter (2-56).    BLADDER: Unremarkable, partially obscured by artifact from RIGHT hip arthroplasty.  REPRODUCTIVE ORGANS: No uterine or adnexal masses.    BOWEL: Large amount of fecal material within the rectal vault with mild rectal wall thickening and perirectal fat stranding suggestive of stercoral colitis. Moderate stool burden throughout the remainder of the colon. Diverticulosis of the sigmoid colon without acute diverticulitis.  LEFT inguinal hernia containing a loop of bowel, likely small bowel. No evidence of obstruction. Evaluation limited by lack of oral contrast material.  Mild gastric wall thickening may represent mild gastritis.  Appendix is not visualized. No evidence of inflammation in the pericecal region.  PERITONEUM: Diffuse increased fat stranding of the mesentery without focal lesion.  VESSELS: Diffuse atherosclerotic disease of the aorta. Evaluation limited by lack of IV contrast material.  RETROPERITONEUM/LYMPH NODES: No lymphadenopathy.    ABDOMINAL WALL: LEFT inguinal hernia containing a loop of bowel, likely small bowel.  BONES: There is a new compression fracture of L3 when compared to the prior study of October 15, 2017. There is mild retropulsion of the superior aspect with narrowing of the thecal sac. Stable compression deformity of L1 without change from prior study.    IMPRESSION:     1.  Rectum distended by significant stool with perirectal fat stranding suggesting stercoral colitis.  2.  LEFT inguinal hernia containing a nonobstructed nondilated loop of small bowel.  3.  L3 compression fracture which is new from October 15, 2017 but reported on prior CT of the lumbar spine from September 21, 2018. There has been further loss of height when compared to the 2018 study.  4.  Bilateral pleural effusions with bilateral lower lobe consolidation.       EXAM:  ECHO TTE WO CON COMP W DOPP         PROCEDURE DATE:  11/20/2020         CC: sob (03 Feb 2020 09:40)    HPI: 89 y.o. female with PMHx of Permanent Afib off AC due to fall risks, HTN, HFrEF p/w SOB, abdominal pain. Pt feels better now. Abdominal pain resolved, no acute SOB or CP.  In ED was found to have low Sodium and  elevated potassium, elevated BNP           INTERVAL HPI/ OVERNIGHT EVENTS:  Pt was seen and examined,   getting slowly  better, no SOB,  tolerates RA at rest,  no Abd pain, had  BM       Vital Signs Last 24 Hrs  T(C): 36.6 (22 Nov 2020 10:37), Max: 36.6 (21 Nov 2020 21:11)  T(F): 97.8 (22 Nov 2020 10:37), Max: 97.8 (21 Nov 2020 21:11)  HR: 87 (22 Nov 2020 10:37) (81 - 101)  BP: 109/46 (22 Nov 2020 10:37) (108/66 - 110/53)  RR: 18 (22 Nov 2020 10:37) (18 - 18)  SpO2: 96% (22 Nov 2020 10:37) (96% - 100%)    REVIEW OF SYSTEMS:  All other review of systems is negative unless indicated above.        PHYSICAL EXAM:  General: Well developed; malnourished;  in no acute distress  Eyes: PERRLA, EOMI; conjunctiva and sclera clear  Head: Normocephalic; atraumatic  ENMT: No nasal discharge; airway clear  Neck: Supple;  no masses  Respiratory: Decreased BS at bases.  With  basilar  rales   Cardiovascular: Irregular rate and rhythm. + S1 and S2 Normal;   Gastrointestinal: Soft non-tender  mildly distended; Normal bowel sounds  Genitourinary: No  suprapubic  tenderness  Extremities: +  edema, improved   Vascular: Peripheral pulses palpable 2+ bilaterally  Neurological: Alert and oriented x2, non focal, baseline confused   Skin: Warm and dry. No acute rash   Lymph Nodes: No acute cervical adenopathy  Musculoskeletal: Normal muscle tone, no  joint effusions or  erythema,   Psychiatric: Cooperative       LABS:                         10.5   8.49  )-----------( 263      ( 21 Nov 2020 07:55 )             32.4     11-22    135  |  97  |  21  ----------------------------<  121<H>  4.2   |  33<H>  |  0.86    Ca    8.9      22 Nov 2020 08:39  Phos  3.0     11-22  Mg     2.5     11-22                          10.5   8.49  )-----------( 263      ( 21 Nov 2020 07:55 )             32.4     11-21    134<L>  |  101  |  28<H>  ----------------------------<  98  4.2   |  28  |  0.88    Ca    8.5      21 Nov 2020 07:55                              12.8   7.84  )-----------( 268      ( 06 Feb 2020 07:27 )             39.7     02-06    133<L>  |  92<L>  |  21  ----------------------------<  111<H>  3.6   |  34<H>  |  0.90    Ca    8.8      06 Feb 2020 07:27  Phos  3.5     02-06  Mg     2.0     02-06          02-04    139  |  100  |  19  ----------------------------<  97  3.6   |  36<H>  |  0.62    Ca    9.0      04 Feb 2020 07:02        03 Feb 2020 07:08    138    |  104    |  21     ----------------------------<  92     3.8     |  30     |  0.61     Ca    8.7        03 Feb 2020 07:08      MEDICATIONS  (STANDING):  aspirin  chewable 81 milliGRAM(s) Oral daily  atorvastatin 10 milliGRAM(s) Oral at bedtime  azithromycin   Tablet 250 milliGRAM(s) Oral daily  cefTRIAXone Injectable. 1000 milliGRAM(s) IV Push every 24 hours  famotidine    Tablet 10 milliGRAM(s) Oral daily  furosemide   Injectable 40 milliGRAM(s) IV Push daily  heparin   Injectable 5000 Unit(s) SubCutaneous every 12 hours  metoprolol tartrate 25 milliGRAM(s) Oral two times a day  polyethylene glycol 3350 17 Gram(s) Oral daily  senna 2 Tablet(s) Oral at bedtime    MEDICATIONS  (PRN):  bisacodyl Suppository 10 milliGRAM(s) Rectal daily PRN Constipation  diltiazem    Tablet 30 milliGRAM(s) Oral every 6 hours PRN For HR>120    RADIOLOGY & ADDITIONAL TESTS:    EXAM:  CT CHEST                        PROCEDURE DATE:  02/01/2020    FINDINGS:    LUNGS, AIRWAYS: The central airways are patent. pulmonary edema.    PLEURA: MOderate effusions.    VESSELS: Aortic atherosclerosis without aneurysm.    HEART: Big heart size. No pericardial effusion. Coronary and mitral annular calcification.    MEDIASTINUM AND SAUMYA: No adenopathy.    UPPER ABDOMEN: Limited visualization is unremarkable.    BONES AND SOFT TISSUES: No acute bony abnormality.    IMPRESSION:   Pulmonary edema and moderate effusions.          EXAM:  CT ABDOMEN AND PELVIS                        PROCEDURE DATE:  02/04/2020      FINDINGS:    LOWER CHEST: New bilateral pleural effusions, moderate in size. Bilateral lower lobe consolidation including groundglass opacity. Increased groundglass opacities also seen in the posterior aspect of the RIGHT middle lobe.  Coronary artery calcifications as well as aortic root calcifications. Calcification of the LEFT ventricular papillary muscle.    LIVER: Stable hypodense lesion in the LEFT lobe of the liver with central chunky calcification.  BILE DUCTS: Normal caliber.  GALLBLADDER: Elongated distended gallbladder without change from prior study. No evidence of acute cholecystitis.  SPLEEN: Within normal limits.  PANCREAS: Within normal limits.  ADRENALS: Within normal limits.  KIDNEYS/URETERS: Stable RIGHT renal cyst. Stable mild LEFT hydronephrosis and hydroureter. Phlebolith adjacent to the mid LEFT ureter (2-56).    BLADDER: Unremarkable, partially obscured by artifact from RIGHT hip arthroplasty.  REPRODUCTIVE ORGANS: No uterine or adnexal masses.    BOWEL: Large amount of fecal material within the rectal vault with mild rectal wall thickening and perirectal fat stranding suggestive of stercoral colitis. Moderate stool burden throughout the remainder of the colon. Diverticulosis of the sigmoid colon without acute diverticulitis.  LEFT inguinal hernia containing a loop of bowel, likely small bowel. No evidence of obstruction. Evaluation limited by lack of oral contrast material.  Mild gastric wall thickening may represent mild gastritis.  Appendix is not visualized. No evidence of inflammation in the pericecal region.  PERITONEUM: Diffuse increased fat stranding of the mesentery without focal lesion.  VESSELS: Diffuse atherosclerotic disease of the aorta. Evaluation limited by lack of IV contrast material.  RETROPERITONEUM/LYMPH NODES: No lymphadenopathy.    ABDOMINAL WALL: LEFT inguinal hernia containing a loop of bowel, likely small bowel.  BONES: There is a new compression fracture of L3 when compared to the prior study of October 15, 2017. There is mild retropulsion of the superior aspect with narrowing of the thecal sac. Stable compression deformity of L1 without change from prior study.    IMPRESSION:     1.  Rectum distended by significant stool with perirectal fat stranding suggesting stercoral colitis.  2.  LEFT inguinal hernia containing a nonobstructed nondilated loop of small bowel.  3.  L3 compression fracture which is new from October 15, 2017 but reported on prior CT of the lumbar spine from September 21, 2018. There has been further loss of height when compared to the 2018 study.  4.  Bilateral pleural effusions with bilateral lower lobe consolidation.       EXAM:  ECHO TTE WO CON COMP W DOPP         PROCEDURE DATE:  11/20/2020

## 2020-11-22 NOTE — PROGRESS NOTE ADULT - SUBJECTIVE AND OBJECTIVE BOX
Patient is a 89y old  Female who presents with a chief complaint of SOB, abd pain (22 Nov 2020 10:08)      HPI:  she is feeling much better today  she had multiple BM yesterday      PAST MEDICAL & SURGICAL HISTORY:  CHF (congestive heart failure)    A-fib    HLD (hyperlipidemia)    Glaucoma    S/P hip replacement, right        MEDICATIONS  (STANDING):  aspirin  chewable 81 milliGRAM(s) Oral daily  atorvastatin 10 milliGRAM(s) Oral at bedtime  azithromycin   Tablet 250 milliGRAM(s) Oral daily  cefTRIAXone Injectable. 1000 milliGRAM(s) IV Push every 24 hours  famotidine    Tablet 10 milliGRAM(s) Oral daily  furosemide   Injectable 40 milliGRAM(s) IV Push daily  heparin   Injectable 5000 Unit(s) SubCutaneous every 12 hours  metoprolol tartrate 25 milliGRAM(s) Oral two times a day  polyethylene glycol 3350 17 Gram(s) Oral daily  senna 2 Tablet(s) Oral at bedtime    MEDICATIONS  (PRN):  bisacodyl Suppository 10 milliGRAM(s) Rectal daily PRN Constipation  diltiazem    Tablet 30 milliGRAM(s) Oral every 6 hours PRN For HR>120      Allergies    atenolol (Other)  sulfa drugs (Other)    Intolerances        REVIEW OF SYSTEMS:    CONSTITUTIONAL: No weakness, fevers or chills  RESPIRATORY: No cough, wheezing, hemoptysis; No shortness of breath  CARDIOVASCULAR: No chest pain or palpitations  GASTROINTESTINAL: as above  All other review of systems is negative unless indicated above.    Vital Signs Last 24 Hrs  T(C): 36.4 (22 Nov 2020 05:19), Max: 36.6 (21 Nov 2020 21:11)  T(F): 97.6 (22 Nov 2020 05:19), Max: 97.8 (21 Nov 2020 21:11)  HR: 81 (22 Nov 2020 05:19) (81 - 101)  BP: 108/66 (22 Nov 2020 05:19) (108/66 - 114/52)  BP(mean): --  RR: 18 (21 Nov 2020 21:11) (18 - 18)  SpO2: 100% (22 Nov 2020 05:19) (100% - 100%)    PHYSICAL EXAM:    Constitutional: NAD  Gastrointestinal: BS+, soft, NT/ND      LABS:                        10.5   8.49  )-----------( 263      ( 21 Nov 2020 07:55 )             32.4     11-22    135  |  97  |  21  ----------------------------<  121<H>  4.2   |  33<H>  |  0.86    Ca    8.9      22 Nov 2020 08:39  Phos  3.0     11-22  Mg     2.5     11-22            RADIOLOGY & ADDITIONAL STUDIES:

## 2020-11-22 NOTE — PROGRESS NOTE ADULT - SUBJECTIVE AND OBJECTIVE BOX
Pulmonary Consult    Patient is a 89y old  Female who presents with a chief complaint of SOB, abd pain (2020 10:01)      HPI:  89 y.o. female with PMHx of Permanent Afib off AC due to fall risks, HTN, HFrEF p/w SOB, abdominal pain. Pt feels better now.  Abdominal pain resolved, no acute SOB or CP. (2020 17:10)    20     patient is seen and above history of present illness noted   patient is poor historian and confused   ct findings noted with the chf and bilateral effusions   with the peribronchial thickening and compressive atelectasis at the bases   developed low B.P with the twice daily lasix   rate seems to be controlled     20     Patient breathing comfortably today   no sob at rest and not on the 02   no wheezing       PAST MEDICAL & SURGICAL HISTORY:  CHF (congestive heart failure)    A-fib    HLD (hyperlipidemia)    Glaucoma    S/P hip replacement, right        PHYSICAL EXAM:  Vital Signs Last 24 Hrs  T(C): 36.4 (2020 05:19), Max: 36.6 (2020 21:11)  T(F): 97.6 (2020 05:19), Max: 97.8 (2020 21:11)  HR: 81 (2020 05:19) (81 - 101)  BP: 108/66 (2020 05:19) (108/66 - 114/52)  BP(mean): --  RR: 18 (2020 21:11) (18 - 18)  SpO2: 100% (2020 05:19) (100% - 100%)    General: Awake, confused   HEENT: Atraumatic, normocephalic.   Neck: No JVD no lymphadenopathy   Respiratory: normal vesicular breathing with decreased breath sounds in the bases and no acute wheeze    Cardiovascular: S1 S2 normal. No murmurs.   Abdomen: Soft, non-tender, non-distended. No organomegaly.  Extremities: No edema of calf tenderness   Skin: No rashes or skin lesions  Neurological: moving all the extremties with out weakness       HOSPITAL MEDICATIONS:  MEDICATIONS  (STANDING):  aspirin  chewable 81 milliGRAM(s) Oral daily  atorvastatin 10 milliGRAM(s) Oral at bedtime  azithromycin   Tablet 250 milliGRAM(s) Oral daily  carvedilol 3.125 milliGRAM(s) Oral every 12 hours  cefTRIAXone Injectable. 1000 milliGRAM(s) IV Push every 24 hours  diltiazem    Tablet 30 milliGRAM(s) Oral every 8 hours  famotidine    Tablet 10 milliGRAM(s) Oral daily  furosemide   Injectable 40 milliGRAM(s) IV Push daily  heparin   Injectable 5000 Unit(s) SubCutaneous every 12 hours  magnesium citrate Oral Solution 1 Bottle Oral once  polyethylene glycol 3350 17 Gram(s) Oral daily  senna 2 Tablet(s) Oral at bedtime    MEDICATIONS  (PRN):  bisacodyl Suppository 10 milliGRAM(s) Rectal daily PRN Constipation      LABS:                        10.5   8.49  )-----------( 263      ( 2020 07:55 )             32.4     11-    134<L>  |  101  |  28<H>  ----------------------------<  98  4.2   |  28  |  0.88    Ca    8.5      2020 07:55  Mg     2.1         TPro  7.6  /  Alb  3.3  /  TBili  0.9  /  DBili  x   /  AST  28  /  ALT  30  /  AlkPhos  66        Urinalysis Basic - ( 2020 16:20 )    Color: Yellow / Appearance: very cloudy / S.010 / pH: x  Gluc: x / Ketone: Negative  / Bili: Negative / Urobili: Negative mg/dL   Blood: x / Protein: 100 mg/dL / Nitrite: Negative   Leuk Esterase: Moderate / RBC: 0-2 /HPF / WBC >50   Sq Epi: x / Non Sq Epi: Occasional / Bacteria: Many              Culture - Urine (collected 2020 16:20)  Source: .Urine None  Preliminary Report (2020 09:52):    >100,000 CFU/ml Escherichia coli    Culture - Blood (collected 2020 12:51)  Source: .Blood Blood  Preliminary Report (2020 19:02):    No growth to date.    < from: CT Chest No Cont (20 @ 14:45) >  FINDINGS:    LUNGS AND AIRWAYS: PLEURA:  Moderate-sized bilateral pleural effusions with underlying compressive atelectasis, similar to prior study.  Intraluminal lobular septal thickening with mild dependent groundglass opacities, peribronchial thickening, likely reflecting interstitial pulmonary edema.    4 mm nodule apical right upper lobe, stable in appearance.  3 mm nodule medial aspect apical segment left upper lobe, slightly more conspicuous on current exam.  3 mm nodule periphery left upper lobe, stable in appearance.    MEDIASTINUM AND SAUMYA:  Small, subcentimeter shotty precarinal mediastinal lymph nodes, stable in appearance.    VESSELS: Atherosclerotic calcification of thoracic aorta with coronary artery calcifications.    HEART: Cardiomegaly. No pericardial effusion.    CHEST WALL AND LOWER NECK: Within normal limits.    VISUALIZED UPPER ABDOMEN:  Coarse capsular hepatic calcification.    BONES: Degenerative changes of spine and multilevel contiguous anterior spodylophytes, may reflect ankylosing spondylitis.    IMPRESSION:    Interstitial pulmonary edema with moderate bilateral pleural effusions and underlying compressive atelectasis, similar to prior exam.    Few pulmonary nodules as discussed above.    Other findings as discussed above.  < from: Xray Chest 1 View AP/PA. (20 @ 13:23) >  AM:  XR CHEST 1 VIEW                            PROCEDURE DATE:  2020          INTERPRETATION:  Portable chest radiograph    CLINICAL INFORMATION:   Short of breath.    TECHNIQUE:  Portable  AP view of the chest was obtained.    COMPARISON:2020 chest radiograph available for review.    FINDINGS:  The lungs show bibasilar airspace consolidations and/or effusions obscuring diaphragmatic contours. There is mild vascular congestion.  The  heart is enlarged in transverse diameter. No hilar mass.  Atherosclerotic calcified intimal wall plaques within nondilated thoracic aorta   . No pneumothorax.    The heart and mediastinum are within normal limits.    Visualized osseous structures are intact.        IMPRESSION:   Cardiomegaly, vascular congestion and bibasilar airspace consolidations and/or effusions.            < from: TTE Echo Complete w/o Contrast w/ Doppler (20 @ 09:29) >  mpression     Summary     Mild to moderate mitral annular calcification is present.   Fibrocalcific changes noted to the mitral valve leaflets with preserved   leaflet excursion.   Moderate to severe (3+) mitral regurgitationis present.   Fibrocalcific changes noted to the Aortic valve leaflets with preserved   leaflet excursion.   Mild to Moderate aortic regurgitation is present.   Mild to moderate tricuspid valve regurgitation is present.   Mild pulmonary hypertension.   Normal appearing pulmonic valve structure and function.   Mild pulmonic valvular regurgitation (1+) is present.   The left atrium is moderately dilated.   The interventricular septum appears hypokinetic.   Estimated left ventricular ejection fraction is 35-40 %.   The right atrium is at the upper limits of normal.   Pleural effusion - is present..

## 2020-11-22 NOTE — PROGRESS NOTE ADULT - ASSESSMENT
89F w/PMH afib (not on AC d/t fall risk), HTN admitted for:     89 y.o. female with PMHx of Permanent Afib off AC due to fall risks, HTN, HFrEF p/w SOB, abdominal pain    1. Permanent Afib with RVR off AC due to frequent falls  off digoxin since last discharge  On presentation AFIB with HR 140s   C/w tele: AFIB 90-120s, had brief episode of 120   C/w Po Cardizem and metoprolol, VÍCTOR improved. Adjust dose as needed   C/w Coreg with parameters   On ASA  Monitor on tele   CArdio f/u appreciated        2. Acute hypoxic respiratory failure 2/2  Acute on Chronic Systolic CHF   CT chest with moderate effusion   Last ECHO 2/2020: EF 35-40%  repeat ECHO stable   C/w  IV lasix 40mg IV QD, reeval in am for possible switch to PO   Monitor BP might need to further adjust dose       3. Abd pain, resolved   Likely due to UTI and constipation   F/u UCX: + ECOLI   C/w Ceftriaxone  CT abd/pelvis with  distended rectum and stercoral colitis   start bowel regiment, had only small BOm will be given dulcolax, if no BM give enema   GI eval appreciated       4. HTN  - BP better   -Off ACEI  - C/w Cardizem   and coreg with   parameters     5. PNA  with pleural effusions  On Ceftriaxone, and Azithromycin  Pulm eval     6.  Hyponatremia, hypervolemic   better on lasix, monitor closely     7.   Hyperkalemia  resolved with lasix and Ca gluconate       8. VTE proph - UFH    Pt is full code      D/w Pts pako, updated on results and plan    89F w/PMH afib (not on AC d/t fall risk), HTN admitted for:     89 y.o. female with PMHx of Permanent Afib off AC due to fall risks, HTN, HFrEF p/w SOB, abdominal pain    1. Permanent Afib with RVR off AC due to frequent falls  off digoxin since last discharge  On presentation AFIB with HR 140s   C/w tele: AFIB 60s, PVCs  C/w Po Cardizem prn   Carvedilol changes to metoprolol for better HR control   On ASA  Monitor on tele   D/w Dr Koroma         2. Acute hypoxic respiratory failure 2/2  Acute on Chronic Systolic CHF. Pleural effusions   CT chest with moderate effusion   Last ECHO 2/2020: EF 35-40%  repeat ECHO stable   C/w  IV lasix 40mg IV QD, re eval in am for possible switch to PO   CXR/BNP in am   Monitor BP might need to further adjust dose   D/w Dr koroma        3. Abd pain, resolved   Likely due to UTI and constipation   F/u UCX: + ECOLI, f/u sensitivities   C/w Ceftriaxone  CT abd/pelvis with  distended rectum and stercoral colitis   start bowel regiment, ha few BMs overnight   GI eval appreciated       4. HTN  - BP better   -Off ACEI  - C/w metoprolol, CArdizem PRN     5. PNA  with pleural effusions  On Ceftriaxone, and Azithromycin  D/w Dr Marcos     6.  Hyponatremia, hypervolemic   resolved with  lasix, monitor closely     7.   Hyperkalemia  resolved with lasix and Ca gluconate       8. VTE proph - UFH    Pt is full code      Dispo: C/w telemetry, labs in am, PT, consider  to switch to Po diuretics in am

## 2020-11-22 NOTE — PROGRESS NOTE ADULT - ASSESSMENT
89 F with systolic heart failure, AF presents with  UTI and episodes of hypotension    -Sometimes coreg has been held due to low BP and then overnight she would get tachycardiac, also unable to get back on her ACEI due to low BP, therefore patient is now switched to metoprolol. Plan to uptitrate metoprolol tartrate to dose she can tolerate and HR control with goal HR <110 and then transition her to metoprolol succinate.  -Holding cardizem due to low BP and h/o cardiomyopathy and wanting to get patient back on her ACEI. Will up titrate BB as above  -Patient had a pleural effusion but now off oxygen and saturating well. Discussed with Dr. Garcia and plan to get CXR tomorrow with consideration to transition to PO diuretic tomorrow.  -C/W Tele

## 2020-11-22 NOTE — PROGRESS NOTE ADULT - ASSESSMENT
89yo female with fecal impaction, stercoral colitis  she is much better today after cleanout and tolerating PO well

## 2020-11-23 NOTE — PROGRESS NOTE ADULT - ASSESSMENT
- CHF with the bilateral pleural effusions with the septal thickening  which has been improved on the follow-up chest x-ray with diuresis  - mild peribronchial thickening can not rule out superimposed infectious process likely pneumonia   - small lung nodules bilateral reported on both sides   - AFIB with the RVR  noted at the time of admission  - hypoxia likely secondary to chf   - stool impaction with the sterocoral coliits  on IV antibiotics being followed up with GI  - baseline H.F with the low EF 35 percent and echo moderate M.R and mild T.R and mild pulmonary HTn  - improving hyponatremia and hyperkalemia   - UTI with the E.coli  on IV antibiotics     PLAN     - Continue with the diuretics iv as tolerated by the renal function  and follow-up chest x-ray noted showing improving effusions and improving pulmonary edema while being diuresed  -  effusions are small noted in the chest x-ray  without any indication for drainage  - rate control of afib as per the cardio   - management of other medical issues as per the medicine including constipation  stercoral colitis   - follow up of the lung nodules and would consider follow up if the family choose to follow up and nodules are below 5 mm .  -  DVT prophylaxis with subcu heparin  -  evaluate need for home O2 therapy  -   physical therapy for ambulation

## 2020-11-23 NOTE — PROGRESS NOTE ADULT - SUBJECTIVE AND OBJECTIVE BOX
Patient is a 89y old  Female who presents with a chief complaint of SOB, abd pain (23 Nov 2020 07:15)      HPI:  she is feeling well this am  abdominal pain improved  she has had multiple BMs    PAST MEDICAL & SURGICAL HISTORY:  CHF (congestive heart failure)    A-fib    HLD (hyperlipidemia)    Glaucoma    S/P hip replacement, right      MEDICATIONS  (STANDING):  aspirin  chewable 81 milliGRAM(s) Oral daily  atorvastatin 10 milliGRAM(s) Oral at bedtime  azithromycin   Tablet 250 milliGRAM(s) Oral daily  cefTRIAXone Injectable. 1000 milliGRAM(s) IV Push every 24 hours  famotidine    Tablet 10 milliGRAM(s) Oral daily  furosemide   Injectable 40 milliGRAM(s) IV Push daily  heparin   Injectable 5000 Unit(s) SubCutaneous every 12 hours  metoprolol tartrate 50 milliGRAM(s) Oral two times a day  polyethylene glycol 3350 17 Gram(s) Oral daily  senna 2 Tablet(s) Oral at bedtime    MEDICATIONS  (PRN):  bisacodyl Suppository 10 milliGRAM(s) Rectal daily PRN Constipation  hydrOXYzine hydrochloride 25 milliGRAM(s) Oral two times a day PRN Anxiety      Allergies    atenolol (Other)  sulfa drugs (Other)    Intolerances        REVIEW OF SYSTEMS:    CONSTITUTIONAL: No weakness, fevers or chills  RESPIRATORY: No cough, wheezing, hemoptysis; No shortness of breath  CARDIOVASCULAR: No chest pain or palpitations  GASTROINTESTINAL: as above  All other review of systems is negative unless indicated above.    Vital Signs Last 24 Hrs  T(C): 36.3 (23 Nov 2020 05:39), Max: 36.6 (22 Nov 2020 10:37)  T(F): 97.4 (23 Nov 2020 05:39), Max: 97.8 (22 Nov 2020 10:37)  HR: 108 (23 Nov 2020 05:39) (87 - 120)  BP: 125/85 (23 Nov 2020 05:39) (109/46 - 125/85)  BP(mean): 88 (22 Nov 2020 22:41) (88 - 88)  RR: 18 (23 Nov 2020 05:39) (18 - 18)  SpO2: 100% (23 Nov 2020 05:39) (94% - 100%)    PHYSICAL EXAM:    Constitutional: NAD  Gastrointestinal: BS+, soft, NT/ND      LABS:                        10.5   8.49  )-----------( 263      ( 21 Nov 2020 07:55 )             32.4     11-22    135  |  97  |  21  ----------------------------<  121<H>  4.2   |  33<H>  |  0.86    Ca    8.9      22 Nov 2020 08:39  Phos  3.0     11-22  Mg     2.5     11-22            RADIOLOGY & ADDITIONAL STUDIES:

## 2020-11-23 NOTE — PROGRESS NOTE ADULT - SUBJECTIVE AND OBJECTIVE BOX
*Covering for Dr. Horacio Villa*    CHIEF COMPLAINT: Patient is a 89y old  Female who presents with a chief complaint of SOB, abd pain (19 Nov 2020 17:10)    HPI:  89 y.o. female with PMHx of Permanent Afib off AC due to fall risks, HTN, HFrEF p/w SOB, abdominal pain. Pt feels better now.  Abdominal pain resolved, no acute SOB or CP. (19 Nov 2020 17:10)    Patient has intermittent episodes of hypotension, so her ACEI has been D/Elio and her coreg sometimes gets held. Because of this her BB has been changed to metoprolol (plan for tartrate so can easily up titrate and then transition to succinate once on stable dosing) and her cardizem was D/Elio      11/23/2020--- confused,. elevated HR overnight  PMHx: PAST MEDICAL & SURGICAL HISTORY:  CHF (congestive heart failure)  A-fib  HLD (hyperlipidemia)  Glaucoma  S/P hip replacement, right      Soc Hx:  no toxic habits       Allergies: Allergies  atenolol (Other)  sulfa drugs (Other)    REVIEW OF SYSTEMS:    CONSTITUTIONAL: No weakness, fevers or chills  EYES/ENT: No visual changes;  No vertigo or throat pain   NECK: No pain or stiffness  RESPIRATORY: No cough, wheezing, hemoptysis; No shortness of breath  CARDIOVASCULAR: No chest pain or palpitations  GENITOURINARY: No dysuria, frequency or hematuria  NEUROLOGICAL: No numbness or weakness  All other review of systems is negative unless indicated above    Vital Signs Last 24 Hrs  T(C): 36.3 (23 Nov 2020 05:39), Max: 36.6 (22 Nov 2020 10:37)  T(F): 97.4 (23 Nov 2020 05:39), Max: 97.8 (22 Nov 2020 10:37)  HR: 108 (23 Nov 2020 05:39) (87 - 120)  BP: 125/85 (23 Nov 2020 05:39) (109/46 - 125/85)  BP(mean): 88 (22 Nov 2020 22:41) (88 - 88)  RR: 18 (23 Nov 2020 05:39) (18 - 18)  SpO2: 100% (23 Nov 2020 05:39) (94% - 100%)      PHYSICAL EXAM:   Constitutional: NAD, awake and alert, well-developed  HEENT: PERR, EOMI, Normal Hearing, MMM  Neck: Soft and supple, No LAD, No JVD  Respiratory: Breath sounds are clear bilaterally, No wheezing, rales or rhonchi  Cardiovascular: variable S1 and S2, irregular rate and rhythm  Gastrointestinal: Bowel Sounds present, soft, nontender, nondistended, no guarding, no rebound  Extremities: No peripheral edema  Vascular: 2+ peripheral pulses  Neurological: Alert, pleasant       MEDICATIONS  (STANDING):  aspirin  chewable 81 milliGRAM(s) Oral daily  atorvastatin 10 milliGRAM(s) Oral at bedtime  azithromycin   Tablet 250 milliGRAM(s) Oral daily  cefTRIAXone Injectable. 1000 milliGRAM(s) IV Push every 24 hours  famotidine    Tablet 10 milliGRAM(s) Oral daily  furosemide   Injectable 40 milliGRAM(s) IV Push daily  heparin   Injectable 5000 Unit(s) SubCutaneous every 12 hours  metoprolol tartrate 50 milliGRAM(s) Oral two times a day  polyethylene glycol 3350 17 Gram(s) Oral daily  senna 2 Tablet(s) Oral at bedtime    MEDICATIONS  (PRN):  bisacodyl Suppository 10 milliGRAM(s) Rectal daily PRN Constipation  hydrOXYzine hydrochloride 25 milliGRAM(s) Oral two times a day PRN Anxiety      LABS: All Labs Reviewed:                         10.5   8.49  )-----------( 263      ( 21 Nov 2020 07:55 )             32.4   11-22    135  |  97  |  21  ----------------------------<  121<H>  4.2   |  33<H>  |  0.86    Ca    8.9      22 Nov 2020 08:39  Phos  3.0     11-22  Mg     2.5     11-22          EKG: AF RVR     Telemetry: AF , HRs 60-120s, mostly now  with rare PVCs vs aberrantly conducted beats    ECHO:  EXAM:  ECHO TTE WO CON COMP W DOPP    PROCEDURE DATE:  11/20/2020     Summary     Mild to moderate mitral annular calcification is present.   Fibrocalcific changes noted to the mitral valve leaflets with preserved   leaflet excursion.   Moderate to severe (3+) mitral regurgitation is present.   Fibrocalcific changes noted to the Aortic valve leaflets with preserved   leaflet excursion.   Mild to Moderate aortic regurgitation is present.   Mild to moderate tricuspid valve regurgitation is present.   Mild pulmonary hypertension.   Normal appearing pulmonic valve structure and function.   Mild pulmonic valvular regurgitation (1+) is present.   The left atrium is moderately dilated.   The interventricular septum appears hypokinetic.   Estimated left ventricular ejection fraction is 35-40 %.   The right atrium is at the upper limits of normal.   Pleural effusion - is present..    Stable form TTE done in the office on 7/28/2020

## 2020-11-23 NOTE — PROGRESS NOTE ADULT - SUBJECTIVE AND OBJECTIVE BOX
CC: sob (03 Feb 2020 09:40)    89 y.o. female with PMHx of Permanent Afib off AC due to fall risks, HTN, HFrEF p/w SOB, abdominal pain. Pt feels better now. Abdominal pain resolved, no acute SOB or CP.  In ED was found to have low Sodium and  elevated potassium, elevated BNP     INTERVAL HPI/ OVERNIGHT EVENTS:  Pt was seen and examined,   getting slowly  better, no SOB,  tolerates RA at rest,  no Abd pain, had  BM     REVIEW OF SYSTEMS:  All other review of systems is negative unless indicated above.    PHYSICAL EXAM:  ICU Vital Signs Last 24 Hrs  T(C): 36.1 (23 Nov 2020 08:17), Max: 36.6 (22 Nov 2020 16:49)  T(F): 97 (23 Nov 2020 08:17), Max: 97.8 (22 Nov 2020 16:49)  HR: 90 (23 Nov 2020 08:17) (90 - 120)  BP: 128/71 (23 Nov 2020 08:17) (110/64 - 128/71)  BP(mean): 88 (22 Nov 2020 22:41) (88 - 88)  RR: 16 (23 Nov 2020 08:17) (16 - 18)  SpO2: 99% (23 Nov 2020 08:17) (94% - 100%)  General: Well developed; malnourished;  in no acute distress  Eyes: PERRLA, EOMI; conjunctiva and sclera clear  Head: Normocephalic; atraumatic  ENMT: No nasal discharge; airway clear  Neck: Supple;  no masses  Respiratory: Decreased BS at bases.  With  basilar  rales   Cardiovascular: Irregular rate and rhythm. + S1 and S2 Normal;   Gastrointestinal: Soft non-tender  mildly distended; Normal bowel sounds  Genitourinary: No  suprapubic  tenderness  Extremities: +  edema, improved   Vascular: Peripheral pulses palpable 2+ bilaterally  Neurological: Alert and oriented x2, non focal, baseline confused   Skin: Warm and dry. No acute rash   Lymph Nodes: No acute cervical adenopathy  Musculoskeletal: Normal muscle tone, no  joint effusions or  erythema,   Psychiatric: Cooperative       LABS:       CBC Full  -  ( 23 Nov 2020 07:41 )  WBC Count : 8.99 K/uL  RBC Count : 4.18 M/uL  Hemoglobin : 11.7 g/dL  Hematocrit : 35.2 %  Platelet Count - Automated : 318 K/uL  Mean Cell Volume : 84.2 fl  Mean Cell Hemoglobin : 28.0 pg  Mean Cell Hemoglobin Concentration : 33.2 gm/dL  Auto Neutrophil # : x  Auto Lymphocyte # : x  Auto Monocyte # : x  Auto Eosinophil # : x  Auto Basophil # : x  Auto Neutrophil % : x  Auto Lymphocyte % : x  Auto Monocyte % : x  Auto Eosinophil % : x  Auto Basophil % : x        11-23    135  |  100  |  25<H>  ----------------------------<  107<H>  4.0   |  29  |  0.87    Ca    9.0      23 Nov 2020 07:41  Phos  3.0     11-22  Mg     2.5     11-22                    BOWEL: Large amount of fecal material within the rectal vault with mild rectal wall thickening and perirectal fat stranding suggestive of stercoral colitis. Moderate stool burden throughout the remainder of the colon. Diverticulosis of the sigmoid colon without acute diverticulitis.  LEFT inguinal hernia containing a loop of bowel, likely small bowel. No evidence of obstruction. Evaluation limited by lack of oral contrast material.  Mild gastric wall thickening may represent mild gastritis.  Appendix is not visualized. No evidence of inflammation in the pericecal region.  PERITONEUM: Diffuse increased fat stranding of the mesentery without focal lesion.  VESSELS: Diffuse atherosclerotic disease of the aorta. Evaluation limited by lack of IV contrast material.  RETROPERITONEUM/LYMPH NODES: No lymphadenopathy.    ABDOMINAL WALL: LEFT inguinal hernia containing a loop of bowel, likely small bowel.  BONES: There is a new compression fracture of L3 when compared to the prior study of October 15, 2017. There is mild retropulsion of the superior aspect with narrowing of the thecal sac. Stable compression deformity of L1 without change from prior study.    IMPRESSION:     1.  Rectum distended by significant stool with perirectal fat stranding suggesting stercoral colitis.  2.  LEFT inguinal hernia containing a nonobstructed nondilated loop of small bowel.  3.  L3 compression fracture which is new from October 15, 2017 but reported on prior CT of the lumbar spine from September 21, 2018. There has been further loss of height when compared to the 2018 study.  4.  Bilateral pleural effusions with bilateral lower lobe consolidation.       89 y.o. female with PMHx of Permanent Afib off AC due to fall risks, HTN, HFrEF p/w SOB, abdominal pain    # Permanent Afib with RVR off AC due to frequent falls  - off digoxin since last discharge  - On presentation AFIB with HR 140s   - C/w tele: AFIB 60s, PVCs  - C/w Po Cardizem prn   - Carvedilol changes to metoprolol for better HR control   - On ASA    # Acute hypoxic respiratory failure 2/2  Acute on Chronic Systolic CHF. Pleural effusions   - CT chest with moderate effusion   - Last ECHO 2/2020: EF 35-40%  - IV lasix 40mg IV BID  - Monitor BP might need to further adjust dose     # Abd pain, resolved   - stercoral colitis, impaction  - resolved  - continue bowel regimen    # HTN  - BP better   - Off ACEI  - C/w metoprolol, CArdizem PRN     # PNA - CAP  - with pleural effusions  - Ceftriaxone, and Azithromycin    # UTI -  EColi  - Rocephin IV    # Hyponatremia, hypervolemic   resolved with  lasix, monitor closely     # Hyperkalemia  - resolved with lasix and Ca gluconate

## 2020-11-23 NOTE — PROGRESS NOTE ADULT - ASSESSMENT
89 F with systolic heart failure, AF presents with  UTI and episodes of hypotension      AF- off of AC due to fall risk-  will stop cardizem and uptitrate metoprolol-- continue tele monitoring    CHF- continue with lasix- with transition to PO in t he next 24 hours     will follow

## 2020-11-23 NOTE — PROGRESS NOTE ADULT - ASSESSMENT
89yo stercoral colitis, impaction  now much improved  continue bowel regimen    will no longer follow daily, please call back with questions

## 2020-11-23 NOTE — PROGRESS NOTE ADULT - SUBJECTIVE AND OBJECTIVE BOX
SUBJECTIVE       PAST MEDICAL & SURGICAL HISTORY:  CHF (congestive heart failure)    A-fib    HLD (hyperlipidemia)    Glaucoma    S/P hip replacement, right      OBJECTIVE   Vital Signs Last 24 Hrs  T(C): 36.1 (23 Nov 2020 08:17), Max: 36.6 (22 Nov 2020 10:37)  T(F): 97 (23 Nov 2020 08:17), Max: 97.8 (22 Nov 2020 10:37)  HR: 90 (23 Nov 2020 08:17) (87 - 120)  BP: 128/71 (23 Nov 2020 08:17) (109/46 - 128/71)  BP(mean): 88 (22 Nov 2020 22:41) (88 - 88)  RR: 16 (23 Nov 2020 08:17) (16 - 18)  SpO2: 99% (23 Nov 2020 08:17) (94% - 100%)    Review of systems   as dictated in the history of present illness with the review of other systems non contributory     PHYSICAL EXAM:  Constitutional: , awake and alert, not in distress.  HEENT: Normo cephalic atraumatic  Neck: Soft and supple, No J.V.D   Respiratory: vesicular breathing , No wheezing, rales or rhonchi.   Cardiovascular: S1 and S2, regular rate .   Gastrointestinal:  soft, nontender,   Extremities: No  edema or calf tenderness .  Neurological: No new  focal deficits.    MEDICATIONS  (STANDING):  aspirin  chewable 81 milliGRAM(s) Oral daily  atorvastatin 10 milliGRAM(s) Oral at bedtime  azithromycin   Tablet 250 milliGRAM(s) Oral daily  cefTRIAXone Injectable. 1000 milliGRAM(s) IV Push every 24 hours  famotidine    Tablet 10 milliGRAM(s) Oral daily  furosemide   Injectable 40 milliGRAM(s) IV Push daily  heparin   Injectable 5000 Unit(s) SubCutaneous every 12 hours  metoprolol tartrate 50 milliGRAM(s) Oral two times a day  polyethylene glycol 3350 17 Gram(s) Oral daily  senna 2 Tablet(s) Oral at bedtime                            11.7   8.99  )-----------( 318      ( 23 Nov 2020 07:41 )             35.2     11-23    135  |  100  |  25<H>  ----------------------------<  107<H>  4.0   |  29  |  0.87    Ca    9.0      23 Nov 2020 07:41  Phos  3.0     11-22  Mg     2.5     11-22                        SUBJECTIVE      patient seen in the a.m. confused offers no new complaints breathing comfortably without having shortness of breath or cough or wheezing and has been using O2 by nasal cannula   follow-up chest x-ray noted showing small effusions and improved pulmonary edema   seen by Cardiology.    no fever spikes during monitoring   day 4 of antibiotic therapy      PAST MEDICAL & SURGICAL HISTORY:  CHF (congestive heart failure)    A-fib    HLD (hyperlipidemia)    Glaucoma    S/P hip replacement, right      OBJECTIVE   Vital Signs Last 24 Hrs  T(C): 36.1 (23 Nov 2020 08:17), Max: 36.6 (22 Nov 2020 10:37)  T(F): 97 (23 Nov 2020 08:17), Max: 97.8 (22 Nov 2020 10:37)  HR: 90 (23 Nov 2020 08:17) (87 - 120)  BP: 128/71 (23 Nov 2020 08:17) (109/46 - 128/71)  BP(mean): 88 (22 Nov 2020 22:41) (88 - 88)  RR: 16 (23 Nov 2020 08:17) (16 - 18)  SpO2: 99% (23 Nov 2020 08:17) (94% - 100%)    Review of systems   as dictated in the history of present illness with the review of other systems non contributory     PHYSICAL EXAM:  Constitutional: , awake and alert, not in distress and on the 02 by the nasal canula   HEENT: Normo cephalic atraumatic  Neck: Soft and supple, No J.V.D   Respiratory: vesicular breathing ,  has mildly decreased breath sounds in the bases   Cardiovascular: S1 and S2, iregular rate .   Gastrointestinal:  soft, nontender,   Extremities: No  edema or calf tenderness .  Neurological: No new  focal deficits.    MEDICATIONS  (STANDING):  aspirin  chewable 81 milliGRAM(s) Oral daily  atorvastatin 10 milliGRAM(s) Oral at bedtime  azithromycin   Tablet 250 milliGRAM(s) Oral daily  cefTRIAXone Injectable. 1000 milliGRAM(s) IV Push every 24 hours  famotidine    Tablet 10 milliGRAM(s) Oral daily  furosemide   Injectable 40 milliGRAM(s) IV Push daily  heparin   Injectable 5000 Unit(s) SubCutaneous every 12 hours  metoprolol tartrate 50 milliGRAM(s) Oral two times a day  polyethylene glycol 3350 17 Gram(s) Oral daily  senna 2 Tablet(s) Oral at bedtime                            11.7   8.99  )-----------( 318      ( 23 Nov 2020 07:41 )             35.2     11-23    135  |  100  |  25<H>  ----------------------------<  107<H>  4.0   |  29  |  0.87    Ca    9.0      23 Nov 2020 07:41  Phos  3.0     11-22  Mg     2.5     11-22

## 2020-11-24 NOTE — PROGRESS NOTE ADULT - ASSESSMENT
89 F with systolic heart failure, AF presents with  UTI and episodes of hypotension      AF- off of AC due to fall risk-  will stop cardizem and uptitrate metoprolol    CHF- continue with lasix-  recommend transition to PO      DC planning

## 2020-11-24 NOTE — PROGRESS NOTE ADULT - SUBJECTIVE AND OBJECTIVE BOX
SUBJECTIVE       PAST MEDICAL & SURGICAL HISTORY:  CHF (congestive heart failure)    A-fib    HLD (hyperlipidemia)    Glaucoma    S/P hip replacement, right      OBJECTIVE   Vital Signs Last 24 Hrs  T(C): 36.6 (24 Nov 2020 09:11), Max: 36.6 (23 Nov 2020 20:59)  T(F): 97.9 (24 Nov 2020 09:11), Max: 97.9 (23 Nov 2020 20:59)  HR: 83 (24 Nov 2020 09:11) (83 - 109)  BP: 111/57 (24 Nov 2020 09:11) (111/57 - 122/76)  BP(mean): --  RR: 18 (23 Nov 2020 20:59) (16 - 18)  SpO2: 100% (23 Nov 2020 20:59) (100% - 100%)    Review of systems   as dictated in the history of present illness with the review of other systems non contributory     PHYSICAL EXAM:  Constitutional: , awake and alert, not in distress.  HEENT: Normo cephalic atraumatic  Neck: Soft and supple, No J.V.D   Respiratory: vesicular breathing , No wheezing, rales or rhonchi.   Cardiovascular: S1 and S2, regular rate .   Gastrointestinal:  soft, nontender,   Extremities: No  edema or calf tenderness .  Neurological: No new  focal deficits.    MEDICATIONS  (STANDING):  aspirin  chewable 81 milliGRAM(s) Oral daily  atorvastatin 10 milliGRAM(s) Oral at bedtime  azithromycin   Tablet 250 milliGRAM(s) Oral daily  cefTRIAXone Injectable. 1000 milliGRAM(s) IV Push every 24 hours  famotidine    Tablet 10 milliGRAM(s) Oral daily  furosemide   Injectable 40 milliGRAM(s) IV Push daily  heparin   Injectable 5000 Unit(s) SubCutaneous every 12 hours  metoprolol tartrate 50 milliGRAM(s) Oral two times a day  polyethylene glycol 3350 17 Gram(s) Oral daily  senna 2 Tablet(s) Oral at bedtime                            11.5   12.44 )-----------( 310      ( 24 Nov 2020 08:18 )             35.7     11-24    136  |  101  |  27<H>  ----------------------------<  109<H>  4.0   |  28  |  0.68    Ca    8.9      24 Nov 2020 08:18                        SUBJECTIVE      patient seen in the morning breathing comfortably on the nasal cannula   mildly confused does complain of nausea not feeling right   no reported desaturation   day 5 of antibiotics with azithromycin and Rocephin   no new fever spikes      PAST MEDICAL & SURGICAL HISTORY:  CHF (congestive heart failure)    A-fib    HLD (hyperlipidemia)    Glaucoma    S/P hip replacement, right      OBJECTIVE   Vital Signs Last 24 Hrs  T(C): 36.6 (24 Nov 2020 09:11), Max: 36.6 (23 Nov 2020 20:59)  T(F): 97.9 (24 Nov 2020 09:11), Max: 97.9 (23 Nov 2020 20:59)  HR: 83 (24 Nov 2020 09:11) (83 - 109)  BP: 111/57 (24 Nov 2020 09:11) (111/57 - 122/76)  BP(mean): --  RR: 18 (23 Nov 2020 20:59) (16 - 18)  SpO2: 100% (23 Nov 2020 20:59) (100% - 100%)    Review of systems   as dictated in the history of present illness with the review of other systems non contributory     PHYSICAL EXAM:  Constitutional: , awake and alert, not in distress  on the nasal cannula.  HEENT: Normo cephalic atraumatic  Neck: Soft and supple, No J.V.D   Respiratory: vesicular breathing , has mildly decreased breath sounds in the bases  Cardiovascular: S1 and S2, regular rate .   Gastrointestinal:  soft, nontender,   Extremities: No  edema or calf tenderness .  Neurological: No new  focal deficits.    MEDICATIONS  (STANDING):  aspirin  chewable 81 milliGRAM(s) Oral daily  atorvastatin 10 milliGRAM(s) Oral at bedtime  azithromycin   Tablet 250 milliGRAM(s) Oral daily  cefTRIAXone Injectable. 1000 milliGRAM(s) IV Push every 24 hours  famotidine    Tablet 10 milliGRAM(s) Oral daily  furosemide   Injectable 40 milliGRAM(s) IV Push daily  heparin   Injectable 5000 Unit(s) SubCutaneous every 12 hours  metoprolol tartrate 50 milliGRAM(s) Oral two times a day  polyethylene glycol 3350 17 Gram(s) Oral daily  senna 2 Tablet(s) Oral at bedtime                            11.5   12.44 )-----------( 310      ( 24 Nov 2020 08:18 )             35.7     11-24    136  |  101  |  27<H>  ----------------------------<  109<H>  4.0   |  28  |  0.68    Ca    8.9      24 Nov 2020 08:18

## 2020-11-24 NOTE — PROGRESS NOTE ADULT - ASSESSMENT
- CHF with the bilateral pleural effusions with the septal thickening  which has been improved on the follow-up chest x-ray with diuresis  - mild peribronchial thickening can not rule out superimposed infectious process likely pneumonia  today day 5 of antibiotics  - small lung nodules bilateral reported on both sides   - AFIB with the RVR  noted at the time of admission  - hypoxia likely secondary to chf   - stool impaction with the sterocoral coliits  on IV antibiotics being followed up with GI  - baseline H.F with the low EF 35 percent and echo moderate M.R and mild T.R and mild pulmonary HTn  -  improved hyponatremia and hyperkalemia  which were noted at the time of admission  - UTI with the E.coli  on IV antibiotics     PLAN     -  discontinue azithromycin and continue with Rocephin while in the hospital  -  effusions are small noted in the chest x-ray  without any indication for drainage  - rate control of afib as per the cardio   - management of other medical issues as per the medicine including constipation,  stercoral colitis   - follow up of the lung nodules and would consider follow up if the family choose to follow up and nodules are below 5 mm .  -  DVT prophylaxis with subcu heparin  -  evaluate need for home O2 therapy  at discharge with CHF and pulmonary hypertension  -   physical therapy for ambulation.

## 2020-11-24 NOTE — PROGRESS NOTE ADULT - SUBJECTIVE AND OBJECTIVE BOX
CHIEF COMPLAINT: Patient is a 89y old  Female who presents with a chief complaint of SOB, abd pain (19 Nov 2020 17:10)    HPI:  89 y.o. female with PMHx of Permanent Afib off AC due to fall risks, HTN, HFrEF p/w SOB, abdominal pain. Pt feels better now.  Abdominal pain resolved, no acute SOB or CP. (19 Nov 2020 17:10)    Patient has intermittent episodes of hypotension, so her ACEI has been D/Elio and her coreg sometimes gets held. Because of this her BB has been changed to metoprolol (plan for tartrate so can easily up titrate and then transition to succinate once on stable dosing) and her cardizem was D/Elio      11/24 no acute complaints       PMHx: PAST MEDICAL & SURGICAL HISTORY:  CHF (congestive heart failure)  A-fib  HLD (hyperlipidemia)  Glaucoma  S/P hip replacement, right      Soc Hx:  no toxic habits       Allergies: Allergies  atenolol (Other)  sulfa drugs (Other)    REVIEW OF SYSTEMS:    CONSTITUTIONAL: No weakness, fevers or chills  EYES/ENT: No visual changes;  No vertigo or throat pain   NECK: No pain or stiffness  RESPIRATORY: No cough, wheezing, hemoptysis; No shortness of breath  CARDIOVASCULAR: No chest pain or palpitations  GENITOURINARY: No dysuria, frequency or hematuria  NEUROLOGICAL: No numbness or weakness  All other review of systems is negative unless indicated above    ICU Vital Signs Last 24 Hrs  T(C): 36.6 (23 Nov 2020 20:59), Max: 36.6 (23 Nov 2020 20:59)  T(F): 97.9 (23 Nov 2020 20:59), Max: 97.9 (23 Nov 2020 20:59)  HR: 109 (23 Nov 2020 20:59) (90 - 109)  BP: 111/70 (23 Nov 2020 20:59) (111/70 - 128/71)  BP(mean): --  ABP: --  ABP(mean): --  RR: 18 (23 Nov 2020 20:59) (16 - 18)  SpO2: 100% (23 Nov 2020 20:59) (99% - 100%)      PHYSICAL EXAM:   Constitutional: NAD, awake and alert, well-developed  HEENT: PERR, EOMI, Normal Hearing, MMM  Neck: Soft and supple, No LAD, No JVD  Respiratory: Breath sounds are clear bilaterally, No wheezing, rales or rhonchi  Cardiovascular: variable S1 and S2, irregular rate and rhythm  Gastrointestinal: Bowel Sounds present, soft, nontender, nondistended, no guarding, no rebound  Extremities: No peripheral edema  Vascular: 2+ peripheral pulses  Neurological: Alert, pleasant       MEDICATIONS  (STANDING):  aspirin  chewable 81 milliGRAM(s) Oral daily  atorvastatin 10 milliGRAM(s) Oral at bedtime  azithromycin   Tablet 250 milliGRAM(s) Oral daily  cefTRIAXone Injectable. 1000 milliGRAM(s) IV Push every 24 hours  famotidine    Tablet 10 milliGRAM(s) Oral daily  furosemide   Injectable 40 milliGRAM(s) IV Push daily  heparin   Injectable 5000 Unit(s) SubCutaneous every 12 hours  metoprolol tartrate 50 milliGRAM(s) Oral two times a day  polyethylene glycol 3350 17 Gram(s) Oral daily  senna 2 Tablet(s) Oral at bedtime    MEDICATIONS  (PRN):  bisacodyl Suppository 10 milliGRAM(s) Rectal daily PRN Constipation  hydrOXYzine hydrochloride 25 milliGRAM(s) Oral two times a day PRN Anxiety    LABS: All Labs Reviewed:                             11.7   8.99  )-----------( 318      ( 23 Nov 2020 07:41 )             35.2   11-23    135  |  100  |  25<H>  ----------------------------<  107<H>  4.0   |  29  |  0.87    Ca    9.0      23 Nov 2020 07:41  Phos  3.0     11-22  Mg     2.5     11-22      EKG: AF RVR     Telemetry: AF , HRs 60-120s, mostly now  with rare PVCs vs aberrantly conducted beats    ECHO:  EXAM:  ECHO TTE WO CON COMP W DOPP    PROCEDURE DATE:  11/20/2020     Summary     Mild to moderate mitral annular calcification is present.   Fibrocalcific changes noted to the mitral valve leaflets with preserved   leaflet excursion.   Moderate to severe (3+) mitral regurgitation is present.   Fibrocalcific changes noted to the Aortic valve leaflets with preserved   leaflet excursion.   Mild to Moderate aortic regurgitation is present.   Mild to moderate tricuspid valve regurgitation is present.   Mild pulmonary hypertension.   Normal appearing pulmonic valve structure and function.   Mild pulmonic valvular regurgitation (1+) is present.   The left atrium is moderately dilated.   The interventricular septum appears hypokinetic.   Estimated left ventricular ejection fraction is 35-40 %.   The right atrium is at the upper limits of normal.   Pleural effusion - is present..    Stable form TTE done in the office on 7/28/2020

## 2020-11-25 NOTE — DISCHARGE NOTE PROVIDER - CARE PROVIDER_API CALL
Horacio Villa (MD)  Cardiovascular Disease; Internal Medicine; Nuclear Cardiology  [address], [address]  [place] [address]  Phone: [phone]  Fax: [phone]  Follow Up Time:     Jesica Marcos  CRITICAL CARE MEDICINE  [address] [place] [address]  Phone: [phone]  Fax: [phone]  Follow Up Time:   
Alert and oriented, no focal deficits, no motor or sensory deficits.

## 2020-11-25 NOTE — DISCHARGE NOTE NURSING/CASE MANAGEMENT/SOCIAL WORK - PATIENT PORTAL LINK FT
You can access the FollowMyHealth Patient Portal offered by [facility] by registering at the following website: [email or web address]. By joining [facility]’s FollowMyHealth portal, you will also be able to view your health information using other applications (apps) compatible with our system.

## 2020-11-25 NOTE — CHART NOTE - NSCHARTNOTEFT_GEN_A_CORE
HOME O2 EVALUATION    Pulse Ox Room Air Rest:95     PATIENT DOES NOT AMBULATE    Pulse Ox Room Air Ambulating:    Pulse Ox on O2          L Ambulating:    Pulse Ox Post Ambulation: HOME O2 EVALUATION    Pulse Ox Room Air Rest:95         Pulse Ox Room Air Ambulatin    Pulse Ox on O2          L Ambulating:    Pulse Ox Post Ambulation:

## 2020-11-25 NOTE — DISCHARGE NOTE PROVIDER - NSDCMRMEDTOKEN_GEN_ALL_CORE_FT
aspirin 81 mg oral tablet: 1 tab(s) orally once a day  atorvastatin 10 mg oral tablet: 1 tab(s) orally once a day  cefuroxime 500 mg oral tablet: 1 tab(s) orally 2 times a day   enalapril 2.5 mg oral tablet: 1 tab(s) orally every 12 hours  famotidine 20 mg oral tablet: 1 tab(s) orally 2 times a day  hydrOXYzine hydrochloride 25 mg oral tablet: 1 tab(s) orally 2 times a day, As needed, Anxiety  Lasix 40 mg oral tablet: 1 tab(s) orally once a day   metoprolol tartrate 50 mg oral tablet: 1 tab(s) orally 2 times a day  potassium chloride 10 mEq oral capsule, extended release: 1 cap(s) orally once a day  senna oral tablet: 2 tab(s) orally once a day (at bedtime)  Vitamin D3 5000 intl units (125 mcg) oral tablet: 1 tab(s) orally once a day

## 2020-11-25 NOTE — PROGRESS NOTE ADULT - ASSESSMENT
- CHF with the bilateral pleural effusions with the septal thickening  which has been improved on the follow-up chest x-ray with diuresis  - mild peribronchial thickening can not rule out superimposed infectious process likely pneumonia   today day 6 of antibiotics  on Rocephin and completed azithromycin  - small lung nodules bilateral reported on both sides   - AFIB with the RVR  noted at the time of admission  - hypoxia likely secondary to chf   - stool impaction with the sterocoral coliits  on IV antibiotics being followed up with GI  - baseline H.F with the low EF 35 percent and echo moderate M.R and mild T.R and mild pulmonary HTn  -  improved hyponatremia and hyperkalemia  which were noted at the time of admission  - UTI with the E.coli  on IV antibiotics     PLAN     -   complete 7 days of antibiotic for suspected pneumonia total  -  effusions are small noted in the chest x-ray  without any indication for drainage  - rate control of afib as per the cardio   - management of other medical issues as per the medicine including constipation,  stercoral colitis   - follow up of the lung nodules and would consider follow up if the family choose to follow up and nodules are below 5 mm .  -  DVT prophylaxis with sub cu heparin  -  evaluate need for home O2 therapy  at discharge with CHF and pulmonary hypertension  -   physical therapy for ambulation.  -   outpatient follow-up chest x-ray in 2-3 weeks if discharged for the pleural effusions while  optimizing underlying CHF management

## 2020-11-25 NOTE — DISCHARGE NOTE PROVIDER - PROVIDER RX CONTACT NUMBER
Patient is alert and oriented. Denies any complaint of pain. Vitals stable. Heparin at 14 ml/hr, next ptt in am. Up with standby assist and walker, ambulated in irene, tolerated well. Daughter at bedside, will continue to monitor.        CARDIOVASCULAR - DAVID [phone]

## 2020-11-25 NOTE — PROGRESS NOTE ADULT - REASON FOR ADMISSION
SOB, abd pain

## 2020-11-25 NOTE — DISCHARGE NOTE PROVIDER - HOSPITAL COURSE
CC: sob (03 Feb 2020 09:40)    89 y.o. female with PMHx of Permanent Afib off AC due to fall risks, HTN, HFrEF p/w SOB, abdominal pain. Pt feels better now. Abdominal pain resolved, no acute SOB or CP.  In ED was found to have low Sodium and  elevated potassium, elevated BNP     Patient is very deconditioned and frail. Denies any HA, CP, SOB. No fevers, chills or shakes. Upon discussion with the patient's son -  patient is very non-mobile at home. Clinically patient is stable for discharge, but there is a high risk for patient to be re-admitted in the hospital   CC: sob (03 Feb 2020 09:40)    89 y.o. female with PMHx of Permanent Afib off AC due to fall risks, HTN, HFrEF p/w SOB, abdominal pain. Pt feels better now. Abdominal pain resolved, no acute SOB or CP.  In ED was found to have low Sodium and  elevated potassium, elevated BNP . In the hospital patient was noted to have UTI * e.coli, suspected PNA *CAP. Patient was placed on antibiotics. In addition, patient is very deconditioned and frail. Chronically sick. Patient with poor mentation due to cognitive decline. Patient has episodes of aggitation * yelling.     Patient is very deconditioned and frail. Denies any HA, CP, SOB. No fevers, chills or shakes. Upon discussion with the patient's son -  patient is very non-mobile at home. Clinically patient is stable for discharge, but there is a high risk for patient to be re-admitted in the hospital. Apperently patient had a loss of ,     Physical Exam:   GENERAL APPEARANCE:  NAD, hemodynamically stable  T(C): 36.6 (11-25-20 @ 08:57), Max: 36.6 (11-25-20 @ 05:15)  HR: 106 (11-25-20 @ 08:57) (85 - 106)  BP: 122/70 (11-25-20 @ 08:57) (105/56 - 122/70)  RR: 17 (11-25-20 @ 08:57) (16 - 17)  SpO2: 97% (11-25-20 @ 08:57) (95% - 97%)  HEENT:  Head is normocephalic    Skin:  Warm and dry without any rash   NECK:  Supple without lymphadenopathy.   HEART:  Regular rate and rhythm. normal S1 and S2, No M/R/G  LUNGS:  Good ins/exp effort, no W/R/R/C  ABDOMEN:  Soft, nontender, nondistended with good bowel sounds heard  EXTREMITIES:  Without cyanosis, clubbing or edema.   NEUROLOGICAL:  Gross nonfocal

## 2020-11-25 NOTE — PROGRESS NOTE ADULT - SUBJECTIVE AND OBJECTIVE BOX
SUBJECTIVE       PAST MEDICAL & SURGICAL HISTORY:  CHF (congestive heart failure)    A-fib    HLD (hyperlipidemia)    Glaucoma    S/P hip replacement, right      OBJECTIVE   Vital Signs Last 24 Hrs  T(C): 36.6 (25 Nov 2020 08:57), Max: 36.6 (25 Nov 2020 05:15)  T(F): 97.8 (25 Nov 2020 08:57), Max: 97.8 (25 Nov 2020 05:15)  HR: 106 (25 Nov 2020 08:57) (85 - 106)  BP: 122/70 (25 Nov 2020 08:57) (98/54 - 122/70)  BP(mean): 67 (24 Nov 2020 20:06) (67 - 67)  RR: 17 (25 Nov 2020 08:57) (16 - 17)  SpO2: 97% (25 Nov 2020 08:57) (95% - 97%)    Review of systems   as dictated in the history of present illness with the review of other systems non contributory     PHYSICAL EXAM:  Constitutional: , awake and alert, not in distress.  HEENT: Normo cephalic atraumatic  Neck: Soft and supple, No J.V.D   Respiratory: vesicular breathing , No wheezing, rales or rhonchi.   Cardiovascular: S1 and S2, regular rate .   Gastrointestinal:  soft, nontender,   Extremities: No  edema or calf tenderness .  Neurological: No new  focal deficits.    MEDICATIONS  (STANDING):  aspirin  chewable 81 milliGRAM(s) Oral daily  atorvastatin 10 milliGRAM(s) Oral at bedtime  cefTRIAXone Injectable. 1000 milliGRAM(s) IV Push every 24 hours  famotidine    Tablet 10 milliGRAM(s) Oral daily  furosemide    Tablet 40 milliGRAM(s) Oral daily  heparin   Injectable 5000 Unit(s) SubCutaneous every 12 hours  metoprolol tartrate 50 milliGRAM(s) Oral two times a day  polyethylene glycol 3350 17 Gram(s) Oral daily  senna 2 Tablet(s) Oral at bedtime                            11.5   12.44 )-----------( 310      ( 24 Nov 2020 08:18 )             35.7     11-24    136  |  101  |  27<H>  ----------------------------<  109<H>  4.0   |  28  |  0.68    Ca    8.9      24 Nov 2020 08:18                        SUBJECTIVE     Patient seen in the morning appears to be confused anxious about her to go home   no reported events during the night   IV Lasix changed to p.o.   patient not wearing oxygen with sats acceptable   on Rocephin day 7 for suspected pneumonia and completed azithromycin      PAST MEDICAL & SURGICAL HISTORY:  CHF (congestive heart failure)    A-fib    HLD (hyperlipidemia)    Glaucoma    S/P hip replacement, right      OBJECTIVE   Vital Signs Last 24 Hrs  T(C): 36.6 (25 Nov 2020 08:57), Max: 36.6 (25 Nov 2020 05:15)  T(F): 97.8 (25 Nov 2020 08:57), Max: 97.8 (25 Nov 2020 05:15)  HR: 106 (25 Nov 2020 08:57) (85 - 106)  BP: 122/70 (25 Nov 2020 08:57) (98/54 - 122/70)  BP(mean): 67 (24 Nov 2020 20:06) (67 - 67)  RR: 17 (25 Nov 2020 08:57) (16 - 17)  SpO2: 97% (25 Nov 2020 08:57) (95% - 97%)    Review of systems   as dictated in the history of present illness with the review of other systems non contributory     PHYSICAL EXAM:  Constitutional: , awake and alert, not in distress  confused and anxious  HEENT: Normo cephalic atraumatic  Neck: Soft and supple, No J.V.D   Respiratory: vesicular breathing , poor inspiratory effort with no wheeze  Cardiovascular: S1 and S2, iregular rate .   Gastrointestinal:  soft, nontender,   Extremities: No  edema or calf tenderness .  Neurological: No new  focal deficits. and confused    MEDICATIONS  (STANDING):  aspirin  chewable 81 milliGRAM(s) Oral daily  atorvastatin 10 milliGRAM(s) Oral at bedtime  cefTRIAXone Injectable. 1000 milliGRAM(s) IV Push every 24 hours  famotidine    Tablet 10 milliGRAM(s) Oral daily  furosemide    Tablet 40 milliGRAM(s) Oral daily  heparin   Injectable 5000 Unit(s) SubCutaneous every 12 hours  metoprolol tartrate 50 milliGRAM(s) Oral two times a day  polyethylene glycol 3350 17 Gram(s) Oral daily  senna 2 Tablet(s) Oral at bedtime                            11.5   12.44 )-----------( 310      ( 24 Nov 2020 08:18 )             35.7     11-24    136  |  101  |  27<H>  ----------------------------<  109<H>  4.0   |  28  |  0.68    Ca    8.9      24 Nov 2020 08:18

## 2020-11-25 NOTE — DISCHARGE NOTE PROVIDER - NSDCCPCAREPLAN_GEN_ALL_CORE_FT
PRINCIPAL DISCHARGE DIAGNOSIS  Diagnosis: CHF exacerbation  Assessment and Plan of Treatment: # Acute hypoxic respiratory failure 2/2  Acute on Chronic Systolic CHF  - Last ECHO 2/2020: EF 35-40%  - Lasix 40 mg po BID  - daily weights  - low salt diet      SECONDARY DISCHARGE DIAGNOSES  Diagnosis: Atrial fibrillation  Assessment and Plan of Treatment: - good rate control  - C/w tele: AFIB 60s, PVCs    Diagnosis: Pneumonia  Assessment and Plan of Treatment: -  complete 7 days of antibiotic for suspected pneumonia    -  effusions are small noted in the chest x-ray  without any indication for drainage  - follow up of the lung nodules and would consider follow up if the family choose to follow up and nodules are below 5 mm .  -  outpatient follow-up chest x-ray in 2-3 weeks if discharged for the pleural effusions while  optimizing underlying CHF management

## 2020-11-27 PROBLEM — N39.0 UTI (URINARY TRACT INFECTION): Status: ACTIVE | Noted: 2020-01-01

## 2020-12-03 NOTE — PATIENT PROFILE ADULT - NSPROHMSYMPCOND_GEN_A_NUR
I reviewed the H&P, I examined the patient, and there are no changes in the patient's condition.     none

## 2021-01-01 ENCOUNTER — TRANSCRIPTION ENCOUNTER (OUTPATIENT)
Age: 86
End: 2021-01-01

## 2021-01-01 ENCOUNTER — INPATIENT (INPATIENT)
Facility: HOSPITAL | Age: 86
LOS: 2 days | Discharge: HOME HEALTH | DRG: 308 | End: 2021-03-18
Attending: HOSPITALIST | Admitting: INTERNAL MEDICINE
Payer: MEDICARE

## 2021-01-01 ENCOUNTER — INPATIENT (INPATIENT)
Facility: HOSPITAL | Age: 86
LOS: 15 days | DRG: 871 | End: 2021-04-15
Attending: HOSPITALIST | Admitting: INTERNAL MEDICINE
Payer: MEDICARE

## 2021-01-01 VITALS
SYSTOLIC BLOOD PRESSURE: 122 MMHG | DIASTOLIC BLOOD PRESSURE: 77 MMHG | HEIGHT: 62 IN | HEART RATE: 124 BPM | WEIGHT: 110.01 LBS | RESPIRATION RATE: 16 BRPM | TEMPERATURE: 99 F | OXYGEN SATURATION: 99 %

## 2021-01-01 VITALS
HEART RATE: 100 BPM | TEMPERATURE: 99 F | DIASTOLIC BLOOD PRESSURE: 59 MMHG | SYSTOLIC BLOOD PRESSURE: 86 MMHG | RESPIRATION RATE: 22 BRPM

## 2021-01-01 VITALS
HEART RATE: 102 BPM | RESPIRATION RATE: 18 BRPM | OXYGEN SATURATION: 99 % | DIASTOLIC BLOOD PRESSURE: 73 MMHG | WEIGHT: 134.92 LBS | HEIGHT: 62 IN | SYSTOLIC BLOOD PRESSURE: 123 MMHG

## 2021-01-01 VITALS
TEMPERATURE: 98 F | WEIGHT: 109.79 LBS | HEART RATE: 77 BPM | DIASTOLIC BLOOD PRESSURE: 54 MMHG | SYSTOLIC BLOOD PRESSURE: 105 MMHG | RESPIRATION RATE: 18 BRPM | OXYGEN SATURATION: 99 %

## 2021-01-01 DIAGNOSIS — I50.43 ACUTE ON CHRONIC COMBINED SYSTOLIC (CONGESTIVE) AND DIASTOLIC (CONGESTIVE) HEART FAILURE: ICD-10-CM

## 2021-01-01 DIAGNOSIS — Z88.2 ALLERGY STATUS TO SULFONAMIDES: ICD-10-CM

## 2021-01-01 DIAGNOSIS — Z96.641 PRESENCE OF RIGHT ARTIFICIAL HIP JOINT: Chronic | ICD-10-CM

## 2021-01-01 DIAGNOSIS — I48.91 UNSPECIFIED ATRIAL FIBRILLATION: ICD-10-CM

## 2021-01-01 DIAGNOSIS — I34.0 NONRHEUMATIC MITRAL (VALVE) INSUFFICIENCY: ICD-10-CM

## 2021-01-01 DIAGNOSIS — Z85.3 PERSONAL HISTORY OF MALIGNANT NEOPLASM OF BREAST: ICD-10-CM

## 2021-01-01 DIAGNOSIS — Z88.8 ALLERGY STATUS TO OTHER DRUGS, MEDICAMENTS AND BIOLOGICAL SUBSTANCES: ICD-10-CM

## 2021-01-01 DIAGNOSIS — Z86.16 PERSONAL HISTORY OF COVID-19: ICD-10-CM

## 2021-01-01 DIAGNOSIS — Z74.01 BED CONFINEMENT STATUS: ICD-10-CM

## 2021-01-01 DIAGNOSIS — E87.5 HYPERKALEMIA: ICD-10-CM

## 2021-01-01 DIAGNOSIS — Z85.72 PERSONAL HISTORY OF NON-HODGKIN LYMPHOMAS: ICD-10-CM

## 2021-01-01 DIAGNOSIS — Z79.82 LONG TERM (CURRENT) USE OF ASPIRIN: ICD-10-CM

## 2021-01-01 DIAGNOSIS — I63.9 CEREBRAL INFARCTION, UNSPECIFIED: ICD-10-CM

## 2021-01-01 DIAGNOSIS — E87.1 HYPO-OSMOLALITY AND HYPONATREMIA: ICD-10-CM

## 2021-01-01 DIAGNOSIS — F03.90 UNSPECIFIED DEMENTIA WITHOUT BEHAVIORAL DISTURBANCE: ICD-10-CM

## 2021-01-01 LAB
-  AMIKACIN: SIGNIFICANT CHANGE UP
-  AMOXICILLIN/CLAVULANIC ACID: SIGNIFICANT CHANGE UP
-  AMPICILLIN/SULBACTAM: SIGNIFICANT CHANGE UP
-  AMPICILLIN: SIGNIFICANT CHANGE UP
-  AZTREONAM: SIGNIFICANT CHANGE UP
-  CEFAZOLIN: SIGNIFICANT CHANGE UP
-  CEFEPIME: SIGNIFICANT CHANGE UP
-  CEFOXITIN: SIGNIFICANT CHANGE UP
-  CEFTRIAXONE: SIGNIFICANT CHANGE UP
-  CIPROFLOXACIN: SIGNIFICANT CHANGE UP
-  ERTAPENEM: SIGNIFICANT CHANGE UP
-  GENTAMICIN: SIGNIFICANT CHANGE UP
-  LEVOFLOXACIN: SIGNIFICANT CHANGE UP
-  MEROPENEM: SIGNIFICANT CHANGE UP
-  NITROFURANTOIN: SIGNIFICANT CHANGE UP
-  PIPERACILLIN/TAZOBACTAM: SIGNIFICANT CHANGE UP
-  TOBRAMYCIN: SIGNIFICANT CHANGE UP
-  TRIMETHOPRIM/SULFAMETHOXAZOLE: SIGNIFICANT CHANGE UP
A1C WITH ESTIMATED AVERAGE GLUCOSE RESULT: 5.7 % — HIGH (ref 4–5.6)
ADD ON TEST-SPECIMEN IN LAB: SIGNIFICANT CHANGE UP
ADD ON TEST-SPECIMEN IN LAB: SIGNIFICANT CHANGE UP
ALBUMIN SERPL ELPH-MCNC: 2.5 G/DL — LOW (ref 3.3–5)
ALBUMIN SERPL ELPH-MCNC: 2.6 G/DL — LOW (ref 3.3–5)
ALBUMIN SERPL ELPH-MCNC: 2.8 G/DL — LOW (ref 3.3–5)
ALBUMIN SERPL ELPH-MCNC: 2.8 G/DL — LOW (ref 3.3–5)
ALBUMIN SERPL ELPH-MCNC: 3.2 G/DL — LOW (ref 3.3–5)
ALP SERPL-CCNC: 50 U/L — SIGNIFICANT CHANGE UP (ref 40–120)
ALP SERPL-CCNC: 53 U/L — SIGNIFICANT CHANGE UP (ref 40–120)
ALP SERPL-CCNC: 53 U/L — SIGNIFICANT CHANGE UP (ref 40–120)
ALP SERPL-CCNC: 55 U/L — SIGNIFICANT CHANGE UP (ref 40–120)
ALP SERPL-CCNC: 57 U/L — SIGNIFICANT CHANGE UP (ref 40–120)
ALT FLD-CCNC: 20 U/L — SIGNIFICANT CHANGE UP (ref 12–78)
ALT FLD-CCNC: 22 U/L — SIGNIFICANT CHANGE UP (ref 12–78)
ALT FLD-CCNC: 22 U/L — SIGNIFICANT CHANGE UP (ref 12–78)
ALT FLD-CCNC: 24 U/L — SIGNIFICANT CHANGE UP (ref 12–78)
ALT FLD-CCNC: 30 U/L — SIGNIFICANT CHANGE UP (ref 12–78)
ANION GAP SERPL CALC-SCNC: 0 MMOL/L — LOW (ref 5–17)
ANION GAP SERPL CALC-SCNC: 1 MMOL/L — LOW (ref 5–17)
ANION GAP SERPL CALC-SCNC: 2 MMOL/L — LOW (ref 5–17)
ANION GAP SERPL CALC-SCNC: 3 MMOL/L — LOW (ref 5–17)
ANION GAP SERPL CALC-SCNC: 5 MMOL/L — SIGNIFICANT CHANGE UP (ref 5–17)
ANION GAP SERPL CALC-SCNC: 5 MMOL/L — SIGNIFICANT CHANGE UP (ref 5–17)
ANION GAP SERPL CALC-SCNC: 6 MMOL/L — SIGNIFICANT CHANGE UP (ref 5–17)
ANION GAP SERPL CALC-SCNC: 6 MMOL/L — SIGNIFICANT CHANGE UP (ref 5–17)
ANION GAP SERPL CALC-SCNC: 7 MMOL/L — SIGNIFICANT CHANGE UP (ref 5–17)
ANION GAP SERPL CALC-SCNC: 7 MMOL/L — SIGNIFICANT CHANGE UP (ref 5–17)
ANION GAP SERPL CALC-SCNC: 9 MMOL/L — SIGNIFICANT CHANGE UP (ref 5–17)
ANION GAP SERPL CALC-SCNC: 9 MMOL/L — SIGNIFICANT CHANGE UP (ref 5–17)
APPEARANCE UR: ABNORMAL
APPEARANCE UR: CLEAR — SIGNIFICANT CHANGE UP
APPEARANCE UR: CLEAR — SIGNIFICANT CHANGE UP
AST SERPL-CCNC: 11 U/L — LOW (ref 15–37)
AST SERPL-CCNC: 12 U/L — LOW (ref 15–37)
AST SERPL-CCNC: 12 U/L — LOW (ref 15–37)
AST SERPL-CCNC: 14 U/L — LOW (ref 15–37)
AST SERPL-CCNC: 21 U/L — SIGNIFICANT CHANGE UP (ref 15–37)
BASE EXCESS BLDA CALC-SCNC: -3.8 MMOL/L — LOW (ref -2–2)
BASE EXCESS BLDA CALC-SCNC: 3.2 MMOL/L — HIGH (ref -2–2)
BASE EXCESS BLDA CALC-SCNC: 3.5 MMOL/L — HIGH (ref -2–2)
BASE EXCESS BLDA CALC-SCNC: 3.6 MMOL/L — HIGH (ref -2–2)
BASE EXCESS BLDA CALC-SCNC: 4.5 MMOL/L — HIGH (ref -2–2)
BASE EXCESS BLDV CALC-SCNC: -20.6 MMOL/L — LOW (ref -2–2)
BASOPHILS # BLD AUTO: 0.01 K/UL — SIGNIFICANT CHANGE UP (ref 0–0.2)
BASOPHILS NFR BLD AUTO: 0.1 % — SIGNIFICANT CHANGE UP (ref 0–2)
BILIRUB SERPL-MCNC: 0.6 MG/DL — SIGNIFICANT CHANGE UP (ref 0.2–1.2)
BILIRUB SERPL-MCNC: 0.6 MG/DL — SIGNIFICANT CHANGE UP (ref 0.2–1.2)
BILIRUB SERPL-MCNC: 0.7 MG/DL — SIGNIFICANT CHANGE UP (ref 0.2–1.2)
BILIRUB SERPL-MCNC: 0.7 MG/DL — SIGNIFICANT CHANGE UP (ref 0.2–1.2)
BILIRUB SERPL-MCNC: 0.8 MG/DL — SIGNIFICANT CHANGE UP (ref 0.2–1.2)
BILIRUB UR-MCNC: NEGATIVE — SIGNIFICANT CHANGE UP
BLOOD GAS COMMENTS ARTERIAL: SIGNIFICANT CHANGE UP
BUN SERPL-MCNC: 19 MG/DL — SIGNIFICANT CHANGE UP (ref 7–23)
BUN SERPL-MCNC: 20 MG/DL — SIGNIFICANT CHANGE UP (ref 7–23)
BUN SERPL-MCNC: 22 MG/DL — SIGNIFICANT CHANGE UP (ref 7–23)
BUN SERPL-MCNC: 26 MG/DL — HIGH (ref 7–23)
BUN SERPL-MCNC: 29 MG/DL — HIGH (ref 7–23)
BUN SERPL-MCNC: 31 MG/DL — HIGH (ref 7–23)
BUN SERPL-MCNC: 32 MG/DL — HIGH (ref 7–23)
BUN SERPL-MCNC: 33 MG/DL — HIGH (ref 7–23)
BUN SERPL-MCNC: 35 MG/DL — HIGH (ref 7–23)
BUN SERPL-MCNC: 37 MG/DL — HIGH (ref 7–23)
BUN SERPL-MCNC: 38 MG/DL — HIGH (ref 7–23)
BUN SERPL-MCNC: 43 MG/DL — HIGH (ref 7–23)
BUN SERPL-MCNC: 45 MG/DL — HIGH (ref 7–23)
BUN SERPL-MCNC: 51 MG/DL — HIGH (ref 7–23)
BUN SERPL-MCNC: 88 MG/DL — HIGH (ref 7–23)
CALCIUM SERPL-MCNC: 7.9 MG/DL — LOW (ref 8.5–10.1)
CALCIUM SERPL-MCNC: 8.3 MG/DL — LOW (ref 8.5–10.1)
CALCIUM SERPL-MCNC: 8.4 MG/DL — LOW (ref 8.5–10.1)
CALCIUM SERPL-MCNC: 8.6 MG/DL — SIGNIFICANT CHANGE UP (ref 8.5–10.1)
CALCIUM SERPL-MCNC: 8.7 MG/DL — SIGNIFICANT CHANGE UP (ref 8.5–10.1)
CALCIUM SERPL-MCNC: 8.8 MG/DL — SIGNIFICANT CHANGE UP (ref 8.5–10.1)
CALCIUM SERPL-MCNC: 8.9 MG/DL — SIGNIFICANT CHANGE UP (ref 8.5–10.1)
CALCIUM SERPL-MCNC: 9 MG/DL — SIGNIFICANT CHANGE UP (ref 8.5–10.1)
CALCIUM SERPL-MCNC: 9 MG/DL — SIGNIFICANT CHANGE UP (ref 8.5–10.1)
CALCIUM SERPL-MCNC: 9.1 MG/DL — SIGNIFICANT CHANGE UP (ref 8.5–10.1)
CALCIUM SERPL-MCNC: 9.3 MG/DL — SIGNIFICANT CHANGE UP (ref 8.5–10.1)
CALCIUM SERPL-MCNC: 9.3 MG/DL — SIGNIFICANT CHANGE UP (ref 8.5–10.1)
CALCIUM SERPL-MCNC: 9.4 MG/DL — SIGNIFICANT CHANGE UP (ref 8.5–10.1)
CALCIUM SERPL-MCNC: 9.5 MG/DL — SIGNIFICANT CHANGE UP (ref 8.5–10.1)
CHLORIDE SERPL-SCNC: 100 MMOL/L — SIGNIFICANT CHANGE UP (ref 96–108)
CHLORIDE SERPL-SCNC: 103 MMOL/L — SIGNIFICANT CHANGE UP (ref 96–108)
CHLORIDE SERPL-SCNC: 104 MMOL/L — SIGNIFICANT CHANGE UP (ref 96–108)
CHLORIDE SERPL-SCNC: 105 MMOL/L — SIGNIFICANT CHANGE UP (ref 96–108)
CHLORIDE SERPL-SCNC: 106 MMOL/L — SIGNIFICANT CHANGE UP (ref 96–108)
CHLORIDE SERPL-SCNC: 107 MMOL/L — SIGNIFICANT CHANGE UP (ref 96–108)
CHLORIDE SERPL-SCNC: 114 MMOL/L — HIGH (ref 96–108)
CHLORIDE SERPL-SCNC: 116 MMOL/L — HIGH (ref 96–108)
CHLORIDE SERPL-SCNC: 116 MMOL/L — HIGH (ref 96–108)
CHLORIDE SERPL-SCNC: 117 MMOL/L — HIGH (ref 96–108)
CHLORIDE SERPL-SCNC: 119 MMOL/L — HIGH (ref 96–108)
CHLORIDE SERPL-SCNC: 91 MMOL/L — LOW (ref 96–108)
CHLORIDE SERPL-SCNC: 92 MMOL/L — LOW (ref 96–108)
CHLORIDE SERPL-SCNC: 97 MMOL/L — SIGNIFICANT CHANGE UP (ref 96–108)
CHOLEST SERPL-MCNC: 152 MG/DL — SIGNIFICANT CHANGE UP
CO2 SERPL-SCNC: 22 MMOL/L — SIGNIFICANT CHANGE UP (ref 22–31)
CO2 SERPL-SCNC: 23 MMOL/L — SIGNIFICANT CHANGE UP (ref 22–31)
CO2 SERPL-SCNC: 23 MMOL/L — SIGNIFICANT CHANGE UP (ref 22–31)
CO2 SERPL-SCNC: 24 MMOL/L — SIGNIFICANT CHANGE UP (ref 22–31)
CO2 SERPL-SCNC: 26 MMOL/L — SIGNIFICANT CHANGE UP (ref 22–31)
CO2 SERPL-SCNC: 26 MMOL/L — SIGNIFICANT CHANGE UP (ref 22–31)
CO2 SERPL-SCNC: 27 MMOL/L — SIGNIFICANT CHANGE UP (ref 22–31)
CO2 SERPL-SCNC: 28 MMOL/L — SIGNIFICANT CHANGE UP (ref 22–31)
CO2 SERPL-SCNC: 28 MMOL/L — SIGNIFICANT CHANGE UP (ref 22–31)
CO2 SERPL-SCNC: 29 MMOL/L — SIGNIFICANT CHANGE UP (ref 22–31)
CO2 SERPL-SCNC: 29 MMOL/L — SIGNIFICANT CHANGE UP (ref 22–31)
CO2 SERPL-SCNC: 31 MMOL/L — SIGNIFICANT CHANGE UP (ref 22–31)
CO2 SERPL-SCNC: 32 MMOL/L — HIGH (ref 22–31)
COLOR SPEC: SIGNIFICANT CHANGE UP
COLOR SPEC: YELLOW — SIGNIFICANT CHANGE UP
COLOR SPEC: YELLOW — SIGNIFICANT CHANGE UP
COVID-19 SPIKE DOMAIN AB INTERP: POSITIVE
COVID-19 SPIKE DOMAIN ANTIBODY RESULT: >250 U/ML — HIGH
CREAT SERPL-MCNC: 0.65 MG/DL — SIGNIFICANT CHANGE UP (ref 0.5–1.3)
CREAT SERPL-MCNC: 0.7 MG/DL — SIGNIFICANT CHANGE UP (ref 0.5–1.3)
CREAT SERPL-MCNC: 0.71 MG/DL — SIGNIFICANT CHANGE UP (ref 0.5–1.3)
CREAT SERPL-MCNC: 0.71 MG/DL — SIGNIFICANT CHANGE UP (ref 0.5–1.3)
CREAT SERPL-MCNC: 0.8 MG/DL — SIGNIFICANT CHANGE UP (ref 0.5–1.3)
CREAT SERPL-MCNC: 0.8 MG/DL — SIGNIFICANT CHANGE UP (ref 0.5–1.3)
CREAT SERPL-MCNC: 0.81 MG/DL — SIGNIFICANT CHANGE UP (ref 0.5–1.3)
CREAT SERPL-MCNC: 0.88 MG/DL — SIGNIFICANT CHANGE UP (ref 0.5–1.3)
CREAT SERPL-MCNC: 0.89 MG/DL — SIGNIFICANT CHANGE UP (ref 0.5–1.3)
CREAT SERPL-MCNC: 1.01 MG/DL — SIGNIFICANT CHANGE UP (ref 0.5–1.3)
CREAT SERPL-MCNC: 1.08 MG/DL — SIGNIFICANT CHANGE UP (ref 0.5–1.3)
CREAT SERPL-MCNC: 1.09 MG/DL — SIGNIFICANT CHANGE UP (ref 0.5–1.3)
CREAT SERPL-MCNC: 1.11 MG/DL — SIGNIFICANT CHANGE UP (ref 0.5–1.3)
CREAT SERPL-MCNC: 1.17 MG/DL — SIGNIFICANT CHANGE UP (ref 0.5–1.3)
CREAT SERPL-MCNC: 1.19 MG/DL — SIGNIFICANT CHANGE UP (ref 0.5–1.3)
CREAT SERPL-MCNC: 1.32 MG/DL — HIGH (ref 0.5–1.3)
CREAT SERPL-MCNC: 1.69 MG/DL — HIGH (ref 0.5–1.3)
CULTURE RESULTS: SIGNIFICANT CHANGE UP
DIFF PNL FLD: ABNORMAL
DIFF PNL FLD: ABNORMAL
DIFF PNL FLD: NEGATIVE — SIGNIFICANT CHANGE UP
DIGOXIN SERPL-MCNC: 1.04 NG/ML — SIGNIFICANT CHANGE UP (ref 0.8–2)
EOSINOPHIL # BLD AUTO: 0 K/UL — SIGNIFICANT CHANGE UP (ref 0–0.5)
EOSINOPHIL NFR BLD AUTO: 0 % — SIGNIFICANT CHANGE UP (ref 0–6)
ESTIMATED AVERAGE GLUCOSE: 117 MG/DL — HIGH (ref 68–114)
GAS PNL BLDA: SIGNIFICANT CHANGE UP
GLUCOSE SERPL-MCNC: 109 MG/DL — HIGH (ref 70–99)
GLUCOSE SERPL-MCNC: 115 MG/DL — HIGH (ref 70–99)
GLUCOSE SERPL-MCNC: 121 MG/DL — HIGH (ref 70–99)
GLUCOSE SERPL-MCNC: 123 MG/DL — HIGH (ref 70–99)
GLUCOSE SERPL-MCNC: 127 MG/DL — HIGH (ref 70–99)
GLUCOSE SERPL-MCNC: 130 MG/DL — HIGH (ref 70–99)
GLUCOSE SERPL-MCNC: 132 MG/DL — HIGH (ref 70–99)
GLUCOSE SERPL-MCNC: 133 MG/DL — HIGH (ref 70–99)
GLUCOSE SERPL-MCNC: 137 MG/DL — HIGH (ref 70–99)
GLUCOSE SERPL-MCNC: 148 MG/DL — HIGH (ref 70–99)
GLUCOSE SERPL-MCNC: 168 MG/DL — HIGH (ref 70–99)
GLUCOSE SERPL-MCNC: 80 MG/DL — SIGNIFICANT CHANGE UP (ref 70–99)
GLUCOSE SERPL-MCNC: 80 MG/DL — SIGNIFICANT CHANGE UP (ref 70–99)
GLUCOSE SERPL-MCNC: 83 MG/DL — SIGNIFICANT CHANGE UP (ref 70–99)
GLUCOSE SERPL-MCNC: 87 MG/DL — SIGNIFICANT CHANGE UP (ref 70–99)
GLUCOSE SERPL-MCNC: 96 MG/DL — SIGNIFICANT CHANGE UP (ref 70–99)
GLUCOSE SERPL-MCNC: 96 MG/DL — SIGNIFICANT CHANGE UP (ref 70–99)
GLUCOSE UR QL: NEGATIVE MG/DL — SIGNIFICANT CHANGE UP
GLUCOSE UR QL: NEGATIVE MG/DL — SIGNIFICANT CHANGE UP
GLUCOSE UR QL: NEGATIVE — SIGNIFICANT CHANGE UP
HCO3 BLDA-SCNC: 21 MMOL/L — SIGNIFICANT CHANGE UP (ref 21–29)
HCO3 BLDA-SCNC: 29 MMOL/L — SIGNIFICANT CHANGE UP (ref 21–29)
HCO3 BLDA-SCNC: 29 MMOL/L — SIGNIFICANT CHANGE UP (ref 21–29)
HCO3 BLDA-SCNC: 30 MMOL/L — HIGH (ref 21–29)
HCO3 BLDA-SCNC: 30 MMOL/L — HIGH (ref 21–29)
HCO3 BLDV-SCNC: 6 MMOL/L — LOW (ref 21–29)
HCT VFR BLD CALC: 34.2 % — LOW (ref 34.5–45)
HCT VFR BLD CALC: 34.7 % — SIGNIFICANT CHANGE UP (ref 34.5–45)
HCT VFR BLD CALC: 35 % — SIGNIFICANT CHANGE UP (ref 34.5–45)
HCT VFR BLD CALC: 35.2 % — SIGNIFICANT CHANGE UP (ref 34.5–45)
HCT VFR BLD CALC: 35.3 % — SIGNIFICANT CHANGE UP (ref 34.5–45)
HCT VFR BLD CALC: 35.6 % — SIGNIFICANT CHANGE UP (ref 34.5–45)
HCT VFR BLD CALC: 36.6 % — SIGNIFICANT CHANGE UP (ref 34.5–45)
HCT VFR BLD CALC: 36.6 % — SIGNIFICANT CHANGE UP (ref 34.5–45)
HCT VFR BLD CALC: 37.1 % — SIGNIFICANT CHANGE UP (ref 34.5–45)
HCT VFR BLD CALC: 38.6 % — SIGNIFICANT CHANGE UP (ref 34.5–45)
HCT VFR BLD CALC: 42.2 % — SIGNIFICANT CHANGE UP (ref 34.5–45)
HDLC SERPL-MCNC: 33 MG/DL — LOW
HGB BLD-MCNC: 10.4 G/DL — LOW (ref 11.5–15.5)
HGB BLD-MCNC: 10.8 G/DL — LOW (ref 11.5–15.5)
HGB BLD-MCNC: 11.1 G/DL — LOW (ref 11.5–15.5)
HGB BLD-MCNC: 11.1 G/DL — LOW (ref 11.5–15.5)
HGB BLD-MCNC: 11.3 G/DL — LOW (ref 11.5–15.5)
HGB BLD-MCNC: 11.4 G/DL — LOW (ref 11.5–15.5)
HGB BLD-MCNC: 11.5 G/DL — SIGNIFICANT CHANGE UP (ref 11.5–15.5)
HGB BLD-MCNC: 11.6 G/DL — SIGNIFICANT CHANGE UP (ref 11.5–15.5)
HGB BLD-MCNC: 12.3 G/DL — SIGNIFICANT CHANGE UP (ref 11.5–15.5)
HGB BLD-MCNC: 13 G/DL — SIGNIFICANT CHANGE UP (ref 11.5–15.5)
HGB BLD-MCNC: 13.2 G/DL — SIGNIFICANT CHANGE UP (ref 11.5–15.5)
HOROWITZ INDEX BLDA+IHG-RTO: SIGNIFICANT CHANGE UP
IMM GRANULOCYTES NFR BLD AUTO: 0.7 % — SIGNIFICANT CHANGE UP (ref 0–1.5)
KETONES UR-MCNC: NEGATIVE — SIGNIFICANT CHANGE UP
LEUKOCYTE ESTERASE UR-ACNC: ABNORMAL
LEUKOCYTE ESTERASE UR-ACNC: ABNORMAL
LEUKOCYTE ESTERASE UR-ACNC: NEGATIVE — SIGNIFICANT CHANGE UP
LIPID PNL WITH DIRECT LDL SERPL: 103 MG/DL — HIGH
LYMPHOCYTES # BLD AUTO: 0.73 K/UL — LOW (ref 1–3.3)
LYMPHOCYTES # BLD AUTO: 7.5 % — LOW (ref 13–44)
MAGNESIUM SERPL-MCNC: 2 MG/DL — SIGNIFICANT CHANGE UP (ref 1.6–2.6)
MAGNESIUM SERPL-MCNC: 2.1 MG/DL — SIGNIFICANT CHANGE UP (ref 1.6–2.6)
MAGNESIUM SERPL-MCNC: 2.3 MG/DL — SIGNIFICANT CHANGE UP (ref 1.6–2.6)
MAGNESIUM SERPL-MCNC: 2.5 MG/DL — SIGNIFICANT CHANGE UP (ref 1.6–2.6)
MAGNESIUM SERPL-MCNC: 2.6 MG/DL — SIGNIFICANT CHANGE UP (ref 1.6–2.6)
MAGNESIUM SERPL-MCNC: 2.6 MG/DL — SIGNIFICANT CHANGE UP (ref 1.6–2.6)
MCHC RBC-ENTMCNC: 26 PG — LOW (ref 27–34)
MCHC RBC-ENTMCNC: 26.2 PG — LOW (ref 27–34)
MCHC RBC-ENTMCNC: 26.2 PG — LOW (ref 27–34)
MCHC RBC-ENTMCNC: 26.6 PG — LOW (ref 27–34)
MCHC RBC-ENTMCNC: 26.7 PG — LOW (ref 27–34)
MCHC RBC-ENTMCNC: 26.7 PG — LOW (ref 27–34)
MCHC RBC-ENTMCNC: 26.8 PG — LOW (ref 27–34)
MCHC RBC-ENTMCNC: 27 PG — SIGNIFICANT CHANGE UP (ref 27–34)
MCHC RBC-ENTMCNC: 27.5 PG — SIGNIFICANT CHANGE UP (ref 27–34)
MCHC RBC-ENTMCNC: 30 GM/DL — LOW (ref 32–36)
MCHC RBC-ENTMCNC: 30.9 GM/DL — LOW (ref 32–36)
MCHC RBC-ENTMCNC: 30.9 GM/DL — LOW (ref 32–36)
MCHC RBC-ENTMCNC: 31.2 GM/DL — LOW (ref 32–36)
MCHC RBC-ENTMCNC: 31.3 GM/DL — LOW (ref 32–36)
MCHC RBC-ENTMCNC: 31.4 GM/DL — LOW (ref 32–36)
MCHC RBC-ENTMCNC: 32.3 GM/DL — SIGNIFICANT CHANGE UP (ref 32–36)
MCHC RBC-ENTMCNC: 32.5 GM/DL — SIGNIFICANT CHANGE UP (ref 32–36)
MCHC RBC-ENTMCNC: 33 GM/DL — SIGNIFICANT CHANGE UP (ref 32–36)
MCHC RBC-ENTMCNC: 33.2 GM/DL — SIGNIFICANT CHANGE UP (ref 32–36)
MCHC RBC-ENTMCNC: 33.7 GM/DL — SIGNIFICANT CHANGE UP (ref 32–36)
MCV RBC AUTO: 81.4 FL — SIGNIFICANT CHANGE UP (ref 80–100)
MCV RBC AUTO: 81.8 FL — SIGNIFICANT CHANGE UP (ref 80–100)
MCV RBC AUTO: 82.1 FL — SIGNIFICANT CHANGE UP (ref 80–100)
MCV RBC AUTO: 82.4 FL — SIGNIFICANT CHANGE UP (ref 80–100)
MCV RBC AUTO: 82.7 FL — SIGNIFICANT CHANGE UP (ref 80–100)
MCV RBC AUTO: 83.4 FL — SIGNIFICANT CHANGE UP (ref 80–100)
MCV RBC AUTO: 84.3 FL — SIGNIFICANT CHANGE UP (ref 80–100)
MCV RBC AUTO: 84.9 FL — SIGNIFICANT CHANGE UP (ref 80–100)
MCV RBC AUTO: 86.6 FL — SIGNIFICANT CHANGE UP (ref 80–100)
MCV RBC AUTO: 86.8 FL — SIGNIFICANT CHANGE UP (ref 80–100)
MCV RBC AUTO: 87.4 FL — SIGNIFICANT CHANGE UP (ref 80–100)
METHOD TYPE: SIGNIFICANT CHANGE UP
MONOCYTES # BLD AUTO: 0.49 K/UL — SIGNIFICANT CHANGE UP (ref 0–0.9)
MONOCYTES NFR BLD AUTO: 5 % — SIGNIFICANT CHANGE UP (ref 2–14)
NEUTROPHILS # BLD AUTO: 8.47 K/UL — HIGH (ref 1.8–7.4)
NEUTROPHILS NFR BLD AUTO: 86.7 % — HIGH (ref 43–77)
NITRITE UR-MCNC: NEGATIVE — SIGNIFICANT CHANGE UP
NON HDL CHOLESTEROL: 118 MG/DL — SIGNIFICANT CHANGE UP
NT-PROBNP SERPL-SCNC: HIGH PG/ML (ref 0–450)
NT-PROBNP SERPL-SCNC: HIGH PG/ML (ref 0–450)
ORGANISM # SPEC MICROSCOPIC CNT: SIGNIFICANT CHANGE UP
ORGANISM # SPEC MICROSCOPIC CNT: SIGNIFICANT CHANGE UP
PCO2 BLDA: 38 MMHG — SIGNIFICANT CHANGE UP (ref 32–46)
PCO2 BLDA: 52 MMHG — HIGH (ref 32–46)
PCO2 BLDA: 53 MMHG — HIGH (ref 32–46)
PCO2 BLDA: 54 MMHG — HIGH (ref 32–46)
PCO2 BLDA: 60 MMHG — HIGH (ref 32–46)
PCO2 BLDV: 15 MMHG — LOW (ref 39–42)
PH BLDA: 7.32 — LOW (ref 7.35–7.45)
PH BLDA: 7.36 — SIGNIFICANT CHANGE UP (ref 7.35–7.45)
PH BLDA: 7.37 — SIGNIFICANT CHANGE UP (ref 7.35–7.45)
PH BLDV: 7.22 — LOW (ref 7.35–7.45)
PH UR: 6 — SIGNIFICANT CHANGE UP (ref 5–8)
PH UR: 6 — SIGNIFICANT CHANGE UP (ref 5–8)
PH UR: 8 — SIGNIFICANT CHANGE UP (ref 5–8)
PHOSPHATE SERPL-MCNC: 2.4 MG/DL — LOW (ref 2.5–4.5)
PHOSPHATE SERPL-MCNC: 2.8 MG/DL — SIGNIFICANT CHANGE UP (ref 2.5–4.5)
PHOSPHATE SERPL-MCNC: 2.9 MG/DL — SIGNIFICANT CHANGE UP (ref 2.5–4.5)
PLATELET # BLD AUTO: 120 K/UL — LOW (ref 150–400)
PLATELET # BLD AUTO: 136 K/UL — LOW (ref 150–400)
PLATELET # BLD AUTO: 150 K/UL — SIGNIFICANT CHANGE UP (ref 150–400)
PLATELET # BLD AUTO: 197 K/UL — SIGNIFICANT CHANGE UP (ref 150–400)
PLATELET # BLD AUTO: 208 K/UL — SIGNIFICANT CHANGE UP (ref 150–400)
PLATELET # BLD AUTO: 216 K/UL — SIGNIFICANT CHANGE UP (ref 150–400)
PLATELET # BLD AUTO: 222 K/UL — SIGNIFICANT CHANGE UP (ref 150–400)
PLATELET # BLD AUTO: 238 K/UL — SIGNIFICANT CHANGE UP (ref 150–400)
PLATELET # BLD AUTO: 274 K/UL — SIGNIFICANT CHANGE UP (ref 150–400)
PLATELET # BLD AUTO: 274 K/UL — SIGNIFICANT CHANGE UP (ref 150–400)
PLATELET # BLD AUTO: 281 K/UL — SIGNIFICANT CHANGE UP (ref 150–400)
PO2 BLDA: 117 MMHG — HIGH (ref 74–108)
PO2 BLDA: 123 MMHG — HIGH (ref 74–108)
PO2 BLDA: 140 MMHG — HIGH (ref 74–108)
PO2 BLDA: 202 MMHG — HIGH (ref 74–108)
PO2 BLDA: 77 MMHG — SIGNIFICANT CHANGE UP (ref 74–108)
PO2 BLDV: 96 MMHG — HIGH (ref 25–45)
POTASSIUM SERPL-MCNC: 3.6 MMOL/L — SIGNIFICANT CHANGE UP (ref 3.5–5.3)
POTASSIUM SERPL-MCNC: 3.6 MMOL/L — SIGNIFICANT CHANGE UP (ref 3.5–5.3)
POTASSIUM SERPL-MCNC: 3.8 MMOL/L — SIGNIFICANT CHANGE UP (ref 3.5–5.3)
POTASSIUM SERPL-MCNC: 3.8 MMOL/L — SIGNIFICANT CHANGE UP (ref 3.5–5.3)
POTASSIUM SERPL-MCNC: 3.9 MMOL/L — SIGNIFICANT CHANGE UP (ref 3.5–5.3)
POTASSIUM SERPL-MCNC: 4.1 MMOL/L — SIGNIFICANT CHANGE UP (ref 3.5–5.3)
POTASSIUM SERPL-MCNC: 4.1 MMOL/L — SIGNIFICANT CHANGE UP (ref 3.5–5.3)
POTASSIUM SERPL-MCNC: 4.2 MMOL/L — SIGNIFICANT CHANGE UP (ref 3.5–5.3)
POTASSIUM SERPL-MCNC: 4.2 MMOL/L — SIGNIFICANT CHANGE UP (ref 3.5–5.3)
POTASSIUM SERPL-MCNC: 4.4 MMOL/L — SIGNIFICANT CHANGE UP (ref 3.5–5.3)
POTASSIUM SERPL-MCNC: 4.4 MMOL/L — SIGNIFICANT CHANGE UP (ref 3.5–5.3)
POTASSIUM SERPL-MCNC: 4.5 MMOL/L — SIGNIFICANT CHANGE UP (ref 3.5–5.3)
POTASSIUM SERPL-MCNC: 4.6 MMOL/L — SIGNIFICANT CHANGE UP (ref 3.5–5.3)
POTASSIUM SERPL-MCNC: 4.7 MMOL/L — SIGNIFICANT CHANGE UP (ref 3.5–5.3)
POTASSIUM SERPL-MCNC: 5.6 MMOL/L — HIGH (ref 3.5–5.3)
POTASSIUM SERPL-MCNC: 5.7 MMOL/L — HIGH (ref 3.5–5.3)
POTASSIUM SERPL-MCNC: 6 MMOL/L — HIGH (ref 3.5–5.3)
POTASSIUM SERPL-SCNC: 3.6 MMOL/L — SIGNIFICANT CHANGE UP (ref 3.5–5.3)
POTASSIUM SERPL-SCNC: 3.6 MMOL/L — SIGNIFICANT CHANGE UP (ref 3.5–5.3)
POTASSIUM SERPL-SCNC: 3.8 MMOL/L — SIGNIFICANT CHANGE UP (ref 3.5–5.3)
POTASSIUM SERPL-SCNC: 3.8 MMOL/L — SIGNIFICANT CHANGE UP (ref 3.5–5.3)
POTASSIUM SERPL-SCNC: 3.9 MMOL/L — SIGNIFICANT CHANGE UP (ref 3.5–5.3)
POTASSIUM SERPL-SCNC: 4.1 MMOL/L — SIGNIFICANT CHANGE UP (ref 3.5–5.3)
POTASSIUM SERPL-SCNC: 4.1 MMOL/L — SIGNIFICANT CHANGE UP (ref 3.5–5.3)
POTASSIUM SERPL-SCNC: 4.2 MMOL/L — SIGNIFICANT CHANGE UP (ref 3.5–5.3)
POTASSIUM SERPL-SCNC: 4.2 MMOL/L — SIGNIFICANT CHANGE UP (ref 3.5–5.3)
POTASSIUM SERPL-SCNC: 4.4 MMOL/L — SIGNIFICANT CHANGE UP (ref 3.5–5.3)
POTASSIUM SERPL-SCNC: 4.4 MMOL/L — SIGNIFICANT CHANGE UP (ref 3.5–5.3)
POTASSIUM SERPL-SCNC: 4.5 MMOL/L — SIGNIFICANT CHANGE UP (ref 3.5–5.3)
POTASSIUM SERPL-SCNC: 4.6 MMOL/L — SIGNIFICANT CHANGE UP (ref 3.5–5.3)
POTASSIUM SERPL-SCNC: 4.7 MMOL/L — SIGNIFICANT CHANGE UP (ref 3.5–5.3)
POTASSIUM SERPL-SCNC: 5.6 MMOL/L — HIGH (ref 3.5–5.3)
POTASSIUM SERPL-SCNC: 5.7 MMOL/L — HIGH (ref 3.5–5.3)
POTASSIUM SERPL-SCNC: 6 MMOL/L — HIGH (ref 3.5–5.3)
PROCALCITONIN SERPL-MCNC: 0.05 NG/ML — SIGNIFICANT CHANGE UP (ref 0.02–0.1)
PROT SERPL-MCNC: 6.2 GM/DL — SIGNIFICANT CHANGE UP (ref 6–8.3)
PROT SERPL-MCNC: 6.4 GM/DL — SIGNIFICANT CHANGE UP (ref 6–8.3)
PROT SERPL-MCNC: 6.5 GM/DL — SIGNIFICANT CHANGE UP (ref 6–8.3)
PROT SERPL-MCNC: 6.5 GM/DL — SIGNIFICANT CHANGE UP (ref 6–8.3)
PROT SERPL-MCNC: 7.1 GM/DL — SIGNIFICANT CHANGE UP (ref 6–8.3)
PROT UR-MCNC: 30 MG/DL
PROT UR-MCNC: 30 MG/DL
PROT UR-MCNC: NEGATIVE — SIGNIFICANT CHANGE UP
RBC # BLD: 3.97 M/UL — SIGNIFICANT CHANGE UP (ref 3.8–5.2)
RBC # BLD: 4.03 M/UL — SIGNIFICANT CHANGE UP (ref 3.8–5.2)
RBC # BLD: 4.11 M/UL — SIGNIFICANT CHANGE UP (ref 3.8–5.2)
RBC # BLD: 4.15 M/UL — SIGNIFICANT CHANGE UP (ref 3.8–5.2)
RBC # BLD: 4.27 M/UL — SIGNIFICANT CHANGE UP (ref 3.8–5.2)
RBC # BLD: 4.29 M/UL — SIGNIFICANT CHANGE UP (ref 3.8–5.2)
RBC # BLD: 4.34 M/UL — SIGNIFICANT CHANGE UP (ref 3.8–5.2)
RBC # BLD: 4.39 M/UL — SIGNIFICANT CHANGE UP (ref 3.8–5.2)
RBC # BLD: 4.56 M/UL — SIGNIFICANT CHANGE UP (ref 3.8–5.2)
RBC # BLD: 4.72 M/UL — SIGNIFICANT CHANGE UP (ref 3.8–5.2)
RBC # BLD: 4.97 M/UL — SIGNIFICANT CHANGE UP (ref 3.8–5.2)
RBC # FLD: 15.5 % — HIGH (ref 10.3–14.5)
RBC # FLD: 15.6 % — HIGH (ref 10.3–14.5)
RBC # FLD: 15.7 % — HIGH (ref 10.3–14.5)
RBC # FLD: 16.7 % — HIGH (ref 10.3–14.5)
RBC # FLD: 16.9 % — HIGH (ref 10.3–14.5)
RBC # FLD: 17 % — HIGH (ref 10.3–14.5)
RBC # FLD: 17.2 % — HIGH (ref 10.3–14.5)
RBC # FLD: 17.3 % — HIGH (ref 10.3–14.5)
RBC # FLD: 17.4 % — HIGH (ref 10.3–14.5)
RBC # FLD: 17.6 % — HIGH (ref 10.3–14.5)
RBC # FLD: 17.9 % — HIGH (ref 10.3–14.5)
SAO2 % BLDA: 96 % — SIGNIFICANT CHANGE UP (ref 92–96)
SAO2 % BLDA: 99 % — HIGH (ref 92–96)
SAO2 % BLDV: 96 % — HIGH (ref 67–88)
SARS-COV-2 IGG SERPL QL IA: POSITIVE
SARS-COV-2 IGG+IGM SERPL QL IA: >250 U/ML — HIGH
SARS-COV-2 IGG+IGM SERPL QL IA: POSITIVE
SARS-COV-2 IGM SERPL IA-ACNC: 213 INDEX — HIGH
SARS-COV-2 RNA SPEC QL NAA+PROBE: SIGNIFICANT CHANGE UP
SODIUM SERPL-SCNC: 122 MMOL/L — LOW (ref 135–145)
SODIUM SERPL-SCNC: 123 MMOL/L — LOW (ref 135–145)
SODIUM SERPL-SCNC: 131 MMOL/L — LOW (ref 135–145)
SODIUM SERPL-SCNC: 133 MMOL/L — LOW (ref 135–145)
SODIUM SERPL-SCNC: 134 MMOL/L — LOW (ref 135–145)
SODIUM SERPL-SCNC: 136 MMOL/L — SIGNIFICANT CHANGE UP (ref 135–145)
SODIUM SERPL-SCNC: 136 MMOL/L — SIGNIFICANT CHANGE UP (ref 135–145)
SODIUM SERPL-SCNC: 137 MMOL/L — SIGNIFICANT CHANGE UP (ref 135–145)
SODIUM SERPL-SCNC: 138 MMOL/L — SIGNIFICANT CHANGE UP (ref 135–145)
SODIUM SERPL-SCNC: 139 MMOL/L — SIGNIFICANT CHANGE UP (ref 135–145)
SODIUM SERPL-SCNC: 140 MMOL/L — SIGNIFICANT CHANGE UP (ref 135–145)
SODIUM SERPL-SCNC: 142 MMOL/L — SIGNIFICANT CHANGE UP (ref 135–145)
SODIUM SERPL-SCNC: 146 MMOL/L — HIGH (ref 135–145)
SODIUM SERPL-SCNC: 147 MMOL/L — HIGH (ref 135–145)
SODIUM SERPL-SCNC: 148 MMOL/L — HIGH (ref 135–145)
SODIUM SERPL-SCNC: 148 MMOL/L — HIGH (ref 135–145)
SODIUM SERPL-SCNC: 152 MMOL/L — HIGH (ref 135–145)
SP GR SPEC: 1 — LOW (ref 1.01–1.02)
SP GR SPEC: 1.01 — SIGNIFICANT CHANGE UP (ref 1.01–1.02)
SP GR SPEC: 1.01 — SIGNIFICANT CHANGE UP (ref 1.01–1.02)
SPECIMEN SOURCE: SIGNIFICANT CHANGE UP
TRIGL SERPL-MCNC: 78 MG/DL — SIGNIFICANT CHANGE UP
TROPONIN I SERPL-MCNC: 0.02 NG/ML — SIGNIFICANT CHANGE UP (ref 0.01–0.04)
TROPONIN I SERPL-MCNC: 0.03 NG/ML — SIGNIFICANT CHANGE UP (ref 0.01–0.04)
TROPONIN I SERPL-MCNC: 0.04 NG/ML — SIGNIFICANT CHANGE UP (ref 0.01–0.04)
UROBILINOGEN FLD QL: NEGATIVE MG/DL — SIGNIFICANT CHANGE UP
UROBILINOGEN FLD QL: NEGATIVE MG/DL — SIGNIFICANT CHANGE UP
UROBILINOGEN FLD QL: NEGATIVE — SIGNIFICANT CHANGE UP
WBC # BLD: 10.79 K/UL — HIGH (ref 3.8–10.5)
WBC # BLD: 6.22 K/UL — SIGNIFICANT CHANGE UP (ref 3.8–10.5)
WBC # BLD: 6.89 K/UL — SIGNIFICANT CHANGE UP (ref 3.8–10.5)
WBC # BLD: 7.27 K/UL — SIGNIFICANT CHANGE UP (ref 3.8–10.5)
WBC # BLD: 7.83 K/UL — SIGNIFICANT CHANGE UP (ref 3.8–10.5)
WBC # BLD: 8.01 K/UL — SIGNIFICANT CHANGE UP (ref 3.8–10.5)
WBC # BLD: 8.16 K/UL — SIGNIFICANT CHANGE UP (ref 3.8–10.5)
WBC # BLD: 9.38 K/UL — SIGNIFICANT CHANGE UP (ref 3.8–10.5)
WBC # BLD: 9.43 K/UL — SIGNIFICANT CHANGE UP (ref 3.8–10.5)
WBC # BLD: 9.55 K/UL — SIGNIFICANT CHANGE UP (ref 3.8–10.5)
WBC # BLD: 9.77 K/UL — SIGNIFICANT CHANGE UP (ref 3.8–10.5)
WBC # FLD AUTO: 10.79 K/UL — HIGH (ref 3.8–10.5)
WBC # FLD AUTO: 6.22 K/UL — SIGNIFICANT CHANGE UP (ref 3.8–10.5)
WBC # FLD AUTO: 6.89 K/UL — SIGNIFICANT CHANGE UP (ref 3.8–10.5)
WBC # FLD AUTO: 7.27 K/UL — SIGNIFICANT CHANGE UP (ref 3.8–10.5)
WBC # FLD AUTO: 7.83 K/UL — SIGNIFICANT CHANGE UP (ref 3.8–10.5)
WBC # FLD AUTO: 8.01 K/UL — SIGNIFICANT CHANGE UP (ref 3.8–10.5)
WBC # FLD AUTO: 8.16 K/UL — SIGNIFICANT CHANGE UP (ref 3.8–10.5)
WBC # FLD AUTO: 9.38 K/UL — SIGNIFICANT CHANGE UP (ref 3.8–10.5)
WBC # FLD AUTO: 9.43 K/UL — SIGNIFICANT CHANGE UP (ref 3.8–10.5)
WBC # FLD AUTO: 9.55 K/UL — SIGNIFICANT CHANGE UP (ref 3.8–10.5)
WBC # FLD AUTO: 9.77 K/UL — SIGNIFICANT CHANGE UP (ref 3.8–10.5)

## 2021-01-01 PROCEDURE — 71250 CT THORAX DX C-: CPT

## 2021-01-01 PROCEDURE — 93010 ELECTROCARDIOGRAM REPORT: CPT

## 2021-01-01 PROCEDURE — 84100 ASSAY OF PHOSPHORUS: CPT

## 2021-01-01 PROCEDURE — 99221 1ST HOSP IP/OBS SF/LOW 40: CPT

## 2021-01-01 PROCEDURE — 71045 X-RAY EXAM CHEST 1 VIEW: CPT | Mod: 26,76

## 2021-01-01 PROCEDURE — 74018 RADEX ABDOMEN 1 VIEW: CPT

## 2021-01-01 PROCEDURE — 94660 CPAP INITIATION&MGMT: CPT

## 2021-01-01 PROCEDURE — 99291 CRITICAL CARE FIRST HOUR: CPT | Mod: CS

## 2021-01-01 PROCEDURE — 97530 THERAPEUTIC ACTIVITIES: CPT | Mod: GP

## 2021-01-01 PROCEDURE — 99223 1ST HOSP IP/OBS HIGH 75: CPT

## 2021-01-01 PROCEDURE — 84484 ASSAY OF TROPONIN QUANT: CPT

## 2021-01-01 PROCEDURE — 99284 EMERGENCY DEPT VISIT MOD MDM: CPT | Mod: CS

## 2021-01-01 PROCEDURE — 99233 SBSQ HOSP IP/OBS HIGH 50: CPT

## 2021-01-01 PROCEDURE — 85025 COMPLETE CBC W/AUTO DIFF WBC: CPT

## 2021-01-01 PROCEDURE — 74018 RADEX ABDOMEN 1 VIEW: CPT | Mod: 26

## 2021-01-01 PROCEDURE — 71045 X-RAY EXAM CHEST 1 VIEW: CPT

## 2021-01-01 PROCEDURE — 70450 CT HEAD/BRAIN W/O DYE: CPT | Mod: 26

## 2021-01-01 PROCEDURE — 71045 X-RAY EXAM CHEST 1 VIEW: CPT | Mod: 26

## 2021-01-01 PROCEDURE — 80061 LIPID PANEL: CPT

## 2021-01-01 PROCEDURE — U0005: CPT

## 2021-01-01 PROCEDURE — 99232 SBSQ HOSP IP/OBS MODERATE 35: CPT

## 2021-01-01 PROCEDURE — 80053 COMPREHEN METABOLIC PANEL: CPT

## 2021-01-01 PROCEDURE — 80162 ASSAY OF DIGOXIN TOTAL: CPT

## 2021-01-01 PROCEDURE — 83880 ASSAY OF NATRIURETIC PEPTIDE: CPT

## 2021-01-01 PROCEDURE — 36415 COLL VENOUS BLD VENIPUNCTURE: CPT

## 2021-01-01 PROCEDURE — 83605 ASSAY OF LACTIC ACID: CPT

## 2021-01-01 PROCEDURE — 80048 BASIC METABOLIC PNL TOTAL CA: CPT

## 2021-01-01 PROCEDURE — 99223 1ST HOSP IP/OBS HIGH 75: CPT | Mod: AI

## 2021-01-01 PROCEDURE — 84145 PROCALCITONIN (PCT): CPT

## 2021-01-01 PROCEDURE — 70551 MRI BRAIN STEM W/O DYE: CPT

## 2021-01-01 PROCEDURE — 87086 URINE CULTURE/COLONY COUNT: CPT

## 2021-01-01 PROCEDURE — 85610 PROTHROMBIN TIME: CPT

## 2021-01-01 PROCEDURE — 93005 ELECTROCARDIOGRAM TRACING: CPT

## 2021-01-01 PROCEDURE — 70551 MRI BRAIN STEM W/O DYE: CPT | Mod: 26

## 2021-01-01 PROCEDURE — 71250 CT THORAX DX C-: CPT | Mod: 26

## 2021-01-01 PROCEDURE — 95816 EEG AWAKE AND DROWSY: CPT | Mod: 26

## 2021-01-01 PROCEDURE — 95816 EEG AWAKE AND DROWSY: CPT

## 2021-01-01 PROCEDURE — 92507 TX SP LANG VOICE COMM INDIV: CPT | Mod: GN

## 2021-01-01 PROCEDURE — 85027 COMPLETE CBC AUTOMATED: CPT

## 2021-01-01 PROCEDURE — 83036 HEMOGLOBIN GLYCOSYLATED A1C: CPT

## 2021-01-01 PROCEDURE — 92526 ORAL FUNCTION THERAPY: CPT | Mod: GN

## 2021-01-01 PROCEDURE — 82803 BLOOD GASES ANY COMBINATION: CPT

## 2021-01-01 PROCEDURE — 82550 ASSAY OF CK (CPK): CPT

## 2021-01-01 PROCEDURE — 82962 GLUCOSE BLOOD TEST: CPT

## 2021-01-01 PROCEDURE — 99239 HOSP IP/OBS DSCHRG MGMT >30: CPT

## 2021-01-01 PROCEDURE — 81001 URINALYSIS AUTO W/SCOPE: CPT

## 2021-01-01 PROCEDURE — 86769 SARS-COV-2 COVID-19 ANTIBODY: CPT

## 2021-01-01 PROCEDURE — 83735 ASSAY OF MAGNESIUM: CPT

## 2021-01-01 PROCEDURE — 97116 GAIT TRAINING THERAPY: CPT | Mod: GP

## 2021-01-01 PROCEDURE — 99222 1ST HOSP IP/OBS MODERATE 55: CPT

## 2021-01-01 PROCEDURE — 92610 EVALUATE SWALLOWING FUNCTION: CPT | Mod: GN

## 2021-01-01 PROCEDURE — 87186 SC STD MICRODIL/AGAR DIL: CPT

## 2021-01-01 PROCEDURE — 97162 PT EVAL MOD COMPLEX 30 MIN: CPT | Mod: GP

## 2021-01-01 PROCEDURE — U0003: CPT

## 2021-01-01 PROCEDURE — 36600 WITHDRAWAL OF ARTERIAL BLOOD: CPT

## 2021-01-01 PROCEDURE — 92523 SPEECH SOUND LANG COMPREHEN: CPT | Mod: GN

## 2021-01-01 PROCEDURE — 85730 THROMBOPLASTIN TIME PARTIAL: CPT

## 2021-01-01 PROCEDURE — 70450 CT HEAD/BRAIN W/O DYE: CPT

## 2021-01-01 PROCEDURE — 81003 URINALYSIS AUTO W/O SCOPE: CPT

## 2021-01-01 PROCEDURE — 87077 CULTURE AEROBIC IDENTIFY: CPT

## 2021-01-01 RX ORDER — FUROSEMIDE 40 MG
80 TABLET ORAL ONCE
Refills: 0 | Status: COMPLETED | OUTPATIENT
Start: 2021-01-01 | End: 2021-01-01

## 2021-01-01 RX ORDER — FUROSEMIDE 40 MG
20 TABLET ORAL ONCE
Refills: 0 | Status: COMPLETED | OUTPATIENT
Start: 2021-01-01 | End: 2021-01-01

## 2021-01-01 RX ORDER — SODIUM CHLORIDE 9 MG/ML
500 INJECTION INTRAMUSCULAR; INTRAVENOUS; SUBCUTANEOUS ONCE
Refills: 0 | Status: COMPLETED | OUTPATIENT
Start: 2021-01-01 | End: 2021-01-01

## 2021-01-01 RX ORDER — DILTIAZEM HCL 120 MG
5 CAPSULE, EXT RELEASE 24 HR ORAL
Qty: 125 | Refills: 0 | Status: DISCONTINUED | OUTPATIENT
Start: 2021-01-01 | End: 2021-01-01

## 2021-01-01 RX ORDER — SODIUM CHLORIDE 9 MG/ML
500 INJECTION INTRAMUSCULAR; INTRAVENOUS; SUBCUTANEOUS
Refills: 0 | Status: DISCONTINUED | OUTPATIENT
Start: 2021-01-01 | End: 2021-01-01

## 2021-01-01 RX ORDER — DILTIAZEM HCL 120 MG
180 CAPSULE, EXT RELEASE 24 HR ORAL DAILY
Refills: 0 | Status: DISCONTINUED | OUTPATIENT
Start: 2021-01-01 | End: 2021-01-01

## 2021-01-01 RX ORDER — SENNA PLUS 8.6 MG/1
2 TABLET ORAL AT BEDTIME
Refills: 0 | Status: DISCONTINUED | OUTPATIENT
Start: 2021-01-01 | End: 2021-01-01

## 2021-01-01 RX ORDER — FUROSEMIDE 40 MG
40 TABLET ORAL
Refills: 0 | Status: DISCONTINUED | OUTPATIENT
Start: 2021-01-01 | End: 2021-01-01

## 2021-01-01 RX ORDER — ASPIRIN/CALCIUM CARB/MAGNESIUM 324 MG
300 TABLET ORAL ONCE
Refills: 0 | Status: COMPLETED | OUTPATIENT
Start: 2021-01-01 | End: 2021-01-01

## 2021-01-01 RX ORDER — INSULIN HUMAN 100 [IU]/ML
6 INJECTION, SOLUTION SUBCUTANEOUS ONCE
Refills: 0 | Status: DISCONTINUED | OUTPATIENT
Start: 2021-01-01 | End: 2021-01-01

## 2021-01-01 RX ORDER — METOPROLOL TARTRATE 50 MG
1 TABLET ORAL
Qty: 60 | Refills: 0
Start: 2021-01-01 | End: 2021-04-16

## 2021-01-01 RX ORDER — SODIUM CHLORIDE 9 MG/ML
1000 INJECTION, SOLUTION INTRAVENOUS
Refills: 0 | Status: DISCONTINUED | OUTPATIENT
Start: 2021-01-01 | End: 2021-01-01

## 2021-01-01 RX ORDER — METOPROLOL TARTRATE 50 MG
50 TABLET ORAL
Refills: 0 | Status: DISCONTINUED | OUTPATIENT
Start: 2021-01-01 | End: 2021-01-01

## 2021-01-01 RX ORDER — METOPROLOL TARTRATE 50 MG
50 TABLET ORAL EVERY 8 HOURS
Refills: 0 | Status: DISCONTINUED | OUTPATIENT
Start: 2021-01-01 | End: 2021-01-01

## 2021-01-01 RX ORDER — MULTIVIT-MIN/FERROUS GLUCONATE 9 MG/15 ML
1 LIQUID (ML) ORAL
Qty: 0 | Refills: 0 | DISCHARGE

## 2021-01-01 RX ORDER — ONDANSETRON 8 MG/1
4 TABLET, FILM COATED ORAL EVERY 6 HOURS
Refills: 0 | Status: DISCONTINUED | OUTPATIENT
Start: 2021-01-01 | End: 2021-01-01

## 2021-01-01 RX ORDER — ACETAMINOPHEN 500 MG
650 TABLET ORAL EVERY 6 HOURS
Refills: 0 | Status: DISCONTINUED | OUTPATIENT
Start: 2021-01-01 | End: 2021-01-01

## 2021-01-01 RX ORDER — HEPARIN SODIUM 5000 [USP'U]/ML
5000 INJECTION INTRAVENOUS; SUBCUTANEOUS EVERY 12 HOURS
Refills: 0 | Status: DISCONTINUED | OUTPATIENT
Start: 2021-01-01 | End: 2021-01-01

## 2021-01-01 RX ORDER — INSULIN HUMAN 100 [IU]/ML
8 INJECTION, SOLUTION SUBCUTANEOUS ONCE
Refills: 0 | Status: COMPLETED | OUTPATIENT
Start: 2021-01-01 | End: 2021-01-01

## 2021-01-01 RX ORDER — SODIUM ZIRCONIUM CYCLOSILICATE 10 G/10G
10 POWDER, FOR SUSPENSION ORAL ONCE
Refills: 0 | Status: DISCONTINUED | OUTPATIENT
Start: 2021-01-01 | End: 2021-01-01

## 2021-01-01 RX ORDER — METOPROLOL TARTRATE 50 MG
2.5 TABLET ORAL EVERY 6 HOURS
Refills: 0 | Status: DISCONTINUED | OUTPATIENT
Start: 2021-01-01 | End: 2021-01-01

## 2021-01-01 RX ORDER — POLYETHYLENE GLYCOL 3350 17 G/17G
0 POWDER, FOR SOLUTION ORAL
Qty: 0 | Refills: 0 | DISCHARGE

## 2021-01-01 RX ORDER — SODIUM POLYSTYRENE SULFONATE 4.1 MEQ/G
15 POWDER, FOR SUSPENSION ORAL ONCE
Refills: 0 | Status: COMPLETED | OUTPATIENT
Start: 2021-01-01 | End: 2021-01-01

## 2021-01-01 RX ORDER — METOPROLOL TARTRATE 50 MG
50 TABLET ORAL ONCE
Refills: 0 | Status: COMPLETED | OUTPATIENT
Start: 2021-01-01 | End: 2021-01-01

## 2021-01-01 RX ORDER — METOPROLOL TARTRATE 50 MG
2.5 TABLET ORAL EVERY 8 HOURS
Refills: 0 | Status: DISCONTINUED | OUTPATIENT
Start: 2021-01-01 | End: 2021-01-01

## 2021-01-01 RX ORDER — ATORVASTATIN CALCIUM 80 MG/1
10 TABLET, FILM COATED ORAL AT BEDTIME
Refills: 0 | Status: DISCONTINUED | OUTPATIENT
Start: 2021-01-01 | End: 2021-01-01

## 2021-01-01 RX ORDER — DIGOXIN 250 MCG
250 TABLET ORAL ONCE
Refills: 0 | Status: COMPLETED | OUTPATIENT
Start: 2021-01-01 | End: 2021-01-01

## 2021-01-01 RX ORDER — ALPRAZOLAM 0.25 MG
0.25 TABLET ORAL ONCE
Refills: 0 | Status: DISCONTINUED | OUTPATIENT
Start: 2021-01-01 | End: 2021-01-01

## 2021-01-01 RX ORDER — FUROSEMIDE 40 MG
40 TABLET ORAL DAILY
Refills: 0 | Status: DISCONTINUED | OUTPATIENT
Start: 2021-01-01 | End: 2021-01-01

## 2021-01-01 RX ORDER — CEFTRIAXONE 500 MG/1
1000 INJECTION, POWDER, FOR SOLUTION INTRAMUSCULAR; INTRAVENOUS EVERY 24 HOURS
Refills: 0 | Status: COMPLETED | OUTPATIENT
Start: 2021-01-01 | End: 2021-01-01

## 2021-01-01 RX ORDER — CHOLECALCIFEROL (VITAMIN D3) 125 MCG
1 CAPSULE ORAL
Qty: 0 | Refills: 0 | DISCHARGE

## 2021-01-01 RX ORDER — METOPROLOL TARTRATE 50 MG
25 TABLET ORAL EVERY 8 HOURS
Refills: 0 | Status: DISCONTINUED | OUTPATIENT
Start: 2021-01-01 | End: 2021-01-01

## 2021-01-01 RX ORDER — FAMOTIDINE 10 MG/ML
1 INJECTION INTRAVENOUS
Qty: 0 | Refills: 0 | DISCHARGE

## 2021-01-01 RX ORDER — ASPIRIN/CALCIUM CARB/MAGNESIUM 324 MG
325 TABLET ORAL ONCE
Refills: 0 | Status: COMPLETED | OUTPATIENT
Start: 2021-01-01 | End: 2021-01-01

## 2021-01-01 RX ORDER — METOPROLOL TARTRATE 50 MG
1 TABLET ORAL
Qty: 0 | Refills: 0 | DISCHARGE

## 2021-01-01 RX ORDER — ATORVASTATIN CALCIUM 80 MG/1
1 TABLET, FILM COATED ORAL
Qty: 0 | Refills: 0 | DISCHARGE

## 2021-01-01 RX ORDER — DEXTROSE 50 % IN WATER 50 %
50 SYRINGE (ML) INTRAVENOUS ONCE
Refills: 0 | Status: DISCONTINUED | OUTPATIENT
Start: 2021-01-01 | End: 2021-01-01

## 2021-01-01 RX ORDER — DIGOXIN 250 MCG
125 TABLET ORAL DAILY
Refills: 0 | Status: DISCONTINUED | OUTPATIENT
Start: 2021-01-01 | End: 2021-01-01

## 2021-01-01 RX ORDER — DIGOXIN 250 MCG
0.25 TABLET ORAL ONCE
Refills: 0 | Status: COMPLETED | OUTPATIENT
Start: 2021-01-01 | End: 2021-01-01

## 2021-01-01 RX ORDER — DILTIAZEM HCL 120 MG
10 CAPSULE, EXT RELEASE 24 HR ORAL ONCE
Refills: 0 | Status: COMPLETED | OUTPATIENT
Start: 2021-01-01 | End: 2021-01-01

## 2021-01-01 RX ORDER — METOPROLOL TARTRATE 50 MG
2.5 TABLET ORAL ONCE
Refills: 0 | Status: COMPLETED | OUTPATIENT
Start: 2021-01-01 | End: 2021-01-01

## 2021-01-01 RX ORDER — DEXTROSE 50 % IN WATER 50 %
50 SYRINGE (ML) INTRAVENOUS ONCE
Refills: 0 | Status: COMPLETED | OUTPATIENT
Start: 2021-01-01 | End: 2021-01-01

## 2021-01-01 RX ORDER — SODIUM,POTASSIUM PHOSPHATES 278-250MG
1 POWDER IN PACKET (EA) ORAL ONCE
Refills: 0 | Status: COMPLETED | OUTPATIENT
Start: 2021-01-01 | End: 2021-01-01

## 2021-01-01 RX ORDER — ASPIRIN/CALCIUM CARB/MAGNESIUM 324 MG
81 TABLET ORAL DAILY
Refills: 0 | Status: DISCONTINUED | OUTPATIENT
Start: 2021-01-01 | End: 2021-01-01

## 2021-01-01 RX ORDER — DILTIAZEM HCL 120 MG
30 CAPSULE, EXT RELEASE 24 HR ORAL ONCE
Refills: 0 | Status: COMPLETED | OUTPATIENT
Start: 2021-01-01 | End: 2021-01-01

## 2021-01-01 RX ORDER — ASPIRIN/CALCIUM CARB/MAGNESIUM 324 MG
1 TABLET ORAL
Qty: 0 | Refills: 0 | DISCHARGE

## 2021-01-01 RX ORDER — METOPROLOL TARTRATE 50 MG
5 TABLET ORAL EVERY 6 HOURS
Refills: 0 | Status: DISCONTINUED | OUTPATIENT
Start: 2021-01-01 | End: 2021-01-01

## 2021-01-01 RX ORDER — POTASSIUM CHLORIDE 20 MEQ
1 PACKET (EA) ORAL
Qty: 0 | Refills: 0 | DISCHARGE

## 2021-01-01 RX ORDER — METOPROLOL TARTRATE 50 MG
25 TABLET ORAL DAILY
Refills: 0 | Status: DISCONTINUED | OUTPATIENT
Start: 2021-01-01 | End: 2021-01-01

## 2021-01-01 RX ORDER — LANOLIN ALCOHOL/MO/W.PET/CERES
5 CREAM (GRAM) TOPICAL AT BEDTIME
Refills: 0 | Status: DISCONTINUED | OUTPATIENT
Start: 2021-01-01 | End: 2021-01-01

## 2021-01-01 RX ADMIN — Medication 5 MILLIGRAM(S): at 12:10

## 2021-01-01 RX ADMIN — Medication 5 MILLIGRAM(S): at 05:35

## 2021-01-01 RX ADMIN — HEPARIN SODIUM 5000 UNIT(S): 5000 INJECTION INTRAVENOUS; SUBCUTANEOUS at 09:22

## 2021-01-01 RX ADMIN — HEPARIN SODIUM 5000 UNIT(S): 5000 INJECTION INTRAVENOUS; SUBCUTANEOUS at 22:06

## 2021-01-01 RX ADMIN — CEFTRIAXONE 1000 MILLIGRAM(S): 500 INJECTION, POWDER, FOR SOLUTION INTRAMUSCULAR; INTRAVENOUS at 14:04

## 2021-01-01 RX ADMIN — Medication 40 MILLIGRAM(S): at 08:50

## 2021-01-01 RX ADMIN — Medication 5 MILLIGRAM(S): at 17:42

## 2021-01-01 RX ADMIN — Medication 30 MILLIGRAM(S): at 09:04

## 2021-01-01 RX ADMIN — Medication 300 MILLIGRAM(S): at 14:11

## 2021-01-01 RX ADMIN — Medication 20 MILLIGRAM(S): at 17:34

## 2021-01-01 RX ADMIN — HEPARIN SODIUM 5000 UNIT(S): 5000 INJECTION INTRAVENOUS; SUBCUTANEOUS at 09:38

## 2021-01-01 RX ADMIN — Medication 50 MILLIGRAM(S): at 22:08

## 2021-01-01 RX ADMIN — Medication 250 MICROGRAM(S): at 16:51

## 2021-01-01 RX ADMIN — Medication 40 MILLIGRAM(S): at 18:52

## 2021-01-01 RX ADMIN — Medication 81 MILLIGRAM(S): at 09:18

## 2021-01-01 RX ADMIN — Medication 5 MILLIGRAM(S): at 11:08

## 2021-01-01 RX ADMIN — SODIUM CHLORIDE 75 MILLILITER(S): 9 INJECTION, SOLUTION INTRAVENOUS at 05:35

## 2021-01-01 RX ADMIN — Medication 125 MICROGRAM(S): at 09:21

## 2021-01-01 RX ADMIN — Medication 0.25 MILLIGRAM(S): at 18:02

## 2021-01-01 RX ADMIN — Medication 50 MILLIGRAM(S): at 21:18

## 2021-01-01 RX ADMIN — HEPARIN SODIUM 5000 UNIT(S): 5000 INJECTION INTRAVENOUS; SUBCUTANEOUS at 21:37

## 2021-01-01 RX ADMIN — HEPARIN SODIUM 5000 UNIT(S): 5000 INJECTION INTRAVENOUS; SUBCUTANEOUS at 09:41

## 2021-01-01 RX ADMIN — SODIUM CHLORIDE 500 MILLILITER(S): 9 INJECTION INTRAMUSCULAR; INTRAVENOUS; SUBCUTANEOUS at 12:11

## 2021-01-01 RX ADMIN — CEFTRIAXONE 1000 MILLIGRAM(S): 500 INJECTION, POWDER, FOR SOLUTION INTRAMUSCULAR; INTRAVENOUS at 13:49

## 2021-01-01 RX ADMIN — INSULIN HUMAN 8 UNIT(S): 100 INJECTION, SOLUTION SUBCUTANEOUS at 20:17

## 2021-01-01 RX ADMIN — Medication 81 MILLIGRAM(S): at 13:13

## 2021-01-01 RX ADMIN — HEPARIN SODIUM 5000 UNIT(S): 5000 INJECTION INTRAVENOUS; SUBCUTANEOUS at 21:18

## 2021-01-01 RX ADMIN — SODIUM CHLORIDE 50 MILLILITER(S): 9 INJECTION, SOLUTION INTRAVENOUS at 23:26

## 2021-01-01 RX ADMIN — Medication 81 MILLIGRAM(S): at 08:50

## 2021-01-01 RX ADMIN — Medication 2.5 MILLIGRAM(S): at 21:48

## 2021-01-01 RX ADMIN — Medication 5 MILLIGRAM(S): at 00:10

## 2021-01-01 RX ADMIN — Medication 5 MILLIGRAM(S): at 22:08

## 2021-01-01 RX ADMIN — Medication 5 MILLIGRAM(S): at 20:27

## 2021-01-01 RX ADMIN — Medication 180 MILLIGRAM(S): at 08:53

## 2021-01-01 RX ADMIN — HEPARIN SODIUM 5000 UNIT(S): 5000 INJECTION INTRAVENOUS; SUBCUTANEOUS at 21:22

## 2021-01-01 RX ADMIN — Medication 650 MILLIGRAM(S): at 12:49

## 2021-01-01 RX ADMIN — Medication 5 MG/HR: at 08:54

## 2021-01-01 RX ADMIN — Medication 5 MILLIGRAM(S): at 18:34

## 2021-01-01 RX ADMIN — Medication 25 MILLIGRAM(S): at 05:21

## 2021-01-01 RX ADMIN — Medication 5 MILLIGRAM(S): at 21:18

## 2021-01-01 RX ADMIN — Medication 25 MILLIGRAM(S): at 14:07

## 2021-01-01 RX ADMIN — HEPARIN SODIUM 5000 UNIT(S): 5000 INJECTION INTRAVENOUS; SUBCUTANEOUS at 22:15

## 2021-01-01 RX ADMIN — Medication 650 MILLIGRAM(S): at 12:19

## 2021-01-01 RX ADMIN — HEPARIN SODIUM 5000 UNIT(S): 5000 INJECTION INTRAVENOUS; SUBCUTANEOUS at 22:08

## 2021-01-01 RX ADMIN — SODIUM CHLORIDE 75 MILLILITER(S): 9 INJECTION, SOLUTION INTRAVENOUS at 21:00

## 2021-01-01 RX ADMIN — HEPARIN SODIUM 5000 UNIT(S): 5000 INJECTION INTRAVENOUS; SUBCUTANEOUS at 21:10

## 2021-01-01 RX ADMIN — Medication 180 MILLIGRAM(S): at 09:41

## 2021-01-01 RX ADMIN — Medication 180 MILLIGRAM(S): at 11:22

## 2021-01-01 RX ADMIN — Medication 81 MILLIGRAM(S): at 09:21

## 2021-01-01 RX ADMIN — HEPARIN SODIUM 5000 UNIT(S): 5000 INJECTION INTRAVENOUS; SUBCUTANEOUS at 08:50

## 2021-01-01 RX ADMIN — Medication 40 MILLIGRAM(S): at 17:19

## 2021-01-01 RX ADMIN — Medication 81 MILLIGRAM(S): at 12:36

## 2021-01-01 RX ADMIN — HEPARIN SODIUM 5000 UNIT(S): 5000 INJECTION INTRAVENOUS; SUBCUTANEOUS at 12:10

## 2021-01-01 RX ADMIN — Medication 1 PACKET(S): at 11:22

## 2021-01-01 RX ADMIN — HEPARIN SODIUM 5000 UNIT(S): 5000 INJECTION INTRAVENOUS; SUBCUTANEOUS at 11:11

## 2021-01-01 RX ADMIN — Medication 25 MILLIGRAM(S): at 09:20

## 2021-01-01 RX ADMIN — SODIUM POLYSTYRENE SULFONATE 15 GRAM(S): 4.1 POWDER, FOR SUSPENSION ORAL at 13:12

## 2021-01-01 RX ADMIN — Medication 5 MILLIGRAM(S): at 22:15

## 2021-01-01 RX ADMIN — Medication 81 MILLIGRAM(S): at 10:24

## 2021-01-01 RX ADMIN — Medication 5 MILLIGRAM(S): at 00:34

## 2021-01-01 RX ADMIN — Medication 50 MILLIGRAM(S): at 08:50

## 2021-01-01 RX ADMIN — HEPARIN SODIUM 5000 UNIT(S): 5000 INJECTION INTRAVENOUS; SUBCUTANEOUS at 21:24

## 2021-01-01 RX ADMIN — Medication 50 MILLIGRAM(S): at 05:14

## 2021-01-01 RX ADMIN — Medication 10 MILLIGRAM(S): at 08:56

## 2021-01-01 RX ADMIN — Medication 2.5 MILLIGRAM(S): at 23:45

## 2021-01-01 RX ADMIN — Medication 50 MILLIGRAM(S): at 09:22

## 2021-01-01 RX ADMIN — HEPARIN SODIUM 5000 UNIT(S): 5000 INJECTION INTRAVENOUS; SUBCUTANEOUS at 21:57

## 2021-01-01 RX ADMIN — Medication 50 MILLIGRAM(S): at 01:12

## 2021-01-01 RX ADMIN — CEFTRIAXONE 1000 MILLIGRAM(S): 500 INJECTION, POWDER, FOR SOLUTION INTRAMUSCULAR; INTRAVENOUS at 14:11

## 2021-01-01 RX ADMIN — HEPARIN SODIUM 5000 UNIT(S): 5000 INJECTION INTRAVENOUS; SUBCUTANEOUS at 11:08

## 2021-01-01 RX ADMIN — Medication 5 MILLIGRAM(S): at 21:26

## 2021-01-01 RX ADMIN — HEPARIN SODIUM 5000 UNIT(S): 5000 INJECTION INTRAVENOUS; SUBCUTANEOUS at 20:27

## 2021-01-01 RX ADMIN — HEPARIN SODIUM 5000 UNIT(S): 5000 INJECTION INTRAVENOUS; SUBCUTANEOUS at 08:54

## 2021-01-01 RX ADMIN — HEPARIN SODIUM 5000 UNIT(S): 5000 INJECTION INTRAVENOUS; SUBCUTANEOUS at 21:32

## 2021-01-01 RX ADMIN — HEPARIN SODIUM 5000 UNIT(S): 5000 INJECTION INTRAVENOUS; SUBCUTANEOUS at 22:14

## 2021-01-01 RX ADMIN — HEPARIN SODIUM 5000 UNIT(S): 5000 INJECTION INTRAVENOUS; SUBCUTANEOUS at 21:20

## 2021-01-01 RX ADMIN — HEPARIN SODIUM 5000 UNIT(S): 5000 INJECTION INTRAVENOUS; SUBCUTANEOUS at 10:25

## 2021-01-01 RX ADMIN — SODIUM CHLORIDE 75 MILLILITER(S): 9 INJECTION, SOLUTION INTRAVENOUS at 07:56

## 2021-01-01 RX ADMIN — Medication 81 MILLIGRAM(S): at 09:40

## 2021-01-01 RX ADMIN — HEPARIN SODIUM 5000 UNIT(S): 5000 INJECTION INTRAVENOUS; SUBCUTANEOUS at 09:47

## 2021-01-01 RX ADMIN — Medication 81 MILLIGRAM(S): at 10:25

## 2021-01-01 RX ADMIN — Medication 40 MILLIGRAM(S): at 10:20

## 2021-01-01 RX ADMIN — Medication 50 MILLIGRAM(S): at 08:53

## 2021-01-01 RX ADMIN — Medication 180 MILLIGRAM(S): at 10:25

## 2021-01-01 RX ADMIN — HEPARIN SODIUM 5000 UNIT(S): 5000 INJECTION INTRAVENOUS; SUBCUTANEOUS at 09:40

## 2021-01-01 RX ADMIN — HEPARIN SODIUM 5000 UNIT(S): 5000 INJECTION INTRAVENOUS; SUBCUTANEOUS at 09:18

## 2021-01-01 RX ADMIN — Medication 40 MILLIGRAM(S): at 09:21

## 2021-01-01 RX ADMIN — Medication 50 MILLIGRAM(S): at 21:32

## 2021-01-01 RX ADMIN — Medication 50 MILLILITER(S): at 20:16

## 2021-01-01 RX ADMIN — Medication 5 MILLIGRAM(S): at 21:32

## 2021-01-01 RX ADMIN — HEPARIN SODIUM 5000 UNIT(S): 5000 INJECTION INTRAVENOUS; SUBCUTANEOUS at 22:00

## 2021-01-01 RX ADMIN — Medication 80 MILLIGRAM(S): at 10:38

## 2021-01-01 RX ADMIN — Medication 50 MILLIGRAM(S): at 12:11

## 2021-01-01 RX ADMIN — SODIUM CHLORIDE 500 MILLILITER(S): 9 INJECTION INTRAMUSCULAR; INTRAVENOUS; SUBCUTANEOUS at 10:30

## 2021-01-01 RX ADMIN — HEPARIN SODIUM 5000 UNIT(S): 5000 INJECTION INTRAVENOUS; SUBCUTANEOUS at 09:21

## 2021-01-01 RX ADMIN — Medication 81 MILLIGRAM(S): at 08:53

## 2021-01-01 RX ADMIN — SODIUM CHLORIDE 50 MILLILITER(S): 9 INJECTION INTRAMUSCULAR; INTRAVENOUS; SUBCUTANEOUS at 21:56

## 2021-01-01 RX ADMIN — Medication 40 MILLIGRAM(S): at 10:25

## 2021-01-01 RX ADMIN — HEPARIN SODIUM 5000 UNIT(S): 5000 INJECTION INTRAVENOUS; SUBCUTANEOUS at 12:00

## 2021-01-01 RX ADMIN — Medication 50 MILLIGRAM(S): at 06:00

## 2021-01-01 RX ADMIN — Medication 81 MILLIGRAM(S): at 09:22

## 2021-01-01 RX ADMIN — Medication 81 MILLIGRAM(S): at 09:41

## 2021-01-01 RX ADMIN — ATORVASTATIN CALCIUM 10 MILLIGRAM(S): 80 TABLET, FILM COATED ORAL at 22:44

## 2021-01-01 RX ADMIN — HEPARIN SODIUM 5000 UNIT(S): 5000 INJECTION INTRAVENOUS; SUBCUTANEOUS at 21:48

## 2021-01-01 RX ADMIN — HEPARIN SODIUM 5000 UNIT(S): 5000 INJECTION INTRAVENOUS; SUBCUTANEOUS at 21:26

## 2021-01-01 RX ADMIN — Medication 81 MILLIGRAM(S): at 11:11

## 2021-01-01 RX ADMIN — HEPARIN SODIUM 5000 UNIT(S): 5000 INJECTION INTRAVENOUS; SUBCUTANEOUS at 21:04

## 2021-01-01 RX ADMIN — SODIUM CHLORIDE 50 MILLILITER(S): 9 INJECTION, SOLUTION INTRAVENOUS at 18:40

## 2021-01-01 RX ADMIN — ATORVASTATIN CALCIUM 10 MILLIGRAM(S): 80 TABLET, FILM COATED ORAL at 21:10

## 2021-01-01 RX ADMIN — Medication 5 MILLIGRAM(S): at 11:11

## 2021-01-01 RX ADMIN — HEPARIN SODIUM 5000 UNIT(S): 5000 INJECTION INTRAVENOUS; SUBCUTANEOUS at 10:20

## 2021-01-01 RX ADMIN — HEPARIN SODIUM 5000 UNIT(S): 5000 INJECTION INTRAVENOUS; SUBCUTANEOUS at 21:09

## 2021-01-01 RX ADMIN — Medication 50 MILLIGRAM(S): at 06:28

## 2021-01-01 RX ADMIN — HEPARIN SODIUM 5000 UNIT(S): 5000 INJECTION INTRAVENOUS; SUBCUTANEOUS at 22:44

## 2021-01-01 RX ADMIN — HEPARIN SODIUM 5000 UNIT(S): 5000 INJECTION INTRAVENOUS; SUBCUTANEOUS at 12:36

## 2021-01-01 RX ADMIN — Medication 81 MILLIGRAM(S): at 09:38

## 2021-01-01 RX ADMIN — Medication 5 MG/HR: at 10:44

## 2021-01-01 RX ADMIN — ATORVASTATIN CALCIUM 10 MILLIGRAM(S): 80 TABLET, FILM COATED ORAL at 22:14

## 2021-01-01 RX ADMIN — ATORVASTATIN CALCIUM 10 MILLIGRAM(S): 80 TABLET, FILM COATED ORAL at 22:06

## 2021-01-01 RX ADMIN — Medication 50 MILLIGRAM(S): at 21:26

## 2021-01-01 RX ADMIN — ATORVASTATIN CALCIUM 10 MILLIGRAM(S): 80 TABLET, FILM COATED ORAL at 21:04

## 2021-03-15 PROBLEM — I50.9 HEART FAILURE, UNSPECIFIED: Chronic | Status: ACTIVE | Noted: 2020-01-01

## 2021-03-15 PROBLEM — I48.91 UNSPECIFIED ATRIAL FIBRILLATION: Chronic | Status: ACTIVE | Noted: 2020-01-01

## 2021-03-15 NOTE — ED ADULT TRIAGE NOTE - CHIEF COMPLAINT QUOTE
patient brought in by ems, complains of general "not feeling well", aide reports SOB by patient denies. reports nausea. code sepsis initiated at triage.

## 2021-03-15 NOTE — ED PROVIDER NOTE - PHYSICAL EXAMINATION
Constitutional: NAD   Eyes: PERRLA EOMI  Head: Normocephalic atraumatic  Mouth: MMM  Cardiac: afib rvr  Resp: Lungs CTAB  GI: Abd s/nt/nd  Neuro: CN2-12 intact  Skin: No rashes

## 2021-03-15 NOTE — H&P ADULT - NSHPLABSRESULTS_GEN_ALL_CORE
13.0   9.77  )-----------( 281      ( 15 Mar 2021 08:58 )             38.6   03-15    124<L>  |  91<L>  |  30<H>  ----------------------------<  129<H>  5.8<H>   |  25  |  1.18    Ca    9.0      15 Mar 2021 10:28  Mg     2.0     03-15    TPro  7.6  /  Alb  3.5  /  TBili  0.8  /  DBili  x   /  AST  13<L>  /  ALT  20  /  AlkPhos  56  03-15

## 2021-03-15 NOTE — ED PROVIDER NOTE - PROGRESS NOTE DETAILS
patient with CHF exacerbation and rapid afib - goc discussed with pt and daughter -full code. cardizem gtt for afib lasix for chf. pt endorsed to Dr. Appiah accepts. Augusto Moreland M.D., Attending Physician

## 2021-03-15 NOTE — ED ADULT NURSE REASSESSMENT NOTE - NS ED NURSE REASSESS COMMENT FT1
Pt received alert to self-confused to time/place/situation-reoriented. Vitals taken and stable-pt continues in afib 90s-100s. Pure wick in place, sheri colored. Pt ready for transfer to floor when bed is available.

## 2021-03-15 NOTE — H&P ADULT - NSHPPHYSICALEXAM_GEN_ALL_CORE
PHYSICAL EXAM:    General: elderly female in no acute distress  Eyes: PERRLA, EOMI; conjunctiva and sclera clear  Head: Normocephalic; atraumatic  ENMT: No nasal discharge; airway clear  Neck: Supple; non tender; no masses  Respiratory: decreased BS at bases R>L with crackles  Cardiovascular: S1, S2 irregular with II/VI GEOVANNY  Gastrointestinal: Soft abd, NT, + BS  Genitourinary: No costovertebral angle tenderness  Extremities: No clubbing, cyanosis or edema  Vascular: Peripheral pulses palpable 2+ bilaterally  Neurological: Alert and oriented x4  Skin: Warm and dry. No acute rash  Lymph Nodes: No acute cervical adenopathy  Musculoskeletal: Normal tone, without deformities  Psychiatric: Cooperative and appropriate PHYSICAL EXAM:    General: elderly female somnolent, confused  Eyes: PERRLA, EOMI; conjunctiva and sclera clear  Head: Normocephalic; atraumatic  ENMT: No nasal discharge; airway clear  Neck: Supple; non tender; no masses  Respiratory: decreased BS at bases R>L with crackles  Cardiovascular: S1, S2 irregular with II/VI GEOVANNY  Gastrointestinal: Soft abd, NT, + BS  Genitourinary: No costovertebral angle tenderness  Extremities: No clubbing, cyanosis or edema  Vascular: Peripheral pulses palpable 2+ bilaterally  Neurological: somnolent, confused, moves all 4 extremities  Skin: Warm and dry. No acute rash  Lymph Nodes: No acute cervical adenopathy  Musculoskeletal: Normal tone, without deformities

## 2021-03-15 NOTE — ED PROVIDER NOTE - CARE PLAN
Principal Discharge DX:	Atrial fibrillation with RVR  Secondary Diagnosis:	CHF (congestive heart failure)

## 2021-03-15 NOTE — H&P ADULT - ASSESSMENT
* AF RVR and CHF  Cardizem drip  order TTE per Dr Villa no recent one in the office  not sure why not AC ( frequent falls but she is non ambulatory) needs AC reeval  IV Lasix bid     * HypoNa, hyperK  c/w IV LAsix  consult Nephrology, at this time Na is improving no need for tolvaptan  add one dose Kayexalate     * Baseline poor functional status needs Pall consult  pt too lethargic and confused  to understand details about her condition or implications of Full Code

## 2021-03-15 NOTE — ED PROVIDER NOTE - OBJECTIVE STATEMENT
89F hx afib on coumadin chf hld presents to the ED for weakness and chest pressure. symptoms worsening over the past few days. no fevers cough. had some nausea. no vomiting. no dysuria. no black or bloody stools. didn't take anything for symptoms. pt also with dementia daughter at bedside confirming story. .

## 2021-03-15 NOTE — ED PROVIDER NOTE - CLINICAL SUMMARY MEDICAL DECISION MAKING FREE TEXT BOX
89F hx afib on coumadin chf hld presents to the ED for weakness and chest pressure. symptoms worsening over the past few days. no fevers cough. had some nausea. no vomiting. no dysuria. no black or bloody stools. didn't take anything for symptoms. pt also with dementia daughter at bedside confirming story. rectal temp wnl. here pt with afib rvr. will obtain labs rate control. no signs of infection. will need admission. Augusto Moreland M.D., Attending Physician

## 2021-03-15 NOTE — ED ADULT NURSE NOTE - NSIMPLEMENTINTERV_GEN_ALL_ED
Implemented All Fall with Harm Risk Interventions:  [place] to call system. Call bell, personal items and telephone within reach. Instruct patient to call for assistance. Room bathroom lighting operational. Non-slip footwear when patient is off stretcher. Physically safe environment: no spills, clutter or unnecessary equipment. Stretcher in lowest position, wheels locked, appropriate side rails in place. Provide visual cue, wrist band, yellow gown, etc. Monitor gait and stability. Monitor for mental status changes and reorient to person, place, and time. Review medications for side effects contributing to fall risk. Reinforce activity limits and safety measures with patient and family. Provide visual clues: red socks.

## 2021-03-15 NOTE — ED ADULT NURSE NOTE - OBJECTIVE STATEMENT
Pt presents to er with rapid a-fibb and nausea starting this morning prior to arrival, pt with history of dementia, pt alert to person, place, disoriented to time, pt with history of left sided breast Ca and Non-Hodgkins lymphoma, HR-140's/irregular on CM Claudette private [phone], respirations shallow with RA pulse-ox-90, pt placed on 2L NC-pulse-ox-100, 10mg IV Cardizem, 30mg po administered as per md order, rate 90's -110 a-fibb on cm, abd soft non-distended, color pale, stretcher in lowest position, call bell at side with daughter, will continue to monitor.

## 2021-03-15 NOTE — ED PROVIDER NOTE - NS ED ROS FT
Constitutional: No fever or chills + weakness  Eyes: No visual changes  HEENT: No throat pain  CV: No chest pain  Resp: No SOB no cough  GI: No abd pain, + nausea no vomiting  : No dysuria  MSK: No musculoskeletal pain  Skin: No rash  Neuro: No headache

## 2021-03-16 NOTE — PROGRESS NOTE ADULT - ASSESSMENT
89F hx afib not on anticoag ( falls)  chf hld presents to the ED for weakness and chest pressure. She was found in AF rvr and fluid overload    Afib RVR  Acute on Chronic  combined systolic and diastolic CHF, EF 35-40%  hypervolemic hyponatremia  hyperkalemia  moderate to severe mitral regurg  history of COVID    ·	   A/P: 89F hx Afib not on anticoag (falls), CHF, HLD, presents to the ED for weakness and chest pressure. She was found in AF rvr and fluid overload    Afib RVR  Acute on Chronic  combined systolic and diastolic CHF, EF 35-40% (per echo 11/20)  hypervolemic hyponatremia  hyperkalemia  moderate to severe mitral regurg  COVID antibodies present    ·	free water restriction  ·	hyperK was treated. hold home supplements  ·	on lasix 40 mg IV bid  ·	strict I&O's  ·	tele  ·	Echo pending  ·	cxr showing chf  ·	cardiology, nephrology and palliative care consults appreciated.   ·	d/w pt's Velia, updates given and all questions answered.   ·	pt is probably not decisional due to intermittent confusion.   ·	family does NOT want the patient to get covid vaccine due to reactions/suspected allergy to flu shot. consent NOT given.   ·	code status: Full code    Dispo: inpatient. possible d/c home by Friday?? pt has caregiver and needs 24 hr advance notice prior to discharge

## 2021-03-16 NOTE — CONSULT NOTE ADULT - SUBJECTIVE AND OBJECTIVE BOX
HPI: Pt is a 89y old Female with a H/O afib not on coumadin ( falls) CHF,HLD presents to the ED for weakness and chest pressure. The symptoms have been worsening over the past few days, denies  fevers cough but  had some nausea. Daughter concerned about her decreased appetite and SOB. She is bed bound but can transfer with help to the commode/ wheelchair and has 24 hr aide assistance at home. She is confused and unable to provide accurate hx. Palliative Medicine Consult to establish GOC as we have seen pt in the past.   3/16/21 Seen and examined at bedside with family member visiting. Pt alert and eating 25-50% of her breakfast. Denies pain or dyspnea.     PAIN: ( )Yes   (X )No    DYSPNEA: ( ) Yes  ( X) No  Level:    PAST MEDICAL & SURGICAL HISTORY:  CHF (congestive heart failure)  A-fib  HLD (hyperlipidemia)  Glaucoma  S/P hip replacement, right      SOCIAL HX:  Lives in home with 24 hr aides     Hx opiate tolerance ( )YES  (X )NO    Baseline ADLs  (Prior to Admission)  ( ) Independent   (X )Dependent    FAMILY HISTORY:  Unable to provide due to confusion        Review of Systems:    Physical Discomfort-denies  Dyspnea-denies  Constipation-can not recall last BM  Anorexia-mod  Weight Loss-   Cough-denies  Secretions-denies  Fatigue-mod-severe  Weakness-mod    All other systems reviewed and negative      PHYSICAL EXAM:    Vital Signs Last 24 Hrs  T(C): 36.6 (16 Mar 2021 07:25), Max: 36.7 (15 Mar 2021 15:51)  T(F): 97.9 (16 Mar 2021 07:25), Max: 98 (15 Mar 2021 15:51)  HR: 112 (16 Mar 2021 07:25) (76 - 124)  BP: 131/98 (16 Mar 2021 07:25) (89/65 - 131/98)  BP(mean): 90 (15 Mar 2021 16:35) (65 - 103)  RR: 19 (16 Mar 2021 07:25) (18 - 22)  SpO2: 99% (16 Mar 2021 07:25) (95% - 100%)  Daily Weight in k.4 (16 Mar 2021 06:52)    PPSV2: 30-40  %  General: Frail elderly female in bed in NAD  Mental Status: Alert and oriented X2  HEENT: oral mucosa dry  Lungs: clear to auscultation mile  Cardiac: S1S2+  GI: abd soft NT ND + BS  : voids  Ext: TIAN on bed  Neuro: confusion      LABS:                        12.3   8.16  )-----------( 274      ( 16 Mar 2021 09:00 )             37.1     -15    123<L>  |  91<L>  |  33<H>  ----------------------------<  148<H>  4.4   |  23  |  1.32<H>    Ca    9.3      15 Mar 2021 17:05  Mg     2.0     03-15    TPro  7.6  /  Alb  3.5  /  TBili  0.8  /  DBili  x   /  AST  13<L>  /  ALT  20  /  AlkPhos  56  03-15      Albumin: Albumin, Serum: 3.5 g/dL (03-15 @ 10:28)      Allergies    atenolol (Other)  sulfa drugs (Other)    Intolerances      MEDICATIONS  (STANDING):  aspirin enteric coated 81 milliGRAM(s) Oral daily  atorvastatin 10 milliGRAM(s) Oral at bedtime  digoxin     Tablet 125 MICROGram(s) Oral daily  furosemide   Injectable 40 milliGRAM(s) IV Push two times a day  heparin   Injectable 5000 Unit(s) SubCutaneous every 12 hours  metoprolol succinate ER 25 milliGRAM(s) Oral daily    MEDICATIONS  (PRN):  aluminum hydroxide/magnesium hydroxide/simethicone Suspension 30 milliLiter(s) Oral every 4 hours PRN Dyspepsia  ondansetron Injectable 4 milliGRAM(s) IV Push every 6 hours PRN Nausea  senna 2 Tablet(s) Oral at bedtime PRN Constipation      RADIOLOGY/ADDITIONAL STUDIES  < from: Xray Chest 1 View-PORTABLE IMMEDIATE (03.15.21 @ 09:27) >    EXAM:  XR CHEST PORTABLE IMMED 1V                            PROCEDURE DATE:  03/15/2021          INTERPRETATION:  AP chest on March 15, 2021 at 9:22 AM. Patient has chest pain.    Heart is enlarged. Clips in the left axilla and extensive posttraumatic deformity of the left upper humerus again noted.    Present film shows mild to moderate interstitial CHF increased from 2020.    IMPRESSION: Increasing CHF. Other findings stable as above.                    
89F hx afib not on coumadin ( falls)  chf hld presents to the ED for weakness and chest pressure. Daughter present pt gives no history; daughter was concerned   about her decreased appetite and SOB, reminded her of previous CHF exacerbation so she brought her to ED.    Found to have CHF with hyperkalemia and hyponatremia in setting of CHF  pt is alert , awake, [place]  daughter at bedside      PAST MEDICAL & SURGICAL HISTORY:  CHF (congestive heart failure)    A-fib    HLD (hyperlipidemia)    Glaucoma    S/P hip replacement, right      Home Medications:  aspirin 81 mg oral tablet: 1 tab(s) orally once a day (15 Mar 2021 10:15)  atorvastatin 10 mg oral tablet: 1 tab(s) orally once a day (15 Mar 2021 10:15)  Metoprolol Succinate ER 25 mg oral tablet, extended release: 1 tab(s) orally once a day (15 Mar 2021 11:10)  potassium chloride 10 mEq oral capsule, extended release: 1 cap(s) orally once a day (15 Mar 2021 10:15)  PreserVision AREDS 2 oral capsule: 1 cap(s) orally 2 times a day (15 Mar 2021 11:10)  senna oral tablet: 2 tab(s) orally once a day (at bedtime), As Needed (15 Mar 2021 10:15)  Vitamin D3 5000 intl units (125 mcg) oral tablet: 1 tab(s) orally once a day (15 Mar 2021 10:15)  enalapril     MEDICATIONS  (STANDING):  aspirin enteric coated 81 milliGRAM(s) Oral daily  atorvastatin 10 milliGRAM(s) Oral at bedtime  diltiazem Infusion 5 mG/Hr (5 mL/Hr) IV Continuous <Continuous>  furosemide   Injectable 40 milliGRAM(s) IV Push two times a day  heparin   Injectable 5000 Unit(s) SubCutaneous every 12 hours  metoprolol succinate ER 25 milliGRAM(s) Oral daily      Allergies    atenolol (Other)  sulfa drugs (Other)    Intolerances        SOCIAL HISTORY:  Denies ETOh,Smoking,     FAMILY HISTORY:  Known health problems: none        REVIEW OF SYSTEMS:  limtied due to [place]    CONSTITUTIONAL: No weakness, fevers or chills  EYES/ENT: No visual changes;  No vertigo or throat pain   NECK: No pain or stiffness  RESPIRATORY: No cough, wheezing, hemoptysis;   CARDIOVASCULAR: No chest pain or palpitations  GASTROINTESTINAL: No abdominal or epigastric pain. No nausea, vomiting, or hematemesis; No diarrhea or constipation. No melena or hematochezia.  GENITOURINARY: No dysuria, frequency or hematuria  NEUROLOGICAL: No numbness or weakness  SKIN: No itching, burning, rashes, or lesions   All other review of systems is negative unless indicated above.    VITAL:  T(C): , Max: 37.1 (03-15-21 @ 08:32)  T(F): , Max: 98.8 (03-15-21 @ 08:32)  HR: 76 (03-15-21 @ 12:55)  BP: 101/63 (03-15-21 @ 12:55)  BP(mean): 95 (03-15-21 @ 12:55)  RR: 20 (03-15-21 @ 12:55)  SpO2: 95% (03-15-21 @ 12:55)  Wt(kg): --    I and O's:    03-15 @ 07:01  -  03-15 @ 13:56  --------------------------------------------------------  IN: 10 mL / OUT: 0 mL / NET: 10 mL          PHYSICAL EXAM:    Constitutional: frail , [place]  HEENT:   MM  Neck: supple   Respiratory: poor AE  Cardiovascular: S1 and S2  Gastrointestinal: BS+, soft, NT/ND  Extremities:  peripheral edema + 1   Neurological: Alert, , moving all ext  : No [place]  Skin: No rashes  Access: Not applicable    LABS:                        13.0   9.77  )-----------( 281      ( 15 Mar 2021 08:58 )             38.6     03-15    124<L>  |  91<L>  |  30<H>  ----------------------------<  129<H>  5.8<H>   |  25  |  1.18    Ca    9.0      15 Mar 2021 10:28  Mg     2.0     03-15    TPro  7.6  /  Alb  3.5  /  TBili  0.8  /  DBili  x   /  AST  13<L>  /  ALT  20  /  AlkPhos  56  03-15      Urine Studies:          RADIOLOGY & ADDITIONAL STUDIES:                
  CHIEF COMPLAINT: Patient is a 89y old  Female who presents with a chief complaint of SOB, abd pain (15 Mar 2021 13:54)      HPI:  89F hx afib not on coumadin ( falls)  chf hld presents to the ED for weakness and chest pressure. The symptoms have been worsening over the past few days, denies  fevers cough but  had some nausea. Daughter present pt gives no history; daughter was concerned about her decreased appetite and SOB, reminded her of previous CHF exacerbation so she brought her to ED. Pt has hearing aids when she will go to the floor th hearing aids will leave with the daughter. She is bed bound but can transfer with help to the commode/ wheelchair. Pt is picking at things in the air, I do not think she understand anything about her condition; per daughter she was asked by the ED doc about code status and said, yes resuscitate me; I doubt she in capable of meking decisions/ understand her condition.  She was found in AF rvr better controlled now. Plan to adm, c/w Cardizem get TTE to assess EF and consult Pall re: GOC   (15 Mar 2021 11:21)      PMHx: PAST MEDICAL & SURGICAL HISTORY:  CHF (congestive heart failure)  A-fib  HLD (hyperlipidemia)  Glaucoma  S/P hip replacement, right      Soc Hx: no toxic habits     Allergies: Allergies  atenolol (Other)  sulfa drugs (Other)      REVIEW OF SYSTEMS:  CONSTITUTIONAL: No weakness, fevers or chills  EYES/ENT: No visual changes;  No vertigo or throat pain   NECK: No pain or stiffness  RESPIRATORY: No cough, wheezing, hemoptysis; No shortness of breath  CARDIOVASCULAR: No chest pain or palpitations  GASTROINTESTINAL: No abdominal or epigastric pain. No nausea, vomiting, or hematemesis; No diarrhea or constipation. No melena or hematochezia.  GENITOURINARY: No dysuria, frequency or hematuria  NEUROLOGICAL: No numbness or weakness  SKIN: No itching, burning, rashes, or lesions   All other review of systems is negative unless indicated above    Vital Signs Last 24 Hrs  T(C): 36.5 (15 Mar 2021 21:14), Max: 37.1 (15 Mar 2021 08:32)  T(F): 97.7 (15 Mar 2021 21:14), Max: 98.8 (15 Mar 2021 08:32)  HR: 119 (15 Mar 2021 21:14) (76 - 124)  BP: 128/69 (15 Mar 2021 21:14) (81/70 - 128/69)  BP(mean): 90 (15 Mar 2021 16:35) (65 - 103)  RR: 18 (15 Mar 2021 21:14) (16 - 22)  SpO2: 96% (15 Mar 2021 21:14) (88% - 100%)    I&O's Summary    15 Mar 2021 07:01  -  16 Mar 2021 06:19  --------------------------------------------------------  IN: 10 mL / OUT: 0 mL / NET: 10 mL            PHYSICAL EXAM:   Constitutional: sleeping , arousable, aggitated     Respiratory: Breath sounds- crackles B/L  Cardiovascular: S1 and S2, irregular, HSM  Gastrointestinal: Bowel Sounds present, soft, nontender, nondistended, no guarding, no rebound  Extremities: 1+ peripheral edema  Vascular: 2+ peripheral pulses  Neurological: Arousable    MEDICATIONS:  MEDICATIONS  (STANDING):  aspirin enteric coated 81 milliGRAM(s) Oral daily  atorvastatin 10 milliGRAM(s) Oral at bedtime  digoxin     Tablet 125 MICROGram(s) Oral daily  furosemide   Injectable 40 milliGRAM(s) IV Push two times a day  heparin   Injectable 5000 Unit(s) SubCutaneous every 12 hours  metoprolol succinate ER 25 milliGRAM(s) Oral daily      LABS: All Labs Reviewed:                        13.0   9.77  )-----------( 281      ( 15 Mar 2021 08:58 )             38.6     03-15    123<L>  |  91<L>  |  33<H>  ----------------------------<  148<H>  4.4   |  23  |  1.32<H>    Ca    9.3      15 Mar 2021 17:05  Mg     2.0     03-15    TPro  7.6  /  Alb  3.5  /  TBili  0.8  /  DBili  x   /  AST  13<L>  /  ALT  20  /  AlkPhos  56  03-15      CARDIAC MARKERS ( 15 Mar 2021 10:28 )  0.020 ng/mL / x     / x     / x     / x      CARDIAC MARKERS ( 15 Mar 2021 10:07 )  0.022 ng/mL / x     / x     / x     / x          Serum Pro-Brain Natriuretic Peptide: 27860 pg/mL (03-15 @ 10:07)  Serum Pro-Brain Natriuretic Peptide: 84668 pg/mL (03-15 @ 08:58)    RADIOLOGY:c< from: Xray Chest 1 View-PORTABLE IMMEDIATE (03.15.21 @ 09:27) >    IMPRESSION: Increasing CHF. Other findings stable as above.        YUE SORTO MD; Attending Radiologist  This document has been electronically signed. Mar 15 2021  9:34AM    < end of copied text >      EKG: AF , LAD, IVCD, non specific ST changes     Telemetry: AF     ECHO:ech< from: TTE Echo Complete w/o Contrast w/ Doppler (11.20.20 @ 09:29) >   Summary     Mild to moderate mitral annular calcification is present.   Fibrocalcific changes noted to the mitral valve leaflets with preserved   leaflet excursion.   Moderate to severe (3+) mitral regurgitationis present.   Fibrocalcific changes noted to the Aortic valve leaflets with preserved   leaflet excursion.   Mild to Moderate aortic regurgitation is present.   Mild to moderate tricuspid valve regurgitation is present.   Mild pulmonary hypertension.   Normal appearing pulmonic valve structure and function.   Mild pulmonic valvular regurgitation (1+) is present.   The left atrium is moderately dilated.   The interventricular septum appears hypokinetic.   Estimated left ventricular ejection fraction is 35-40 %.   The right atrium is at the upper limits of normal.   Pleural effusion - is present..     Signature     ----------------------------------------------------------------   Electronically signed by Carla Pineda MD(Interpreting   physician) on 11/21/2020 07:43 AM   ----------------------------------------------------------------    < end of copied text >

## 2021-03-16 NOTE — GOALS OF CARE CONVERSATION - ADVANCED CARE PLANNING - CONVERSATION DETAILS
The role of Palliative medicine was discussed and reviewed as we have seen the pt and spoken to her daughter in the past. She plans on returning home with 24 hr aide service and home visits with an NP. I discussed completion of the MOLST and she states that her mother does not want to set any limits at this time. I do not feel her mother has capacity to make those decisions and recommended that her daughter consider options. Velia will speak to her mother and complete at a later time. Will follow.

## 2021-03-16 NOTE — CONSULT NOTE ADULT - ASSESSMENT
89 yo female with hx of CHF,fall, Afib, and now admitted with increased sob with CHF and hyperkalemia and hyponatremia on labs.  renal eval called for evaluation     Hyperkalemia while on potassium supplements at home    Kayexelate po x 1 ordered    K supplements -  hold this   agree with IV lasix - will aid in Kaliuresis   hold ACE dose   repeat K today    low K diet      Hypervolemic Hyponatremia w fluid overload   IV lasix daily    free water restriction    monitor Na trends     d/w asadther at bedside      Thank you for the courtesy of this consult. We will follow this patient with you.   Management is subject to change if new information becomes available or patient condition changes.          
89 F with AF non on AC due to concern of falls and bleeding, CHF , COVID november 2020 presents with hyponatremia and AF with rapid rates      AF- rate control with  metoprolol and digoxin. check digoxin level and adjust accordingly- not on AC due to fall/ bleeding risk.    CHF- lasix 40 BID- monitor chemistry including sodium, k+ and mg+- daily weights     no evidence of ACS- negative CE, continue asa and statin    hyponatremia- fluid restriction and diuresis
    HPI: Pt is a 89y old Female with a H/O Afib not on coumadin ( falls) CHF,HLD presents to the ED for weakness and chest pressure. The symptoms have been worsening over the past few days, denies  fevers cough but  had some nausea. Daughter concerned about her decreased appetite and SOB. She is bed bound but can transfer with help to the commode/ wheelchair and has 24 hr aide assistance at home. She is confused and unable to provide accurate hx. Palliative Medicine Consult to establish GOC as we have seen pt in the past.   3/16/21 Seen and examined at bedside with family member visiting. Pt alert and eating 25-50% of her breakfast. Denies pain or dyspnea.     Assessment and Plan:    1) Chronic heart failure  -chest pressure on adm  -Now resolved  -CXR= heart failure  -Diuresis as tolerated  -Cardio eval noted  -Cont ASA/BB/Statin      2) Debility  -Overall weakness  -Poor PO intake  -Bed/chair bound  -Requires assist with ADL    3) Hyponatremia  -Currently 123  -Cont to monitor  -Nephrology eval noted    4) S/P COVID  -Nov 2020    5) Advanced Directives  -Pt without capacity  -Daughter Velia surrogate  -Will discuss GOC

## 2021-03-16 NOTE — PROGRESS NOTE ADULT - ASSESSMENT
89 yo female with hx of CHF,fall, Afib, and now admitted with increased sob with CHF and hyperkalemia and hyponatremia on labs.  renal eval called for evaluation.     Hyperkalemia while on potassium    K stabilized   agree with IV lasix - will aid in Kaliuresis   hold ACE    trend k     Hypervolemic Hyponatremia w fluid overload   IV lasix     free water restriction

## 2021-03-16 NOTE — PROGRESS NOTE ADULT - SUBJECTIVE AND OBJECTIVE BOX
CC/reason for follow up: *    S: *    ROS: no chest pain, no dyspnea    T(C): 36.6 (03-16-21 @ 07:25), Max: 36.7 (03-15-21 @ 15:51)  HR: 112 (03-16-21 @ 07:25) (76 - 124)  BP: 131/98 (03-16-21 @ 07:25) (89/65 - 131/98)  RR: 19 (03-16-21 @ 07:25) (18 - 22)  SpO2: 99% (03-16-21 @ 07:25) (95% - 100%)    Gen - Pleasant, cooperative, in no acute distress  HEENT- PERRL, moist mucus membranes, OP clear  CV - RRR, No m/r/g, +pulses, * edema  Lungs - Good effort, Clear to auscultation bilaterally  Abdomen - Soft, nontender, nondistended, +BS, No rebound/rigidity/guarding  Ext - No cyanosis/clubbing.   Skin - No rashes, No jaundice  Psych- Alert & oriented x 3  Neuro- *    MEDICATIONS  (STANDING):  aspirin enteric coated 81 milliGRAM(s) Oral daily  atorvastatin 10 milliGRAM(s) Oral at bedtime  digoxin     Tablet 125 MICROGram(s) Oral daily  furosemide   Injectable 40 milliGRAM(s) IV Push two times a day  heparin   Injectable 5000 Unit(s) SubCutaneous every 12 hours  metoprolol succinate ER 25 milliGRAM(s) Oral daily    MEDICATIONS  (PRN):  aluminum hydroxide/magnesium hydroxide/simethicone Suspension 30 milliLiter(s) Oral every 4 hours PRN Dyspepsia  ondansetron Injectable 4 milliGRAM(s) IV Push every 6 hours PRN Nausea  senna 2 Tablet(s) Oral at bedtime PRN Constipation      Diagnostic studies: personally reviewed  LABS: All Labs Reviewed:                        12.3   8.16  )-----------( 274      ( 16 Mar 2021 09:00 )             37.1     03-15    123<L>  |  91<L>  |  33<H>  ----------------------------<  148<H>  4.4   |  23  |  1.32<H>    Ca    9.3      15 Mar 2021 17:05  Mg     2.0     03-15    TPro  7.6  /  Alb  3.5  /  TBili  0.8  /  DBili  x   /  AST  13<L>  /  ALT  20  /  AlkPhos  56  03-15      CARDIAC MARKERS ( 15 Mar 2021 10:28 )  0.020 ng/mL / x     / x     / x     / x      CARDIAC MARKERS ( 15 Mar 2021 10:07 )  0.022 ng/mL / x     / x     / x     / x            Blood Culture:   RADIOLOGY/EKG:             CC/reason for follow up: chf, abnormal abs    S: has dyspnea but it's better than yesterday. c/o aching "all over", says it's chronic pain.     ROS: no chest pain, no cough    T(C): 36.6 (03-16-21 @ 07:25), Max: 36.7 (03-15-21 @ 15:51)  HR: 112 (03-16-21 @ 07:25) (76 - 124)  BP: 131/98 (03-16-21 @ 07:25) (89/65 - 131/98)  RR: 19 (03-16-21 @ 07:25) (18 - 22)  SpO2: 99% (03-16-21 @ 07:25) (95% - 100%)    Gen - Pleasant, cooperative, in no acute distress, chronically ill appearing  HEENT- PERRL, moist mucus membranes, OP clear  CV - RRR, No m/r/g, +pulses, no edema  Lungs - Good effort, diminished BS in the bases bilaterally  Abdomen - Soft, nontender, nondistended, +BS, No rebound/rigidity/guarding  Ext - No cyanosis/clubbing.   Skin - No rashes, No jaundice  Psych- Alert & oriented x 2-3  Neuro-  no facial droop, EOMI. moves all ext    MEDICATIONS  (STANDING):  aspirin enteric coated 81 milliGRAM(s) Oral daily  atorvastatin 10 milliGRAM(s) Oral at bedtime  digoxin     Tablet 125 MICROGram(s) Oral daily  furosemide   Injectable 40 milliGRAM(s) IV Push two times a day  heparin   Injectable 5000 Unit(s) SubCutaneous every 12 hours  metoprolol succinate ER 25 milliGRAM(s) Oral daily    MEDICATIONS  (PRN):  aluminum hydroxide/magnesium hydroxide/simethicone Suspension 30 milliLiter(s) Oral every 4 hours PRN Dyspepsia  ondansetron Injectable 4 milliGRAM(s) IV Push every 6 hours PRN Nausea  senna 2 Tablet(s) Oral at bedtime PRN Constipation      Diagnostic studies: personally reviewed  LABS: All Labs Reviewed:                        12.3   8.16  )-----------( 274      ( 16 Mar 2021 09:00 )             37.1     03-15    123<L>  |  91<L>  |  33<H>  ----------------------------<  148<H>  4.4   |  23  |  1.32<H>    Ca    9.3      15 Mar 2021 17:05  Mg     2.0     03-15    TPro  7.6  /  Alb  3.5  /  TBili  0.8  /  DBili  x   /  AST  13<L>  /  ALT  20  /  AlkPhos  56  03-15      CARDIAC MARKERS ( 15 Mar 2021 10:28 )  0.020 ng/mL / x     / x     / x     / x      CARDIAC MARKERS ( 15 Mar 2021 10:07 )  0.022 ng/mL / x     / x     / x     / x            Blood Culture:   RADIOLOGY/EKG:  < from: Xray Chest 1 View-PORTABLE IMMEDIATE (03.15.21 @ 09:27) >  IMPRESSION: Increasing CHF. Other findings stable as above.    < end of copied text >             CC/reason for follow up: chf, abnormal abs    S: has dyspnea but it's better than yesterday. c/o aching "all over", says it's chronic pain.     ROS: no chest pain, no cough    T(C): 36.6 (03-16-21 @ 07:25), Max: 36.7 (03-15-21 @ 15:51)  HR: 112 (03-16-21 @ 07:25) (76 - 124)  BP: 131/98 (03-16-21 @ 07:25) (89/65 - 131/98)  RR: 19 (03-16-21 @ 07:25) (18 - 22)  SpO2: 99% (03-16-21 @ 07:25) (95% - 100%)    Gen - Pleasant, cooperative, in no acute distress, chronically ill appearing  HEENT- PERRL, moist mucus membranes, OP clear  CV - IRIR, No r/g, +pulses, ?murmur, no edema  Lungs - Good effort, diminished BS in the bases bilaterally  Abdomen - Soft, nontender, nondistended, +BS, No rebound/rigidity/guarding  Ext - No cyanosis/clubbing.   Skin - No rashes, No jaundice  Psych- Alert & oriented x 2-3  Neuro-  no facial droop, EOMI. moves all ext    MEDICATIONS  (STANDING):  aspirin enteric coated 81 milliGRAM(s) Oral daily  atorvastatin 10 milliGRAM(s) Oral at bedtime  digoxin     Tablet 125 MICROGram(s) Oral daily  furosemide   Injectable 40 milliGRAM(s) IV Push two times a day  heparin   Injectable 5000 Unit(s) SubCutaneous every 12 hours  metoprolol succinate ER 25 milliGRAM(s) Oral daily    MEDICATIONS  (PRN):  aluminum hydroxide/magnesium hydroxide/simethicone Suspension 30 milliLiter(s) Oral every 4 hours PRN Dyspepsia  ondansetron Injectable 4 milliGRAM(s) IV Push every 6 hours PRN Nausea  senna 2 Tablet(s) Oral at bedtime PRN Constipation      Diagnostic studies: personally reviewed  LABS: All Labs Reviewed:                        12.3   8.16  )-----------( 274      ( 16 Mar 2021 09:00 )             37.1     03-15    123<L>  |  91<L>  |  33<H>  ----------------------------<  148<H>  4.4   |  23  |  1.32<H>    Ca    9.3      15 Mar 2021 17:05  Mg     2.0     03-15    TPro  7.6  /  Alb  3.5  /  TBili  0.8  /  DBili  x   /  AST  13<L>  /  ALT  20  /  AlkPhos  56  03-15      CARDIAC MARKERS ( 15 Mar 2021 10:28 )  0.020 ng/mL / x     / x     / x     / x      CARDIAC MARKERS ( 15 Mar 2021 10:07 )  0.022 ng/mL / x     / x     / x     / x            Blood Culture:   RADIOLOGY/EKG:  < from: Xray Chest 1 View-PORTABLE IMMEDIATE (03.15.21 @ 09:27) >  IMPRESSION: Increasing CHF. Other findings stable as above.    < end of copied text >

## 2021-03-16 NOTE — PROGRESS NOTE ADULT - SUBJECTIVE AND OBJECTIVE BOX
Patient is a 89y Female who reports no complaints as new, breathing stable.     MEDICATIONS  (STANDING):  aspirin enteric coated 81 milliGRAM(s) Oral daily  atorvastatin 10 milliGRAM(s) Oral at bedtime  digoxin     Tablet 125 MICROGram(s) Oral daily  furosemide   Injectable 40 milliGRAM(s) IV Push two times a day  heparin   Injectable 5000 Unit(s) SubCutaneous every 12 hours  metoprolol succinate ER 25 milliGRAM(s) Oral daily    MEDICATIONS  (PRN):  aluminum hydroxide/magnesium hydroxide/simethicone Suspension 30 milliLiter(s) Oral every 4 hours PRN Dyspepsia  ondansetron Injectable 4 milliGRAM(s) IV Push every 6 hours PRN Nausea  senna 2 Tablet(s) Oral at bedtime PRN Constipation        T(C): , Max: 36.7 (03-15-21 @ 15:51)  T(F): , Max: 98 (03-15-21 @ 15:51)  HR: 112 (03-16-21 @ 07:25)  BP: 131/98 (03-16-21 @ 07:25)  BP(mean): 90 (03-15-21 @ 16:35)  RR: 19 (03-16-21 @ 07:25)  SpO2: 99% (03-16-21 @ 07:25)  Wt(kg): --    03-15 @ 07:01  -  03-16 @ 07:00  --------------------------------------------------------  IN: 10 mL / OUT: 0 mL / NET: 10 mL        Weight (kg): 51.8 (03-15 @ 21:14)    PHYSICAL EXAM:    Constitutional: NAD,frail  HEENT: dry MM  dist  Cardiovascular: S1 and S2, RRR  Gastrointestinal: BS+, soft, NT/ND  Extremities: tr to no peripheral edema  Neurological: A/O x 3, no focal deficits  Psychiatric: Normal mood, normal affect        LABS:                        12.3   8.16  )-----------( 274      ( 16 Mar 2021 09:00 )             37.1     16 Mar 2021 09:00    131    |  97     |  32     ----------------------------<  96     4.2     |  27     |  1.01   15 Mar 2021 17:05    123    |  91     |  33     ----------------------------<  148    4.4     |  23     |  1.32   15 Mar 2021 10:28    124    |  91     |  30     ----------------------------<  129    5.8     |  25     |  1.18   15 Mar 2021 10:07    122    |  92     |  31     ----------------------------<  132    6.0     |  23     |  1.09     Ca    8.9        16 Mar 2021 09:00  Ca    9.3        15 Mar 2021 17:05  Ca    9.0        15 Mar 2021 10:28  Ca    9.4        15 Mar 2021 10:07  Mg     2.0       15 Mar 2021 10:28  Mg     2.1       15 Mar 2021 10:07    TPro  7.6    /  Alb  3.5    /  TBili  0.8    /  DBili  x      /  AST  13     /  ALT  20     /  AlkPhos  56     15 Mar 2021 10:28  TPro  7.1    /  Alb  3.2    /  TBili  0.8    /  DBili  x      /  AST  21     /  ALT  20     /  AlkPhos  55     15 Mar 2021 10:07          Urine Studies:          RADIOLOGY & ADDITIONAL STUDIES:

## 2021-03-17 NOTE — PROGRESS NOTE ADULT - ASSESSMENT
89 F with AF non on AC due to concern of falls and bleeding, CHF , COVID november 2020 presents with hyponatremia and AF with rapid rates      AF- rate control with  metoprolol, will increase dose to improve rate control, mirna discontine digoxin as patient does not tolerate it - not on AC due to fall/ bleeding risk.    CHF- lasix 40 BID- monitor chemistry including sodium, k+ and mg+- daily weights     no evidence of ACS- negative CE, continue asa and statin    hyponatremia is improving- fluid restriction and diuresis     if chem stabilized- will change to PO lasix

## 2021-03-17 NOTE — PROGRESS NOTE ADULT - ASSESSMENT
A/P: 89F hx Afib not on anticoag (falls), CHF, HLD, presents to the ED for weakness and chest pressure. She was found in AF rvr and fluid overload    Afib RVR  Acute on Chronic combined systolic and diastolic CHF, EF 35-40% (per echo 11/20)  hypervolemic hyponatremia  hyperkalemia  moderate to severe mitral regurg  COVID antibodies present    ·	free water restriction  ·	hyperK was treated. hold home supplements  ·	switch lasix to PO  ·	strict I&O's  ·	tele  ·	metoprolol increased to 25mg TID  ·	digoxin stopped  ·	Echo pending  ·	cardiology, nephrology and palliative care consults appreciated.   ·	d/w pt's daughter Velia, updates given and all questions answered.   ·	pt is probably not decisional due to intermittent confusion.   ·	family does NOT want the patient to get covid vaccine due to reactions/suspected allergy to flu shot. consent NOT given.   ·	code status: Full code    Dispo: inpatient. possible d/c home by tomorrow or friday. pt has caregiver and needs 24 hr advance notice prior to discharge, CM and SW aware

## 2021-03-17 NOTE — PROGRESS NOTE ADULT - SUBJECTIVE AND OBJECTIVE BOX
* seen earlier  Patient is a 89y Female who reports no complaints as new, breathing stable.  states she is urinating alot     MEDICATIONS  (STANDING):  aspirin enteric coated 81 milliGRAM(s) Oral daily  atorvastatin 10 milliGRAM(s) Oral at bedtime  furosemide    Tablet 40 milliGRAM(s) Oral daily  heparin   Injectable 5000 Unit(s) SubCutaneous every 12 hours  metoprolol tartrate 25 milliGRAM(s) Oral every 8 hours    MEDICATIONS  (PRN):  aluminum hydroxide/magnesium hydroxide/simethicone Suspension 30 milliLiter(s) Oral every 4 hours PRN Dyspepsia  ondansetron Injectable 4 milliGRAM(s) IV Push every 6 hours PRN Nausea  senna 2 Tablet(s) Oral at bedtime PRN Constipation    Vital Signs Last 24 Hrs  T(C): 36.3 (17 Mar 2021 21:07), Max: 36.8 (17 Mar 2021 07:29)  T(F): 97.4 (17 Mar 2021 21:07), Max: 98.3 (17 Mar 2021 07:29)  HR: 67 (17 Mar 2021 21:07) (67 - 118)  BP: 96/55 (17 Mar 2021 21:07) (96/55 - 123/62)  BP(mean): --  RR: 20 (17 Mar 2021 21:07) (20 - 20)  SpO2: 100% (17 Mar 2021 21:07) (100% - 100%)    I&O's Detail    16 Mar 2021 07:01  -  17 Mar 2021 07:00  --------------------------------------------------------  IN:  Total IN: 0 mL    OUT:    Voided (mL): 700 mL  Total OUT: 700 mL    Total NET: -700 mL          PHYSICAL EXAM:    Constitutional: NAD,frail  HEENT: dry MM  RESP: distant   Cardiovascular: S1 and S2, RRR  Gastrointestinal: BS+, soft, NT/ND  Extremities: tr to no peripheral edema  Neurological: A/O x 3, no focal deficits    LABS:                          133    |  100    |  35     ----------------------------<  80        17 Mar 2021 08:18  4.4     |  28     |  1.17     131    |  97     |  32     ----------------------------<  96        16 Mar 2021 09:00  4.2     |  27     |  1.01     123    |  91     |  33     ----------------------------<  148       15 Mar 2021 17:05  4.4     |  23     |  1.32     Ca    8.3        17 Mar 2021 08:18  Ca    8.9        16 Mar 2021 09:00      Mg     2.0       15 Mar 2021 10:28  Mg     2.1       15 Mar 2021 10:07    TPro  7.6    /  Alb  3.5    /  TBili  0.8    /        15 Mar 2021 10:28  DBili  x      /  AST  13     /  ALT  20     /  AlkPhos  56       TPro  7.1    /  Alb  3.2    /  TBili  0.8    /        15 Mar 2021 10:07  DBili  x      /  AST  21     /  ALT  20     /  AlkPhos  55                    11.6   7.27  )-----------( 274      ( 17 Mar 2021 08:18 )             35.2                         12.3   8.16  )-----------( 274      ( 16 Mar 2021 09:00 )             37.1         Urine Studies:          RADIOLOGY & ADDITIONAL STUDIES:

## 2021-03-17 NOTE — PROGRESS NOTE ADULT - SUBJECTIVE AND OBJECTIVE BOX
CHIEF COMPLAINT: Patient is a 89y old  Female who presents with a chief complaint of SOB, abd pain (15 Mar 2021 13:54)      HPI:  89F hx afib not on coumadin ( falls)  chf hld presents to the ED for weakness and chest pressure. The symptoms have been worsening over the past few days, denies  fevers cough but  had some nausea. Daughter present pt gives no history; daughter was concerned about her decreased appetite and SOB, reminded her of previous CHF exacerbation so she brought her to ED. Pt has hearing aids when she will go to the floor th hearing aids will leave with the daughter. She is bed bound but can transfer with help to the commode/ wheelchair. Pt is picking at things in the air, I do not think she understand anything about her condition; per daughter she was asked by the ED doc about code status and said, yes resuscitate me; I doubt she in capable of meking decisions/ understand her condition.  She was found in AF rvr better controlled now. Plan to adm, c/w Cardizem get TTE to assess EF and consult Pall re: GOC   (15 Mar 2021 11:21)   3/17   still elevated rate controlled     PMHx: PAST MEDICAL & SURGICAL HISTORY:  CHF (congestive heart failure)  A-fib  HLD (hyperlipidemia)  Glaucoma  S/P hip replacement, right      Soc Hx: no toxic habits     Allergies: Allergies  atenolol (Other)  sulfa drugs (Other)      REVIEW OF SYSTEMS:  CONSTITUTIONAL: No weakness, fevers or chills  EYES/ENT: No visual changes;  No vertigo or throat pain   NECK: No pain or stiffness  RESPIRATORY: No cough, wheezing, hemoptysis; No shortness of breath  CARDIOVASCULAR: No chest pain or palpitations  GASTROINTESTINAL: No abdominal or epigastric pain. No nausea, vomiting, or hematemesis; No diarrhea or constipation. No melena or hematochezia.  GENITOURINARY: No dysuria, frequency or hematuria  NEUROLOGICAL: No numbness or weakness  SKIN: No itching, burning, rashes, or lesions   All other review of systems is negative unless indicated above    Vital Signs Last 24 Hrs  T(C): 36.5 (16 Mar 2021 20:02), Max: 36.6 (16 Mar 2021 07:25)  T(F): 97.7 (16 Mar 2021 20:02), Max: 97.9 (16 Mar 2021 07:25)  HR: 114 (16 Mar 2021 20:02) (110 - 114)  BP: 108/67 (16 Mar 2021 20:02) (108/67 - 131/98)  BP(mean): --  RR: 19 (16 Mar 2021 07:25) (19 - 19)  SpO2: 95% (16 Mar 2021 20:02) (95% - 99%)        PHYSICAL EXAM:   Constitutional: sleeping , arousable, aggitated     Respiratory: Breath sounds- crackles B/L  Cardiovascular: S1 and S2, irregular, HSM  Gastrointestinal: Bowel Sounds present, soft, nontender, nondistended, no guarding, no rebound  Extremities: 1+ peripheral edema  Vascular: 2+ peripheral pulses  Neurological: Arousable    MEDICATIONS:  MEDICATIONS  (STANDING):  aspirin enteric coated 81 milliGRAM(s) Oral daily  atorvastatin 10 milliGRAM(s) Oral at bedtime  digoxin     Tablet 125 MICROGram(s) Oral daily  furosemide   Injectable 40 milliGRAM(s) IV Push two times a day  heparin   Injectable 5000 Unit(s) SubCutaneous every 12 hours  metoprolol succinate ER 25 milliGRAM(s) Oral daily      LABS: All Labs Reviewed:                                        12.3   8.16  )-----------( 274      ( 16 Mar 2021 09:00 )             37.1   03-16    131<L>  |  97  |  32<H>  ----------------------------<  96  4.2   |  27  |  1.01    Ca    8.9      16 Mar 2021 09:00  Mg     2.0     03-15    TPro  7.6  /  Alb  3.5  /  TBili  0.8  /  DBili  x   /  AST  13<L>  /  ALT  20  /  AlkPhos  56  03-15      Serum Pro-Brain Natriuretic Peptide: 54078 pg/mL (03-15 @ 10:07)  Serum Pro-Brain Natriuretic Peptide: 58560 pg/mL (03-15 @ 08:58)    RADIOLOGY:c< from: Xray Chest 1 View-PORTABLE IMMEDIATE (03.15.21 @ 09:27) >    IMPRESSION: Increasing CHF. Other findings stable as above.        YUE SORTO MD; Attending Radiologist  This document has been electronically signed. Mar 15 2021  9:34AM    < end of copied text >      EKG: AF , LAD, IVCD, non specific ST changes     Telemetry: AF     ECHO:ech< from: TTE Echo Complete w/o Contrast w/ Doppler (11.20.20 @ 09:29) >   Summary     Mild to moderate mitral annular calcification is present.   Fibrocalcific changes noted to the mitral valve leaflets with preserved   leaflet excursion.   Moderate to severe (3+) mitral regurgitationis present.   Fibrocalcific changes noted to the Aortic valve leaflets with preserved   leaflet excursion.   Mild to Moderate aortic regurgitation is present.   Mild to moderate tricuspid valve regurgitation is present.   Mild pulmonary hypertension.   Normal appearing pulmonic valve structure and function.   Mild pulmonic valvular regurgitation (1+) is present.   The left atrium is moderately dilated.   The interventricular septum appears hypokinetic.   Estimated left ventricular ejection fraction is 35-40 %.   The right atrium is at the upper limits of normal.   Pleural effusion - is present..     Signature     ----------------------------------------------------------------   Electronically signed by Carla Pineda MD(Interpreting   physician) on 11/21/2020 07:43 AM   ----------------------------------------------------------------    < end of copied text >

## 2021-03-17 NOTE — PROGRESS NOTE ADULT - SUBJECTIVE AND OBJECTIVE BOX
HPI: Pt is a 89y old Female with a H/O afib not on coumadin ( falls) CHF,HLD presents to the ED for weakness and chest pressure. The symptoms have been worsening over the past few days, denies  fevers cough but  had some nausea. Daughter concerned about her decreased appetite and SOB. She is bed bound but can transfer with help to the commode/ wheelchair and has 24 hr aide assistance at home. She is confused and unable to provide accurate hx. Palliative Medicine Consult to establish GOC as we have seen pt in the past.   3/16/21 Seen and examined at bedside with family member visiting. Pt alert and eating 25-50% of her breakfast. Denies pain or dyspnea.   3/17/21 Seen and examined at bedside. Alert and pleasant this am. Took breakfast well.  PAIN: ( )Yes   (X )No    DYSPNEA: ( ) Yes  ( X) No  Level:    PAST MEDICAL & SURGICAL HISTORY:  CHF (congestive heart failure)  A-fib  HLD (hyperlipidemia)  Glaucoma  S/P hip replacement, right      SOCIAL HX:  Lives in home with 24 hr aides     Hx opiate tolerance ( )YES  (X )NO    Baseline ADLs  (Prior to Admission)  ( ) Independent   (X )Dependent    FAMILY HISTORY:  Unable to provide due to confusion        Review of Systems:    Physical Discomfort-denies  Dyspnea-denies  Constipation-can not recall last BM  Anorexia-mod  Weight Loss-   Cough-denies  Secretions-denies  Fatigue-mod-severe  Weakness-mod    All other systems reviewed and negative      PHYSICAL EXAM:  ICU Vital Signs Last 24 Hrs  T(C): 36.8 (17 Mar 2021 07:29), Max: 36.8 (17 Mar 2021 07:29)  T(F): 98.3 (17 Mar 2021 07:29), Max: 98.3 (17 Mar 2021 07:29)  HR: 95 (17 Mar 2021 07:29) (95 - 114)  BP: 106/48 (17 Mar 2021 07:29) (106/48 - 128/88)  RR: 20 (17 Mar 2021 07:29) (20 - 20)  SpO2: 100% (17 Mar 2021 07:29) (95% - 100%)      PPSV2: 30-40  %  General: Frail elderly female in bed in NAD  Mental Status: Alert and oriented X2  HEENT: oral mucosa dry  Lungs: clear to auscultation mile  Cardiac: S1S2+  GI: abd soft NT ND + BS  : voids  Ext: TIAN on bed  Neuro: confusion      LABS:                              11.6   7.27  )-----------( 274      ( 17 Mar 2021 08:18 )             35.2      03-17    133<L>  |  100  |  35<H>  ----------------------------<  80  4.4   |  28  |  1.17    Ca    8.3<L>      17 Mar 2021 08:18    Albumin: Albumin, Serum: 3.5 g/dL (03-15 @ 10:28)      Allergies    atenolol (Other)  sulfa drugs (Other)    Intolerances      MEDICATIONS  (STANDING):    MEDICATIONS  (PRN):      RADIOLOGY/ADDITIONAL STUDIES

## 2021-03-17 NOTE — PROGRESS NOTE ADULT - ASSESSMENT
HPI: Pt is a 89y old Female with a H/O Afib not on coumadin ( falls) CHF,HLD presents to the ED for weakness and chest pressure. The symptoms have been worsening over the past few days, denies  fevers cough but  had some nausea. Daughter concerned about her decreased appetite and SOB. She is bed bound but can transfer with help to the commode/ wheelchair and has 24 hr aide assistance at home. She is confused and unable to provide accurate hx. Palliative Medicine Consult to establish GOC as we have seen pt in the past.   3/16/21 Seen and examined at bedside with family member visiting. Pt alert and eating 25-50% of her breakfast. Denies pain or dyspnea.     Assessment and Plan:    1) Chronic heart failure  -chest pressure on adm  -Now resolved  -CXR= heart failure  -Diuresis as tolerated  -Cardio eval noted  -Cont ASA/BB/Statin      2) Debility  -Overall weakness  -Poor PO intake  -Bed/chair bound  -Requires assist with ADL    3) Hyponatremia  -Currently 123  -Cont to monitor  -Nephrology eval noted    4) S/P COVID  -Nov 2020  -Daughter was unaware as pt was never symptomatic     5) Advanced Directives  -Pt without capacity  -Daughter Velia surrogate  -GOC discussion with Velia   -Currently no limits set  -Return home with current 24 hr aides

## 2021-03-17 NOTE — PROGRESS NOTE ADULT - ASSESSMENT
89 yo female with hx of CHF,fall, Afib, and now admitted with increased sob with CHF and hyperkalemia and hyponatremia on labs.  renal eval called for evaluation.     Hyperkalemia while on potassium supplements    K stabilized    IV lasix aid in [place]uresis   hold ACE    trend k while on po lasix      Hypervolemic Hyponatremia w fluid overload   diuresis    free water restriction - 1 Liter per day - advised pt     No further renal intervention and will  follow as needed

## 2021-03-17 NOTE — PROGRESS NOTE ADULT - SUBJECTIVE AND OBJECTIVE BOX
CC/reason for follow up: chf, abnormal abs    S: pt seen and examined. chart reviewed. feels better today. No dyspnea today. complains of generalized weakness and aches. HR still elevated at times.     ROS: no chest pain, no cough    Vital Signs Last 24 Hrs  T(C): 36.8 (17 Mar 2021 07:29), Max: 36.8 (17 Mar 2021 07:29)  T(F): 98.3 (17 Mar 2021 07:29), Max: 98.3 (17 Mar 2021 07:29)  HR: 95 (17 Mar 2021 07:29) (95 - 114)  BP: 106/48 (17 Mar 2021 07:29) (106/48 - 128/88)  BP(mean): --  RR: 20 (17 Mar 2021 07:29) (20 - 20)  SpO2: 100% (17 Mar 2021 07:29) (95% - 100%)    Gen - Pleasant, cooperative, in no acute distress, chronically ill appearing  HEENT- PERRL, moist mucus membranes, OP clear  CV - IRIR, No r/g, +pulses, +murmur, no edema  Lungs - Good effort, diminished BS in the bases bilaterally  Abdomen - Soft, nontender, nondistended, +BS, No rebound/rigidity/guarding  Ext - No cyanosis/clubbing.   Skin - No rashes, No jaundice  Psych- Alert & oriented x 2-3  Neuro-  no facial droop, EOMI. moves all ext    MEDICATIONS  (STANDING):  aspirin enteric coated 81 milliGRAM(s) Oral daily  atorvastatin 10 milliGRAM(s) Oral at bedtime  furosemide    Tablet 40 milliGRAM(s) Oral daily  heparin   Injectable 5000 Unit(s) SubCutaneous every 12 hours  metoprolol tartrate 25 milliGRAM(s) Oral every 8 hours    MEDICATIONS  (PRN):  aluminum hydroxide/magnesium hydroxide/simethicone Suspension 30 milliLiter(s) Oral every 4 hours PRN Dyspepsia  ondansetron Injectable 4 milliGRAM(s) IV Push every 6 hours PRN Nausea  senna 2 Tablet(s) Oral at bedtime PRN Constipation    Labs                              11.6   7.27  )-----------( 274      ( 17 Mar 2021 08:18 )             35.2     17 Mar 2021 08:18    133    |  100    |  35     ----------------------------<  80     4.4     |  28     |  1.17     Ca    8.3        17 Mar 2021 08:18        CAPILLARY BLOOD GLUCOSE        RADIOLOGY/EKG:  < from: Xray Chest 1 View-PORTABLE IMMEDIATE (03.15.21 @ 09:27) >  IMPRESSION: Increasing CHF. Other findings stable as above.    < end of copied text >

## 2021-03-18 NOTE — PROGRESS NOTE ADULT - ASSESSMENT
89 F with AF non on AC due to concern of falls and bleeding, CHF , COVID november 2020 presents with hyponatremia and AF with rapid rates      AF- rate control with  metoprolol, will increase dose to improve rate control,  - not on AC due to fall/ bleeding risk.    CHF- achieving euvolemic state- continue 50 PO lasix -  monitor chemistry including sodium, k+ and mg+- daily weights     no evidence of ACS- negative CE, continue asa and statin      PT DC planning

## 2021-03-18 NOTE — DISCHARGE NOTE PROVIDER - NSDCFUADDINST_GEN_ALL_CORE_FT
PCP- follow up in 1 week. Bring these papers with you to that appointment so your PCP can request records from your hospital stay.    if you have worsening shortness of breath or chest pain/pressure or feel that you are going to faint, then call 911 or come to the ER immediately.

## 2021-03-18 NOTE — DISCHARGE NOTE NURSING/CASE MANAGEMENT/SOCIAL WORK - PATIENT PORTAL LINK FT
You can access the FollowMyHealth Patient Portal offered by [facility] by registering at the following website: [email or web address]. By joining [email or web address]’s FollowMyHealth portal, you will also be able to view your health information using other applications (apps) compatible with our system.

## 2021-03-18 NOTE — DISCHARGE NOTE PROVIDER - PROVIDER TOKENS
FREE:[LAST:[your primary care doctor],PHONE:[(   )    -],FAX:[(   )    -]],PROVIDER:[TOKEN:[[id number]]],PROVIDER:[TOKEN:[[id number]],FOLLOWUP:[1 week]]

## 2021-03-18 NOTE — CHART NOTE - NSCHARTNOTEFT_GEN_A_CORE
LMSW met with pt. bedside to provide emotional support and check in after pt. had a bad night filled with anxiety, stress, and fright. Pt was not in the mood to speak this morning, so LMSW will follow up this afternoon.
HR better ctr but borderline Cardizem drip dc; start one dose IV dig and PO starting [place]

## 2021-03-18 NOTE — PROGRESS NOTE ADULT - SUBJECTIVE AND OBJECTIVE BOX
CHIEF COMPLAINT: Patient is a 89y old  Female who presents with a chief complaint of SOB, abd pain (15 Mar 2021 13:54)      HPI:  89F hx afib not on coumadin ( falls)  chf hld presents to the ED for weakness and chest pressure. The symptoms have been worsening over the past few days, denies  fevers cough but  had some nausea. Daughter present pt gives no history; daughter was concerned about her decreased appetite and SOB, reminded her of previous CHF exacerbation so she brought her to ED. Pt has hearing aids when she will go to the floor th hearing aids will leave with the daughter. She is bed bound but can transfer with help to the commode/ wheelchair. Pt is picking at things in the air, I do not think she understand anything about her condition; per daughter she was asked by the ED doc about code status and said, yes resuscitate me; I doubt she in capable of meking decisions/ understand her condition.  She was found in AF rvr better controlled now. Plan to adm, c/w Cardizem get TTE to assess EF and consult Pall re: GOC   (15 Mar 2021 11:21)   3/17   still elevated rate   3/18 no acute issues    PMHx: PAST MEDICAL & SURGICAL HISTORY:  CHF (congestive heart failure)  A-fib  HLD (hyperlipidemia)  Glaucoma  S/P hip replacement, right      Soc Hx: no toxic habits     Allergies: Allergies  atenolol (Other)  sulfa drugs (Other)        Vital Signs Last 24 Hrs  T(C): 36.7 (18 Mar 2021 05:19), Max: 36.8 (17 Mar 2021 07:29)  T(F): 98.1 (18 Mar 2021 05:19), Max: 98.3 (17 Mar 2021 07:29)  HR: 77 (18 Mar 2021 05:19) (67 - 118)  BP: 105/54 (18 Mar 2021 05:19) (96/55 - 123/62)  BP(mean): --  RR: 18 (18 Mar 2021 05:19) (18 - 20)  SpO2: 99% (18 Mar 2021 05:19) (99% - 100%)      PHYSICAL EXAM:   Constitutional: sleeping , arousable,   Respiratory: Breath sounds-clear  Cardiovascular: S1 and S2, irregular, HSM  Gastrointestinal: Bowel Sounds present, soft, nontender, nondistended, no guarding, no rebound  Extremities:no peripheral edema  Vascular: 2+ peripheral pulses  Neurological: Arousable    MEDICATIONS  (STANDING):  aspirin enteric coated 81 milliGRAM(s) Oral daily  atorvastatin 10 milliGRAM(s) Oral at bedtime  furosemide    Tablet 40 milliGRAM(s) Oral daily  heparin   Injectable 5000 Unit(s) SubCutaneous every 12 hours  metoprolol tartrate 50 milliGRAM(s) Oral two times a day    MEDICATIONS  (PRN):  aluminum hydroxide/magnesium hydroxide/simethicone Suspension 30 milliLiter(s) Oral every 4 hours PRN Dyspepsia  ondansetron Injectable 4 milliGRAM(s) IV Push every 6 hours PRN Nausea  senna 2 Tablet(s) Oral at bedtime PRN Constipation    LABS: All Labs Reviewed:                                           11.6   7.27  )-----------( 274      ( 17 Mar 2021 08:18 )             35.2   03-17    133<L>  |  100  |  35<H>  ----------------------------<  80  4.4   |  28  |  1.17    Ca    8.3<L>      17 Mar 2021 08:18            IMPRESSION: Increasing CHF. Other findings stable as above.        YUE SORTO MD; Attending Radiologist  This document has been electronically signed. Mar 15 2021  9:34AM    < end of copied text >      EKG: AF , LAD, IVCD, non specific ST changes     Telemetry: AF     ECHO:ech< from: TTE Echo Complete w/o Contrast w/ Doppler (11.20.20 @ 09:29) >   Summary     Mild to moderate mitral annular calcification is present.   Fibrocalcific changes noted to the mitral valve leaflets with preserved   leaflet excursion.   Moderate to severe (3+) mitral regurgitationis present.   Fibrocalcific changes noted to the Aortic valve leaflets with preserved   leaflet excursion.   Mild to Moderate aortic regurgitation is present.   Mild to moderate tricuspid valve regurgitation is present.   Mild pulmonary hypertension.   Normal appearing pulmonic valve structure and function.   Mild pulmonic valvular regurgitation (1+) is present.   The left atrium is moderately dilated.   The interventricular septum appears hypokinetic.   Estimated left ventricular ejection fraction is 35-40 %.   The right atrium is at the upper limits of normal.   Pleural effusion - is present..     Signature     ----------------------------------------------------------------   Electronically signed by Carla CARRASQUILLOInterpreting   physician) on 11/21/2020 07:43 AM   ----------------------------------------------------------------    < end of copied text >

## 2021-03-18 NOTE — DISCHARGE NOTE PROVIDER - CARE PROVIDER_API CALL
your primary care doctor,   Phone: (   )    -  Fax: (   )    -  Follow Up Time:     Franklin Salazar)  Internal Medicine; Nephrology  [address] [place] [address]  Phone: [phone]  Fax: [phone]  Follow Up Time:     Horacio Villa)  Cardiovascular Disease; Internal Medicine; Nuclear Cardiology  [address], [address]  [place] [address]  Phone: [phone]  Fax: [phone]  Follow Up Time: 1 week

## 2021-03-18 NOTE — DISCHARGE NOTE PROVIDER - NSDCMRMEDTOKEN_GEN_ALL_CORE_FT
aspirin 81 mg oral tablet: 1 tab(s) orally once a day  atorvastatin 10 mg oral tablet: 1 tab(s) orally once a day  Lasix 40 mg oral tablet: 1 tab(s) orally once a day   metoprolol tartrate 50 mg oral tablet: 1 tab(s) orally 2 times a day  PreserVision AREDS 2 oral capsule: 1 cap(s) orally 2 times a day  senna oral tablet: 2 tab(s) orally once a day (at bedtime), As Needed  Vitamin D3 5000 intl units (125 mcg) oral tablet: 1 tab(s) orally once a day

## 2021-03-18 NOTE — DISCHARGE NOTE PROVIDER - NSDCCPCAREPLAN_GEN_ALL_CORE_FT
PRINCIPAL DISCHARGE DIAGNOSIS  Diagnosis: Atrial fibrillation with RVR  Assessment and Plan of Treatment: -metoprolol dose has changed, and you have a new script (sent to your pharmacy)  -take baby aspirin  -follow up with cardiologist      SECONDARY DISCHARGE DIAGNOSES  Diagnosis: CHF (congestive heart failure)  Assessment and Plan of Treatment: -take furosemide  -free water restriction (plain regular water) is 1 liter per day  -total fluid restriction (all liquids) is no more than 2 liters per day  -follow up with cardiologist    Diagnosis: Hyponatremia  Assessment and Plan of Treatment: -free water restriction (plain regular water) is 1 liter per day  -repeat blood test (BMP) in 1 week  -follow up with PCP    Diagnosis: Hyperkalemia  Assessment and Plan of Treatment: -stop taking potassium supplements and stop taking enalapril  -repeat blood test (BMP) in 1 week  -follow up with PCP

## 2021-03-18 NOTE — DISCHARGE NOTE PROVIDER - HOSPITAL COURSE
A/P: 89F hx Afib not on anticoag (falls), CHF, HLD, presents to the ED for weakness and chest pressure. She was found in AF rvr and fluid overload    Afib RVR  Acute on Chronic combined systolic and diastolic CHF, EF 35-40% (per echo 11/20)  hypervolemic hyponatremia  hyperkalemia  moderate to severe mitral regurg  COVID antibodies present    free water restriction  hyperK was treated. hold home supplements  switch lasix to PO  strict I&O's  tele  metoprolol increased to 25mg TID  digoxin stopped  Echo pending  cardiology, nephrology and palliative care consults appreciated.   d/w pt's daughter Velia, updates given and all questions answered.   pt is probably not decisional due to intermittent confusion.   family does NOT want the patient to get covid vaccine due to reactions/suspected allergy to flu shot. consent NOT given.   code status: Full code      T(C): 36.7 (03-18-21 @ 05:19), Max: 36.7 (03-18-21 @ 05:19)  HR: 77 (03-18-21 @ 05:19) (67 - 118)  BP: 105/54 (03-18-21 @ 05:19) (96/55 - 123/62)  RR: 18 (03-18-21 @ 05:19) (18 - 20)  SpO2: 99% (03-18-21 @ 05:19) (99% - 100%)      Gen - Pleasant, cooperative, in no acute distress, chronically ill appearing  HEENT- PERRL, moist mucus membranes, OP clear  CV - IRIR, No r/g, +pulses, +murmur, no edema  Lungs - Good effort, diminished BS in the bases bilaterally  Abdomen - Soft, nontender, nondistended, +BS, No rebound/rigidity/guarding  Ext - No cyanosis/clubbing.   Skin - No rashes, No jaundice  Psych- Alert & oriented x 2-3  Neuro-  no facial droop, EOMI. moves all ext    Dispo: discharge to home w/ caregiver in stable condition    Final diagnosis, treatment plan, and follow-up recommendations were discussed and explained to the patient. The patient was given an opportunity to ask questions concerning the diagnosis and treatment plan. The patient acknowledged understanding of the diagnosis, treatment, and follow-up recommendations. The patient was advised to seek urgent care upon discharge if worsening symptoms develop prior to scheduled follow-up. Time spent on discharge included time with the patient, and also coordinating discharge care as outlined below.    Total time spent: 50 min A/P: 89F hx Afib not on anticoag (falls), CHF, HLD, presents to the ED for weakness and chest pressure. She was found in AF rvr and fluid overload    Afib RVR  Acute on Chronic combined systolic and diastolic CHF, EF 35-40% (per echo 11/20)  hypervolemic hyponatremia  hyperkalemia  moderate to severe mitral regurg  COVID antibodies present    free water restriction  hyperK was treated. hold home supplements. hold acei  switched lasix to PO  strict I&O's  tele  free water restriction of 1 L per day as rec by nephrology  metoprolol increased   digoxin stopped  Echo noted  cardiology, nephrology and palliative care consults appreciated.   d/w pt's daughter Velia, updates given and all questions answered.   pt is probably not decisional due to intermittent confusion.   family does NOT want the patient to get covid vaccine due to reactions/suspected allergy to flu shot. consent NOT given.   code status: Full code      T(C): 36.7 (03-18-21 @ 05:19), Max: 36.7 (03-18-21 @ 05:19)  HR: 77 (03-18-21 @ 05:19) (67 - 118)  BP: 105/54 (03-18-21 @ 05:19) (96/55 - 123/62)  RR: 18 (03-18-21 @ 05:19) (18 - 20)  SpO2: 99% (03-18-21 @ 05:19) (99% - 100%)      Gen - Pleasant, cooperative, in no acute distress, chronically ill appearing  HEENT- PERRL, moist mucus membranes, OP clear  CV - IRIR, No r/g, +pulses, +murmur, no edema  Lungs - Good effort, diminished BS in the bases bilaterally  Abdomen - Soft, nontender, nondistended, +BS, No rebound/rigidity/guarding  Ext - No cyanosis/clubbing.   Skin - No rashes, No jaundice  Psych- Alert & oriented x 2-3  Neuro-  no facial droop, EOMI. moves all ext    Dispo: discharge to home w/ caregiver in stable condition    Final diagnosis, treatment plan, and follow-up recommendations were discussed and explained to the patient/daughter. The patient/daughter was given an opportunity to ask questions concerning the diagnosis and treatment plan. The patient/daughter acknowledged understanding of the diagnosis, treatment, and follow-up recommendations. The patient/daughter was advised to seek urgent care upon discharge if worsening symptoms develop prior to scheduled follow-up. Time spent on discharge included time with the patient, and also coordinating discharge care as outlined below.    Total time spent: 50 min

## 2021-03-30 NOTE — ED PROVIDER NOTE - PHYSICAL EXAMINATION
*GEN: No acute distress, well appearing   *HEAD: Normocephalic, Atraumatic  *EYES/NOSE: b/l Pupils symmetric & Reactive to light, EOMI b/l  *THROAT: airway patent, moist mucous membranes  *NECK: Neck supple  *PULMONARY: No Respiratory distress, symmetric b/l chest rise  *CARDIAC: s1s2, regular rhythm   *ABDOMEN:  Non Tender, Non Distended, soft, no guarding, no rebound, no masses   *BACK: no CVA tenderness, No midline vertebral tenderness to palpation   *EXTREMITIES: symmetric pulses, 2+ DP & radial pulses, no cyanosis, no edema   *SKIN: no rash, no bruising   *NEUROLOGIC: A&Ox1, +slurred speech, +RLE weakness  *PSYCH: appropriate concern about symptoms, pleasant

## 2021-03-30 NOTE — H&P ADULT - HISTORY OF PRESENT ILLNESS
89 yo WF h/o A-fib not on anticoagulants (falls), CHF EF 35-40%, HLD, moderate to severe mitral regurgitation, s/p R hip replacement, who not does ambulate independently at baseline, who presented to the ED sent by her family due to progressive aphasia for the past 4 days.  Patient was admitted 2 weeks ago for rapid Afib and Chf, and was discharged on Lasix and water restriction; today her Cr was found to be elevated      PAST MEDICAL HISTORY:  A-fib   CHF (congestive heart failure)   Glaucoma   HLD (hyperlipidemia).     PAST SURGICAL HISTORY:  S/P hip replacement, right.     FAMILY HISTORY:  Patient unable to provide history    Social History:  no tobacco, ETOH, IVDA lives home with aids, non ambulatory            REVIEW OF SYSTEMS:  unable to obtain due to aphasia     All other review of systems is negative unless indicated above    Vital Signs Last 24 Hrs  T(C): 36.8 (30 Mar 2021 11:47), Max: 36.8 (30 Mar 2021 09:14)  T(F): 98.2 (30 Mar 2021 11:47), Max: 98.2 (30 Mar 2021 09:14)  HR: 113 (30 Mar 2021 11:47) (102 - 114)  BP: 123/92 (30 Mar 2021 11:47) (115/71 - 123/92)  BP(mean): 102 (30 Mar 2021 11:47) (102 - 102)  RR: 28 (30 Mar 2021 11:47) (18 - 31)  SpO2: 92% (30 Mar 2021 09:47) (92% - 99%)    PHYSICAL EXAM:    GENERAL: NAD, Well nourished  HEENT:  NC/AT, EOMI, PERRLA, No scleral icterus, Moist mucous membranes  NECK: Supple, No JVD  CNS:  Alert & Oriented X0, Motor Strength 5/5 B/L upper , 2/5 bilateral lower   LUNG: Normal Breath sounds, bilateral crackles   HEART: RRR; No murmurs, No rubs  ABDOMEN: +BS, ST/ND/NT  GENITOURINARY: Voiding, Bladder not distended  EXTREMITIES:  2+ Peripheral Pulses, No clubbing, No cyanosis, No tibial edema  MUSCULOSKELTAL: Joints normal ROM, No TTP, No effusion  SKIN: no rashes  RECTAL: deferred, not indicated  BREAST: deferred                          13.6   18.14 )-----------( 224      ( 30 Mar 2021 12:30 )             40.0     03-30    129<L>  |  95<L>  |  96<H>  ----------------------------<  96  4.4   |  23  |  2.19<H>    Ca    8.7      30 Mar 2021 12:30    TPro  6.8  /  Alb  3.3  /  TBili  0.8  /  DBili  x   /  AST  22  /  ALT  51  /  AlkPhos  70  03-30      MEDICATIONS  (STANDING):  cefTRIAXone Injectable. 1000 milliGRAM(s) IV Push every 24 hours    MEDICATIONS  (PRN):  acetaminophen   Tablet .. 650 milliGRAM(s) Oral every 6 hours PRN Mild Pain (1 - 3)      all labs reviewed  all imaging reviewed    a/p:    1. Aphasia r/o CVA:  CT brain noted, negative  will check MRI brain  c/w ASA/Statins  Neurology consult   Speech and Swallow evaluation   DVT prophy    2. CHF, EF 35%  stable, will be cautious with hydration     3. AFib:   not on anticoag due to falls, however patient is not ambulatory; this will need to be re evaluated  bblockers   cardio consult    4. ARF:   likely prerenal azotemia  will hydrate gently   recheck bun/cr in am      89 yo WF h/o A-fib not on anticoagulants (falls), CHF EF 35-40%, HLD, moderate to severe mitral regurgitation, s/p R hip replacement, who not does ambulate independently at baseline, who presented to the ED sent by her family due to progressive aphasia for the past 4 days.  Patient was admitted 2 weeks ago for rapid Afib and Chf, and was discharged on Lasix and water restriction; today her Cr was found to be elevated      PAST MEDICAL HISTORY:  A-fib   CHF (congestive heart failure)   Glaucoma   HLD (hyperlipidemia).     PAST SURGICAL HISTORY:  S/P hip replacement, right.     FAMILY HISTORY:  Patient unable to provide history    Social History:  no tobacco, ETOH, IVDA lives home with aids, non ambulatory            REVIEW OF SYSTEMS:  unable to obtain due to aphasia     All other review of systems is negative unless indicated above    Vital Signs Last 24 Hrs  T(C): 36.8 (30 Mar 2021 11:47), Max: 36.8 (30 Mar 2021 09:14)  T(F): 98.2 (30 Mar 2021 11:47), Max: 98.2 (30 Mar 2021 09:14)  HR: 113 (30 Mar 2021 11:47) (102 - 114)  BP: 123/92 (30 Mar 2021 11:47) (115/71 - 123/92)  BP(mean): 102 (30 Mar 2021 11:47) (102 - 102)  RR: 28 (30 Mar 2021 11:47) (18 - 31)  SpO2: 92% (30 Mar 2021 09:47) (92% - 99%)    PHYSICAL EXAM:    GENERAL: NAD, Well nourished  HEENT:  NC/AT, EOMI, PERRLA, No scleral icterus, Moist mucous membranes  NECK: Supple, No JVD  CNS:  Alert & Oriented X0, Motor Strength 5/5 B/L upper , 2/5 bilateral lower   LUNG: Normal Breath sounds, bilateral crackles   HEART: RRR; No murmurs, No rubs  ABDOMEN: +BS, ST/ND/NT  GENITOURINARY: Voiding, Bladder not distended  EXTREMITIES:  2+ Peripheral Pulses, No clubbing, No cyanosis, No tibial edema  MUSCULOSKELTAL: Joints normal ROM, No TTP, No effusion  SKIN: no rashes  RECTAL: deferred, not indicated  BREAST: deferred                          13.6   18.14 )-----------( 224      ( 30 Mar 2021 12:30 )             40.0     03-30    129<L>  |  95<L>  |  96<H>  ----------------------------<  96  4.4   |  23  |  2.19<H>    Ca    8.7      30 Mar 2021 12:30    TPro  6.8  /  Alb  3.3  /  TBili  0.8  /  DBili  x   /  AST  22  /  ALT  51  /  AlkPhos  70  03-30      MEDICATIONS  (STANDING):  cefTRIAXone Injectable. 1000 milliGRAM(s) IV Push every 24 hours    MEDICATIONS  (PRN):  acetaminophen   Tablet .. 650 milliGRAM(s) Oral every 6 hours PRN Mild Pain (1 - 3)      all labs reviewed  all imaging reviewed    a/p:    1. Aphasia r/o CVA:  CT brain noted, negative  will check MRI brain  c/w ASA/Statins  Neurology consult   Speech and Swallow evaluation   DVT prophy    2. CHF, EF 35%  stable, will be cautious with hydration   hold Lasix     3. AFib:   not on anticoag due to falls, however patient is not ambulatory; this will need to be re evaluated  bblockers   cardio consult    4. ARF:   likely prerenal azotemia  will hydrate gently   recheck bun/cr in am     5. Pyuria and Leukocytosis:  r/o Uti  Ceftriaxone pending cultures

## 2021-03-30 NOTE — ED PROVIDER NOTE - NS ED ROS FT
Review of Systems:  	•	CONSTITUTIONAL: no fever  	•	SKIN: no rash  	•	RESPIRATORY: no shortness of breath  	•	GI: no vomiting, no diarrhea  	•	NEUROLOGIC: +generalized weakness, +slurred speech

## 2021-03-30 NOTE — ED ADULT NURSE NOTE - NSIMPLEMENTINTERV_GEN_ALL_ED
Implemented All Fall with Harm Risk Interventions:  [place] to call system. Call bell, personal items and telephone within reach. Instruct patient to call for assistance. Room bathroom lighting operational. Non-slip footwear when patient is off stretcher. Physically safe environment: no spills, clutter or unnecessary equipment. Stretcher in lowest position, wheels locked, appropriate side rails in place. Provide visual cue, wrist band, yellow gown, etc. Monitor gait and stability. Monitor for mental status changes and reorient to person, place, and time. Review medications for side effects contributing to fall risk. Reinforce activity limits and safety measures with patient and family. Provide visual clues: red socks.

## 2021-03-30 NOTE — ED PROVIDER NOTE - PROGRESS NOTE DETAILS
sridevi: labs hemolyzed, awaiting redraw sridevi: Discussed need to admit with patient & discussed risk and benefits.  Patient agreed to admission.  Discussed case w/ admitting doctor - agreed to admit to their service. will place bridge orders. Accepting physician said: APPROVE PT TO MOVE   prior to inpatient team evaluation

## 2021-03-30 NOTE — ED ADULT TRIAGE NOTE - CHIEF COMPLAINT QUOTE
slurred speech starting 4 days ago as per ems, now with progressive generalized weakness and sleeping a lot.

## 2021-03-30 NOTE — ED PROVIDER NOTE - OBJECTIVE STATEMENT
Pertinent HPI/PMH/PSH/FHx/SHx and Review of Systems contained within  HPI: 89 y/o female BIBA from home p/w CC slurred speech x 4-7 days, new onset. Daughter states pt was hospitalized from 03/15-03/18/21 and since being home pt with generalized weakness, decrease PO intake and has been "sleeping all day long". Pt placed on Metoprolol since DC. Cardio: Dr. Villa    PMH/PSH relevant for: A-fib not on anticoagulants (falls), CHF EF 35-40%, HLD, moderate to severe mitral regurgitation, s/p R hip replacement, not does ambulate independently at baseline  ROS negative for: fever, SOB, vomiting, diarrhea   FamilyHx and SocialHx not otherwise contributory

## 2021-03-30 NOTE — ED ADULT NURSE REASSESSMENT NOTE - NS ED NURSE REASSESS COMMENT FT1
Patients daughter states she does not want her mother to get covid vaccine since she gets a reaction to the flu shot.

## 2021-03-30 NOTE — ED PROVIDER NOTE - CLINICAL SUMMARY MEDICAL DECISION MAKING FREE TEXT BOX
Pt with slurred speech and RLE weakness past 4-7 days likely from CVA since pt is not on AC for A-fib, will need admission. NIH score of 5

## 2021-03-30 NOTE — PATIENT PROFILE ADULT - FALLEN IN THE PAST
Difficulty breathing since 1AM with cough and wheezing.  Last albuterol treatment 8:45 AM.  Pt wheezing B/L with coarse breath sounds, mild retractions, cough and tachypneic in triage.  Decreased PO. no

## 2021-03-30 NOTE — ED ADULT NURSE REASSESSMENT NOTE - NS ED NURSE REASSESS COMMENT FT1
Great difficulty in getting bloodwork. Multiple nurse and phlebotomy tries. Able to get arterial blood by Dr. Alcaraz. Continuing to wait for results.

## 2021-03-30 NOTE — ED ADULT NURSE REASSESSMENT NOTE - NS ED NURSE REASSESS COMMENT FT1
Patient resting comfortably in stretcher.  Patient awaiting bed placement on inpatient unit at this time.  Call bell within reach, safety maintained

## 2021-03-31 NOTE — PHYSICAL THERAPY INITIAL EVALUATION ADULT - IMPAIRMENTS FOUND, PT EVAL
arousal, attention, and cognition/cognitive impairment/gait, locomotion, and balance/muscle strength/posture

## 2021-03-31 NOTE — DIETITIAN INITIAL EVALUATION ADULT. - ETIOLOGY
changes in chewing/swallowing difficulty inadequate PO intake and chewing/swallowing difficulty 2/2 progressive aphasia inadequate PO intake and chewing/swallowing difficulty 2/2 CHF, Cerebral infarct

## 2021-03-31 NOTE — CONSULT NOTE ADULT - SUBJECTIVE AND OBJECTIVE BOX
History of Present Illness:   89 yo WF h/o A-fib not on anticoagulants (falls), CHF EF 35-40%, HLD, moderate to severe mitral regurgitation, s/p R hip replacement, who not does ambulate independently at baseline, who presented to the ED sent by her family due to progressive aphasia for the past 4 days.  Patient was admitted 2 weeks ago for rapid Afib and Chf, and was discharged on Lasix and water restriction; today her Cr was found to be elevated     she is currently agitated,  speaking/ screaming and moving all 4 limbs      PAST MEDICAL HISTORY:  A-fib   CHF (congestive heart failure)   Glaucoma   HLD (hyperlipidemia).     PAST SURGICAL HISTORY:  S/P hip replacement, right.     FAMILY HISTORY:  Patient unable to provide history    Social History:  no tobacco, ETOH, IVDA lives home with aids, non ambulatory            REVIEW OF SYSTEMS:  unable to obtain due to aggitation     All other review of systems is negative unless indicated above    Vital Signs Last 24 Hrs  T(C): 36.4 (31 Mar 2021 06:30), Max: 36.8 (30 Mar 2021 09:14)  T(F): 97.5 (31 Mar 2021 06:30), Max: 98.2 (30 Mar 2021 09:14)  HR: 120 (31 Mar 2021 06:30) (96 - 120)  BP: 128/85 (31 Mar 2021 06:30) (97/75 - 128/85)  BP(mean): 92 (31 Mar 2021 06:30) (84 - 102)  RR: 18 (31 Mar 2021 06:30) (12 - 31)  SpO2: 95% (31 Mar 2021 06:30) (92% - 100%)    PHYSICAL EXAM:    GENERAL:aggitated, frail appearing   CNS:  aggitated, Motor Strength 5/5 B/L upper , 2/5 bilateral lower   LUNG: Normal Breath sounds, bilateral crackles   HEART: RRR; No murmurs, No rubs  ABDOMEN: +BS, ST/ND/NT  EXTREMITIES:  2+ Peripheral Pulses, No clubbing, No cyanosis, No tibial edema                            13.6   18.14 )-----------( 224      ( 30 Mar 2021 12:30 )             40.0     03-30    129<L>  |  95<L>  |  96<H>  ----------------------------<  96  4.4   |  23  |  2.19<H>    Ca    8.7      30 Mar 2021 12:30    TPro  6.8  /  Alb  3.3  /  TBili  0.8  /  DBili  x   /  AST  22  /  ALT  51  /  AlkPhos  70  03-30      MEDICATIONS  (STANDING):  aspirin  chewable 81 milliGRAM(s) Oral daily  atorvastatin 10 milliGRAM(s) Oral at bedtime  cefTRIAXone Injectable. 1000 milliGRAM(s) IV Push every 24 hours  heparin   Injectable 5000 Unit(s) SubCutaneous every 12 hours  metoprolol tartrate 50 milliGRAM(s) Oral two times a day  sodium chloride 0.9%. 500 milliLiter(s) (50 mL/Hr) IV Continuous <Continuous>    MEDICATIONS  (PRN):  acetaminophen   Tablet .. 650 milliGRAM(s) Oral every 6 hours PRN Mild Pain (1 - 3)      < from: TTE Echo Complete w/o Contrast w/ Doppler (11.20.20 @ 09:29) >     Findings     Mitral Valve   Mild to moderate mitral annular calcification is present.   Fibrocalcific changes noted to themitral valve leaflets with preserved   leaflet excursion.   Moderate to severe (3+) mitral regurgitation is present.     Aortic Valve   Fibrocalcific changes noted to the Aortic valve leaflets with preserved   leaflet excursion.   Mild to Moderate aortic regurgitation is present.     Tricuspid Valve   Mild to moderate tricuspid valve regurgitation is present.   Mild pulmonary hypertension.     Pulmonic Valve   Normal appearing pulmonic valve structure and function.   Mild pulmonic valvular regurgitation (1+) is present.     Left Atrium   The left atrium is moderately dilated.     Left Ventricle   The interventricular septum appears hypokinetic.   Estimated left ventricular ejection fraction is 35-40 %.     Right Atrium   The right atrium is at the upper limits of normal.     Right Ventricle   Normal appearing right ventricle structure and function.     Pericardial Effusion   No evidence of pericardial effusion.     Pleural Effusion   Pleural effusion - is present..     Miscellaneous   TheIVC appears normal.     Impression     Summary     Mild to moderate mitral annular calcification is present.   Fibrocalcific changes noted to the mitral valve leaflets with preserved   leaflet excursion.   Moderate to severe (3+) mitral regurgitationis present.   Fibrocalcific changes noted to the Aortic valve leaflets with preserved   leaflet excursion.   Mild to Moderate aortic regurgitation is present.   Mild to moderate tricuspid valve regurgitation is present.   Mild pulmonary hypertension.   Normal appearing pulmonic valve structure and function.   Mild pulmonic valvular regurgitation (1+) is present.   The left atrium is moderately dilated.   The interventricular septum appears hypokinetic.   Estimated left ventricular ejection fraction is 35-40 %.   The right atrium is at the upper limits of normal.   Pleural effusion - is present..     Signature     ----------------------------------------------------------------   Electronically signed by Carla Pineda MD(Interpreting   physician) on 11/21/2020 07:43 AM   ----------------------------------------------------------------      < end of copied text >

## 2021-03-31 NOTE — PHYSICAL THERAPY INITIAL EVALUATION ADULT - DISCHARGE DISPOSITION, PT EVAL
return home with 24/7 HHAs vs [facility] when closer to PLOF ,mentation improved/home w/ assist/home w/ home PT/rehabilitation facility

## 2021-03-31 NOTE — SWALLOW BEDSIDE ASSESSMENT ADULT - COMMENTS
The patient was admitted to  with a few day history of progressive lethargy and progressive difficulties communicating. A head CT revealed a possible meningioma but no acute stroke. This profile is superimposed upon a h/o a recent hospitalization with rapid A-Fib, CHF mitral regurgitation, HLD, glaucoma and prior right hip replacement.

## 2021-03-31 NOTE — DIETITIAN INITIAL EVALUATION ADULT. - OTHER INFO
89 yo WF h/o A-fib not on anticoagulants (falls), CHF EF 35-40%, HLD, moderate to severe mitral regurgitation, s/p R hip replacement, who not does ambulate independently at baseline, who presented to the ED sent by her family due to progressive aphasia for the past 4 days. Patient was admitted 2 weeks ago for rapid Afib and Chf, and was discharged on Lasix and water restriction; today her Cr was found to be elevated.    Spoke to RN who said pt is not eating. RN attempted to feed pt applesauce, but pt became agitated. Daughter states that the pt has had no recent weight loss and typically weighs around 105-110 lbs. Daughter would like to order food for the pt while she is in the hospital and states that she does not want her mother to have any meat ordered for her on Good Friday. Daughter interested in supplementing with Ensure. 89 yo WF h/o A-fib not on anticoagulants (falls), CHF EF 35-40%, HLD, moderate to severe mitral regurgitation, s/p R hip replacement, who not does ambulate independently at baseline, who presented to the ED sent by her family due to progressive aphasia for the past 4 days. Patient was admitted 2 weeks ago for rapid Afib and Chf, and was discharged on Lasix and water restriction; today her Cr was found to be elevated.    Spoke to RN who said pt is not eating. RN attempted to feed pt applesauce, but pt became agitated. Daughter states that the pt has had no recent weight loss and typically weighs around 105-110 lbs. Daughter would like to order food for the pt while she is in the hospital and states that she does not want her mother to have any meat ordered for her on Good Friday. Daughter interested in supplementing with Ensure. Per SLP kike, diet changed to NPO. Spoke with RN about changing the diet and will also provide pt with Chapstick and sponge water due to pt c/o chapped lips and thirst.

## 2021-03-31 NOTE — PHYSICAL THERAPY INITIAL EVALUATION ADULT - FOLLOWS COMMANDS/ANSWERS QUESTIONS, REHAB EVAL
speech slurred at times and seems incongruent to conversation at hand, internally distracted/50% of the time/able to follow single-step instructions

## 2021-03-31 NOTE — SWALLOW BEDSIDE ASSESSMENT ADULT - SLP GENERAL OBSERVATIONS
The pt was lethargic, communicatively passive/withdrawn, internally distractible, & periodically oppositional. Eye opening and joint attention were fleeting. Unable to direct to structured communication tasks & pt did  not follow commands. However, she did occasionally spontaneously verbalize. At these times, her utterances either expressed displeasure(i.e "leave me alone" and/or were unintelligible or contextually inappropriate. Pt's altered alertness/sensorium hinders communicative competence and precludes the feasibility of assessing speech-language integrity. Encephalopathy of unclear cause suspected. The pt was lethargic, communicatively passive/withdrawn, internally distractible, & periodically oppositional. Eye opening and joint attention were fleeting. Unable to direct to structured communication tasks & pt did not follow commands. However, she did occasionally spontaneously verbalize. At these times, her utterances either expressed displeasure(i.e "leave me alone") and/or were unintelligible/contextually inappropriate when decipherable. Pt's altered alertness/sensorium hindered communicative competence and precluded the feasibility of assessing speech-language integrity. Encephalopathy of unclear cause suspected.

## 2021-03-31 NOTE — CONSULT NOTE ADULT - SUBJECTIVE AND OBJECTIVE BOX
Patient is a 89y old  Female who presents with a chief complaint of aphasia       HPI:  89 yo WF h/o A-fib not on anticoagulants (falls), CHF EF 35-40%, HLD, moderate to severe mitral regurgitation, s/p R hip replacement, who not does ambulate independently at baseline, who presented to the ED sent by her family due to progressive aphasia for the past 4 days. Patient was admitted 2 weeks ago for rapid Afib and Chf, and was discharged on Lasix and water restriction; today her Cr was found to be elevated. Pt lethargic, when calling her name and trying to wake her up she screaming "leave me alone , let me sleep " one of her niece was at bed side. Not cooperative for exam, limited exam.     PAST MEDICAL & SURGICAL HISTORY:  Glaucoma  HLD (hyperlipidemia)  A-fib  CHF (congestive heart failure)  S/P hip replacement, right    FAMILY HISTORY:  Known health problems: none    Social Hx:  Nonsmoker, no drug or alcohol use    MEDICATIONS  (STANDING):  aspirin  chewable 81 milliGRAM(s) Oral daily  atorvastatin 10 milliGRAM(s) Oral at bedtime  cefTRIAXone Injectable. 1000 milliGRAM(s) IV Push every 24 hours  diltiazem Infusion 5 mG/Hr (5 mL/Hr) IV Continuous <Continuous>  heparin   Injectable 5000 Unit(s) SubCutaneous every 12 hours  metoprolol tartrate 50 milliGRAM(s) Oral two times a day  sodium chloride 0.9%. 500 milliLiter(s) (50 mL/Hr) IV Continuous <Continuous>       Allergies    atenolol (Other)  sulfa drugs (Other)    ROS: Pertinent positives in HPI, all other ROS were reviewed and are negative.      Vital Signs Last 24 Hrs  T(C): 36.3 (31 Mar 2021 08:34), Max: 36.8 (30 Mar 2021 11:47)  T(F): 97.4 (31 Mar 2021 08:34), Max: 98.2 (30 Mar 2021 11:47)  HR: 126 (31 Mar 2021 08:34) (96 - 126)  BP: 107/82 (31 Mar 2021 08:34) (97/75 - 128/85)  BP(mean): 92 (31 Mar 2021 06:30) (84 - 102)  RR: 18 (31 Mar 2021 08:34) (12 - 28)  SpO2: 95% (31 Mar 2021 08:34) (95% - 100%)        Constitutional:  Lethargic, rousable but not following commands  HEENT: PERRLA, EOMI  Neck: Supple.  Respiratory: Breath sounds are clear bilaterally  Cardiovascular: S1 and S2,  irregular rhythm  Gastrointestinal: soft, nontender  Extremities:  no edema  Vascular: Carotid Bruit - no  Musculoskeletal: no joint swelling/tenderness, no abnormal movements  Skin: No rashes    Neurological exam:  HF: Lethargic, rousable to calling name. screaming   leave me alone at times, not following commands.  CN: MARGARET, EOMI, VFF, no facial asymmetry, gag can not be tested.  Motor: Can not assess power ,moves extremities when thrashing when angry.    Sens: responds to light touch   Reflexes: Symmetric and normal +2 UE +1  downgoing toes b/l  Coord:  Can not assess  Gait/Balance: Cannot test        Labs:   03-30    129<L>  |  95<L>  |  96<H>  ----------------------------<  96  4.4   |  23  |  2.19<H>    Ca    8.7      30 Mar 2021 12:30    TPro  6.8  /  Alb  3.3  /  TBili  0.8  /  DBili  x   /  AST  22  /  ALT  51  /  AlkPhos  70  03-30                              11.1   10.79 )-----------( 197      ( 31 Mar 2021 09:40 )             34.2       Radiology:  - CT Head:    < from: CT Head No Cont (03.30.21 @ 10:08) >  Impression:    No acute hemorrhage, mass effect or extra-axial collection.    Increasing size of right frontal parafalcine extra-axial soft tissue mass likely representing a meningioma.

## 2021-03-31 NOTE — PROGRESS NOTE ADULT - SUBJECTIVE AND OBJECTIVE BOX
History of Present Illness:   89 yo WF h/o A-fib not on anticoagulants (falls), CHF EF 35-40%, HLD, moderate to severe mitral regurgitation, s/p R hip replacement, who not does ambulate independently at baseline, who presented to the ED sent by her family due to progressive aphasia for the past 4 days.  Patient was admitted 2 weeks ago for rapid Afib and Chf, and was discharged on Lasix and water restriction; today her Cr was found to be elevated    3.31: more alert today; able to speak few words    REVIEW OF SYSTEMS:  unable to obtain due to confusion     All other review of systems is negative unless indicated above    Vital Signs Last 24 Hrs  T(C): 36.3 (31 Mar 2021 08:34), Max: 36.7 (30 Mar 2021 18:00)  T(F): 97.4 (31 Mar 2021 08:34), Max: 98 (30 Mar 2021 18:00)  HR: 126 (31 Mar 2021 08:34) (96 - 126)  BP: 107/82 (31 Mar 2021 08:34) (105/71 - 128/85)  BP(mean): 92 (31 Mar 2021 06:30) (92 - 92)  RR: 18 (31 Mar 2021 08:34) (18 - 20)  SpO2: 95% (31 Mar 2021 08:34) (95% - 100%)    PHYSICAL EXAM:    GENERAL: NAD, Well nourished  HEENT:  NC/AT, EOMI, PERRLA, No scleral icterus, Moist mucous membranes  NECK: Supple, No JVD  CNS:  Alert & Oriented X0, Motor Strength 5/5 B/L upper , 2/5 bilateral lower   LUNG: Normal Breath sounds, bilateral crackles   HEART: RRR; No murmurs, No rubs  ABDOMEN: +BS, ST/ND/NT  GENITOURINARY: Voiding, Bladder not distended  EXTREMITIES:  2+ Peripheral Pulses, No clubbing, No cyanosis, No tibial edema  MUSCULOSKELTAL: Joints normal ROM, No TTP, No effusion  SKIN: no rashes  RECTAL: deferred, not indicated  BREAST: deferred               Labs:                        11.1   10.79 )-----------( 197      ( 31 Mar 2021 09:40 )             34.2     03-31    136  |  105  |  88<H>  ----------------------------<  80  3.6   |  22  |  1.69<H>    Ca    7.9<L>      31 Mar 2021 09:40    TPro  6.8  /  Alb  3.3  /  TBili  0.8  /  DBili  x   /  AST  22  /  ALT  51  /  AlkPhos  70  03-30           Cultures:       MEDICATIONS  (STANDING):  cefTRIAXone Injectable. 1000 milliGRAM(s) IV Push every 24 hours    MEDICATIONS  (PRN):  acetaminophen   Tablet .. 650 milliGRAM(s) Oral every 6 hours PRN Mild Pain (1 - 3)      all labs reviewed  all imaging reviewed    a/p:    1. Aphasia r/o CVA:  CT brain noted, negative  will check MRI brain  c/w ASA/Statins  Neurology consult   Speech and Swallow evaluation   DVT prophy    2. CHF, EF 35%  stable, will be cautious with hydration   hold Lasix     3. AFib:   not on anticoag due to falls, however patient is not ambulatory; this will need to be re evaluated  bblockers   cardio consult    4. ARF: improving   likely prerenal azotemia  will hydrate gently   recheck bun/cr in am     5. Pyuria and Leukocytosis:  r/o Uti  Ceftriaxone pending cultures

## 2021-03-31 NOTE — PHYSICAL THERAPY INITIAL EVALUATION ADULT - LEVEL OF INDEPENDENCE: SUPINE/SIT, REHAB EVAL
pt with impaired sitting balance and posture kyphotic ,tips posteriorly,difficulty maintaining unsupported sitting due to weakness/lethargy/maximum assist (25% patients effort)

## 2021-03-31 NOTE — SWALLOW BEDSIDE ASSESSMENT ADULT - ORAL PREPARATORY PHASE
Pt's alertness for/orientation to feeding were reduced. Further, her willingness to accept PO was sometimes psychogenically decreased as evidenced by periodic yelling/periodic resistive mouth closing upon PO presentation.

## 2021-03-31 NOTE — PHYSICAL THERAPY INITIAL EVALUATION ADULT - TRANSFER SKILLS, REHAB EVAL
needs device and assist pt at bed to chair activity level with assist of HHA prior to admission/needs device and assist

## 2021-03-31 NOTE — SWALLOW BEDSIDE ASSESSMENT ADULT - ADDITIONAL RECOMMENDATIONS
1) NEURO F/U TO RULE OUT A NEURO EVENT.     2) ID CONSULT TO RULE OUT INFECTION CAUSE OF ENCEPHALOPATHY.     3) NUTRITION F/U. PT AT NUTRITION RISK IN CURRENT STATE.

## 2021-03-31 NOTE — SWALLOW BEDSIDE ASSESSMENT ADULT - ORAL PHASE
When PO entered oral cavity, food materials stagnated in her mouth without attempts to form or transfer a bolus. Pt sometimes yelled while PO in oral cavity. She did not demonstrate an over cognitive sensory oral awareness of food stasis in mouth and PO than needed to be manually removed prior to entering Pharynx due to a lack of functional oral processing. When PO passively entered oral cavity, food materials stagnated in her mouth without attempts to form or transfer a bolus. Pt sometimes yelled while PO in oral cavity. She did not demonstrate an over cognitive sensory oral awareness of food stasis in mouth and PO than needed to be manually removed prior to entering Pharynx due to a lack of functional oral processing.

## 2021-03-31 NOTE — DIETITIAN INITIAL EVALUATION ADULT. - ADD RECOMMEND
-Will provide Ensure Enlive Chocolate BID to increase PO intake ; -Encourage PO intake at meals; -Monitor daily weight and labs; -RD remains available, consult if needed -Recommend NPO diet at this time per SLP eval; -Monitor daily weight and labs; -RD remains available, consult if needed -Recommend NPO diet at this time per SLP eval; -Recommend F/U with palliative care; -Monitor daily weight and labs; -RD remains available, consult if needed -Recommend NPO diet at this time per SLP eval; -Recommend F/U with palliative care; Supplements if pt upgraded to PO intake-Monitor daily weight and labs; -RD remains available, consult if needed

## 2021-03-31 NOTE — DIETITIAN INITIAL EVALUATION ADULT. - ORAL NUTRITION SUPPLEMENTS
Ensure BID When diet advances, recommend Ensure BID Ensure Enlive TID, gelatein bid if pt upgraded to PO intake

## 2021-03-31 NOTE — PHYSICAL THERAPY INITIAL EVALUATION ADULT - ADDITIONAL COMMENTS
pt has live in 24/7 [facility] aide thru [employer] ,had home care thru [facility] with RICHIE 3/18 after last hospitalization ,also known to [facility]

## 2021-03-31 NOTE — PHYSICAL THERAPY INITIAL EVALUATION ADULT - GENERAL OBSERVATIONS, REHAB EVAL
recumbent in bed ,lethargic but arousable,?verbal but speech slurred at times and frequently irrelevant to examiners questions ,off topic suggestive of encephalopathy +/- dementia

## 2021-03-31 NOTE — SWALLOW BEDSIDE ASSESSMENT ADULT - NS SPL SWALLOW CLINIC TRIAL FT
Solids and liquids thinner than honey consistency not offered given severity of feeding compromise/Oral Dysphagia.

## 2021-03-31 NOTE — DIETITIAN INITIAL EVALUATION ADULT. - ORAL INTAKE PTA/DIET HISTORY
Pt sleeping. Spoke with daughter who states that pt's appetite was good PTA. Daughter reports that pt was eating 3 meals a day and was following a low sodium diet for CHF. Pt has a home aide who cooks and prepares meals. Reports that pt did not have a chewing/swallowing difficulty PTA.

## 2021-03-31 NOTE — DIETITIAN INITIAL EVALUATION ADULT. - PERTINENT LABORATORY DATA
03-31    136  |  105  |  88<H>  ----------------------------<  80  3.6   |  22  |  1.69<H>    Ca    7.9<L>      31 Mar 2021 09:40    TPro  6.8  /  Alb  3.3  /  TBili  0.8  /  DBili  x   /  AST  22  /  ALT  51  /  AlkPhos  70  03-30    BMI: BMI (kg/m2): 20.6 (03-30-21 @ 21:05)  HbA1c: A1C with Estimated Average Glucose Result: 5.7 % (03-30-21 @ 17:16)    Glucose: POCT Blood Glucose.: 106 mg/dL (03-30-21 @ 09:48)    BP: 107/82 (03-31-21 @ 08:34) (97/75 - 128/85)

## 2021-03-31 NOTE — DIETITIAN INITIAL EVALUATION ADULT. - PHYSCIAL ASSESSMENT
Pt appears underweight with visible signs of muscle/fat wasting.  Anton score = 13 - at risk for pressure injury  No BM documented underweight

## 2021-03-31 NOTE — DIETITIAN INITIAL EVALUATION ADULT. - PERTINENT MEDS FT
MEDICATIONS  (STANDING):  aspirin  chewable 81 milliGRAM(s) Oral daily  atorvastatin 10 milliGRAM(s) Oral at bedtime  cefTRIAXone Injectable. 1000 milliGRAM(s) IV Push every 24 hours  diltiazem Infusion 5 mG/Hr (5 mL/Hr) IV Continuous <Continuous>  heparin   Injectable 5000 Unit(s) SubCutaneous every 12 hours  metoprolol tartrate 50 milliGRAM(s) Oral two times a day  sodium chloride 0.9%. 500 milliLiter(s) (50 mL/Hr) IV Continuous <Continuous>    MEDICATIONS  (PRN):  acetaminophen   Tablet .. 650 milliGRAM(s) Oral every 6 hours PRN Mild Pain (1 - 3)

## 2021-03-31 NOTE — SWALLOW BEDSIDE ASSESSMENT ADULT - SWALLOW EVAL: DIAGNOSIS
1) The pt was lethargic, communicatively passive/withdrawn, internally distractible, & periodically oppositional. Eye opening and joint attention were fleeting. Unable to direct to structured communication tasks & pt did  not follow commands. However, she did occasionally spontaneously verbalize. At these times, her utterances either expressed displeasure(i.e "leave me alone" and/or were unintelligible or contextually inappropriate. Pt's altered alertness/sensorium hinders communicative competence and precludes the feasibility of assessing speech-language integrity. Encephalopathy of unclear cause suspected.  2) Pt demonstrates reduced alertness for/orientation to feeding with periodic psychogenically decreased  willingness to accept PO atop prominent Oral Dysphagia which hinders functional swallowing abilities/precludes the feasibility of assessing pharyngeal integrity at this time. Pt's altered alertness/mentation/suspected encephalopathy are felt to be contributory. 1) The pt was lethargic, communicatively passive/withdrawn, internally distractible, & periodically oppositional. Eye opening and joint attention were fleeting. Unable to direct to structured communication tasks & pt did not follow commands. However, she did occasionally spontaneously verbalize. At these times, her utterances either expressed displeasure(i.e "leave me alone") and/or were unintelligible/contextually inappropriate when decipherable. Pt's altered alertness/sensorium hindered communicative competence and precluded the feasibility of assessing speech-language integrity. Encephalopathy of unclear cause suspected.  2) Pt demonstrates reduced alertness for/orientation to feeding with periodic psychogenically decreased willingness to accept PO atop prominent Oral Dysphagia which hindered functional swallowing abilities/precluded the feasibility of assessing pharyngeal integrity at this time. Pt's altered alertness/mentation/suspected encephalopathy are felt to be contributory.

## 2021-04-01 NOTE — DIETITIAN NUTRITION RISK NOTIFICATION - ADDITIONAL COMMENTS/DIETITIAN RECOMMENDATIONS
-Recommend NPO diet at this time per SLP eval  -Recommend F/U with palliative care  -Monitor daily weight and labs   Change diet to NPO as per SLP, pt at risk for aspiration  -Recommend F/U with palliative care  -Monitor daily weight and labs

## 2021-04-01 NOTE — PROGRESS NOTE ADULT - SUBJECTIVE AND OBJECTIVE BOX
History of Present Illness:   89 yo WF h/o A-fib not on anticoagulants (falls), CHF EF 35-40%, HLD, moderate to severe mitral regurgitation, s/p R hip replacement, who not does ambulate independently at baseline, who presented to the ED sent by her family due to progressive aphasia for the past 4 days.  Patient was admitted 2 weeks ago for rapid Afib and Chf, and was discharged on Lasix and water restriction; today her Cr was found to be elevated     she is currently agitated,  speaking/ screaming and moving all 4 limbs      4/1/21- more calm this AM, arousable, weak    PAST MEDICAL HISTORY:  A-fib   CHF (congestive heart failure)   Glaucoma   HLD (hyperlipidemia).     PAST SURGICAL HISTORY:  S/P hip replacement, right.     FAMILY HISTORY:  Patient unable to provide history    Social History:  no tobacco, ETOH, IVDA lives home with aids, non ambulatory        All other review of systems is negative unless indicated above    MEDICATIONS  (STANDING):  aspirin  chewable 81 milliGRAM(s) Oral daily  atorvastatin 10 milliGRAM(s) Oral at bedtime  cefTRIAXone Injectable. 1000 milliGRAM(s) IV Push every 24 hours  diltiazem Infusion 5 mG/Hr (5 mL/Hr) IV Continuous <Continuous>  heparin   Injectable 5000 Unit(s) SubCutaneous every 12 hours  metoprolol tartrate 50 milliGRAM(s) Oral two times a day  sodium chloride 0.9%. 500 milliLiter(s) (50 mL/Hr) IV Continuous <Continuous>    MEDICATIONS  (PRN):  acetaminophen   Tablet .. 650 milliGRAM(s) Oral every 6 hours PRN Mild Pain (1 - 3)     Vital Signs Last 24 Hrs  T(C): 36.3 (31 Mar 2021 16:45), Max: 36.3 (31 Mar 2021 08:34)  T(F): 97.4 (31 Mar 2021 16:45), Max: 97.4 (31 Mar 2021 08:34)  HR: 98 (01 Apr 2021 05:05) (82 - 126)  BP: 100/45 (01 Apr 2021 05:05) (85/49 - 107/82)  BP(mean): 66 (01 Apr 2021 01:20) (66 - 76)  RR: 20 (31 Mar 2021 22:06) (18 - 20)  SpO2: 94% (31 Mar 2021 22:06) (94% - 97%)  PHYSICAL EXAM:    GENERAL:sleeping but arouable. frail appearing   LUNG: Normal Breath sounds,   HEART: irregular tachy  ABDOMEN: +BS, ST/ND/NT  EXTREMITIES:  2+ Peripheral Pulses, No clubbing, No cyanosis, No tibial edema                          11.1   10.79 )-----------( 197      ( 31 Mar 2021 09:40 )             34.2   03-31    136  |  105  |  88<H>  ----------------------------<  80  3.6   |  22  |  1.69<H>    Ca    7.9<L>      31 Mar 2021 09:40    TPro  6.8  /  Alb  3.3  /  TBili  0.8  /  DBili  x   /  AST  22  /  ALT  51  /  AlkPhos  70  03-30      < from: TTE Echo Complete w/o Contrast w/ Doppler (11.20.20 @ 09:29) >     Findings     Mitral Valve   Mild to moderate mitral annular calcification is present.   Fibrocalcific changes noted to themitral valve leaflets with preserved   leaflet excursion.   Moderate to severe (3+) mitral regurgitation is present.     Aortic Valve   Fibrocalcific changes noted to the Aortic valve leaflets with preserved   leaflet excursion.   Mild to Moderate aortic regurgitation is present.     Tricuspid Valve   Mild to moderate tricuspid valve regurgitation is present.   Mild pulmonary hypertension.     Pulmonic Valve   Normal appearing pulmonic valve structure and function.   Mild pulmonic valvular regurgitation (1+) is present.     Left Atrium   The left atrium is moderately dilated.     Left Ventricle   The interventricular septum appears hypokinetic.   Estimated left ventricular ejection fraction is 35-40 %.     Right Atrium   The right atrium is at the upper limits of normal.     Right Ventricle   Normal appearing right ventricle structure and function.     Pericardial Effusion   No evidence of pericardial effusion.     Pleural Effusion   Pleural effusion - is present..     Miscellaneous   TheIVC appears normal.     Impression     Summary     Mild to moderate mitral annular calcification is present.   Fibrocalcific changes noted to the mitral valve leaflets with preserved   leaflet excursion.   Moderate to severe (3+) mitral regurgitationis present.   Fibrocalcific changes noted to the Aortic valve leaflets with preserved   leaflet excursion.   Mild to Moderate aortic regurgitation is present.   Mild to moderate tricuspid valve regurgitation is present.   Mild pulmonary hypertension.   Normal appearing pulmonic valve structure and function.   Mild pulmonic valvular regurgitation (1+) is present.   The left atrium is moderately dilated.   The interventricular septum appears hypokinetic.   Estimated left ventricular ejection fraction is 35-40 %.   The right atrium is at the upper limits of normal.   Pleural effusion - is present..     Signature     ----------------------------------------------------------------   Electronically signed by Carla Pineda MD(Interpreting   physician) on 11/21/2020 07:43 AM   ----------------------------------------------------------------      < end of copied text >

## 2021-04-01 NOTE — PROGRESS NOTE ADULT - ASSESSMENT
89 F with AF not on AC, systolic heart failure presents with altered mental status with aphasia- now resolved      AF- hx of AF not rate controlled as she is not taking PO- recommend Cardizem drip at  this time- at lowest dose needed - AC has been discontinued previously due to risk of falls-discussed with daughter last night that patient is lower risk of falls as she is mostly bed bound- she defers AC at ths time  would resume PO medications for rate control when able to take PO- would start with metoprolol     CHF- normally on lasix as home- recommend holding now due to increased  creatine-- consider gentle hydration in the setting of PACO and close monitoring of renal function-- no current clinical evidence for fluid overload       CVA vs TIA- vs AMS due to infection- recommend MRI if tolerates, no acute stroke or bleed on CT-  continue abx for infection     will follow

## 2021-04-01 NOTE — PROGRESS NOTE ADULT - SUBJECTIVE AND OBJECTIVE BOX
History of Present Illness:   89 yo WF h/o A-fib not on anticoagulants (falls), CHF EF 35-40%, HLD, moderate to severe mitral regurgitation, s/p R hip replacement, who not does ambulate independently at baseline, who presented to the ED sent by her family due to progressive aphasia for the past 4 days.  Patient was admitted 2 weeks ago for rapid Afib and Chf, and was discharged on Lasix and water restriction; today her Cr was found to be elevated    patient sitting up. Denies any HA, CP, SOB. No fevers, chills or shakes. comfortable. tolerating feeding.     REVIEW OF SYSTEMS:  All other review of systems is negative unless indicated above    PHYSICAL EXAM:  ICU Vital Signs Last 24 Hrs  T(C): 36.4 (01 Apr 2021 08:37), Max: 36.4 (01 Apr 2021 08:37)  T(F): 97.5 (01 Apr 2021 08:37), Max: 97.5 (01 Apr 2021 08:37)  HR: 104 (01 Apr 2021 08:37) (82 - 104)  BP: 114/94 (01 Apr 2021 08:37) (85/49 - 114/94)  BP(mean): 66 (01 Apr 2021 01:20) (66 - 76)  RR: 20 (01 Apr 2021 08:37) (18 - 20)  SpO2: 98% (01 Apr 2021 08:37) (94% - 98%)  GENERAL: NAD, Well nourished  HEENT:  NC/AT, EOMI, PERRLA, No scleral icterus, Moist mucous membranes  NECK: Supple, No JVD  CNS:  Alert & Oriented X0, Motor Strength 5/5 B/L upper , 2/5 bilateral lower   LUNG: Normal Breath sounds, bilateral crackles   HEART: RRR; No murmurs, No rubs  ABDOMEN: +BS, ST/ND/NT  GENITOURINARY: Voiding, Bladder not distended  EXTREMITIES:  2+ Peripheral Pulses, No clubbing, No cyanosis, No tibial edema  MUSCULOSKELTAL: Joints normal ROM, No TTP, No effusion  SKIN: no rashes  RECTAL: deferred, not indicated  BREAST: deferred               Labs:                        11.1   10.79 )-----------( 197      ( 31 Mar 2021 09:40 )             34.2     03-31    136  |  105  |  88<H>  ----------------------------<  80  3.6   |  22  |  1.69<H>    Ca    7.9<L>      31 Mar 2021 09:40    TPro  6.8  /  Alb  3.3  /  TBili  0.8  /  DBili  x   /  AST  22  /  ALT  51  /  AlkPhos  70  03-30           Cultures:       MEDICATIONS  (STANDING):  cefTRIAXone Injectable. 1000 milliGRAM(s) IV Push every 24 hours    MEDICATIONS  (PRN):  acetaminophen   Tablet .. 650 milliGRAM(s) Oral every 6 hours PRN Mild Pain (1 - 3)      all labs reviewed  all imaging reviewed    # AMS with Encephalopathy   -Agree with MRI if Pt cooperative.  -If not repeat CT head  -Cardiology Dr. villa to follow up.  -Continue Aspirin/ statin  -Speech / swallow evaluation  -Correct underling metabolic  causes  -EEG    # Rt Frontal Meningioma   -Incidental seen last scan in 2019 slightly large .  -NS if family wants  -No intervention from my point of view    # Pyuria and Leukocytosis:  r/o Uti  -Antibiotics  -Per medicine    # PAF  - poor  rate controlled as she is not taking PO  - Cardizem drip at  this time  - AC has been discontinued previously due to risk of falls  - would resume PO medications for rate control when able to take PO  - would start with metoprolol   - Dr. Villa following    #  chronic systolic CHF   - holding now due to increased  creatine-     # ARF: improving   - likely prerenal azotemia  - will hydrate gently   - recheck bun/cr in am      History of Present Illness:   89 yo WF h/o A-fib not on anticoagulants (falls), CHF EF 35-40%, HLD, moderate to severe mitral regurgitation, s/p R hip replacement, who not does ambulate independently at baseline, who presented to the ED sent by her family due to progressive aphasia for the past 4 days.  Patient was admitted 2 weeks ago for rapid Afib and Chf, and was discharged on Lasix and water restriction; today her Cr was found to be elevated    patient sitting up. Denies any HA, CP, SOB. No fevers, chills or shakes. comfortable. tolerating feeding.     REVIEW OF SYSTEMS:  All other review of systems is negative unless indicated above    PHYSICAL EXAM:  ICU Vital Signs Last 24 Hrs  T(C): 36.4 (01 Apr 2021 08:37), Max: 36.4 (01 Apr 2021 08:37)  T(F): 97.5 (01 Apr 2021 08:37), Max: 97.5 (01 Apr 2021 08:37)  HR: 104 (01 Apr 2021 08:37) (82 - 104)  BP: 114/94 (01 Apr 2021 08:37) (85/49 - 114/94)  BP(mean): 66 (01 Apr 2021 01:20) (66 - 76)  RR: 20 (01 Apr 2021 08:37) (18 - 20)  SpO2: 98% (01 Apr 2021 08:37) (94% - 98%)  GENERAL: NAD, Well nourished  HEENT:  NC/AT, EOMI, PERRLA, No scleral icterus, Moist mucous membranes  NECK: Supple, No JVD  CNS:  Alert & Oriented X0, Motor Strength 5/5 B/L upper , 2/5 bilateral lower   LUNG: Normal Breath sounds, bilateral crackles   HEART: RRR; No murmurs, No rubs  ABDOMEN: +BS, ST/ND/NT  GENITOURINARY: Voiding, Bladder not distended  EXTREMITIES:  2+ Peripheral Pulses, No clubbing, No cyanosis, No tibial edema  MUSCULOSKELTAL: Joints normal ROM, No TTP, No effusion  SKIN: no rashes  RECTAL: deferred, not indicated  BREAST: deferred               Labs:                        11.1   10.79 )-----------( 197      ( 31 Mar 2021 09:40 )             34.2     03-31    136  |  105  |  88<H>  ----------------------------<  80  3.6   |  22  |  1.69<H>    Ca    7.9<L>      31 Mar 2021 09:40    TPro  6.8  /  Alb  3.3  /  TBili  0.8  /  DBili  x   /  AST  22  /  ALT  51  /  AlkPhos  70  03-30           Cultures:       MEDICATIONS  (STANDING):  cefTRIAXone Injectable. 1000 milliGRAM(s) IV Push every 24 hours    MEDICATIONS  (PRN):  acetaminophen   Tablet .. 650 milliGRAM(s) Oral every 6 hours PRN Mild Pain (1 - 3)      # AMS with Encephalopathy this could be secondary to TIA vs UTI  - Agree with MRI if Pt cooperative.  - Continue Aspirin/ statin  - Speech / swallow evaluation  - Correct underling metabolic  causes  - EEG results reviewed    # Rt Frontal Meningioma   - Incidental seen last scan in 2019 slightly large .  - NS if family wants  - No intervention from my point of view    # PAF  - poor  rate controlled as she is not taking PO  - Cardizem drip at  this time  - AC has been discontinued previously due to risk of falls  - would resume PO medications for rate control when able to take PO  - would start with metoprolol   - Dr. Villa following    #  chronic systolic CHF   - holding now due to increased  creatine-     # ARF: improving   - likely prerenal azotemia  - will hydrate gently   - recheck bun/cr in am      History of Present Illness:   89 yo WF h/o A-fib not on anticoagulants (falls), CHF EF 35-40%, HLD, moderate to severe mitral regurgitation, s/p R hip replacement, who not does ambulate independently at baseline, who presented to the ED sent by her family due to progressive aphasia for the past 4 days.  Patient was admitted 2 weeks ago for rapid Afib and Chf, and was discharged on Lasix and water restriction; today her Cr was found to be elevated    patient sitting up. Denies any HA, CP, SOB. No fevers, chills or shakes. comfortable. tolerating feeding.     REVIEW OF SYSTEMS:  All other review of systems is negative unless indicated above    PHYSICAL EXAM:  ICU Vital Signs Last 24 Hrs  T(C): 36.4 (01 Apr 2021 08:37), Max: 36.4 (01 Apr 2021 08:37)  T(F): 97.5 (01 Apr 2021 08:37), Max: 97.5 (01 Apr 2021 08:37)  HR: 104 (01 Apr 2021 08:37) (82 - 104)  BP: 114/94 (01 Apr 2021 08:37) (85/49 - 114/94)  BP(mean): 66 (01 Apr 2021 01:20) (66 - 76)  RR: 20 (01 Apr 2021 08:37) (18 - 20)  SpO2: 98% (01 Apr 2021 08:37) (94% - 98%)  GENERAL: NAD, Well nourished  HEENT:  NC/AT, EOMI, PERRLA, No scleral icterus, Moist mucous membranes  NECK: Supple, No JVD  CNS:  Alert & Oriented X0, Motor Strength 5/5 B/L upper , 2/5 bilateral lower   LUNG: Normal Breath sounds, bilateral crackles   HEART: RRR; No murmurs, No rubs  ABDOMEN: +BS, ST/ND/NT  GENITOURINARY: Voiding, Bladder not distended  EXTREMITIES:  2+ Peripheral Pulses, No clubbing, No cyanosis, No tibial edema  MUSCULOSKELTAL: Joints normal ROM, No TTP, No effusion  SKIN: no rashes  RECTAL: deferred, not indicated  BREAST: deferred               Labs:                        11.1   10.79 )-----------( 197      ( 31 Mar 2021 09:40 )             34.2     03-31    136  |  105  |  88<H>  ----------------------------<  80  3.6   |  22  |  1.69<H>    Ca    7.9<L>      31 Mar 2021 09:40    TPro  6.8  /  Alb  3.3  /  TBili  0.8  /  DBili  x   /  AST  22  /  ALT  51  /  AlkPhos  70  03-30           Cultures:       MEDICATIONS  (STANDING):  cefTRIAXone Injectable. 1000 milliGRAM(s) IV Push every 24 hours    MEDICATIONS  (PRN):  acetaminophen   Tablet .. 650 milliGRAM(s) Oral every 6 hours PRN Mild Pain (1 - 3)      # AMS with Encephalopathy this could be secondary to TIA vs UTI  - Agree with MRI if Pt cooperative.  - Continue Aspirin/ statin  - Speech / swallow evaluation  - Correct underling metabolic  causes  - EEG results reviewed    # Rt Frontal Meningioma   - Incidental seen last scan in 2019 slightly large .  - NS if family wants  - No intervention from my point of view    # PAF  - poor  rate controlled as she is not taking PO  - Cardizem drip at  this time  - AC has been discontinued previously due to risk of falls  - would resume PO medications for rate control when able to take PO  - would start with metoprolol   - Dr. Villa following    #  chronic systolic CHF   - holding now due to increased  creatine-     # ARF: improving   - likely prerenal azotemia  - will hydrate gently   - recheck bun/cr in am     # Acute UTI  - preteous species in the urine  - patient is on rocephin     History of Present Illness:   89 yo WF h/o A-fib not on anticoagulants (falls), CHF EF 35-40%, HLD, moderate to severe mitral regurgitation, s/p R hip replacement, who not does ambulate independently at baseline, who presented to the ED sent by her family due to progressive aphasia for the past 4 days.  Patient was admitted 2 weeks ago for rapid Afib and Chf, and was discharged on Lasix and water restriction; today her Cr was found to be elevated    patient sitting up. Denies any HA, CP, SOB. No fevers, chills or shakes. comfortable. tolerating feeding.     REVIEW OF SYSTEMS:  All other review of systems is negative unless indicated above    PHYSICAL EXAM:  ICU Vital Signs Last 24 Hrs  T(C): 36.4 (01 Apr 2021 08:37), Max: 36.4 (01 Apr 2021 08:37)  T(F): 97.5 (01 Apr 2021 08:37), Max: 97.5 (01 Apr 2021 08:37)  HR: 104 (01 Apr 2021 08:37) (82 - 104)  BP: 114/94 (01 Apr 2021 08:37) (85/49 - 114/94)  BP(mean): 66 (01 Apr 2021 01:20) (66 - 76)  RR: 20 (01 Apr 2021 08:37) (18 - 20)  SpO2: 98% (01 Apr 2021 08:37) (94% - 98%)  GENERAL: NAD, Well nourished  HEENT:  NC/AT, EOMI, PERRLA, No scleral icterus, Moist mucous membranes  NECK: Supple, No JVD  CNS:  Alert & Oriented X0, Motor Strength 5/5 B/L upper , 2/5 bilateral lower   LUNG: Normal Breath sounds, bilateral crackles   HEART: RRR; No murmurs, No rubs  ABDOMEN: +BS, ST/ND/NT  GENITOURINARY: Voiding, Bladder not distended  EXTREMITIES:  2+ Peripheral Pulses, No clubbing, No cyanosis, No tibial edema  MUSCULOSKELTAL: Joints normal ROM, No TTP, No effusion  SKIN: no rashes  RECTAL: deferred, not indicated  BREAST: deferred             Labs:                        11.1   10.79 )-----------( 197      ( 31 Mar 2021 09:40 )             34.2     03-31    136  |  105  |  88<H>  ----------------------------<  80  3.6   |  22  |  1.69<H>    Ca    7.9<L>      31 Mar 2021 09:40    TPro  6.8  /  Alb  3.3  /  TBili  0.8  /  DBili  x   /  AST  22  /  ALT  51  /  AlkPhos  70  03-30           Cultures:       MEDICATIONS  (STANDING):  cefTRIAXone Injectable. 1000 milliGRAM(s) IV Push every 24 hours    MEDICATIONS  (PRN):  acetaminophen   Tablet .. 650 milliGRAM(s) Oral every 6 hours PRN Mild Pain (1 - 3)      # AMS with Encephalopathy this could be secondary to TIA vs UTI  - Agree with MRI if Pt cooperative.  - Continue Aspirin/ statin  - Speech / swallow evaluation  - Correct underling metabolic  causes  - EEG results reviewed    # Rt Frontal Meningioma   - Incidental seen last scan in 2019 slightly large .  - NS if family wants  - No intervention from my point of view    # PAF  - poor  rate controlled as she is not taking PO  - Cardizem drip at  this time  - patient continues to be off AC -  as patient's daughter concerend about multiple falls. Patient's daughter is concerned about risks of AC and will be off AC.   - would resume PO medications for rate control when able to take PO  - would start with metoprolol   - Dr. Villa following    #  chronic systolic CHF   - holding now due to increased  creatine-     # ARF: improving   - likely prerenal azotemia  - will hydrate gently   - recheck bun/cr in am     # Acute UTI  - preteous species in the urine  - patient is on rocephin     History of Present Illness:   89 yo WF h/o A-fib not on anticoagulants (falls), CHF EF 35-40%, HLD, moderate to severe mitral regurgitation, s/p R hip replacement, who not does ambulate independently at baseline, who presented to the ED sent by her family due to progressive aphasia for the past 4 days.  Patient was admitted 2 weeks ago for rapid Afib and Chf, and was discharged on Lasix and water restriction; today her Cr was found to be elevated    patient sitting up. Denies any HA, CP, SOB. No fevers, chills or shakes. comfortable. tolerating feeding.     REVIEW OF SYSTEMS:  All other review of systems is negative unless indicated above    PHYSICAL EXAM:  ICU Vital Signs Last 24 Hrs  T(C): 36.4 (01 Apr 2021 08:37), Max: 36.4 (01 Apr 2021 08:37)  T(F): 97.5 (01 Apr 2021 08:37), Max: 97.5 (01 Apr 2021 08:37)  HR: 104 (01 Apr 2021 08:37) (82 - 104)  BP: 114/94 (01 Apr 2021 08:37) (85/49 - 114/94)  BP(mean): 66 (01 Apr 2021 01:20) (66 - 76)  RR: 20 (01 Apr 2021 08:37) (18 - 20)  SpO2: 98% (01 Apr 2021 08:37) (94% - 98%)  GENERAL: NAD, Well nourished  HEENT:  NC/AT, EOMI, PERRLA, No scleral icterus, Moist mucous membranes  NECK: Supple, No JVD  CNS:  Alert & Oriented X0, Motor Strength 5/5 B/L upper , 2/5 bilateral lower   LUNG: Normal Breath sounds, bilateral crackles   HEART: RRR; No murmurs, No rubs  ABDOMEN: +BS, ST/ND/NT  GENITOURINARY: Voiding, Bladder not distended  EXTREMITIES:  2+ Peripheral Pulses, No clubbing, No cyanosis, No tibial edema  MUSCULOSKELTAL: Joints normal ROM, No TTP, No effusion  SKIN: no rashes  RECTAL: deferred, not indicated  BREAST: deferred             Labs:                        11.1   10.79 )-----------( 197      ( 31 Mar 2021 09:40 )             34.2     03-31    136  |  105  |  88<H>  ----------------------------<  80  3.6   |  22  |  1.69<H>    Ca    7.9<L>      31 Mar 2021 09:40    TPro  6.8  /  Alb  3.3  /  TBili  0.8  /  DBili  x   /  AST  22  /  ALT  51  /  AlkPhos  70  03-30           Cultures:       MEDICATIONS  (STANDING):  cefTRIAXone Injectable. 1000 milliGRAM(s) IV Push every 24 hours    MEDICATIONS  (PRN):  acetaminophen   Tablet .. 650 milliGRAM(s) Oral every 6 hours PRN Mild Pain (1 - 3)      # AMS with Encephalopathy this could be secondary to TIA vs UTI  - Agree with MRI if Pt cooperative.  - Continue Aspirin/ statin  - Speech / swallow evaluation  - Correct underling metabolic  causes  - EEG results reviewed    # Rt Frontal Meningioma   - Incidental seen last scan in 2019 slightly large .  - NS if family wants  - No intervention from my point of view    # PAF  - poor  rate controlled as she is not taking PO  - Cardizem drip at  this time  - patient continues to be off AC -  as patient's daughter concerend about multiple falls. Patient's daughter is concerned about risks of AC and at this time not interested.   - would resume PO medications for rate control when able to take PO  - would start with metoprolol   - Dr. Villa following    #  chronic systolic CHF   - holding now due to increased  creatine-     # ARF: improving   - likely prerenal azotemia  - will hydrate gently   - recheck bun/cr in am     # Acute UTI  - preteous species in the urine  - patient is on rocephin

## 2021-04-02 NOTE — PROGRESS NOTE ADULT - ASSESSMENT
· Assessment	  89 yo WF h/o A-fib not on anticoagulants (falls), CHF EF 35-40%, HLD, moderate to severe mitral regurgitation, s/p R hip replacement, who not does ambulate independently at baseline, who presented to the ED sent by her family due to progressive aphasia for the past 4 days.      # AMS with Encephalopathy   -Vascular event can not be ruled out with h/o CH AF and not on AC  -Improving MS   -Agree with MRI if Pt cooperative.  -If not repeat CT head  -Cardiology Dr. ponce to follow up.  -Continue Aspirin/ statin  -Speech / swallow evaluation  -Correct underling metabolic  causes  -EEG bilat slow     # Rt Frontal Meningioma   -Incidental seen last scan in 2019 slightly large .  -NS if family wants  -No intervention from my point of view    # Pyuria and Leukocytosis:  r/o Uti  -Antibiotics  -Per medicine    .#AFib:   -not on anticoag due to falls, however patient is not ambulatory; this will need to be re evaluated  -bblockers   -Follow up with Dr. Vega as needed from tomorrow.  -cardio  Follow up

## 2021-04-02 NOTE — PROGRESS NOTE ADULT - ASSESSMENT
89 F with AF not on AC, systolic heart failure presents with altered mental status with aphasia- now resolved      AF- hx of AF not rate controlled as she is not taking PO- recommend Cardizem drip at  this time- at lowest dose needed - AC has been discontinued previously due to risk of falls-discussed with daughter last night that patient is lower risk of falls as she is mostly bed bound- she defers AC at ths time  would resume PO medications for rate control when able to take PO- would start with metoprolol     CHF- normally on lasix as home- recommend holding now due to increased  creatine-- consider gentle hydration in the setting of PACO and close monitoring of renal function-- no current clinical evidence for fluid overload     NSVT on monitor- check K+ and mg levels- keep k> 4 and mg > 2,   it is recommended that she get the metoprolol over the cardizem if she is able to take PO  given her hx of heart failure and VT on tele    CVA vs TIA- vs AMS due to infection- recommend MRI if tolerates, no acute stroke or bleed on CT-  continue abx for infection    Poor prognosis overall    please call over the weekend as needed- Angela Mon is rounding  this weekend, I will resume coverage monday

## 2021-04-02 NOTE — PROGRESS NOTE ADULT - SUBJECTIVE AND OBJECTIVE BOX
History of Present Illness:   89 yo WF h/o A-fib not on anticoagulants (falls), CHF EF 35-40%, HLD, moderate to severe mitral regurgitation, s/p R hip replacement, who not does ambulate independently at baseline, who presented to the ED sent by her family due to progressive aphasia for the past 4 days.  Patient was admitted 2 weeks ago for rapid Afib and Chf, and was discharged on Lasix and water restriction; today her Cr was found to be elevated     she is currently agitated,  speaking/ screaming and moving all 4 limbs      4/1/21- more calm this AM, arousable, weak  4/2/21 confused but calm this AM    PAST MEDICAL HISTORY:  A-fib   CHF (congestive heart failure)   Glaucoma   HLD (hyperlipidemia).     PAST SURGICAL HISTORY:  S/P hip replacement, right.     FAMILY HISTORY:  Patient unable to provide history    Social History:  no tobacco, ETOH, IVDA lives home with aids, non ambulatory        MEDICATIONS  (STANDING):  aspirin  chewable 81 milliGRAM(s) Oral daily  atorvastatin 10 milliGRAM(s) Oral at bedtime  diltiazem Infusion 5 mG/Hr (5 mL/Hr) IV Continuous <Continuous>  heparin   Injectable 5000 Unit(s) SubCutaneous every 12 hours  melatonin 5 milliGRAM(s) Oral at bedtime  metoprolol tartrate 50 milliGRAM(s) Oral two times a day  sodium chloride 0.9%. 500 milliLiter(s) (50 mL/Hr) IV Continuous <Continuous>    MEDICATIONS  (PRN):  acetaminophen   Tablet .. 650 milliGRAM(s) Oral every 6 hours PRN Mild Pain (1 - 3)      Vital Signs Last 24 Hrs  T(C): 36.2 (01 Apr 2021 23:55), Max: 36.6 (01 Apr 2021 17:00)  T(F): 97.2 (01 Apr 2021 23:55), Max: 97.8 (01 Apr 2021 17:00)  HR: 93 (01 Apr 2021 23:55) (86 - 104)  BP: 110/48 (01 Apr 2021 23:55) (108/69 - 114/94)  BP(mean): --  RR: 18 (01 Apr 2021 23:55) (18 - 20)  SpO2: 98% (01 Apr 2021 23:55) (95% - 98%)    GENERAL:sleeping but arouable. frail appearing   LUNG: Normal Breath sounds,   HEART: irregular tachy  ABDOMEN: +BS, ST/ND/NT  EXTREMITIES:  2+ Peripheral Pulses, No clubbing, No cyanosis, No tibial edema                                   11.1   10.79 )-----------( 197      ( 31 Mar 2021 09:40 )             34.2   03-31    136  |  105  |  88<H>  ----------------------------<  80  3.6   |  22  |  1.69<H>    Ca    7.9<L>      31 Mar 2021 09:40          < from: TTE Echo Complete w/o Contrast w/ Doppler (11.20.20 @ 09:29) >     Findings     Mitral Valve   Mild to moderate mitral annular calcification is present.   Fibrocalcific changes noted to themitral valve leaflets with preserved   leaflet excursion.   Moderate to severe (3+) mitral regurgitation is present.     Aortic Valve   Fibrocalcific changes noted to the Aortic valve leaflets with preserved   leaflet excursion.   Mild to Moderate aortic regurgitation is present.     Tricuspid Valve   Mild to moderate tricuspid valve regurgitation is present.   Mild pulmonary hypertension.     Pulmonic Valve   Normal appearing pulmonic valve structure and function.   Mild pulmonic valvular regurgitation (1+) is present.     Left Atrium   The left atrium is moderately dilated.     Left Ventricle   The interventricular septum appears hypokinetic.   Estimated left ventricular ejection fraction is 35-40 %.     Right Atrium   The right atrium is at the upper limits of normal.     Right Ventricle   Normal appearing right ventricle structure and function.     Pericardial Effusion   No evidence of pericardial effusion.     Pleural Effusion   Pleural effusion - is present..     Miscellaneous   TheIVC appears normal.     Impression     Summary     Mild to moderate mitral annular calcification is present.   Fibrocalcific changes noted to the mitral valve leaflets with preserved   leaflet excursion.   Moderate to severe (3+) mitral regurgitationis present.   Fibrocalcific changes noted to the Aortic valve leaflets with preserved   leaflet excursion.   Mild to Moderate aortic regurgitation is present.   Mild to moderate tricuspid valve regurgitation is present.   Mild pulmonary hypertension.   Normal appearing pulmonic valve structure and function.   Mild pulmonic valvular regurgitation (1+) is present.   The left atrium is moderately dilated.   The interventricular septum appears hypokinetic.   Estimated left ventricular ejection fraction is 35-40 %.   The right atrium is at the upper limits of normal.   Pleural effusion - is present..     Signature     ----------------------------------------------------------------   Electronically signed by Carla CARRASQUILLOInterpreting   physician) on 11/21/2020 07:43 AM   ----------------------------------------------------------------      < end of copied text >

## 2021-04-02 NOTE — PROGRESS NOTE ADULT - SUBJECTIVE AND OBJECTIVE BOX
History of Present Illness:   89 yo WF h/o A-fib not on anticoagulants (falls), CHF EF 35-40%, HLD, moderate to severe mitral regurgitation, s/p R hip replacement, who not does ambulate independently at baseline, who presented to the ED sent by her family due to progressive aphasia for the past 4 days. Patient was admitted 2 weeks ago for rapid Afib and Chf, and was discharged on Lasix and water restriction; today her Cr was found to be elevated    Medical progress: Very sick, deconditioned, frail. Chronically sick. to me patient appears to be failure to thrive. patient's daughter   Complaints: dementia /  poor functional state  State of mind: confused (unable to perform MRI)  Updates: - I have discussed care with patient's daughter. patient's daughter might have unrealistic expectations about mom's deconditioned and frail state. She has no interest in palliative care (as previously she was recommended hospice). She is not ready to let mom go and states she wants her mom to live as long as possible.     REVIEW OF SYSTEMS:  All other review of systems is negative unless indicated above    PHYSICAL EXAM:  ICU Vital Signs Last 24 Hrs  T(C): 36.7 (02 Apr 2021 08:09), Max: 36.7 (02 Apr 2021 08:09)  T(F): 98.1 (02 Apr 2021 08:09), Max: 98.1 (02 Apr 2021 08:09)  HR: 88 (02 Apr 2021 11:30) (86 - 93)  BP: 106/79 (02 Apr 2021 11:30) (104/41 - 110/50)  BP(mean): --  ABP: --  ABP(mean): --  RR: 20 (02 Apr 2021 11:30) (18 - 22)  SpO2: 99% (02 Apr 2021 11:30) (91% - 99%)  GENERAL: sick, deconditioned, frail, poor functional state  HEENT:  NC/AT, EOMI, PERRLA, No scleral icterus, Moist mucous membranes  NECK: Supple, No JVD  CNS:  Alert & Oriented X0, Motor Strength 5/5 B/L upper , 2/5 bilateral lower   LUNG: Normal Breath sounds, bilateral crackles   HEART: irregularly irregular  ABDOMEN: +BS, ST/ND/NT  GENITOURINARY: Voiding, Bladder not distended  EXTREMITIES:  2+ Peripheral Pulses, No clubbing, No cyanosis, No tibial edema  MUSCULOSKELTAL: Joints normal ROM, No TTP, No effusion  SKIN: no rashes  RECTAL: deferred, not indicated  BREAST: deferred             Labs:         CBC Full  -  ( 02 Apr 2021 08:55 )  WBC Count : 9.43 K/uL  RBC Count : 4.27 M/uL  Hemoglobin : 11.4 g/dL  Hematocrit : 35.3 %  Platelet Count - Automated : 208 K/uL      147<H>  |  117<H>  |  51<H>  ----------------------------<  121<H>  4.2   |  24  |  1.19    Ca    8.4<L>      02 Apr 2021 08:55  Phos  2.4     04-02  Mg     2.6     04-02      # AMS with Encephalopathy this could be secondary to UTI /  CVA was not ruled out  - We are not able to do MRI -  as patient is not following directions  - ASA / STATIN  - Speech / swallow evaluation  - PT eval  - Correct underling metabolic  causes  - EEG results reviewed    # Rt Frontal Meningioma   - Incidental seen last scan in 2019 slightly large   - NS if family wants  - No intervention from my point of view    # PAF  - poor  rate controlled as she is not taking PO  - transition from IV Cardizem / PO Cardizem  - patient continues to be off AC -  as patient's daughter concerned about multiple falls. Patient's daughter is concerned about risks of AC and at this time not interested.   - would resume PO medications for rate control when able to take PO  - would start with metoprolol   - Dr. Villa following    #  chronic systolic CHF   - holding now due to increased  creatine-     # ARF: improving   - likely prerenal azotemia  - will hydrate gently   - recheck bun/cr in am     # Acute UTI  - preteous species in the urine  - patient is on rocephin     History of Present Illness:   89 yo WF h/o A-fib not on anticoagulants (falls), CHF EF 35-40%, HLD, moderate to severe mitral regurgitation, s/p R hip replacement, who not does ambulate independently at baseline, who presented to the ED sent by her family due to progressive aphasia for the past 4 days. Patient was admitted 2 weeks ago for rapid Afib and Chf, and was discharged on Lasix and water restriction; today her Cr was found to be elevated    Medical progress: Very sick, deconditioned, frail. Chronically sick. to me patient appears to be failure to thrive. patient's daughter   Complaints: dementia /  poor functional state  State of mind: confused (unable to perform MRI)  Updates: - I have discussed care with patient's daughter. patient's daughter might have unrealistic expectations about mom's deconditioned and frail state. She has no interest in palliative care (as previously she was recommended hospice). She is not ready to let mom go and states she wants her mom to live as long as possible.     REVIEW OF SYSTEMS:  All other review of systems is negative unless indicated above    PHYSICAL EXAM:  ICU Vital Signs Last 24 Hrs  T(C): 36.7 (02 Apr 2021 08:09), Max: 36.7 (02 Apr 2021 08:09)  T(F): 98.1 (02 Apr 2021 08:09), Max: 98.1 (02 Apr 2021 08:09)  HR: 88 (02 Apr 2021 11:30) (86 - 93)  BP: 106/79 (02 Apr 2021 11:30) (104/41 - 110/50)  BP(mean): --  ABP: --  ABP(mean): --  RR: 20 (02 Apr 2021 11:30) (18 - 22)  SpO2: 99% (02 Apr 2021 11:30) (91% - 99%)  GENERAL: sick, deconditioned, frail, poor functional state  HEENT:  NC/AT, EOMI, PERRLA, No scleral icterus, Moist mucous membranes  NECK: Supple, No JVD  CNS:  Alert & Oriented X0, Motor Strength 5/5 B/L upper , 2/5 bilateral lower   LUNG: Normal Breath sounds, bilateral crackles   HEART: irregularly irregular  ABDOMEN: +BS, ST/ND/NT  GENITOURINARY: Voiding, Bladder not distended  EXTREMITIES:  2+ Peripheral Pulses, No clubbing, No cyanosis, No tibial edema  MUSCULOSKELTAL: Joints normal ROM, No TTP, No effusion  SKIN: no rashes  RECTAL: deferred, not indicated  BREAST: deferred             Labs:         CBC Full  -  ( 02 Apr 2021 08:55 )  WBC Count : 9.43 K/uL  RBC Count : 4.27 M/uL  Hemoglobin : 11.4 g/dL  Hematocrit : 35.3 %  Platelet Count - Automated : 208 K/uL      147<H>  |  117<H>  |  51<H>  ----------------------------<  121<H>  4.2   |  24  |  1.19    Ca    8.4<L>      02 Apr 2021 08:55  Phos  2.4     04-02  Mg     2.6     04-02      # AMS with Encephalopathy this could be secondary to UTI /  CVA was not ruled out  # Hx of atrial fibrillation not on AC   - We are not able to do MRI -  as patient is not following directions  - ASA / STATIN  - Speech / swallow evaluation  - PT eval  - Correct underling metabolic  causes  - EEG results reviewed    # Rt Frontal Meningioma   - Incidental seen last scan in 2019 slightly large   - NS if family wants  - No intervention from my point of view    # PAF  - poor  rate controlled as she is not taking PO  - transition from IV Cardizem / PO Cardizem  - patient continues to be off AC -  as patient's daughter concerned about multiple falls. Patient's daughter is concerned about risks of AC and at this time not interested.   - would resume PO medications for rate control when able to take PO  - would start with metoprolol   - Dr. Villa following    #  chronic systolic CHF   - holding now due to increased  creatine-     # ARF: improving   - likely prerenal azotemia  - will hydrate gently   - recheck bun/cr in am     # Acute UTI  - preteous species in the urine  - patient is on rocephin

## 2021-04-02 NOTE — PROGRESS NOTE ADULT - SUBJECTIVE AND OBJECTIVE BOX
· Reason for Admission	CVA  4/2/21 : Pt more alert, responsive, eating breakfast in bed. Talking. says feeling fine.     MEDICATIONS  (STANDING):  aspirin  chewable 81 milliGRAM(s) Oral daily  atorvastatin 10 milliGRAM(s) Oral at bedtime  diltiazem    milliGRAM(s) Oral daily  heparin   Injectable 5000 Unit(s) SubCutaeous every 12 hours  	    melatonin 5 milliGRAM(s) Oral at bedtime  metoprolol tartrate 50 milliGRAM(s) Oral two times a day  sodium chloride 0.9%. 500 milliLiter(s) (50 mL/Hr) IV Continuous <Continuous>      Vital Signs Last 24 Hrs  T(C): 36.7 (02 Apr 2021 08:09), Max: 36.7 (02 Apr 2021 08:09)  T(F): 98.1 (02 Apr 2021 08:09), Max: 98.1 (02 Apr 2021 08:09)  HR: 88 (02 Apr 2021 11:30) (86 - 93)  BP: 106/79 (02 Apr 2021 11:30) (104/41 - 110/50)  BP(mean): --  RR: 20 (02 Apr 2021 11:30) (18 - 22)  SpO2: 99% (02 Apr 2021 11:30) (91% - 99%)    Neurological exam:  HF: More  awake, alert, follows commands, eating break fast. still confused  CN: MARGARET, EOMI, VFF, no facial asymmetry, gag can not be tested.  Motor: Can not assess power ,moves all extremities    Sens: responds to light touch   Reflexes: Symmetric and normal +2 UE +1  downgoing toes b/l  Coord:  Can not assess  Gait/Balance: Cannot test                        11.4   9.43  )-----------( 208      ( 02 Apr 2021 08:55 )             35.3     04-02    147<H>  |  117<H>  |  51<H>  ----------------------------<  121<H>  4.2   |  24  |  1.19    Ca    8.4<L>      02 Apr 2021 08:55  Phos  2.4     04-02  Mg     2.6     04-02        03-31 Chol 152 LDL -- HDL 33<L> Trig 78    Radiology report:  - CT Head  < from: CT Head No Cont (03.30.21 @ 10:08) >  Impression:    No acute hemorrhage, mass effect or extra-axial collection.    Increasing size of right frontal parafalcine extra-axial soft tissue mass likely representing a meningioma.  - EEG  < from: EEG Awake or Drowsy (03.31.21 @ 11:20) >  Impression :  This is an abnormal EEG due to the presence of diffuse bilateral slowing suggestive of underlying cerebral dysfunction may be on the basis of metabolic, toxic or structural pathology. Clinical correlation recommended. No epileptiform activity noted.    < end of copied text >

## 2021-04-02 NOTE — CHART NOTE - NSCHARTNOTEFT_GEN_A_CORE
Pt seen by SLP recommending Dysphagia 2 diet w/ nectar thick liquids. D/w MD regarding diet consistency change. Pending diet order placed.     *will continue to monitor.

## 2021-04-03 NOTE — CONSULT NOTE ADULT - SUBJECTIVE AND OBJECTIVE BOX
`  HPI:  89 yo WF h/o A-fib not on anticoagulants (falls), CHF EF 35-40%, HLD, moderate to severe mitral regurgitation, s/p R hip replacement, she does ambulate independently at baseline, she was sent by her family due to progressive aphasia for the past 4 days. Patient was admitted 2 weeks ago for rapid Afib and Chf, and was discharged on Lasix and water restriction; on admission Cr was found to be elevated, pat seen for pulmonary eval with b/l pleural effusion.      PAST MEDICAL HISTORY:  A-fib   CHF (congestive heart failure)   Glaucoma   HLD (hyperlipidemia).     PAST SURGICAL HISTORY:  S/P hip replacement, right.     FAMILY HISTORY:  Patient unable to provide history    Social History:  no tobacco, ETOH, IVDA lives home with aids, non ambulatory            REVIEW OF SYSTEMS:  unable to obtain due to aphasia     All other review of systems is negative unless indicated above    Vital Signs Last 24 Hrs  T(C): 36.8 (30 Mar 2021 11:47), Max: 36.8 (30 Mar 2021 09:14)  T(F): 98.2 (30 Mar 2021 11:47), Max: 98.2 (30 Mar 2021 09:14)  HR: 113 (30 Mar 2021 11:47) (102 - 114)  BP: 123/92 (30 Mar 2021 11:47) (115/71 - 123/92)  BP(mean): 102 (30 Mar 2021 11:47) (102 - 102)  RR: 28 (30 Mar 2021 11:47) (18 - 31)  SpO2: 92% (30 Mar 2021 09:47) (92% - 99%)    PHYSICAL EXAM:    GENERAL: NAD, Well nourished  HEENT:  NC/AT, EOMI, PERRLA, No scleral icterus, Moist mucous membranes  NECK: Supple, No JVD  CNS:  Alert & Oriented X0, Motor Strength 5/5 B/L upper , 2/5 bilateral lower   LUNG: Normal Breath sounds, bilateral crackles   HEART: RRR; No murmurs, No rubs  ABDOMEN: +BS, ST/ND/NT  GENITOURINARY: Voiding, Bladder not distended  EXTREMITIES:  2+ Peripheral Pulses, No clubbing, No cyanosis, No tibial edema  MUSCULOSKELTAL: Joints normal ROM, No TTP, No effusion  SKIN: no rashes  RECTAL: deferred, not indicated  BREAST: deferred                          13.6   18.14 )-----------( 224      ( 30 Mar 2021 12:30 )             40.0     03-30    129<L>  |  95<L>  |  96<H>  ----------------------------<  96  4.4   |  23  |  2.19<H>    Ca    8.7      30 Mar 2021 12:30    TPro  6.8  /  Alb  3.3  /  TBili  0.8  /  DBili  x   /  AST  22  /  ALT  51  /  AlkPhos  70  03-30      MEDICATIONS  (STANDING):  cefTRIAXone Injectable. 1000 milliGRAM(s) IV Push every 24 hours    MEDICATIONS  (PRN):  acetaminophen   Tablet .. 650 milliGRAM(s) Oral every 6 hours PRN Mild Pain (1 - 3)      all labs reviewed  all imaging reviewed    a/p:    1. Aphasia r/o CVA:  CT brain noted, negative  will check MRI brain  c/w ASA/Statins  Neurology consult   Speech and Swallow evaluation   DVT prophy    2. CHF, EF 35%  stable, will be cautious with hydration   hold Lasix     3. AFib:   not on anticoag due to falls, however patient is not ambulatory; this will need to be re evaluated  bblockers   cardio consult    4. ARF:   likely prerenal azotemia  will hydrate gently   recheck bun/cr in am     5. Pyuria and Leukocytosis:  r/o Uti  Ceftriaxone pending cultures      (30 Mar 2021 14:24)      PAST MEDICAL & SURGICAL HISTORY:  Glaucoma    HLD (hyperlipidemia)    A-fib    CHF (congestive heart failure)    S/P hip replacement, right        Home Medications:  aspirin 81 mg oral tablet: 1 tab(s) orally once a day (30 Mar 2021 12:09)  atorvastatin 10 mg oral tablet: 1 tab(s) orally once a day (30 Mar 2021 12:09)  MiraLax oral powder for reconstitution: Use as needed (30 Mar 2021 12:09)  PreserVision AREDS 2 oral capsule: 1 cap(s) orally 2 times a day (30 Mar 2021 12:09)  senna oral tablet: 2 tab(s) orally once a day (at bedtime), As Needed (30 Mar 2021 12:09)  Vitamin D3 5000 intl units (125 mcg) oral tablet: 1 tab(s) orally once a day (30 Mar 2021 12:09)      MEDICATIONS  (STANDING):  aspirin  chewable 81 milliGRAM(s) Oral daily  diltiazem    milliGRAM(s) Oral daily  furosemide   Injectable 40 milliGRAM(s) IV Push daily  heparin   Injectable 5000 Unit(s) SubCutaneous every 12 hours  melatonin 5 milliGRAM(s) Oral at bedtime  metoprolol tartrate 50 milliGRAM(s) Oral two times a day    MEDICATIONS  (PRN):  acetaminophen   Tablet .. 650 milliGRAM(s) Oral every 6 hours PRN Mild Pain (1 - 3)      Allergies    atenolol (Other)  sulfa drugs (Other)    Intolerances        SOCIAL HISTORY: Denies tobacco, etoh abuse or illicit drug use    FAMILY HISTORY:  Known health problems: none        Vital Signs Last 24 Hrs  T(C): 36.3 (2021 16:58), Max: 36.7 (2021 21:15)  T(F): 97.4 (2021 16:58), Max: 98.1 (2021 21:15)  HR: 58 (2021 16:58) (58 - 90)  BP: 116/74 (2021 16:58) (103/70 - 118/63)  BP(mean): 84 (2021 16:58) (82 - 84)  RR: 20 (2021 16:58) (20 - 20)  SpO2: 89% (2021 16:58) (89% - 92%)        REVIEW OF SYSTEMS:    CONSTITUTIONAL:  As per HPI.  HEENT:  Eyes:  No diplopia or blurred vision. ENT:  No earache, sore throat or runny nose.  CARDIOVASCULAR:  No pressure, squeezing, tightness, heaviness or aching about the chest, neck, axilla or epigastrium.  RESPIRATORY:  No cough, shortness of breath, PND or orthopnea.  GASTROINTESTINAL:  No nausea, vomiting or diarrhea.  GENITOURINARY:  No dysuria, frequency or urgency.  MUSCULOSKELETAL:  As per HPI.  SKIN:  No change in skin, hair or nails.  NEUROLOGIC:  No paresthesias, fasciculations, seizures or weakness.  PSYCHIATRIC:  No disorder of thought or mood.  ENDOCRINE:  No heat or cold intolerance, polyuria or polydipsia.  HEMATOLOGICAL:  No easy bruising or bleedings:  .     PHYSICAL EXAMINATION:    GENERAL APPEARANCE:  Pt. is not currently dyspneic, in no distress. Pt. is alert, oriented, and pleasant.  HEENT:  Pupils are normal and react normally. No icterus. Mucous membranes well colored.  NECK:  Supple. No lymphadenopathy. Jugular venous pressure not elevated. Carotids equal.   HEART:   The cardiac impulse has a normal quality. Regular. Normal S1 and S2. There are no murmurs, rubs or gallops noted  CHEST:  Chest is clear to auscultation. Normal respiratory effort.  ABDOMEN:  Soft and nontender.   EXTREMITIES:  There is no cyanosis, clubbing or edema.   SKIN:  No rash or significant lesions are noted.    LABS:                        11.5   7.83  )-----------( 216      ( 2021 07:17 )             36.6     04-03    148<H>  |  116<H>  |  38<H>  ----------------------------<  127<H>  3.9   |  26  |  0.89    Ca    8.7      2021 07:17  Phos  2.4     04-02  Mg     2.6     04-02    TPro  6.5  /  Alb  2.8<L>  /  TBili  0.6  /  DBili  x   /  AST  14<L>  /  ALT  30  /  AlkPhos  57  04-03    LIVER FUNCTIONS - ( 2021 07:17 )  Alb: 2.8 g/dL / Pro: 6.5 gm/dL / ALK PHOS: 57 U/L / ALT: 30 U/L / AST: 14 U/L / GGT: x                 Urinalysis Basic - ( 2021 23:04 )    Color: Yellow / Appearance: Clear / S.015 / pH: x  Gluc: x / Ketone: Negative  / Bili: Negative / Urobili: Negative mg/dL   Blood: x / Protein: 30 mg/dL / Nitrite: Negative   Leuk Esterase: Moderate / RBC: 3-5 /HPF / WBC 11-25   Sq Epi: x / Non Sq Epi: Few / Bacteria: Moderate            RADIOLOGY & ADDITIONAL STUDIES:     CT Chest No Cont (21 @ 16:07) >  FINDINGS:    LUNGS AND AIRWAYS: Patent central airways.  Alveolar and interstitial edema increased from prior study. Bibasilar dependent atelectasis, increased.  PLEURA: Increased bilateral pleural effusions.  MEDIASTINUM AND SAUMYA: No lymphadenopathy.  VESSELS: Calcified thoracic aorta and coronary arteries.  HEART: Cardiomegaly. Valvular calcifications. No pericardial effusion.  CHEST WALL AND LOWER NECK: Within normal limits.  VISUALIZED UPPER ABDOMEN: Two focal calcifications associated with the liver capsule, unchanged.  BONES: Kyphosis, osteopenia.    IMPRESSION:  Worsening CHF

## 2021-04-03 NOTE — CHART NOTE - NSCHARTNOTEFT_GEN_A_CORE
Contacted by RN.  Patient lethargic, unable to follow command.    Patient seen and examined at bedside.  Patient unable to take PO meds, her PO lopressor was changed to IV lopressor.  Patient able to open her eyes and raise her eyebrows, she is only saying "hello" and not answering any questions at this time.  Yesterday night the patient was more cooperative, able to follow commands and knew her name.    Vital Signs Last 24 Hrs  T(C): 36.3 (03 Apr 2021 22:04), Max: 36.7 (03 Apr 2021 08:14)  T(F): 97.3 (03 Apr 2021 22:04), Max: 98 (03 Apr 2021 08:14)  HR: 125 (03 Apr 2021 23:44) (58 - 125)  BP: 112/87 (03 Apr 2021 23:44) (112/87 - 130/61)  BP(mean): 84 (03 Apr 2021 16:58) (84 - 84)  RR: 20 (03 Apr 2021 16:58) (20 - 20)  SpO2: 92% (03 Apr 2021 21:24) (89% - 92%)    Physical Exam:  GENERAL:  lying in bed, non verbal  RESPIRATORY: bilateral crackles  CARDIO: Regular rate and rhythm; No murmurs, rubs, or gallops  ABDOMEN: Bowel sounds present; Soft, Nontender, Nondistended.   NERVOUS SYSTEM:  Alert & Oriented X0    #AMS  -   - VS stable, continue to monitor  - f/u repeated CT results  - Consider changing her PO meds to IV    #Afib  - 130s > 90s  - s/p IV Lopressor 2.5 mg

## 2021-04-03 NOTE — PROVIDER CONTACT NOTE (OTHER) - SITUATION
Pt lethargic, unable to follow commands to swallow pills crushed in applesauce. Pt lethargic, unable to follow commands including neuro assessment. Bedside report with Day shift RN and as per Day RN pt has been unable to follow commands, but was able to swallow pills crushed.

## 2021-04-03 NOTE — CDI QUERY NOTE - [id number]_GEN_ALL_CORE_FT
Patient admitted with AMS    Neurologist documented : AMS with Encephalopathy     Urine culture = > 100,000 proteus mirabilis    Can the encephalopathy be further clarified  a) Metabolic encephalopathy  b) Toxic encephalopathy  c) No clinical evidence of encephalopathy  d) Other type of encephalopathy, please clarify
Patient admitted with AMS    Neurologist documented : AMS with Encephalopathy     Urine culture = > 100,000 proteus mirabilis    Can the encephalopathy be further clarified  a) Metabolic encephalopathy  b) Toxic encephalopathy  c) No clinical evidence of encephalopathy  d) Other type of encephalopathy, please clarify
Patient admitted with AMS    Neurologist documented : AMS with Encephalopathy  Documentation reveals : AMS with Encephalopathy this could be secondary to UTI /  CVA was not ruled out   Acute UTI    Can the encephalopathy be further clarified  a) Metabolic encephalopathy due to UTI  b) Metabolic encephalopathy due to CVA  c) No clinical evidence of encephalopathy  d) Other type of encephalopathy, please clarify

## 2021-04-03 NOTE — PROVIDER CONTACT NOTE (OTHER) - ASSESSMENT
Pt VSS. Pt does not follow commands and is confused. Increasingly lethargic. MD Díaz made aware. Pt VSS. Pt does not follow commands and is confused, remains lethargic. Unable to swallow crushed pills. MD Díaz made aware.

## 2021-04-03 NOTE — PROVIDER CONTACT NOTE (OTHER) - ACTION/TREATMENT ORDERED:
MD came to bedside. Metoprolol IVP 2.5 mg ordered MD came to bedside. Metoprolol IVP 2.5 mg ordered.

## 2021-04-03 NOTE — PROGRESS NOTE ADULT - SUBJECTIVE AND OBJECTIVE BOX
History of Present Illness:   89 yo WF h/o A-fib not on anticoagulants (falls), CHF EF 35-40%, HLD, moderate to severe mitral regurgitation, s/p R hip replacement, who not does ambulate independently at baseline, who presented to the ED sent by her family due to progressive aphasia for the past 4 days. Patient was admitted 2 weeks ago for rapid Afib and Chf, and was discharged on Lasix and water restriction; today her Cr was found to be elevated    Medical progress: Very sick, deconditioned, frail. Chronically sick as per daughter. Patient more encephalopathic this am, but was able to eat full meal. Care discussed with patient's daughter and updated as patient is more encephalopathic this am, but no noted focal deficits.   Complaints: dementia /  poor functional state  State of mind: confused (unable to perform MRI)  Updates:  I have discussed care with patient's daughter. Patient's daughter might have unrealistic expectations about mom's deconditioned and frail state. She has no interest in palliative care (as previously she was recommended hospice). She is not ready to let mom go and states she wants her mom to live as long as possible.     REVIEW OF SYSTEMS:  All other review of systems is negative unless indicated above    PHYSICAL EXAM:  ICU Vital Signs Last 24 Hrs  T(C): 36.7 (2021 08:14), Max: 36.7 (2021 15:00)  T(F): 98 (2021 08:14), Max: 98.1 (2021 21:15)  HR: 90 (2021 08:14) (85 - 90)  BP: 118/63 (2021 08:14) (103/70 - 128/62)  BP(mean): 82 (2021 21:15) (82 - 82)  RR: 20 (2021 08:14) (20 - 22)  SpO2: 92% (2021 21:15) (92% - 92%)  GENERAL: sick, deconditioned, frail, poor functional state  HEENT:  NC/AT, EOMI, PERRLA, No scleral icterus, Moist mucous membranes  NECK: Supple, No JVD  CNS:  Alert & Oriented X0, Motor Strength 5/5 B/L upper , 2/5 bilateral lower   LUNG: Normal Breath sounds, bilateral crackles   HEART: irregularly irregular  ABDOMEN: +BS, ST/ND/NT  GENITOURINARY: Voiding, Bladder not distended  EXTREMITIES:  2+ Peripheral Pulses, No clubbing, No cyanosis, No tibial edema  MUSCULOSKELTAL: Joints normal ROM, No TTP, No effusion  SKIN: no rashes  RECTAL: deferred, not indicated  BREAST: deferred             Labs:    CBC Full  -  ( 2021 07:17 )  WBC Count : 7.83 K/uL  RBC Count : 4.39 M/uL  Hemoglobin : 11.5 g/dL  Hematocrit : 36.6 %  Platelet Count - Automated : 216 K/uL  Mean Cell Volume : 83.4 fl  Mean Cell Hemoglobin : 26.2 pg  Mean Cell Hemoglobin Concentration : 31.4 gm/dL  Auto Neutrophil # : x  Auto Lymphocyte # : x  Auto Monocyte # : x  Auto Eosinophil # : x  Auto Basophil # : x  Auto Neutrophil % : x  Auto Lymphocyte % : x  Auto Monocyte % : x  Auto Eosinophil % : x  Auto Basophil % : x      Urinalysis Basic - ( 2021 23:04 )    Color: Yellow / Appearance: Clear / S.015 / pH: x  Gluc: x / Ketone: Negative  / Bili: Negative / Urobili: Negative mg/dL   Blood: x / Protein: 30 mg/dL / Nitrite: Negative   Leuk Esterase: Moderate / RBC: 3-5 /HPF / WBC 11-25   Sq Epi: x / Non Sq Epi: Few / Bacteria: Moderate      04-03    148<H>  |  116<H>  |  38<H>  ----------------------------<  127<H>  3.9   |  26  |  0.89    Ca    8.7      2021 07:17  Phos  2.4     04-02  Mg     2.6     04-02    TPro  6.5  /  Alb  2.8<L>  /  TBili  0.6  /  DBili  x   /  AST  14<L>  /  ALT  30  /  AlkPhos  57  04-03      # AMS with Encephalopathy this could be secondary to UTI /  we were not able to rule out CVA  # Hx of atrial fibrillation not on AC   - We are not able to do MRI -  as patient is not following directions /  agitated when transported. Once the patient is more cooperative, MR of the brain as vascular causes of patient's change in mentation has not been ruled out.   - ASA / STATIN  - Speech / swallow evaluation  - PT eval  - EEG results reviewed  - Neurology Dr. Khalil has evaluated the patient    # Rt Frontal Meningioma   - Incidental seen last scan in 2019 slightly large   - NS if family wants  - No intervention from my point of view    # PAF good rate control /  poor rhythm control /  not on AC  - PO Cardizem / PO metoprolol  - risks and benefits of anticoagulation explained to the patient. Daughter understands that pt is high risk of falls and at this time she is not interested in blood thinners.   - Dr. Villa following    # Acute UTI  - proteus species in the urine  - patient is on Rocephin    #  chronic systolic CHF   - holding now due to increased  creatine-     # ARF: improving   - likely prerenal azotemia  - will hydrate gently   - recheck bun/cr in am        History of Present Illness:   89 yo WF h/o A-fib not on anticoagulants (falls), CHF EF 35-40%, HLD, moderate to severe mitral regurgitation, s/p R hip replacement, who not does ambulate independently at baseline, who presented to the ED sent by her family due to progressive aphasia for the past 4 days. Patient was admitted 2 weeks ago for rapid Afib and Chf, and was discharged on Lasix and water restriction; today her Cr was found to be elevated    Medical progress: Very sick, deconditioned, frail. Chronically sick as per daughter. Patient more encephalopathic this am, but was able to eat full meal. Care discussed with patient's daughter and updated as patient is more encephalopathic this am, but no noted focal deficits.   Complaints: dementia /  poor functional state  State of mind: confused (unable to perform MRI)  Updates:  I have discussed care with patient's daughter. Patient's daughter might have unrealistic expectations about mom's deconditioned and frail state. She has no interest in palliative care (as previously she was recommended hospice). She is not ready to let mom go and states she wants her mom to live as long as possible.     REVIEW OF SYSTEMS:  All other review of systems is negative unless indicated above    PHYSICAL EXAM:  ICU Vital Signs Last 24 Hrs  T(C): 36.7 (2021 08:14), Max: 36.7 (2021 15:00)  T(F): 98 (2021 08:14), Max: 98.1 (2021 21:15)  HR: 90 (2021 08:14) (85 - 90)  BP: 118/63 (2021 08:14) (103/70 - 128/62)  BP(mean): 82 (2021 21:15) (82 - 82)  RR: 20 (2021 08:14) (20 - 22)  SpO2: 92% (2021 21:15) (92% - 92%)  GENERAL: sick, deconditioned, frail, poor functional state  HEENT:  NC/AT, EOMI, PERRLA, No scleral icterus, Moist mucous membranes  NECK: Supple, No JVD  CNS:  Alert & Oriented X0, Motor Strength 5/5 B/L upper , 2/5 bilateral lower   LUNG: Normal Breath sounds, bilateral crackles   HEART: irregularly irregular  ABDOMEN: +BS, ST/ND/NT  GENITOURINARY: Voiding, Bladder not distended  EXTREMITIES:  2+ Peripheral Pulses, No clubbing, No cyanosis, No tibial edema  MUSCULOSKELTAL: Joints normal ROM, No TTP, No effusion  SKIN: no rashes  RECTAL: deferred, not indicated  BREAST: deferred             Labs:    CBC Full  -  ( 2021 07:17 )  WBC Count : 7.83 K/uL  RBC Count : 4.39 M/uL  Hemoglobin : 11.5 g/dL  Hematocrit : 36.6 %  Platelet Count - Automated : 216 K/uL  Mean Cell Volume : 83.4 fl  Mean Cell Hemoglobin : 26.2 pg  Mean Cell Hemoglobin Concentration : 31.4 gm/dL  Auto Neutrophil # : x  Auto Lymphocyte # : x  Auto Monocyte # : x  Auto Eosinophil # : x  Auto Basophil # : x  Auto Neutrophil % : x  Auto Lymphocyte % : x  Auto Monocyte % : x  Auto Eosinophil % : x  Auto Basophil % : x      Urinalysis Basic - ( 2021 23:04 )    Color: Yellow / Appearance: Clear / S.015 / pH: x  Gluc: x / Ketone: Negative  / Bili: Negative / Urobili: Negative mg/dL   Blood: x / Protein: 30 mg/dL / Nitrite: Negative   Leuk Esterase: Moderate / RBC: 3-5 /HPF / WBC 11-25   Sq Epi: x / Non Sq Epi: Few / Bacteria: Moderate      04-03    148<H>  |  116<H>  |  38<H>  ----------------------------<  127<H>  3.9   |  26  |  0.89    Ca    8.7      2021 07:17  Phos  2.4     04-02  Mg     2.6     04-02    TPro  6.5  /  Alb  2.8<L>  /  TBili  0.6  /  DBili  x   /  AST  14<L>  /  ALT  30  /  AlkPhos  57  04-03      # AMS with Encephalopathy this could be secondary to UTI /  we were not able to rule out CVA  # Hx of atrial fibrillation not on AC   - We are not able to do MRI -  as patient is not following directions /  agitated when transported. Once the patient is more cooperative, MR of the brain as vascular causes of patient's change in mentation has not been ruled out.   - ASA / STATIN  - Speech / swallow evaluation  - PT eval  - EEG results reviewed  - This is an abnormal EEG due to the presence of diffuse bilateral slowing suggestive of underlying cerebral dysfunction may be on the basis of metabolic, toxic or structural pathology. Clinical correlation recommended. No epileptiform activity noted.  - Neurology Dr. Khalil has evaluated the patient    # Rt Frontal Meningioma   - Incidental seen last scan in 2019 slightly large   - NS if family wants  - No intervention from my point of view    # PAF good rate control /  poor rhythm control /  not on AC  - PO Cardizem / PO metoprolol  - risks and benefits of anticoagulation explained to the patient. Daughter understands that pt is high risk of falls and at this time she is not interested in blood thinners.   - Dr. Villa following    # Acute UTI  - proteus species in the urine  - patient is on Rocephin    #  chronic systolic CHF   - holding now due to increased  creatine-     # ARF: improving   - likely prerenal azotemia  - will hydrate gently   - recheck bun/cr in am        History of Present Illness:   89 yo WF h/o A-fib not on anticoagulants (falls), CHF EF 35-40%, HLD, moderate to severe mitral regurgitation, s/p R hip replacement, who not does ambulate independently at baseline, who presented to the ED sent by her family due to progressive aphasia for the past 4 days. Patient was admitted 2 weeks ago for rapid Afib and Chf, and was discharged on Lasix and water restriction; today her Cr was found to be elevated    Medical progress: Very sick, deconditioned, frail. Chronically sick as per daughter. Patient more encephalopathic this am, but was able to eat full meal. Care discussed with patient's daughter and updated as patient is more encephalopathic this am, but no noted focal deficits. More crackles noted on physical exam. CT -  worsening CHF - started on lasix /  discussed with cards.   Complaints: dementia /  poor functional state  State of mind: confused (unable to perform MRI)  Updates:  I have discussed care with patient's daughter. Patient's daughter might have unrealistic expectations about mom's deconditioned and frail state. She has no interest in palliative care (as previously she was recommended hospice). She is not ready to let mom go and states she wants her mom to live as long as possible.     REVIEW OF SYSTEMS:  All other review of systems is negative unless indicated above    PHYSICAL EXAM:  ICU Vital Signs Last 24 Hrs  T(C): 36.7 (2021 08:14), Max: 36.7 (2021 15:00)  T(F): 98 (2021 08:14), Max: 98.1 (2021 21:15)  HR: 90 (2021 08:14) (85 - 90)  BP: 118/63 (2021 08:14) (103/70 - 128/62)  BP(mean): 82 (2021 21:15) (82 - 82)  RR: 20 (2021 08:14) (20 - 22)  SpO2: 92% (2021 21:15) (92% - 92%)  GENERAL: sick, deconditioned, frail, poor functional state  HEENT:  NC/AT, EOMI, PERRLA, No scleral icterus, Moist mucous membranes  NECK: Supple, No JVD  CNS:  Alert & Oriented X0, Motor Strength 5/5 B/L upper , 2/5 bilateral lower   LUNG: Normal Breath sounds, bilateral increasing crackles   HEART: irregularly irregular  ABDOMEN: +BS, ST/ND/NT  GENITOURINARY: Voiding, Bladder not distended  EXTREMITIES:  2+ Peripheral Pulses, No clubbing, No cyanosis, No tibial edema  MUSCULOSKELTAL: Joints normal ROM, No TTP, No effusion  SKIN: no rashes  RECTAL: deferred, not indicated  BREAST: deferred             Labs:    CBC Full  -  ( 2021 07:17 )  WBC Count : 7.83 K/uL  RBC Count : 4.39 M/uL  Hemoglobin : 11.5 g/dL  Hematocrit : 36.6 %  Platelet Count - Automated : 216 K/uL  Mean Cell Volume : 83.4 fl  Mean Cell Hemoglobin : 26.2 pg  Mean Cell Hemoglobin Concentration : 31.4 gm/dL  Auto Neutrophil # : x  Auto Lymphocyte # : x  Auto Monocyte # : x  Auto Eosinophil # : x  Auto Basophil # : x  Auto Neutrophil % : x  Auto Lymphocyte % : x  Auto Monocyte % : x  Auto Eosinophil % : x  Auto Basophil % : x      Urinalysis Basic - ( 2021 23:04 )    Color: Yellow / Appearance: Clear / S.015 / pH: x  Gluc: x / Ketone: Negative  / Bili: Negative / Urobili: Negative mg/dL   Blood: x / Protein: 30 mg/dL / Nitrite: Negative   Leuk Esterase: Moderate / RBC: 3-5 /HPF / WBC 11-25   Sq Epi: x / Non Sq Epi: Few / Bacteria: Moderate      04-03    148<H>  |  116<H>  |  38<H>  ----------------------------<  127<H>  3.9   |  26  |  0.89    Ca    8.7      2021 07:17  Phos  2.4     04-02  Mg     2.6     04-02    TPro  6.5  /  Alb  2.8<L>  /  TBili  0.6  /  DBili  x   /  AST  14<L>  /  ALT  30  /  AlkPhos  57  04-03      # AMS with Encephalopathy this could be secondary to UTI /  we were not able to rule out CVA  # Hx of atrial fibrillation not on AC   - We are not able to do MRI -  as patient is not following directions /  agitated when transported. Once the patient is more cooperative, MR of the brain as vascular causes of patient's change in mentation has not been ruled out.   - ASA / STATIN  - Speech / swallow evaluation  - PT eval  - EEG results reviewed  - This is an abnormal EEG due to the presence of diffuse bilateral slowing suggestive of underlying cerebral dysfunction may be on the basis of metabolic, toxic or structural pathology. Clinical correlation recommended. No epileptiform activity noted.  - Neurology Dr. Khalil has evaluated the patient    # Rt Frontal Meningioma   - Incidental seen last scan in 2019 slightly large   - NS if family wants  - No intervention from my point of view    # PAF good rate control /  poor rhythm control /  not on AC  - PO Cardizem / PO metoprolol  - risks and benefits of anticoagulation explained to the patient. Daughter understands that pt is high risk of falls and at this time she is not interested in blood thinners.   - Dr. Villa following    # Acute UTI  - proteus species in the urine  - patient is on Rocephin    #  acute on chronic systolic CHF   - started on IV lasix  - strict i/o  - daily weights  - dysphagia diet (low salt)  - I asked Dr. Mon to come and evaluate patient in the am    # ARF - msot likely secondary to intravascular depression  - resolved

## 2021-04-04 NOTE — PROGRESS NOTE ADULT - ASSESSMENT
PROBLEMS;    Acute on chronic systolic CHF   B/L Pleural effusion  ARF   AMS with Encephalopathy this could be secondary to UTI /  we were not able to rule out CVA  Hx of atrial fibrillation not on AC   Rt Frontal Meningioma   Acute UTI- proteus species in the urine    PLAN:    Keep neg balance  s/p iv rhocephin  If persistant pleural effusion may consider thorocentesis  Neuro/cardic eval  supportive care  dvt prophylasix

## 2021-04-04 NOTE — PROGRESS NOTE ADULT - SUBJECTIVE AND OBJECTIVE BOX
History of Present Illness:   89 yo WF h/o A-fib not on anticoagulants (falls), CHF EF 35-40%, HLD, moderate to severe mitral regurgitation, s/p R hip replacement, who not does ambulate independently at baseline, who presented to the ED sent by her family due to progressive aphasia for the past 4 days.  Patient was admitted 2 weeks ago for rapid Afib and Chf, and was discharged on Lasix and water restriction; today her Cr was found to be elevated     she is currently agitated,  speaking/ screaming and moving all 4 limbs      4/1/21- more calm this AM, arousable, weak  4/2/21 confused but calm this AM  4/4 - Yesterday patient was wheezing with concerns for CHF and was restarted on lasix IV, with improvement    Patient seen and examined at bedside. She is mumbling and not following commands. Unable to get ROS in current clinical condition.    PAST MEDICAL HISTORY:  A-fib   CHF (congestive heart failure)   Glaucoma   HLD (hyperlipidemia).     PAST SURGICAL HISTORY:  S/P hip replacement, right.     FAMILY HISTORY:  Patient unable to provide history    Social History:  no tobacco, ETOH, IVDA lives home with aids, non ambulatory      MEDICATIONS  (STANDING):  aspirin  chewable 81 milliGRAM(s) Oral daily  diltiazem    milliGRAM(s) Oral daily  furosemide   Injectable 40 milliGRAM(s) IV Push daily  heparin   Injectable 5000 Unit(s) SubCutaneous every 12 hours  melatonin 5 milliGRAM(s) Oral at bedtime  metoprolol tartrate 50 milliGRAM(s) Oral two times a day    MEDICATIONS  (PRN):  acetaminophen   Tablet .. 650 milliGRAM(s) Oral every 6 hours PRN Mild Pain (1 - 3)    Vital Signs Last 24 Hrs  T(C): 36.4 (04 Apr 2021 07:26), Max: 36.4 (04 Apr 2021 07:26)  T(F): 97.5 (04 Apr 2021 07:26), Max: 97.5 (04 Apr 2021 07:26)  HR: 92 (04 Apr 2021 07:26) (58 - 125)  BP: 132/68 (04 Apr 2021 07:26) (105/60 - 132/68)  BP(mean): 84 (03 Apr 2021 16:58) (84 - 84)  RR: 20 (04 Apr 2021 07:26) (18 - 20)  SpO2: 96% (04 Apr 2021 07:26) (89% - 100%)    GENERAL:Awake and alert, in mild distress due to disorentation  LUNG: Decreased BS at bases but no W/R/R  HEART: irreg/irreg with soft systolic murmur at LSB  ABDOMEN: +BS, ST/ND/NT  EXTREMITIES:  2+ Peripheral Pulses, No clubbing, No cyanosis, No tibial edema  neuro: unable to answer questions or follow commands but moving all 4 ext    Labs:                        13.2   6.22  )-----------( 222      ( 04 Apr 2021 07:45 )             42.2                                  11.1   10.79 )-----------( 197      ( 31 Mar 2021 09:40 )             34.2     04-04    148<H>  |  116<H>  |  38<H>  ----------------------------<  115<H>  4.1   |  29  |  1.08    Ca    9.0      04 Apr 2021 07:45    TPro  6.5  /  Alb  2.8<L>  /  TBili  0.6  /  DBili  x   /  AST  14<L>  /  ALT  30  /  AlkPhos  57  04-03 03-31    136  |  105  |  88<H>  ----------------------------<  80  3.6   |  22  |  1.69<H>    Ca    7.9<L>      31 Mar 2021 09:40    Tele:  remains in AF, rate controlled with rates mostly 80-100s.      < from: TTE Echo Complete w/o Contrast w/ Doppler (11.20.20 @ 09:29) >     Findings     Mitral Valve   Mild to moderate mitral annular calcification is present.   Fibrocalcific changes noted to themitral valve leaflets with preserved   leaflet excursion.   Moderate to severe (3+) mitral regurgitation is present.     Aortic Valve   Fibrocalcific changes noted to the Aortic valve leaflets with preserved   leaflet excursion.   Mild to Moderate aortic regurgitation is present.     Tricuspid Valve   Mild to moderate tricuspid valve regurgitation is present.   Mild pulmonary hypertension.     Pulmonic Valve   Normal appearing pulmonic valve structure and function.   Mild pulmonic valvular regurgitation (1+) is present.     Left Atrium   The left atrium is moderately dilated.     Left Ventricle   The interventricular septum appears hypokinetic.   Estimated left ventricular ejection fraction is 35-40 %.     Right Atrium   The right atrium is at the upper limits of normal.     Right Ventricle   Normal appearing right ventricle structure and function.     Pericardial Effusion   No evidence of pericardial effusion.     Pleural Effusion   Pleural effusion - is present..     Miscellaneous   TheIVC appears normal.     Impression     Summary     Mild to moderate mitral annular calcification is present.   Fibrocalcific changes noted to the mitral valve leaflets with preserved   leaflet excursion.   Moderate to severe (3+) mitral regurgitationis present.   Fibrocalcific changes noted to the Aortic valve leaflets with preserved   leaflet excursion.   Mild to Moderate aortic regurgitation is present.   Mild to moderate tricuspid valve regurgitation is present.   Mild pulmonary hypertension.   Normal appearing pulmonic valve structure and function.   Mild pulmonic valvular regurgitation (1+) is present.   The left atrium is moderately dilated.   The interventricular septum appears hypokinetic.   Estimated left ventricular ejection fraction is 35-40 %.   The right atrium is at the upper limits of normal.   Pleural effusion - is present..     Signature     ----------------------------------------------------------------   Electronically signed by Carla Pineda MD(Interpreting   physician) on 11/21/2020 07:43 AM   ----------------------------------------------------------------      < end of copied text >

## 2021-04-04 NOTE — PROGRESS NOTE ADULT - SUBJECTIVE AND OBJECTIVE BOX
Subjective:    pat better, lying in bed, no new complaint.    Home Medications:  aspirin 81 mg oral tablet: 1 tab(s) orally once a day (30 Mar 2021 12:09)  atorvastatin 10 mg oral tablet: 1 tab(s) orally once a day (30 Mar 2021 12:09)  MiraLax oral powder for reconstitution: Use as needed (30 Mar 2021 12:09)  PreserVision AREDS 2 oral capsule: 1 cap(s) orally 2 times a day (30 Mar 2021 12:09)  senna oral tablet: 2 tab(s) orally once a day (at bedtime), As Needed (30 Mar 2021 12:09)  Vitamin D3 5000 intl units (125 mcg) oral tablet: 1 tab(s) orally once a day (30 Mar 2021 12:09)    MEDICATIONS  (STANDING):  aspirin  chewable 81 milliGRAM(s) Oral daily  diltiazem    milliGRAM(s) Oral daily  furosemide   Injectable 40 milliGRAM(s) IV Push daily  heparin   Injectable 5000 Unit(s) SubCutaneous every 12 hours  melatonin 5 milliGRAM(s) Oral at bedtime  metoprolol tartrate 50 milliGRAM(s) Oral two times a day    MEDICATIONS  (PRN):  acetaminophen   Tablet .. 650 milliGRAM(s) Oral every 6 hours PRN Mild Pain (1 - 3)      Allergies    atenolol (Other)  sulfa drugs (Other)    Intolerances        Vital Signs Last 24 Hrs  T(C): 36.4 (2021 07:26), Max: 36.4 (2021 07:26)  T(F): 97.5 (2021 07:26), Max: 97.5 (2021 07:26)  HR: 92 (2021 07:26) (58 - 125)  BP: 132/68 (2021 07:26) (105/60 - 132/68)  BP(mean): 84 (2021 16:58) (84 - 84)  RR: 20 (2021 07:26) (18 - 20)  SpO2: 96% (2021 07:26) (89% - 100%)      PHYSICAL EXAMINATION:    NECK:  Supple. No lymphadenopathy. Jugular venous pressure not elevated. Carotids equal.   HEART:   The cardiac impulse has a normal quality. Reg., Nl S1 and S2.  There are no murmurs, rubs or gallops noted  CHEST:  Chest crackles to auscultation. Normal respiratory effort.  ABDOMEN:  Soft and nontender.   EXTREMITIES:  There is no edema.       LABS:                        13.2   6.22  )-----------( 222      ( 2021 07:45 )             42.2     04-04    148<H>  |  116<H>  |  38<H>  ----------------------------<  115<H>  4.1   |  29  |  1.08    Ca    9.0      2021 07:45    TPro  6.5  /  Alb  2.8<L>  /  TBili  0.6  /  DBili  x   /  AST  14<L>  /  ALT  30  /  AlkPhos  57  04-03      Urinalysis Basic - ( 2021 23:04 )    Color: Yellow / Appearance: Clear / S.015 / pH: x  Gluc: x / Ketone: Negative  / Bili: Negative / Urobili: Negative mg/dL   Blood: x / Protein: 30 mg/dL / Nitrite: Negative   Leuk Esterase: Moderate / RBC: 3-5 /HPF / WBC 11-25   Sq Epi: x / Non Sq Epi: Few / Bacteria: Moderate

## 2021-04-04 NOTE — PROGRESS NOTE ADULT - ASSESSMENT
89 F with AF not on AC, systolic heart failure presents with altered mental status with aphasia    AF- current rate controlled, c/w BB and CCB    CHF- Was in CHF yesterday, now improving. Agree with another dose of IV lasix, but likely will need to transition to PO tomorrow, close to euvolemic.    NSVT on monitor- No more on tele, continue to monitor and replace electrolytes as needed    CVA vs TIA- vs AMS due to infection- unable to tolerate MRI. CT head stable. Likely deterioration in setting of dementia and poor PO itnake    Poor prognosis overall

## 2021-04-04 NOTE — PROGRESS NOTE ADULT - SUBJECTIVE AND OBJECTIVE BOX
CC: aphasia (2021 14:50)    HPI:  89 yo WF h/o A-fib not on anticoagulants (falls), CHF EF 35-40%, HLD, moderate to severe mitral regurgitation, s/p R hip replacement, who not does ambulate independently at baseline, who presented to the ED sent by her family due to progressive aphasia for the past 4 days.  Patient was admitted 2 weeks ago for rapid Afib and CHF and was discharged on Lasix and water restriction; On Admission,  Cr was found to be elevated    INTERVAL HPI/ OVERNIGHT EVENTS: chart reviewed, Pt was seen and examined, Pt is awake but confused and agitated, is yelling out " I am tired", cant provide any history, did not have her breakfast today, was  sleeping in am and was hard to arouse, now  restless.  Last night was not taking PO meds         Vital Signs Last 24 Hrs  T(C): 36.3 (2021 15:40), Max: 36.4 (2021 07:26)  T(F): 97.3 (2021 15:40), Max: 97.5 (2021 07:26)  HR: 93 (2021 15:40) (90 - 125)  BP: 102/61 (2021 15:40) (102/61 - 132/68)  RR: 20 (2021 15:40) (18 - 20)  SpO2: 94% (2021 15:40) (92% - 100%)        REVIEW OF SYSTEMS:  Unable to obtain due to encephalopathy     PHYSICAL EXAM:  General: Well developed; malnourished; frail,  not in respiratory distress   Eyes: PERRLA,conjunctiva and sclera clear  Head: Normocephalic; atraumatic  ENMT: No nasal discharge; airway clear  Neck: Supple; ; no masses  Respiratory: Diminished BS at bases b/l, No wheezes  Cardiovascular: Irregular rate and rhythm. S1 and S2 Normal;  Gastrointestinal: Soft  non-distended; +  bowel sounds  Genitourinary: No  palpable bladder   Extremities: No  edema, moves all for extremities spontaneously   Neurological: encephalopathic, grossly non focal   Skin: Warm and dry. No acute rash  Musculoskeletal: Normal muscle tone and strength   Psychiatric: restless     LABS:                         13.2   6.22  )-----------( 222      ( 2021 07:45 )             42.2     2021 07:45    148    |  116    |  38     ----------------------------<  115    4.1     |  29     |  1.08     Ca    9.0        2021 07:45    TPro  6.5    /  Alb  2.8    /  TBili  0.6    /  DBili  x      /  AST  14     /  ALT  30     /  AlkPhos  57     2021 07:17      CAPILLARY BLOOD GLUCOSE  POCT Blood Glucose.: 132 mg/dL (2021 01:36)    LIVER FUNCTIONS - ( 2021 07:17 )  Alb: 2.8 g/dL / Pro: 6.5 gm/dL / ALK PHOS: 57 U/L / ALT: 30 U/L / AST: 14 U/L / GGT: x           Urinalysis Basic - ( 2021 23:04 )  Color: Yellow / Appearance: Clear / S.015 / pH: x  Gluc: x / Ketone: Negative  / Bili: Negative / Urobili: Negative mg/dL   Blood: x / Protein: 30 mg/dL / Nitrite: Negative   Leuk Esterase: Moderate / RBC: 3-5 /HPF / WBC 11-25   Sq Epi: x / Non Sq Epi: Few / Bacteria: Moderate        MEDICATIONS  (STANDING):  aspirin  chewable 81 milliGRAM(s) Oral daily  diltiazem    milliGRAM(s) Oral daily  furosemide   Injectable 40 milliGRAM(s) IV Push daily  heparin   Injectable 5000 Unit(s) SubCutaneous every 12 hours  melatonin 5 milliGRAM(s) Oral at bedtime  metoprolol tartrate 50 milliGRAM(s) Oral two times a day    MEDICATIONS  (PRN):  acetaminophen   Tablet .. 650 milliGRAM(s) Oral every 6 hours PRN Mild Pain (1 - 3)      RADIOLOGY & ADDITIONAL TESTS:      EXAM:  CT BRAIN                        PROCEDURE DATE:  2021      FINDINGS:  The study is slightly limited by patient motion.    Redemonstrated  is a right parafalcine extra-axial mass measuring 3.2 x 2.4 cm, likely representing a meningioma, with minimal mass effect on the underlying right frontal lobe.    There is no CT evidence of acute intracranial hemorrhage, midline shift, or acute, large territorial infarct.  The ventricles and sulci are prominent compatible with moderate cerebral volume loss. Patchy nonspecific white matter hypoattenuation is likely on the basis of chronic microangiopathy. 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 The basal cisterns are patent.    There are atherosclerotic vascular calcifications at the skull base.    There are minimal right mastoid air cell effusions. There is a mucous retention cyst or polyp in the right maxillary sinus. The remaining visualized paranasal sinuses and mastoid air cells are grossly clear.    The calvarium and skull base are grossly intact.    IMPRESSION:  No acute intracranial findings. No significant change when compared with study performed on 3/30/2021.        EXAM:  CT CHEST                        PROCEDURE DATE:  2021      FINDINGS:    LUNGS AND AIRWAYS: Patent central airways.  Alveolar and interstitial edema increased from prior study. Bibasilar dependent atelectasis, increased.  PLEURA: Increased bilateral pleural effusions.  MEDIASTINUM AND SAUMYA: No lymphadenopathy.  VESSELS: Calcified thoracic aorta and coronary arteries.  HEART: Cardiomegaly. Valvular calcifications. No pericardial effusion.  CHEST WALL AND LOWER NECK: Within normal limits.  VISUALIZED UPPER ABDOMEN: Two focal calcifications associated with the liver capsule, unchanged.  BONES: Kyphosis, osteopenia.    IMPRESSION:  Worsening CHF.

## 2021-04-04 NOTE — PROGRESS NOTE ADULT - ASSESSMENT
89 yo WF h/o A-fib not on anticoagulants (falls), CHF EF 35-40%, HLD, moderate to severe mitral regurgitation, s/p R hip replacement, who not does ambulate independently at baseline    # AMS due to metabolic  Encephalopathy  suspect due to  UTI  and baseline dementia but cant entirely rule out CVA   # Hx of atrial fibrillation not on AC     - We are not able to do MRI -  as patient is not following directions /  agitated when transported. Will plan for MRI Once the patient is more cooperative, MR of the brain as vascular causes of patient's change in mentation has not been ruled out.   - S/p 3 days of IV Abxs with no significant improvement of mental status  - EEG neg for seizure but global slowing   - C/w ASA / STATIN  - Speech / swallow evaluation appreciated on dysphagia diet       # Rt Frontal Meningioma   - Incidental seen last scan in 2019 slightly large   - will further d/w family management         # PAF  not on A/c   - C/w PO Cardizem / PO metoprolol  as tolerated   - PRN IV metoprolol if refusing Po meds   - risks and benefits of anticoagulation were d/w Pts daughter,  understands that pt is high risk of falls and at this time she is not interested in blood thinners.   - D/w Dr Mon     # Acute Proteus  UTI  - UCX reviewed, sensitive to ceftriaxone  - Completed 3 days of  Rocephin    #  acute on chronic systolic CHF   - CT chest with CHF and b/l pleural effusions   -on IV lasix 40mg QD, received 2 doses, will hold off now will re eval in am to resume vs PO   - strict i/o, daily weights  - monitor O2 sats, on NC   - labs in am       # ARF - msot likely secondary to intravascular depression  - resolved  - S/p total of 1.5L of NS on admission   - Monitor UO  -labs in am         Dispo: C/w tele, labs in am

## 2021-04-05 NOTE — PROGRESS NOTE ADULT - ASSESSMENT
89 yo WF h/o A-fib not on anticoagulants (falls), CHF EF 35-40%, HLD, moderate to severe mitral regurgitation, s/p R hip replacement, who not does ambulate independently at baseline, who presented to the ED sent by her family due to progressive aphasia for the past 4 days.      # AMS with Encephalopathy   She apparently had some improvement initially and has now declined.  -Vascular event can not be ruled out with h/o CH AF and not on AC  -MRI brain previously not performed due to patient being incooperative. CT head negative for stroke x 2.  -At this patient is minimally responsive and may be able to complete MRI brain.  -Continue Aspirin/ statin for now. Aspirin can be given rectally, since at this time she is not alert enough to take PO  -If patient improves, can reevaluate if patient is a candidate for anticoagulation for stroke prevention. AC was previously discontinued due to fall risk. This can be reconsidered if she is no longer ambulatory.    -Correct underling metabolic  causes  -EEG bilat slow     # Rt Frontal Meningioma   -Incidental seen last scan in 2019 slightly large .    Will follow up as needed.

## 2021-04-05 NOTE — PROGRESS NOTE ADULT - ASSESSMENT
89 F with AF not on AC, systolic heart failure presents with altered mental status with aphasia    AF- current rate controlled, c/w BB and CCB-- overall goal is to decrease CCB and increase BB due to CHF- not on AC due to fall risk and family request     CHF-was given lasix and now hypernatremic- appears euvolemic on exam- would hold lasix today and monitor     NSVT on monitor- increased metoprolol and check electrolytes today     CVA vs TIA- vs AMS due to infection- unable to tolerate MRI. CT head stable. Likely deterioration in setting of dementia and poor PO itnake    Poor prognosis overall

## 2021-04-05 NOTE — PROGRESS NOTE ADULT - SUBJECTIVE AND OBJECTIVE BOX
CC: aphasia (04 Apr 2021 14:50)    HPI:  89 yo WF h/o A-fib not on anticoagulants (falls), CHF EF 35-40%, HLD, moderate to severe mitral regurgitation, s/p R hip replacement, who not does ambulate independently at baseline, who presented to the ED sent by her family due to progressive aphasia for the past 4 days.  Patient was admitted 2 weeks ago for rapid Afib and CHF and was discharged on Lasix and water restriction; On Admission,  Cr was found to be elevated    INTERVAL HPI/ OVERNIGHT EVENTS: chart reviewed, Pt was seen and examined, Pt is lethargic, barely responsive to sternal rub, MRI oredered, discussed with daughter      REVIEW OF SYSTEMS:  All other review of systems is negative unless indicated above    Vital Signs Last 24 Hrs  T(C): 36.9 (05 Apr 2021 09:04), Max: 36.9 (05 Apr 2021 09:04)  T(F): 98.4 (05 Apr 2021 09:04), Max: 98.4 (05 Apr 2021 09:04)  HR: 95 (05 Apr 2021 09:04) (91 - 95)  BP: 108/34 (05 Apr 2021 09:04) (102/61 - 114/69)  BP(mean): --  RR: 20 (05 Apr 2021 09:04) (19 - 20)  SpO2: 93% (05 Apr 2021 09:04) (93% - 96%)    PHYSICAL EXAM:  General: Well developed; malnourished; lethargic,  not in respiratory distress   Eyes: PERRLA,conjunctiva and sclera clear  Head: Normocephalic; atraumatic  ENMT: No nasal discharge; airway clear  Neck: Supple; ; no masses  Respiratory: Diminished BS at bases b/l, No wheezes  Cardiovascular: Irregular rate and rhythm. S1 and S2 Normal;  Gastrointestinal: Soft  non-distended; +  bowel sounds  Genitourinary: No  palpable bladder   Extremities: No  edema  Neurological: encephalopathic, grossly non focal   Skin: Warm and dry. No acute rash  Musculoskeletal: Normal muscle tone and strength   Psychiatric: restless     Medications:  MEDICATIONS  (STANDING):  aspirin  chewable 81 milliGRAM(s) Oral daily  diltiazem    milliGRAM(s) Oral daily  heparin   Injectable 5000 Unit(s) SubCutaneous every 12 hours  melatonin 5 milliGRAM(s) Oral at bedtime  metoprolol tartrate 50 milliGRAM(s) Oral every 8 hours      Labs: All Labs Reviewed:                        11.3   6.89  )-----------( 238      ( 05 Apr 2021 08:07 )             36.6     04-05    152<H>  |  119<H>  |  38<H>  ----------------------------<  109<H>  3.8   |  28  |  1.11    Ca    9.0      05 Apr 2021 08:07  Mg     2.6     04-05    TPro  6.5  /  Alb  2.8<L>  /  TBili  0.7  /  DBili  x   /  AST  12<L>  /  ALT  22  /  AlkPhos  53  04-05        ABG - ( 05 Apr 2021 11:53 )  pH, Arterial: 7.36  pH, Blood: x     /  pCO2: 53    /  pO2: 140   / HCO3: 29    / Base Excess: 3.2   /  SaO2: 99        Blood Culture: 03-30 @ 11:59  Organism Proteus mirabilis  Gram Stain Blood -- Gram Stain --  Specimen Source .Urine None  Culture-Blood --    >100,000 CFU/ml Proteus mirabilis  03-30 @ 10:42  Organism --  Gram Stain Blood -- Gram Stain --  Specimen Source .Blood None  Culture-Blood --    No Growth Final    Urine Culture: Organism: Proteus mirabilis  gram stain: --  03-30 @ 11:59     >100,000 CFU/ml Proteus mirabilis  Organism: --  gram stain: --  03-30 @ 10:42     No Growth Final      RADIOLOGY/EKG: all tests were reviewed       EXAM:  CT BRAIN                        PROCEDURE DATE:  04/04/2021      FINDINGS:  The study is slightly limited by patient motion.    Redemonstrated  is a right parafalcine extra-axial mass measuring 3.2 x 2.4 cm, likely representing a meningioma, with minimal mass effect on the underlying right frontal lobe.    There is no CT evidence of acute intracranial hemorrhage, midline shift, or acute, large territorial infarct.  The ventricles and sulci are prominent compatible with moderate cerebral volume loss. Patchy nonspecific white matter hypoattenuation is likely on the basis of chronic microangiopathy. 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 The basal cisterns are patent.    There are atherosclerotic vascular calcifications at the skull base.    There are minimal right mastoid air cell effusions. There is a mucous retention cyst or polyp in the right maxillary sinus. The remaining visualized paranasal sinuses and mastoid air cells are grossly clear.    The calvarium and skull base are grossly intact.    IMPRESSION:  No acute intracranial findings. No significant change when compared with study performed on 3/30/2021.        EXAM:  CT CHEST                        PROCEDURE DATE:  04/03/2021      FINDINGS:    LUNGS AND AIRWAYS: Patent central airways.  Alveolar and interstitial edema increased from prior study. Bibasilar dependent atelectasis, increased.  PLEURA: Increased bilateral pleural effusions.  MEDIASTINUM AND SAUMYA: No lymphadenopathy.  VESSELS: Calcified thoracic aorta and coronary arteries.  HEART: Cardiomegaly. Valvular calcifications. No pericardial effusion.  CHEST WALL AND LOWER NECK: Within normal limits.  VISUALIZED UPPER ABDOMEN: Two focal calcifications associated with the liver capsule, unchanged.  BONES: Kyphosis, osteopenia.    IMPRESSION:  Worsening CHF.    DVT PPX: heparin Sq     Advance Directive: Full Code     Disposition: MRI head

## 2021-04-05 NOTE — CDI QUERY NOTE - NSCDI_DOCCLARIFY2_GEN_ALL_CORE_FT
Documentation clarification is required for accuracy in coding and billing, claim validation and reporting severity of illness, quality data and risk of mortality.

## 2021-04-05 NOTE — CDI QUERY NOTE - NSCDINOTEDOCCLARIFICATION_GEN_A_CORE
PLEASE INCLUDE MORE SPECIFIC DOCUMENTATION IN YOUR PROGRESS NOTE AND DISCHARGE SUMMARY.  The documentation in this patient's medical record requires additional clarification to ensure that we accurately capture the patients diagnosis(es), treatment and/or severity of illness. Please document to the greatest level of specificity all corresponding diagnoses (either known or suspected) and/or treatment associated with the clinical information described below.

## 2021-04-05 NOTE — CDI QUERY NOTE - NSCDIOTHERTXTBX_GEN_ALL_CORE_HH
Patient admitted with AMS and aphasia  CVA vs TIA- vs AMS due to infection- documented    CT of head negative  Urine culture: > 100,000 proteus mirabilis   Acute UTI    Documentation reveals : AMS due to metabolic  Encephalopathy  suspect due to  UTI  but cant entirely rule out CVA     On admission patient with AMS , PACO and UTI  WBC= 18.14  HR= 102  Temp= 98.2  RR= 31  BP= 123/73    Are the above clinical indicators indicative of a further diagnosis  a) Sepsis, present on admission due to UTI  b) Sepsis, present on admission due to other etiology  c) No clinical evidence of Sepsis  d) UTI, present on admission but no clinical evidence of Sepsis  e) Other, please clarify
Patient admitted with AMS and aphasia  CVA vs TIA- vs AMS due to infection- documented    CT of head negative  Urine culture: > 100,000 proteus mirabilis   Acute UTI    On admission patient with AMS and PACO  WBC= 18.14  HR= 102  Temp= 98.2  RR= 31  BP= 123/73    Are the above clinical indicators indicative of a further diagnosis  a) Sepsis, present on admission due to UTI  b) Sepsis, present on admission due to other etiology  c) No clinical evidence of Sepsis  d) UTI, present on admission but no clinical evidence of Sepsis  e) Other, please clarify
Patient admitted with AMS and aphasia  CVA vs TIA- vs AMS due to infection- documented    CT of head negative  Urine culture: > 100,000 proteus mirabilis    On admission patient with AMS and PACO  WBC= 18.14  HR= 102  Temp= 98.2  RR= 31  BP= 123/73    Are the above clinical indicators indicative of a further diagnosis  a) Sepsis, present on admission due to UTI  b) Sepsis, present on admission due to other etiology  c) No clinical evidence of Sepsis  d) UTI, present on admission but no clinical evidence of Sepsis  e) Other, please clarify
Patient admitted with AMS and aphasia  CVA vs TIA- vs AMS due to infection- documented    CT of head negative  Urine culture: > 100,000 proteus mirabilis    On admission patient with AMS and PACO  WBC= 18.14  HR= 102  Temp= 98.2  RR= 31  BP= 123/73    Are the above clinical indicators indicative of a further diagnosis  a) Sepsis, present on admission due to UTI  b) Sepsis, present on admission due to other etiology  c) No clinical evidence of Sepsis  d) UTI, present on admission but no clinical evidence of Sepsis  e) Other, please clarify

## 2021-04-05 NOTE — PROGRESS NOTE ADULT - SUBJECTIVE AND OBJECTIVE BOX
Subjective:    pat lying in bed, resless, no respiratory complaint.    Home Medications:  aspirin 81 mg oral tablet: 1 tab(s) orally once a day (30 Mar 2021 12:09)  atorvastatin 10 mg oral tablet: 1 tab(s) orally once a day (30 Mar 2021 12:09)  MiraLax oral powder for reconstitution: Use as needed (30 Mar 2021 12:09)  PreserVision AREDS 2 oral capsule: 1 cap(s) orally 2 times a day (30 Mar 2021 12:09)  senna oral tablet: 2 tab(s) orally once a day (at bedtime), As Needed (30 Mar 2021 12:09)  Vitamin D3 5000 intl units (125 mcg) oral tablet: 1 tab(s) orally once a day (30 Mar 2021 12:09)    MEDICATIONS  (STANDING):  aspirin  chewable 81 milliGRAM(s) Oral daily  diltiazem    milliGRAM(s) Oral daily  heparin   Injectable 5000 Unit(s) SubCutaneous every 12 hours  melatonin 5 milliGRAM(s) Oral at bedtime  metoprolol tartrate 50 milliGRAM(s) Oral every 8 hours    MEDICATIONS  (PRN):  acetaminophen   Tablet .. 650 milliGRAM(s) Oral every 6 hours PRN Mild Pain (1 - 3)  metoprolol tartrate Injectable 2.5 milliGRAM(s) IV Push every 8 hours PRN For HR>120s      Allergies    atenolol (Other)  sulfa drugs (Other)    Intolerances        Vital Signs Last 24 Hrs  T(C): 36.9 (05 Apr 2021 09:04), Max: 36.9 (05 Apr 2021 09:04)  T(F): 98.4 (05 Apr 2021 09:04), Max: 98.4 (05 Apr 2021 09:04)  HR: 95 (05 Apr 2021 09:04) (91 - 95)  BP: 108/34 (05 Apr 2021 09:04) (102/61 - 114/69)  BP(mean): --  RR: 20 (05 Apr 2021 09:04) (19 - 20)  SpO2: 93% (05 Apr 2021 09:04) (93% - 96%)      PHYSICAL EXAMINATION:    NECK:  Supple. No lymphadenopathy. Jugular venous pressure not elevated. Carotids equal.   HEART:   The cardiac impulse has a normal quality. Reg., Nl S1 and S2.  There are no murmurs, rubs or gallops noted  CHEST:  Chest crackles to auscultation. Normal respiratory effort.  ABDOMEN:  Soft and nontender.   EXTREMITIES:  There is no edema.       LABS:                        11.3   6.89  )-----------( 238      ( 05 Apr 2021 08:07 )             36.6     04-05    152<H>  |  119<H>  |  38<H>  ----------------------------<  109<H>  3.8   |  28  |  1.11    Ca    9.0      05 Apr 2021 08:07  Mg     2.6     04-05    TPro  6.5  /  Alb  2.8<L>  /  TBili  0.7  /  DBili  x   /  AST  12<L>  /  ALT  22  /  AlkPhos  53  04-05

## 2021-04-05 NOTE — CDI QUERY NOTE - NSCDINOTEPOA_GEN_ALL_CORE_FT
Was the condition present on admission? If so, please document in the chart that “(the condition) was present on admission.”

## 2021-04-05 NOTE — PROGRESS NOTE ADULT - ASSESSMENT
89 yo WF h/o A-fib not on anticoagulants (falls), CHF EF 35-40%, HLD, moderate to severe mitral regurgitation, s/p R hip replacement, who not does ambulate independently at baseline    # AMS due to metabolic  Encephalopathy  suspect due to  UTI  and baseline dementia but cant entirely rule out CVA   # Hx of atrial fibrillation not on AC   She apparently had some improvement initially and has now declined..  - more lethargic on 4/5, poor po intake - daughter made aware of change of status   - Vascular event can not be ruled out with h/o CH AF and not on AC  -MRI brain previously not performed due to patient being uncooperative. CT head negative for stroke x 2.  -At thistime  patient is minimally responsive and may be able to complete MRI brain.  -Continue Aspirin/ statin for now - change asa to suppository if unable to take po   - MRI head ordered    - S/p 3 days of IV Abxs with no significant improvement of mental status  - EEG neg for seizure but global slowing   - C/w ASA / STATIN  - Speech / swallow evaluation appreciated on dysphagia diet - poor po intake   - D/w with neurology - Dr. Vega     # Hypernatermia   - Na trending up due to poor po intake   - pt. lethargic today 5/4   - encourage free water   - monitor Na     # Rt Frontal Meningioma   - Incidental seen last scan in 2019 slightly large   - will further d/w family management       # PAF  not on A/c   - C/w PO Cardizem / PO metoprolol  as tolerated   - PRN IV metoprolol if refusing Po meds   - risks and benefits of anticoagulation were d/w Pts daughter,  understands that pt is high risk of falls and at this time she is not interested in blood thinners.   - D/w Dr Mon     # Sepsis peresent on admission due to Acute Proteus  UTI  - UCX reviewed, sensitive to ceftriaxone  - Completed 3 days of  Rocephin    #  acute on chronic systolic CHF   - CT chest with CHF and b/l pleural effusions   -on IV lasix 40mg QD, received 2 doses, will hold off now   - strict i/o, daily weights  - monitor O2 sats, on NC   - labs in am       # ARF - msot likely secondary to intravascular depression  - resolved  - S/p total of 1.5L of NS on admission   - Monitor UO  -labs in am

## 2021-04-05 NOTE — PROGRESS NOTE ADULT - CONVERSATION DETAILS
Discussed with pt's daughter - Velia - both on the phone and at bedside; explained pt. is deteriorating, not eating and/or drinking, most likely CVA, poor prognosis. Daughter wants [facility], as both her father and her sister  on Hospice (sister at [facility]); daughter considering PEG - explained pt. is not a good candidate as this is a surgical procedure and it would be too risky for anesthesia. Daughter will consider options after MRI result

## 2021-04-05 NOTE — PROGRESS NOTE ADULT - ASSESSMENT
PROBLEMS;    Acute on chronic systolic CHF   B/L Pleural effusion  ARF   AMS with Encephalopathy this could be secondary to UTI /  we were not able to rule out CVA  Hx of atrial fibrillation not on AC   Rt Frontal Meningioma   Acute UTI- proteus species in the urine    PLAN:    Keep neg balance  symptomatic rx  Neuro/cardic eval  supportive care  dvt prophylasix

## 2021-04-05 NOTE — PROGRESS NOTE ADULT - SUBJECTIVE AND OBJECTIVE BOX
History of Present Illness:   89 yo WF h/o A-fib not on anticoagulants (falls), CHF EF 35-40%, HLD, moderate to severe mitral regurgitation, s/p R hip replacement, who not does ambulate independently at baseline, who presented to the ED sent by her family due to progressive aphasia for the past 4 days.  Patient was admitted 2 weeks ago for rapid Afib and Chf, and was discharged on Lasix and water restriction; today her Cr was found to be elevated     she is currently agitated,  speaking/ screaming and moving all 4 limbs      4/1/21- more calm this AM, arousable, weak  4/2/21 confused but calm this AM  4/4 - Yesterday patient was wheezing with concerns for CHF and was restarted on lasix IV, with improvement    Patient seen and examined at bedside. She is mumbling and not following commands. Unable to get ROS in current clinical condition.    PAST MEDICAL HISTORY:  A-fib   CHF (congestive heart failure)   Glaucoma   HLD (hyperlipidemia).     PAST SURGICAL HISTORY:  S/P hip replacement, right.     FAMILY HISTORY:  Patient unable to provide history    Social History:  no tobacco, ETOH, IVDA lives home with aids, non ambulatory      MEDICATIONS  (STANDING):  aspirin  chewable 81 milliGRAM(s) Oral daily  diltiazem    milliGRAM(s) Oral daily  heparin   Injectable 5000 Unit(s) SubCutaneous every 12 hours  melatonin 5 milliGRAM(s) Oral at bedtime  metoprolol tartrate 50 milliGRAM(s) Oral two times a day    MEDICATIONS  (PRN):  acetaminophen   Tablet .. 650 milliGRAM(s) Oral every 6 hours PRN Mild Pain (1 - 3)  metoprolol tartrate Injectable 2.5 milliGRAM(s) IV Push every 8 hours PRN For HR>120s        Vital Signs Last 24 Hrs  T(C): 36.4 (04 Apr 2021 07:26), Max: 36.4 (04 Apr 2021 07:26)  T(F): 97.5 (04 Apr 2021 07:26), Max: 97.5 (04 Apr 2021 07:26)  HR: 92 (04 Apr 2021 07:26) (58 - 125)  BP: 132/68 (04 Apr 2021 07:26) (105/60 - 132/68)  BP(mean): 84 (03 Apr 2021 16:58) (84 - 84)  RR: 20 (04 Apr 2021 07:26) (18 - 20)  SpO2: 96% (04 Apr 2021 07:26) (89% - 100%)    GENERAL:Awake and alert, in mild distress due to disorentation  LUNG: Decreased BS at bases but no W/R/R  HEART: irreg/irreg with soft systolic murmur at LSB  ABDOMEN: +BS, ST/ND/NT  EXTREMITIES:  2+ Peripheral Pulses, No clubbing, No cyanosis, No tibial edema  neuro: unable to answer questions or follow commands but moving all 4 ext    Labs:                           13.2   6.22  )-----------( 222      ( 04 Apr 2021 07:45 )             42.2   04-04    148<H>  |  116<H>  |  38<H>  ----------------------------<  115<H>  4.1   |  29  |  1.08    Ca    9.0      04 Apr 2021 07:45    TPro  6.5  /  Alb  2.8<L>  /  TBili  0.6  /  DBili  x   /  AST  14<L>  /  ALT  30  /  AlkPhos  57  04-03        Tele:  remains in AF, rate controlled with rates mostly 80-100s.      < from: TTE Echo Complete w/o Contrast w/ Doppler (11.20.20 @ 09:29) >     Findings     Mitral Valve   Mild to moderate mitral annular calcification is present.   Fibrocalcific changes noted to themitral valve leaflets with preserved   leaflet excursion.   Moderate to severe (3+) mitral regurgitation is present.     Aortic Valve   Fibrocalcific changes noted to the Aortic valve leaflets with preserved   leaflet excursion.   Mild to Moderate aortic regurgitation is present.     Tricuspid Valve   Mild to moderate tricuspid valve regurgitation is present.   Mild pulmonary hypertension.     Pulmonic Valve   Normal appearing pulmonic valve structure and function.   Mild pulmonic valvular regurgitation (1+) is present.     Left Atrium   The left atrium is moderately dilated.     Left Ventricle   The interventricular septum appears hypokinetic.   Estimated left ventricular ejection fraction is 35-40 %.     Right Atrium   The right atrium is at the upper limits of normal.     Right Ventricle   Normal appearing right ventricle structure and function.     Pericardial Effusion   No evidence of pericardial effusion.     Pleural Effusion   Pleural effusion - is present..     Miscellaneous   TheIVC appears normal.     Impression     Summary     Mild to moderate mitral annular calcification is present.   Fibrocalcific changes noted to the mitral valve leaflets with preserved   leaflet excursion.   Moderate to severe (3+) mitral regurgitationis present.   Fibrocalcific changes noted to the Aortic valve leaflets with preserved   leaflet excursion.   Mild to Moderate aortic regurgitation is present.   Mild to moderate tricuspid valve regurgitation is present.   Mild pulmonary hypertension.   Normal appearing pulmonic valve structure and function.   Mild pulmonic valvular regurgitation (1+) is present.   The left atrium is moderately dilated.   The interventricular septum appears hypokinetic.   Estimated left ventricular ejection fraction is 35-40 %.   The right atrium is at the upper limits of normal.   Pleural effusion - is present..     Signature     ----------------------------------------------------------------   Electronically signed by Carla Pineda MD(Interpreting   physician) on 11/21/2020 07:43 AM   ----------------------------------------------------------------      < end of copied text >

## 2021-04-05 NOTE — H&P ADULT - BACK
My question is should this be scheduled with Dr Edmond or the new Dr Burgess.  The question comes down to if Dr Edmond does this and patient needs surgery will the new doctor provider the surgery service or will have have to see her in person first.  Please advise, thanks   detailed exam

## 2021-04-05 NOTE — PROGRESS NOTE ADULT - SUBJECTIVE AND OBJECTIVE BOX
Interval History:  4/5/21: Patient previously seen by Dr. Khalil.  Asked to reevaluate for worsening mental status.    MEDICATIONS  (STANDING):  aspirin  chewable 81 milliGRAM(s) Oral daily  diltiazem    milliGRAM(s) Oral daily  heparin   Injectable 5000 Unit(s) SubCutaneous every 12 hours  melatonin 5 milliGRAM(s) Oral at bedtime  metoprolol tartrate 50 milliGRAM(s) Oral every 8 hours    MEDICATIONS  (PRN):  acetaminophen   Tablet .. 650 milliGRAM(s) Oral every 6 hours PRN Mild Pain (1 - 3)  metoprolol tartrate Injectable 2.5 milliGRAM(s) IV Push every 8 hours PRN For HR>120s      Allergies    atenolol (Other)  sulfa drugs (Other)    Intolerances        PHYSICAL EXAM:  Vital Signs Last 24 Hrs  T(F): 98.4 (04-05-21 @ 09:04)  HR: 95 (04-05-21 @ 09:04)  BP: 108/34 (04-05-21 @ 09:04)  RR: 20 (04-05-21 @ 09:04)    GENERAL: NAD, well-groomed, well-developed  HEAD:  Atraumatic, Normocephalic  Neuro:  Lying in bed.  Eyes closed. Moans.   Not answering questions.   CN: No focal facial weakness. Pupils small and poorly reactive to light  Motor: no spontaneous movements of extremities  CN: PERRL, EOMI, no nystagmus, no facial weakness, tongue protrudes in the midline  motor: normal tone, no pronator drift, full strength in all four extremities  sensory: No withdrawal to noxious stimuli  coordination: unable to assess  Gait: not tested    LABS:                        11.3   6.89  )-----------( 238      ( 05 Apr 2021 08:07 )             36.6     04-05    152<H>  |  119<H>  |  38<H>  ----------------------------<  109<H>  3.8   |  28  |  1.11    Ca    9.0      05 Apr 2021 08:07  Mg     2.6     04-05    TPro  6.5  /  Alb  2.8<L>  /  TBili  0.7  /  DBili  x   /  AST  12<L>  /  ALT  22  /  AlkPhos  53  04-05          RADIOLOGY & ADDITIONAL STUDIES:        PLAN:  CT head 3/30/21:   No acute hemorrhage, mass effect or extra-axial collection.  Increasing size of right frontal parafalcine extra-axial soft tissue mass likely representing a meningioma.    CT head 4/4/21:  No acute intracranial findings. No significant change when compared with study performed on 3/30/2021.

## 2021-04-06 NOTE — PROGRESS NOTE ADULT - ASSESSMENT
89 F with AF not on AC, systolic heart failure presents with altered mental status with aphasia    AF- current rate controlled, c/w BB and CCB-- overall goal is to decrease CCB and increase BB due to CHF- not on AC due to fall risk and family request - may need IV pushes of BB or CCb as not taking PO    CHF-was given lasix and now hypernatremic- appears euvolemic on exam- would hold lasix today and monitor - give back fluids    NSVT on monitor previously this admission- increased metoprolol and check electrolytes today     CVA vs TIA- vs AMS due to infection- unable to tolerate MRI. CT head stable. Likely deterioration in setting of dementia and poor PO itnake    Poor prognosis overall

## 2021-04-06 NOTE — PROGRESS NOTE ADULT - ASSESSMENT
89 yo WF h/o A-fib not on anticoagulants (falls), CHF EF 35-40%, HLD, moderate to severe mitral regurgitation, s/p R hip replacement, who not does ambulate independently at baseline, who presented to the ED sent by her family due to progressive aphasia for the past 4 days.      # AMS with Encephalopathy   She apparently had some improvement initially and has now declined.  -Vascular event was suspected in setting of A-fib, off anticoagulation  -MRI brain is negative for acute infarct although limited  -Placed on BPAP, but no improvement in CO2 levels or mental status.   -Can give aspirin NJ.  -If patient improves, can reevaluate if patient is a candidate for anticoagulation for stroke prevention. AC was previously discontinued due to fall risk. This can be reconsidered if she is no longer ambulatory.    -Correct underling metabolic  causes  -EEG bilat slow     # Rt Frontal Meningioma   -Incidental seen last scan in 2019 slightly large .  -Not a surgical candidate at this time.    Would consider palliative care eval is no improvement with BPAP.    Will follow up as needed.

## 2021-04-06 NOTE — PROGRESS NOTE ADULT - SUBJECTIVE AND OBJECTIVE BOX
Interval History:  4/6/21: Patient moved to SICU for isolation room while on BPAP    MEDICATIONS  (STANDING):  aspirin  chewable 81 milliGRAM(s) Oral daily  dextrose 5%. 1000 milliLiter(s) (50 mL/Hr) IV Continuous <Continuous>  diltiazem    milliGRAM(s) Oral daily  heparin   Injectable 5000 Unit(s) SubCutaneous every 12 hours  melatonin 5 milliGRAM(s) Oral at bedtime  metoprolol tartrate 50 milliGRAM(s) Oral every 8 hours    MEDICATIONS  (PRN):  acetaminophen   Tablet .. 650 milliGRAM(s) Oral every 6 hours PRN Mild Pain (1 - 3)  metoprolol tartrate Injectable 2.5 milliGRAM(s) IV Push every 8 hours PRN For HR>120s      Allergies    atenolol (Other)  sulfa drugs (Other)    Intolerances        PHYSICAL EXAM:  Vital Signs Last 24 Hrs  T(F): 96.8 (04-06-21 @ 05:00)  HR: 92 (04-06-21 @ 09:00)  BP: 115/61 (04-06-21 @ 09:00)  RR: 13 (04-06-21 @ 09:00)    GENERAL: Minimally responsive, on BPAP  HEAD:  Atraumatic, Normocephalic  Neuro:  Lying in bed.  On BPAp  Eyes closed. Moans.   Not answering questions.   CN: No focal facial weakness.   Motor: Moves both arms and fights me when I try to examine her eyes  sensory: No withdrawal to noxious stimuli  coordination: unable to assess  Gait: not tested        LABS:                        12.1   4.71  )-----------( 211      ( 06 Apr 2021 08:21 )             40.1     04-06    152<H>  |  118<H>  |  33<H>  ----------------------------<  149<H>  3.9   |  28  |  0.96    Ca    8.8      06 Apr 2021 08:21  Mg     2.6     04-05    TPro  6.5  /  Alb  2.8<L>  /  TBili  0.7  /  DBili  x   /  AST  12<L>  /  ALT  22  /  AlkPhos  53  04-05          RADIOLOGY & ADDITIONAL STUDIES:    CT head 3/30/21:   No acute hemorrhage, mass effect or extra-axial collection.  Increasing size of right frontal parafalcine extra-axial soft tissue mass likely representing a meningioma.    CT head 4/4/21:  No acute intracranial findings. No significant change when compared with study performed on 3/30/2021.    MRI head 4/5/21:  Limited exam due to motion    No acute intracranial hemorrhage or acute infarct.

## 2021-04-06 NOTE — PROGRESS NOTE ADULT - SUBJECTIVE AND OBJECTIVE BOX
Subjective:    pat on bipap 10/5, 35%, no respiratory complaint. still lathergy, agitated last night.    Home Medications:  aspirin 81 mg oral tablet: 1 tab(s) orally once a day (30 Mar 2021 12:09)  atorvastatin 10 mg oral tablet: 1 tab(s) orally once a day (30 Mar 2021 12:09)  MiraLax oral powder for reconstitution: Use as needed (30 Mar 2021 12:09)  PreserVision AREDS 2 oral capsule: 1 cap(s) orally 2 times a day (30 Mar 2021 12:09)  senna oral tablet: 2 tab(s) orally once a day (at bedtime), As Needed (30 Mar 2021 12:09)  Vitamin D3 5000 intl units (125 mcg) oral tablet: 1 tab(s) orally once a day (30 Mar 2021 12:09)    MEDICATIONS  (STANDING):  aspirin  chewable 81 milliGRAM(s) Oral daily  dextrose 5%. 1000 milliLiter(s) (75 mL/Hr) IV Continuous <Continuous>  diltiazem    milliGRAM(s) Oral daily  heparin   Injectable 5000 Unit(s) SubCutaneous every 12 hours  melatonin 5 milliGRAM(s) Oral at bedtime  metoprolol tartrate 50 milliGRAM(s) Oral every 8 hours    MEDICATIONS  (PRN):  acetaminophen   Tablet .. 650 milliGRAM(s) Oral every 6 hours PRN Mild Pain (1 - 3)  metoprolol tartrate Injectable 2.5 milliGRAM(s) IV Push every 8 hours PRN For HR>120s      Allergies    atenolol (Other)  sulfa drugs (Other)    Intolerances        Vital Signs Last 24 Hrs  T(C): 35.6 (06 Apr 2021 16:53), Max: 36.4 (05 Apr 2021 22:15)  T(F): 96.1 (06 Apr 2021 16:53), Max: 97.6 (05 Apr 2021 22:15)  HR: 101 (06 Apr 2021 17:00) (84 - 102)  BP: 109/59 (06 Apr 2021 17:00) (87/44 - 124/66)  BP(mean): 65 (06 Apr 2021 17:00) (49 - 83)  RR: 26 (06 Apr 2021 17:00) (13 - 32)  SpO2: 98% (06 Apr 2021 17:00) (85% - 100%)      PHYSICAL EXAMINATION:    NECK:  Supple. No lymphadenopathy. Jugular venous pressure not elevated. Carotids equal.   HEART:   The cardiac impulse has a normal quality. Reg., Nl S1 and S2.  There are no murmurs, rubs or gallops noted  CHEST:  Chest crackles to auscultation. Normal respiratory effort.  ABDOMEN:  Soft and nontender.   EXTREMITIES:  There is no edema.       LABS:                        12.1   4.71  )-----------( 211      ( 06 Apr 2021 08:21 )             40.1     04-06    152<H>  |  118<H>  |  33<H>  ----------------------------<  149<H>  3.9   |  28  |  0.96    Ca    8.8      06 Apr 2021 08:21  Mg     2.6     04-05    TPro  6.5  /  Alb  2.8<L>  /  TBili  0.7  /  DBili  x   /  AST  12<L>  /  ALT  22  /  AlkPhos  53  04-05      Xray Chest 1 View- PORTABLE-Routine (Xray Chest 1 View- PORTABLE-Routine in AM.) (04.06.21 @ 06:34) >  FINDINGS:    The lungs show similar LEFT greater than RIGHT pleural effusions and/or LEFT retrocardiac airspace consolidation obscuring diaphragm contour.  No pneumothorax.    The  heart is enlarged in transverse diameter. No hilar mass.   Visualized osseous structures are intact.      IMPRESSION:   No significant change..      Blood Gas Profile - Arterial (04.06.21 @ 09:09)   pH, Arterial: 7.37   pCO2, Arterial: 54 mmHg   pO2, Arterial: 117 mmHg   HCO3, Arterial: 30 mmoL/L   Base Excess, Arterial: 4.5 mmol/L   Oxygen Saturation, Arterial: 99 %

## 2021-04-06 NOTE — CONSULT NOTE ADULT - ATTENDING COMMENTS
Patient seen and examined. CT chest reviewed, right pleural effusion slightly larger than the left. Will attempt to talk to family and potentially obtain consent for thoracentesis/pigtail placement. These appear to be chronic/recurrent effusions.     Patient unable to communicate with us at bedside.

## 2021-04-06 NOTE — PROGRESS NOTE ADULT - ASSESSMENT
PROBLEMS;    AC hypoxaemiac & hypercapnic respiratory failure  Acute on chronic systolic CHF   B/L Pleural effusion  ARF   AMS with Encephalopathy this could be secondary to UTI /  we were not able to rule out CVA  Hx of atrial fibrillation not on AC   Rt Frontal Meningioma   Acute UTI- proteus species in the urine    PLAN:    serial abg  trial of bipap as tolerated  Keep neg balance  symptomatic rx  Neuro/cardic eval  supportive care  dvt prophylasix

## 2021-04-06 NOTE — CONSULT NOTE ADULT - SUBJECTIVE AND OBJECTIVE BOX
Patient is a 89y old  Female who presents with a chief complaint of Aphasia (05 Apr 2021 14:58)      HPI:  89 yo WF h/o A-fib not on anticoagulants (falls), CHF EF 35-40%, HLD, moderate to severe mitral regurgitation, s/p R hip replacement, who not does ambulate independently at baseline, who presented to the ED sent by her family due to progressive aphasia for the past 4 days.  Patient was admitted 2 weeks ago for rapid Afib and Chf, and was discharged on Lasix and water restriction; today her Cr was found to be elevated.   Pt was found to have UTI, and encephalopathy Pt being followed by neurology. Neurosurgery was called to consult for known meningioma. MRI was done showing right frontal meningioma.   Pt was seen and examined and d/w Dr. Tijerina     PAST MEDICAL HISTORY:  A-fib   CHF (congestive heart failure)   Glaucoma   HLD (hyperlipidemia).     PAST SURGICAL HISTORY:  S/P hip replacement, right.     FAMILY HISTORY:  Patient unable to provide history    Social History:  no tobacco, ETOH, IVDA lives home with aids, non ambulatory      FAMILY HISTORY:  Known health problems: none        Social Hx:  Nonsmoker, no drug or alcohol use    MEDICATIONS  (STANDING):  aspirin  chewable 81 milliGRAM(s) Oral daily  dextrose 5%. 1000 milliLiter(s) (50 mL/Hr) IV Continuous <Continuous>  diltiazem    milliGRAM(s) Oral daily  heparin   Injectable 5000 Unit(s) SubCutaneous every 12 hours  melatonin 5 milliGRAM(s) Oral at bedtime  metoprolol tartrate 50 milliGRAM(s) Oral every 8 hours       Allergies    atenolol (Other)  sulfa drugs (Other)    Intolerances        ROS: Pertinent positives in HPI, all other ROS were reviewed and are negative.      Vital Signs Last 24 Hrs  T(C): 36 (06 Apr 2021 05:00), Max: 36.9 (05 Apr 2021 09:04)  T(F): 96.8 (06 Apr 2021 05:00), Max: 98.4 (05 Apr 2021 09:04)  HR: 91 (06 Apr 2021 06:00) (84 - 98)  BP: 100/64 (06 Apr 2021 06:00) (87/44 - 120/44)  BP(mean): 71 (06 Apr 2021 06:00) (56 - 78)  RR: 23 (06 Apr 2021 06:00) (14 - 24)  SpO2: 85% (06 Apr 2021 06:00) (85% - 100%)        Constitutional: on Bipap, awake and alert, lethargic   HEENT: opens eyes, PERRLA, EOMI,   Neck: Supple.    Neurological exam:  HF: awake, alert lethargic, not wanting to participate in exam, states " no" and "go away", pt opens eyes spontaneously, TIAN x4 antigravity, strong and spontaneously   CN: MARGARET, EOMI,    Gait/Balance: Cannot test      Labs:   04-05    152<H>  |  119<H>  |  38<H>  ----------------------------<  109<H>  3.8   |  28  |  1.11    Ca    9.0      05 Apr 2021 08:07  Mg     2.6     04-05    TPro  6.5  /  Alb  2.8<L>  /  TBili  0.7  /  DBili  x   /  AST  12<L>  /  ALT  22  /  AlkPhos  53  04-05    03-31 Chol 152 LDL -- HDL 33<L> Trig 78                          11.3   6.89  )-----------( 238      ( 05 Apr 2021 08:07 )             36.6       Radiology:  EXAM:  MR BRAIN                            PROCEDURE DATE:  04/05/2021          INTERPRETATION:  CLINICAL STATEMENT: Metabolic encephalopathy. Rule out CVA    TECHNIQUE: MRI of the brain was performed without gadolinium.    COMPARISON: CT head 4/4/2021    FINDINGS:  There is moderate diffuse parenchymal volume loss. There are T2 prolongation signal abnormalities in the periventricular subcortical white matter likely related to moderate chronic microvascular ischemic changes.    Right frontal extra-axial mass noted measuring 2.5 x 2.5 cm with mass effect on the adjacent right frontal lobe most compatible with meningioma. No vasogenic edema    Small chronic infarcts bilateral cerebellum    There is no acute parenchymal hemorrhage, or midline shift. There is no extra-axial fluid collection.  There is no hydrocephalus.  There is no acute infarct. Partial empty sella    Retention cyst/polyp right maxillary sinus. Opacification right mastoid air cells.    IMPRESSION:  Limited exam due to motion    No acute intracranial hemorrhage or acute infarct.    SENA YEH MD; Attending Radiologist  This document has been electronically signed. Apr 5 2021  3:48PM        EXAM:  CT BRAIN                            PROCEDURE DATE:  04/04/2021          INTERPRETATION:  CLINICAL INFORMATION:  worsening mental status    TECHNIQUE:  Axial CT images were acquired through the head.  Intravenous contrast: None  Two-dimensional reformats were generated.    COMPARISON STUDY: CT head 3/30/2021.    FINDINGS:  The study is slightly limited by patient motion.    Redemonstrated is a right parafalcine extra-axial mass measuring 3.2 x 2.4 cm, likely representing a meningioma, with minimal mass effect on the underlying right frontal lobe.    There is no CT evidence of acute intracranial hemorrhage, midline shift, or acute, large territorial infarct.  The ventricles and sulci are prominent compatible with moderate cerebral volume loss. Patchy nonspecific white matter hypoattenuation is likely on the basis of chronic microangiopathy. 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 The basal cisterns are patent.    There are atherosclerotic vascular calcifications at the skull base.    There are minimal right mastoid air cell effusions. There is a mucous retention cyst or polyp in the right maxillary sinus. The remaining visualized paranasal sinuses and mastoid air cells are grossly clear.    The calvarium and skull base are grossly intact.    IMPRESSION:  No acute intracranial findings. No significant change when compared with study performed on 3/30/2021.            ROMAN LOAIZA MD; Attending Radiologist  This document has been electronically signed. Apr 4 2021  3:22AM

## 2021-04-06 NOTE — PROGRESS NOTE ADULT - ASSESSMENT
89 y.o. female with h/o A-fib not on anticoagulants (falls), CHF EF 35-40%, HLD, moderate to severe mitral regurgitation, s/p R hip replacement, who not does ambulate independently at baseline    # AMS due to metabolic  Encephalopathy    - UTI treated w/o improvement   - no CVA   - possible Gram neg PNA based on CT but no fevers or leukocytosis - will follow procalcitonin level    # Hx of atrial fibrillation not on AC   - C/w ASA / STATIN  - Speech / swallow evaluation appreciated on dysphagia diet     # Rt Frontal Meningioma   - Incidental seen last scan in 2019 slightly large   - will further d/w family management     # PAF  not on A/c   - C/w PO Cardizem / PO metoprolol  as tolerated   - PRN IV metoprolol if refusing Po meds     # acute on chronic systolic CHF   - on IV lasix     # ARF with hypovolemic hypernatremia - likely secondary to intravascular depletion   - D5W, monitor UO

## 2021-04-06 NOTE — PROGRESS NOTE ADULT - SUBJECTIVE AND OBJECTIVE BOX
CC: aphasia (04 Apr 2021 14:50)    HPI:  89 yo WF h/o A-fib not on anticoagulants (falls), CHF EF 35-40%, HLD, moderate to severe mitral regurgitation, s/p R hip replacement, who not does ambulate independently at baseline, who presented to the ED sent by her family due to progressive aphasia for the past 4 days.  Patient was admitted 2 weeks ago for rapid Afib and CHF and was discharged on Lasix and water restriction; On Admission,  Cr was found to be elevated    INTERVAL HPI/ OVERNIGHT EVENTS: transferred to SICU for BIPAP, confused and agitated, not tolerating BIPAP well  ROS UTO    Vital Signs Last 24 Hrs  T(C): 35.6 (06 Apr 2021 16:53), Max: 36.4 (05 Apr 2021 22:15)  T(F): 96.1 (06 Apr 2021 16:53), Max: 97.6 (05 Apr 2021 22:15)  HR: 101 (06 Apr 2021 17:00) (84 - 102)  BP: 109/59 (06 Apr 2021 17:00) (87/44 - 124/66)  BP(mean): 65 (06 Apr 2021 17:00) (49 - 83)  RR: 26 (06 Apr 2021 17:00) (13 - 32)  SpO2: 98% (06 Apr 2021 17:00) (85% - 100%)  I&O's Detail    05 Apr 2021 07:01  -  06 Apr 2021 07:00  --------------------------------------------------------  IN:    dextrose 5%: 385 mL  Total IN: 385 mL    OUT:    Incontinent per Collection Bag (mL): 200 mL  Total OUT: 200 mL    Total NET: 185 mL                      12.1   4.71  )-----------( 211      ( 06 Apr 2021 08:21 )             40.1     06 Apr 2021 08:21    152    |  118    |  33     ----------------------------<  149    3.9     |  28     |  0.96     Ca    8.8        06 Apr 2021 08:21  Mg     2.6       05 Apr 2021 08:07    TPro  6.5    /  Alb  2.8    /  TBili  0.7    /  DBili  x      /  AST  12     /  ALT  22     /  AlkPhos  53     05 Apr 2021 08:07    CAPILLARY BLOOD GLUCOSE    LIVER FUNCTIONS - ( 05 Apr 2021 08:07 )  Alb: 2.8 g/dL / Pro: 6.5 gm/dL / ALK PHOS: 53 U/L / ALT: 22 U/L / AST: 12 U/L / GGT: x           MEDICATIONS  (STANDING):  aspirin  chewable 81 milliGRAM(s) Oral daily  dextrose 5%. 1000 milliLiter(s) (75 mL/Hr) IV Continuous <Continuous>  diltiazem    milliGRAM(s) Oral daily  heparin   Injectable 5000 Unit(s) SubCutaneous every 12 hours  melatonin 5 milliGRAM(s) Oral at bedtime  metoprolol tartrate 50 milliGRAM(s) Oral every 8 hours    MEDICATIONS  (PRN):  acetaminophen   Tablet .. 650 milliGRAM(s) Oral every 6 hours PRN Mild Pain (1 - 3)  metoprolol tartrate Injectable 2.5 milliGRAM(s) IV Push every 8 hours PRN For HR>120s    PHYSICAL EXAM:  General: frail female, agitated  Eyes: PERRLA, conjunctiva and sclera clear  Head: Normocephalic; atraumatic  ENMT: No nasal discharge; airway clear  Neck: Supple; ; no masses  Respiratory: Diminished BS at bases BL, no RRW  Cardiovascular: S1, S2 irreg  Gastrointestinal: Soft non-distended; +  bowel sounds  Genitourinary: No  palpable bladder   Extremities: No edema, moves all for extremities spontaneously   Neurological: encephalopathic, grossly non focal   Skin: Warm and dry.     RADIOLOGY & ADDITIONAL TESTS:      EXAM:  CT BRAIN                        PROCEDURE DATE:  04/04/2021      FINDINGS:  The study is slightly limited by patient motion.    Redemonstrated  is a right parafalcine extra-axial mass measuring 3.2 x 2.4 cm, likely representing a meningioma, with minimal mass effect on the underlying right frontal lobe.    There is no CT evidence of acute intracranial hemorrhage, midline shift, or acute, large territorial infarct.  The ventricles and sulci are prominent compatible with moderate cerebral volume loss. Patchy nonspecific white matter hypoattenuation is likely on the basis of chronic microangiopathy. 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 The basal cisterns are patent.    There are atherosclerotic vascular calcifications at the skull base.    There are minimal right mastoid air cell effusions. There is a mucous retention cyst or polyp in the right maxillary sinus. The remaining visualized paranasal sinuses and mastoid air cells are grossly clear.    The calvarium and skull base are grossly intact.    IMPRESSION:  No acute intracranial findings. No significant change when compared with study performed on 3/30/2021.        EXAM:  CT CHEST                        PROCEDURE DATE:  04/03/2021      FINDINGS:    LUNGS AND AIRWAYS: Patent central airways.  Alveolar and interstitial edema increased from prior study. Bibasilar dependent atelectasis, increased.  PLEURA: Increased bilateral pleural effusions.  MEDIASTINUM AND SAUMYA: No lymphadenopathy.  VESSELS: Calcified thoracic aorta and coronary arteries.  HEART: Cardiomegaly. Valvular calcifications. No pericardial effusion.  CHEST WALL AND LOWER NECK: Within normal limits.  VISUALIZED UPPER ABDOMEN: Two focal calcifications associated with the liver capsule, unchanged.  BONES: Kyphosis, osteopenia.    IMPRESSION:  Worsening CHF.

## 2021-04-06 NOTE — PROGRESS NOTE ADULT - SUBJECTIVE AND OBJECTIVE BOX
History of Present Illness:   89 yo WF h/o A-fib not on anticoagulants (falls), CHF EF 35-40%, HLD, moderate to severe mitral regurgitation, s/p R hip replacement, who not does ambulate independently at baseline, who presented to the ED sent by her family due to progressive aphasia for the past 4 days.  Patient was admitted 2 weeks ago for rapid Afib and Chf, and was discharged on Lasix and water restriction; today her Cr was found to be elevated     she is currently agitated,  speaking/ screaming and moving all 4 limbs      4/1/21- more calm this AM, arousable, weak  4/2/21 confused but calm this AM  4/4 - Yesterday patient was wheezing with concerns for CHF and was restarted on lasix IV, with improvement  4/6  decreased mental status, on BIPAP    Patient seen and examined at bedside. She is mumbling and not following commands. Unable to get ROS in current clinical condition.    PAST MEDICAL HISTORY:  A-fib   CHF (congestive heart failure)   Glaucoma   HLD (hyperlipidemia).     PAST SURGICAL HISTORY:  S/P hip replacement, right.     FAMILY HISTORY:  Patient unable to provide history    Social History:  no tobacco, ETOH, IVDA lives home with aids, non ambulatory      MEDICATIONS  (STANDING):  aspirin  chewable 81 milliGRAM(s) Oral daily  dextrose 5%. 1000 milliLiter(s) (50 mL/Hr) IV Continuous <Continuous>  diltiazem    milliGRAM(s) Oral daily  heparin   Injectable 5000 Unit(s) SubCutaneous every 12 hours  melatonin 5 milliGRAM(s) Oral at bedtime  metoprolol tartrate 50 milliGRAM(s) Oral every 8 hours    MEDICATIONS  (PRN):  acetaminophen   Tablet .. 650 milliGRAM(s) Oral every 6 hours PRN Mild Pain (1 - 3)  metoprolol tartrate Injectable 2.5 milliGRAM(s) IV Push every 8 hours PRN For HR>120s    Vital Signs Last 24 Hrs  T(C): 36 (06 Apr 2021 05:00), Max: 36.9 (05 Apr 2021 09:04)  T(F): 96.8 (06 Apr 2021 05:00), Max: 98.4 (05 Apr 2021 09:04)  HR: 91 (06 Apr 2021 06:00) (84 - 98)  BP: 100/64 (06 Apr 2021 06:00) (87/44 - 120/44)  BP(mean): 71 (06 Apr 2021 06:00) (56 - 78)  RR: 23 (06 Apr 2021 06:00) (14 - 24)  SpO2: 85% (06 Apr 2021 06:00) (85% - 100%)      GENERAL:obtunded  LUNG: Decreased BS at bases but no W/R/R  HEART: irreg/irreg with soft systolic murmur at LSB  ABDOMEN: +BS, ST/ND/NT  EXTREMITIES:  2+ Peripheral Pulses, No clubbing, No cyanosis, No tibial edema  neuro: unable to answer questions or follow commands but moving all 4 ext    Labs:                              11.3   6.89  )-----------( 238      ( 05 Apr 2021 08:07 )             36.6   04-05    152<H>  |  119<H>  |  38<H>  ----------------------------<  109<H>  3.8   |  28  |  1.11    Ca    9.0      05 Apr 2021 08:07  Mg     2.6     04-05    TPro  6.5  /  Alb  2.8<L>  /  TBili  0.7  /  DBili  x   /  AST  12<L>  /  ALT  22  /  AlkPhos  53  04-05        Tele:  remains in AF, rate controlled with rates mostly 80-100s.      < from: TTE Echo Complete w/o Contrast w/ Doppler (11.20.20 @ 09:29) >     Findings     Mitral Valve   Mild to moderate mitral annular calcification is present.   Fibrocalcific changes noted to themitral valve leaflets with preserved   leaflet excursion.   Moderate to severe (3+) mitral regurgitation is present.     Aortic Valve   Fibrocalcific changes noted to the Aortic valve leaflets with preserved   leaflet excursion.   Mild to Moderate aortic regurgitation is present.     Tricuspid Valve   Mild to moderate tricuspid valve regurgitation is present.   Mild pulmonary hypertension.     Pulmonic Valve   Normal appearing pulmonic valve structure and function.   Mild pulmonic valvular regurgitation (1+) is present.     Left Atrium   The left atrium is moderately dilated.     Left Ventricle   The interventricular septum appears hypokinetic.   Estimated left ventricular ejection fraction is 35-40 %.     Right Atrium   The right atrium is at the upper limits of normal.     Right Ventricle   Normal appearing right ventricle structure and function.     Pericardial Effusion   No evidence of pericardial effusion.     Pleural Effusion   Pleural effusion - is present..     Miscellaneous   TheIVC appears normal.     Impression     Summary     Mild to moderate mitral annular calcification is present.   Fibrocalcific changes noted to the mitral valve leaflets with preserved   leaflet excursion.   Moderate to severe (3+) mitral regurgitationis present.   Fibrocalcific changes noted to the Aortic valve leaflets with preserved   leaflet excursion.   Mild to Moderate aortic regurgitation is present.   Mild to moderate tricuspid valve regurgitation is present.   Mild pulmonary hypertension.   Normal appearing pulmonic valve structure and function.   Mild pulmonic valvular regurgitation (1+) is present.   The left atrium is moderately dilated.   The interventricular septum appears hypokinetic.   Estimated left ventricular ejection fraction is 35-40 %.   The right atrium is at the upper limits of normal.   Pleural effusion - is present..     Signature     ----------------------------------------------------------------   Electronically signed by Carla Pineda MD(Interpreting   physician) on 11/21/2020 07:43 AM   ----------------------------------------------------------------      < end of copied text >

## 2021-04-06 NOTE — CONSULT NOTE ADULT - SUBJECTIVE AND OBJECTIVE BOX
Surgeon: Semaj    Consult requesting by: Jose     HISTORY OF PRESENT ILLNESS:  89 yo WF h/o A-fib not on anticoagulants (falls), CHF EF 35-40%, HLD, moderate to severe mitral regurgitation, s/p R hip replacement, who not does ambulate independently at baseline, who presented to the ED sent by her family due to progressive aphasia for the past 4 days.  (Patient was admitted 2 weeks ago for rapid Afib and Chf, and was discharged on Lasix and water restriction; today her Cr was found to be elevated. )    Patient seen at bedside,  On Bipap, lethargic .  Noted b/l pleural effusions on CXR,    PAST MEDICAL & SURGICAL HISTORY:  Glaucoma    HLD (hyperlipidemia)    A-fib    CHF (congestive heart failure)    S/P hip replacement, right        MEDICATIONS  (STANDING):  aspirin  chewable 81 milliGRAM(s) Oral daily  dextrose 5%. 1000 milliLiter(s) (75 mL/Hr) IV Continuous <Continuous>  diltiazem    milliGRAM(s) Oral daily  heparin   Injectable 5000 Unit(s) SubCutaneous every 12 hours  melatonin 5 milliGRAM(s) Oral at bedtime  metoprolol tartrate 50 milliGRAM(s) Oral every 8 hours    MEDICATIONS  (PRN):  acetaminophen   Tablet .. 650 milliGRAM(s) Oral every 6 hours PRN Mild Pain (1 - 3)  metoprolol tartrate Injectable 2.5 milliGRAM(s) IV Push every 8 hours PRN For HR>120s    Antiplatelet therapy:     HSQ                      Last dose/amt:    Allergies    atenolol (Other)  sulfa drugs (Other)    Intolerances        SOCIAL HISTORY:  Smoker: [ ] Yes  [ ] No        PACK YEARS:                         WHEN QUIT?  ETOH use: [ ] Yes  [ ] No              FREQUENCY / QUANTITY:  Ilic[employer] Drug use:  [ ] Yes  [ ] No  Occupation:  Live with:  Assisted device use:    FAMILY HISTORY:  Known health problems: none        Review of Systems unable to interview   CONSTITUTIONAL:  Fevers[ ] chills[ ] sweats[ ] fatigue[ ] weight loss[ ] weight gain [ ]                                     NEGATIVE [ ]   NEURO:  parathesias[ ] seizures [ ]  syncope [ ]  confusion [ ]                                                                                NEGATIVE[ ]   EYES: glasses[ ]  blurry vision[ ]  discharge[ ] pain[ ] glaucoma [ ]                                                                          NEGATIVE[ ]   ENMT:  difficulty hearing [ ]  vertigo[ ]  dysphagia[ ] epistaxis[ ] recent dental work [ ]                                    NEGATIVE[ ]   CV:  chest pain[ ] palpitations[ ] BRITO [ ] diaphoresis [ ]                                                                                           NEGATIVE[ ]   RESPIRATORY:  wheezing[ ] SOB[ ] cough [ ] sputum[ ] hemoptysis[ ]                                                                  NEGATIVE[ ]   GI:  nausea[ ]  vommiting [ ]  diarrhea[ ] constipation [ ] melena [ ]                                                                      NEGATIVE[ ]   : hematuria[ ]  dysuria[ ] urgency[ ] incontinence[ ]                                                                                            NEGATIVE[ ]   MUSKULOSKELETAL:  arthritis[ ]  joint swelling [ ] muscle weakness [ ]                                                                NEGATIVE[ ]   SKIN/BREAST:  rash[ ] itching [ ]  hair loss[ ] masses[ ]                                                                                              NEGATIVE[ ]   PSYCH:  dementia [ ] depresion [ ] anxiety[ ]                                                                                                               NEGATIVE[ ]   HEME/LYMPH:  bruises easily[ ] enlarged lymph nodes[ ] tender lymph nodes[ ]                                               NEGATIVE[ ]   ENDOCRINE:  cold intolerance[ ] heat intolerance[ ] polydipsia[ ]                                                                          NEGATIVE[ ]     PHYSICAL EXAM  Vital Signs Last 24 Hrs  T(C): 35.6 (06 Apr 2021 16:53), Max: 36.4 (05 Apr 2021 22:15)  T(F): 96.1 (06 Apr 2021 16:53), Max: 97.6 (05 Apr 2021 22:15)  HR: 88 (06 Apr 2021 14:00) (84 - 102)  BP: 106/59 (06 Apr 2021 14:00) (87/44 - 124/66)  BP(mean): 66 (06 Apr 2021 14:00) (49 - 80)  RR: 26 (06 Apr 2021 14:00) (13 - 32)  SpO2: 100% (06 Apr 2021 14:00) (85% - 100%)    CONSTITUTIONAL:                                                                          WNL[ ] lethargic   Neuro: WNL[ ] Normal exam oriented to person/place & time with no focal motor or sensory  deficits. Other                     Eyes: WNL[x ]   Normal exam of conjunctiva & lids, pupils equally reactive. Other     ENT: WNL[ ]    Normal exam of nasal/oral mucosa with absence of cyanosis. Other dry mucosa   Neck: WNL[ x]  Normal exam of jugular veins, trachea & thyroid. Other  Chest: WNL[ ] Normal lung exam with good air movement absence of wheezes, rales, or rhonchi: Other   decreased b/l                                                                               CV:  Auscultation: normal [ ] S3[ ] S4[ ] Irregular [x ] Rub[ ] Clicks[ ]    Murmurs none:[ ]systolic [x ]  diastolic [ ] holosystolic [ ]  Carotids: No Bruits[x ] Other                 Abdominal Aorta: normal [ ] nonpalpable[ x]Other                                                                                      GI:           WNL[x ] Normal exam of abdomen, liver & spleen with no noted masses or tenderness. Other                                                                                                        Extremities: WNL[ ] Normal no evidence of cyanosis or deformity Edema: none[ ]trace[x ]1+[ ]2+[ ]3+[ ]4+[ ]  Lower Extremity Pulses: Right[2+_ ] Left[ 2+]Varicosities[ ]  SKIN :WNLx[ ] Normal exam to inspection & palation. Other:                                                          LABS:                        12.1   4.71  )-----------( 211      ( 06 Apr 2021 08:21 )             40.1     04-06    152<H>  |  118<H>  |  33<H>  ----------------------------<  149<H>  3.9   |  28  |  0.96    Ca    8.8      06 Apr 2021 08:21  Mg     2.6     04-05    TPro  6.5  /  Alb  2.8<L>  /  TBili  0.7  /  DBili  x   /  AST  12<L>  /  ALT  22  /  AlkPhos  53  04-05          < from: Xray Chest 1 View- PORTABLE-Routine (Xray Chest 1 View- PORTABLE-Routine in AM.) (04.06.21 @ 06:34) >    COMPARISON: 4/2/2021 chest available for review.    FINDINGS:    The lungs show similar LEFT greater than RIGHT pleural effusions and/or LEFT retrocardiac airspace consolidation obscuring diaphragm contour.  No pneumothorax.    The  heart is enlarged in transverse diameter. No hilar mass.   Visualized osseous structures are intact.      IMPRESSION:   No significant change..    < end of copied text >      < from: CT Chest No Cont (04.03.21 @ 16:07) >  Complications: None reported at time of study completion    PROCEDURE:  CT of the Chest was performed.  Sagittal and coronal reformats were performed.    FINDINGS:    LUNGS AND AIRWAYS: Patent central airways.  Alveolar and interstitial edema increased from prior study. Bibasilar dependent atelectasis, increased.  PLEURA: Increased bilateral pleural effusions.  MEDIASTINUM AND SAUMYA: No lymphadenopathy.  VESSELS: Calcified thoracic aorta and coronary arteries.  HEART: Cardiomegaly. Valvular calcifications. No pericardial effusion.  CHEST WALL AND LOWER NECK: Within normal limits.  VISUALIZED UPPER ABDOMEN: Two focal calcifications associated with the liver capsule, unchanged.  BONES: Kyphosis, osteopenia.    IMPRESSION:  Worsening CHF.    < end of copied text >

## 2021-04-07 NOTE — PROGRESS NOTE ADULT - ASSESSMENT
PROBLEMS;    AC hypoxaemiac & hypercapnic respiratory failure  Acute on chronic systolic CHF   B/L Pleural effusion  ARF   AMS with Encephalopathy this could be secondary to UTI /  we were not able to rule out CVA  Hx of atrial fibrillation not on AC   Rt Frontal Meningioma   Acute UTI- proteus species in the urine    PLAN:    serial abg  trial of bipap as tolerated  Keep neg balance  symptomatic rx  Neuro/cardic eval  supportive care  dvt prophylasix   PROBLEMS;    AC hypoxaemiac & hypercapnic respiratory failure  Acute on chronic systolic CHF   B/L Pleural effusion  ARF   AMS with Encephalopathy this could be secondary to UTI /  we were not able to rule out CVA  Hx of atrial fibrillation not on AC   Rt Frontal Meningioma   Acute UTI- proteus species in the urine    PLAN:    serial abg  put bipap as abg worsening-d/w staff  Keep neg balance  symptomatic rx  Neuro/cardic eval  supportive care  dvt prophylasix

## 2021-04-07 NOTE — CHART NOTE - NSCHARTNOTEFT_GEN_A_CORE
Thoracic PA note.    Thoracentesis attempted at bedside.  Procedure aborted. Did not appreciate a lot of fluid return.  CXR post trial without PTX. Called daughter Velia  and made her aware.  Primary team made aware. Thoracic PA note.    Left side Thoracentesis attempted at bedside.  Procedure aborted. Did not appreciate a lot of fluid return.  CXR post trial without PTX . Called daughter Velia  and made her aware.  Primary team made aware.

## 2021-04-07 NOTE — PROGRESS NOTE ADULT - ASSESSMENT
89 y.o. female with h/o A-fib not on anticoagulants (falls), CHF EF 35-40%, HLD, moderate to severe mitral regurgitation, s/p R hip replacement, who not does ambulate independently at baseline    # AMS due to metabolic  Encephalopathy    - UTI treated w/o improvement   - no acute CVA with chronic mirovascular ischemic changes and volume loss   - neg procalcitonin level indicationg of unlikely infectious etiology    # Hx of atrial fibrillation not on AC   - C/w ASA / STATIN  - Speech / swallow evaluation appreciated on dysphagia diet    - now NPO due to lethargy    # Rt Frontal Meningioma   - Incidental seen last scan in 2019 slightly larger  - no immediate intervention warranted     # PAF  not on A/c   - iv metoprolol due to unable to tolerate po    # acute on chronic systolic CHF   - resolved on IV lasix - stopped as pt is clinically intravascularly depleted    # ARF with hypovolemic hypernatremia - likely secondary to intravascular depletion   - D5W, improved    Overall poor prognosis as pt remains with PVC despite of diuresis and intravascular depletion also with acute hypercapnic respiratory failure and difficulty tolerating BIPAP     89 y.o. female with h/o A-fib not on anticoagulants (falls), CHF EF 35-40%, HLD, moderate to severe mitral regurgitation, s/p R hip replacement, who not does ambulate independently at baseline    # AMS due to metabolic  Encephalopathy    - UTI treated w/o improvement   - no acute CVA with chronic mirovascular ischemic changes and volume loss   - neg procalcitonin level indicationg of unlikely infectious etiology    # Hx of atrial fibrillation not on AC   - C/w ASA / STATIN  - Speech / swallow evaluation appreciated on dysphagia diet    - now NPO due to lethargy    # Rt Frontal Meningioma   - Incidental seen last scan in 2019 slightly larger  - no immediate intervention warranted     # PAF  not on A/c   - iv metoprolol due to unable to tolerate po    # acute on chronic systolic CHF   - resolved on IV lasix - stopped as pt is clinically intravascularly depleted    # ARF with hypovolemic hypernatremia - likely secondary to intravascular depletion   - D5W, improved    Overall poor prognosis as pt remains with PVC despite of diuresis and intravascular depletion also with acute hypercapnic respiratory failure and difficulty tolerating BIPAP.  Pt needs to be restrained in order to continue with BIPAP. Started on D5W for hypernatremia, changed to IV meds, no po intake.  As per family wants everything done.

## 2021-04-07 NOTE — PROGRESS NOTE ADULT - SUBJECTIVE AND OBJECTIVE BOX
Subjective:    Home Medications:  aspirin 81 mg oral tablet: 1 tab(s) orally once a day (30 Mar 2021 12:09)  atorvastatin 10 mg oral tablet: 1 tab(s) orally once a day (30 Mar 2021 12:09)  MiraLax oral powder for reconstitution: Use as needed (30 Mar 2021 12:09)  PreserVision AREDS 2 oral capsule: 1 cap(s) orally 2 times a day (30 Mar 2021 12:09)  senna oral tablet: 2 tab(s) orally once a day (at bedtime), As Needed (30 Mar 2021 12:09)  Vitamin D3 5000 intl units (125 mcg) oral tablet: 1 tab(s) orally once a day (30 Mar 2021 12:09)    MEDICATIONS  (STANDING):  aspirin  chewable 81 milliGRAM(s) Oral daily  dextrose 5%. 1000 milliLiter(s) (75 mL/Hr) IV Continuous <Continuous>  diltiazem    milliGRAM(s) Oral daily  heparin   Injectable 5000 Unit(s) SubCutaneous every 12 hours  melatonin 5 milliGRAM(s) Oral at bedtime  metoprolol tartrate 50 milliGRAM(s) Oral every 8 hours    MEDICATIONS  (PRN):  acetaminophen   Tablet .. 650 milliGRAM(s) Oral every 6 hours PRN Mild Pain (1 - 3)  metoprolol tartrate Injectable 2.5 milliGRAM(s) IV Push every 8 hours PRN For HR>120s      Allergies    atenolol (Other)  sulfa drugs (Other)    Intolerances        Vital Signs Last 24 Hrs  T(C): 36.4 (07 Apr 2021 06:31), Max: 36.4 (07 Apr 2021 06:31)  T(F): 97.5 (07 Apr 2021 06:31), Max: 97.5 (07 Apr 2021 06:31)  HR: 103 (07 Apr 2021 08:02) (88 - 120)  BP: 106/74 (07 Apr 2021 08:02) (88/71 - 129/63)  BP(mean): 81 (07 Apr 2021 08:02) (49 - 98)  RR: 24 (07 Apr 2021 08:02) (13 - 32)  SpO2: 94% (07 Apr 2021 08:02) (89% - 100%)      PHYSICAL EXAMINATION:    NECK:  Supple. No lymphadenopathy. Jugular venous pressure not elevated. Carotids equal.   HEART:   The cardiac impulse has a normal quality. Reg., Nl S1 and S2.  There are no murmurs, rubs or gallops noted  CHEST:  Chest is clear to auscultation. Normal respiratory effort.  ABDOMEN:  Soft and nontender.   EXTREMITIES:  There is no edema.       LABS:                        12.1   4.71  )-----------( 211      ( 06 Apr 2021 08:21 )             40.1     04-06    152<H>  |  118<H>  |  33<H>  ----------------------------<  149<H>  3.9   |  28  |  0.96    Ca    8.8      06 Apr 2021 08:21          < from: Xray Chest 1 View- PORTABLE-Routine (Xray Chest 1 View- PORTABLE-Routine in AM.) (04.06.21 @ 06:34) >  FINDINGS:    The lungs show similar LEFT greater than RIGHT pleural effusions and/or LEFT retrocardiac airspace consolidation obscuring diaphragm contour.  No pneumothorax.    The  heart is enlarged in transverse diameter. No hilar mass.   Visualized osseous structures are intact.      IMPRESSION:   No significant change..    < end of copied text >         Subjective:    pat lathergic, pat refuses bipap last night, ABG - ( 07 Apr 2021 09:41 )  pH, Arterial: 7.32  pH, Blood: x     /  pCO2: 60    /  pO2: 123   / HCO3: 30    / Base Excess: 3.6   /  SaO2: 99       Home Medications:  aspirin 81 mg oral tablet: 1 tab(s) orally once a day (30 Mar 2021 12:09)  atorvastatin 10 mg oral tablet: 1 tab(s) orally once a day (30 Mar 2021 12:09)  MiraLax oral powder for reconstitution: Use as needed (30 Mar 2021 12:09)  PreserVision AREDS 2 oral capsule: 1 cap(s) orally 2 times a day (30 Mar 2021 12:09)  senna oral tablet: 2 tab(s) orally once a day (at bedtime), As Needed (30 Mar 2021 12:09)  Vitamin D3 5000 intl units (125 mcg) oral tablet: 1 tab(s) orally once a day (30 Mar 2021 12:09)    MEDICATIONS  (STANDING):  aspirin  chewable 81 milliGRAM(s) Oral daily  dextrose 5%. 1000 milliLiter(s) (75 mL/Hr) IV Continuous <Continuous>  diltiazem    milliGRAM(s) Oral daily  heparin   Injectable 5000 Unit(s) SubCutaneous every 12 hours  melatonin 5 milliGRAM(s) Oral at bedtime  metoprolol tartrate 50 milliGRAM(s) Oral every 8 hours    MEDICATIONS  (PRN):  acetaminophen   Tablet .. 650 milliGRAM(s) Oral every 6 hours PRN Mild Pain (1 - 3)  metoprolol tartrate Injectable 2.5 milliGRAM(s) IV Push every 8 hours PRN For HR>120s      Allergies    atenolol (Other)  sulfa drugs (Other)    Intolerances        Vital Signs Last 24 Hrs  T(C): 36.4 (07 Apr 2021 06:31), Max: 36.4 (07 Apr 2021 06:31)  T(F): 97.5 (07 Apr 2021 06:31), Max: 97.5 (07 Apr 2021 06:31)  HR: 103 (07 Apr 2021 08:02) (88 - 120)  BP: 106/74 (07 Apr 2021 08:02) (88/71 - 129/63)  BP(mean): 81 (07 Apr 2021 08:02) (49 - 98)  RR: 24 (07 Apr 2021 08:02) (13 - 32)  SpO2: 94% (07 Apr 2021 08:02) (89% - 100%)      PHYSICAL EXAMINATION:    NECK:  Supple. No lymphadenopathy. Jugular venous pressure not elevated. Carotids equal.   HEART:   The cardiac impulse has a normal quality. Reg., Nl S1 and S2.  There are no murmurs, rubs or gallops noted  CHEST:  Chest is clear to auscultation. Normal respiratory effort.  ABDOMEN:  Soft and nontender.   EXTREMITIES:  There is no edema.       LABS:                        12.1   4.71  )-----------( 211      ( 06 Apr 2021 08:21 )             40.1     04-06    152<H>  |  118<H>  |  33<H>  ----------------------------<  149<H>  3.9   |  28  |  0.96    Ca    8.8      06 Apr 2021 08:21          < from: Xray Chest 1 View- PORTABLE-Routine (Xray Chest 1 View- PORTABLE-Routine in AM.) (04.06.21 @ 06:34) >  FINDINGS:    The lungs show similar LEFT greater than RIGHT pleural effusions and/or LEFT retrocardiac airspace consolidation obscuring diaphragm contour.  No pneumothorax.    The  heart is enlarged in transverse diameter. No hilar mass.   Visualized osseous structures are intact.      IMPRESSION:   No significant change..    < end of copied text >

## 2021-04-07 NOTE — PROGRESS NOTE ADULT - SUBJECTIVE AND OBJECTIVE BOX
CC: aphasia (04 Apr 2021 14:50)    HPI:  89 y.o. female with h/o A-fib not on anticoagulants (falls), CHF EF 35-40%, HLD, moderate to severe mitral regurgitation, s/p R hip replacement, who not does ambulate independently at baseline, who presented to the ED sent by her family due to progressive aphasia for the past 4 days.  Patient was admitted 2 weeks ago for rapid Afib and CHF and was discharged on Lasix and water restriction; On Admission,  Cr was found to be elevated    INTERVAL HPI/ OVERNIGHT EVENTS: transferred to SICU for BIPAP, confused and lethargic now, did not tolerate BIPAP as was taking it off, attempt of thoracocentesis unsuccessful, no po intake  ROS UTO    Vital Signs Last 24 Hrs  T(C): 36.4 (07 Apr 2021 08:42), Max: 36.4 (07 Apr 2021 06:31)  T(F): 97.6 (07 Apr 2021 08:42), Max: 97.6 (07 Apr 2021 08:42)  HR: 94 (07 Apr 2021 12:00) (88 - 120)  BP: 117/61 (07 Apr 2021 12:00) (88/71 - 129/63)  BP(mean): 70 (07 Apr 2021 12:00) (59 - 98)  RR: 22 (07 Apr 2021 12:00) (17 - 31)  SpO2: 97% (07 Apr 2021 12:05) (89% - 100%)  I&O's Detail    06 Apr 2021 07:01  -  07 Apr 2021 07:00  --------------------------------------------------------  IN:    dextrose 5%: 1472 mL  Total IN: 1472 mL    OUT:    Incontinent per Collection Bag (mL): 100 mL  Total OUT: 100 mL    Total NET: 1372 mL                        12.1   4.71  )-----------( 211      ( 06 Apr 2021 08:21 )             40.1     07 Apr 2021 08:41    146    |  114    |  26     ----------------------------<  133    3.8     |  29     |  0.80     Ca    8.8        07 Apr 2021 08:41    MEDICATIONS  (STANDING):  aspirin  chewable 81 milliGRAM(s) Oral daily  dextrose 5%. 1000 milliLiter(s) (75 mL/Hr) IV Continuous <Continuous>  heparin   Injectable 5000 Unit(s) SubCutaneous every 12 hours  melatonin 5 milliGRAM(s) Oral at bedtime  metoprolol tartrate 50 milliGRAM(s) Oral every 8 hours  metoprolol tartrate Injectable 5 milliGRAM(s) IV Push every 6 hours    MEDICATIONS  (PRN):  acetaminophen   Tablet .. 650 milliGRAM(s) Oral every 6 hours PRN Mild Pain (1 - 3)    PHYSICAL EXAM:  General: frail female, lethargic  Eyes: PERRLA, conjunctiva and sclera clear  Head: Normocephalic; atraumatic  ENMT: No nasal discharge; airway clear, dry mucosals  Neck: Supple; ; no masses  Respiratory: Diminished BS at bases BL, no RRW  Cardiovascular: S1, S2 irreg  Gastrointestinal: Soft non-distended; +  bowel sounds  Genitourinary: No  palpable bladder   Extremities: No edema, moves all for extremities spontaneously   Neurological: encephalopathic, grossly non focal   Skin: Warm and dry.     RADIOLOGY & ADDITIONAL TESTS:      EXAM:  CT BRAIN                        PROCEDURE DATE:  04/04/2021      FINDINGS:  The study is slightly limited by patient motion.    Redemonstrated  is a right parafalcine extra-axial mass measuring 3.2 x 2.4 cm, likely representing a meningioma, with minimal mass effect on the underlying right frontal lobe.    There is no CT evidence of acute intracranial hemorrhage, midline shift, or acute, large territorial infarct.  The ventricles and sulci are prominent compatible with moderate cerebral volume loss. Patchy nonspecific white matter hypoattenuation is likely on the basis of chronic microangiopathy. 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 The basal cisterns are patent.    There are atherosclerotic vascular calcifications at the skull base.    There are minimal right mastoid air cell effusions. There is a mucous retention cyst or polyp in the right maxillary sinus. The remaining visualized paranasal sinuses and mastoid air cells are grossly clear.    The calvarium and skull base are grossly intact.    IMPRESSION:  No acute intracranial findings. No significant change when compared with study performed on 3/30/2021.        EXAM:  CT CHEST                        PROCEDURE DATE:  04/03/2021      FINDINGS:    LUNGS AND AIRWAYS: Patent central airways.  Alveolar and interstitial edema increased from prior study. Bibasilar dependent atelectasis, increased.  PLEURA: Increased bilateral pleural effusions.  MEDIASTINUM AND SAUMYA: No lymphadenopathy.  VESSELS: Calcified thoracic aorta and coronary arteries.  HEART: Cardiomegaly. Valvular calcifications. No pericardial effusion.  CHEST WALL AND LOWER NECK: Within normal limits.  VISUALIZED UPPER ABDOMEN: Two focal calcifications associated with the liver capsule, unchanged.  BONES: Kyphosis, osteopenia.    IMPRESSION:  Worsening CHF.

## 2021-04-07 NOTE — PROGRESS NOTE ADULT - ASSESSMENT
89 yo WF h/o A-fib not on anticoagulants (falls), CHF EF 35-40%, HLD, moderate to severe mitral regurgitation, s/p R hip replacement, who not does ambulate independently at baseline, who presented to the ED sent by her family due to progressive aphasia for the past 4 days.      # AMS with Encephalopathy   She apparently had some improvement initially and has now declined.  -Vascular event was suspected in setting of A-fib, off anticoagulation  -MRI brain is negative for acute infarct although limited  -No improvement in mental status despite use of BPAP.   -Can give aspirin NC.  -If patient improves, can reevaluate if patient is a candidate for anticoagulation for stroke prevention. AC was previously discontinued due to fall risk. This can be reconsidered if she is no longer ambulatory.    -Patient is unable to eat at this time due to decrease level of alertness.  -Palliative care consult  suggested but family is not interested on comfort measures at this time.   -Correct underling metabolic  causes  -EEG bilat slow     # Rt Frontal Meningioma   -Incidental seen last scan in 2019 slightly large .  -Not a surgical candidate at this time.    Will f/u if additional input is needed from neurology service.

## 2021-04-07 NOTE — PROGRESS NOTE ADULT - SUBJECTIVE AND OBJECTIVE BOX
Interval History:  Patient remains on BPAP. Not responsive.    MEDICATIONS  (STANDING):  aspirin  chewable 81 milliGRAM(s) Oral daily  dextrose 5%. 1000 milliLiter(s) (75 mL/Hr) IV Continuous <Continuous>  heparin   Injectable 5000 Unit(s) SubCutaneous every 12 hours  melatonin 5 milliGRAM(s) Oral at bedtime  metoprolol tartrate 50 milliGRAM(s) Oral every 8 hours  metoprolol tartrate Injectable 5 milliGRAM(s) IV Push every 6 hours    MEDICATIONS  (PRN):  acetaminophen   Tablet .. 650 milliGRAM(s) Oral every 6 hours PRN Mild Pain (1 - 3)      Allergies    atenolol (Other)  sulfa drugs (Other)    Intolerances        PHYSICAL EXAM:  Vital Signs Last 24 Hrs  T(F): 96.4 (04-07-21 @ 14:23)  HR: 100 (04-07-21 @ 14:00)  BP: 100/81 (04-07-21 @ 14:00)  RR: 31 (04-07-21 @ 14:00)    GENERAL: NAD, well-groomed, well-developed  HEAD:  Atraumatic, Normocephalic  Neuro:  On BPAP  Unresponsive  Does not follow commands  Moans when examiner opens eyes  Pupils minimally reactive to light.    LABS:                        12.1   4.71  )-----------( 211      ( 06 Apr 2021 08:21 )             40.1     04-07    146<H>  |  114<H>  |  26<H>  ----------------------------<  133<H>  3.8   |  29  |  0.80    Ca    8.8      07 Apr 2021 08:41            RADIOLOGY & ADDITIONAL STUDIES:    CT head 3/30/21:   No acute hemorrhage, mass effect or extra-axial collection.  Increasing size of right frontal parafalcine extra-axial soft tissue mass likely representing a meningioma.    CT head 4/4/21:  No acute intracranial findings. No significant change when compared with study performed on 3/30/2021.    MRI head 4/5/21:  Limited exam due to motion    No acute intracranial hemorrhage or acute infarct.

## 2021-04-07 NOTE — PROGRESS NOTE ADULT - SUBJECTIVE AND OBJECTIVE BOX
Subjective:  pt seen at bedside lethargic attempted Thoracentesis for Lt effusion . Aborted dt small pocket of fluid     T(C): 36.4 (04-07-21 @ 08:42), Max: 36.4 (04-07-21 @ 06:31)  HR: 94 (04-07-21 @ 12:00) (94 - 120)  BP: 117/61 (04-07-21 @ 12:00) (88/71 - 129/63)  ABP: --  ABP(mean): --  RR: 22 (04-07-21 @ 12:00) (17 - 31)  SpO2: 97% (04-07-21 @ 12:05) (89% - 100%) 3 l NC   Wt(kg): --  CVP(mm Hg): --  CO: --  CI: --  PA: --                                              Tele: Afib            04-07    146<H>  |  114<H>  |  26<H>  ----------------------------<  133<H>  3.8   |  29  |  0.80    Ca    8.8      07 Apr 2021 08:41                                 12.1   4.71  )-----------( 211      ( 06 Apr 2021 08:21 )             40.1                 CAPILLARY BLOOD GLUCOSE                CXR: < from: Xray Chest 1 View- PORTABLE-Routine (Xray Chest 1 View- PORTABLE-Routine in AM.) (04.06.21 @ 06:34) >    The lungs show similar LEFT greater than RIGHT pleural effusions and/or LEFT retrocardiac airspace consolidation obscuring diaphragm contour.  No pneumothorax.    The  heart is enlarged in transverse diameter. No hilar mass.   Visualized osseous structures are intact.      IMPRESSION:   No significant change..    < end of copied text >          EXAM  Neuro: lethargic minimal response   Pulm: decreased at bases   CV: Irreg S1 S2   Abd: soft NT ND           Assessment:  89yFemale    with PAST MEDICAL & SURGICAL HISTORY:  Glaucoma    HLD (hyperlipidemia)    A-fib    CHF (congestive heart failure)    S/P hip replacement, right    moderate to severe MR           Plan:

## 2021-04-07 NOTE — PROGRESS NOTE ADULT - ASSESSMENT
89 y/o with CHF exacerbation with elevated creatine levels now trending down. On lasix PRN with b/l pleural effusions    PLAN  no intervention at this time for effusions.  small in nature   CXR in am  recommend diuretic if BP and creatinine allow am  cont mgmt as per primary team   DW Dr Cha

## 2021-04-08 NOTE — PROGRESS NOTE ADULT - SUBJECTIVE AND OBJECTIVE BOX
History of Present Illness:   89 yo WF h/o A-fib not on anticoagulants (falls), CHF EF 35-40%, HLD, moderate to severe mitral regurgitation, s/p R hip replacement, who not does ambulate independently at baseline, who presented to the ED sent by her family due to progressive aphasia for the past 4 days.  Patient was admitted 2 weeks ago for rapid Afib and Chf, and was discharged on Lasix and water restriction; today her Cr was found to be elevated     she is currently agitated,  speaking/ screaming and moving all 4 limbs      4/1/21- more calm this AM, arousable, weak  4/2/21 confused but calm this AM  4/4 - Yesterday patient was wheezing with concerns for CHF and was restarted on lasix IV, with improvement  4/6  decreased mental status, on BIPAP  4/8 still with decreased mental status    Patient seen and examined at bedside. She is mumbling and not following commands. Unable to get ROS in current clinical condition.    PAST MEDICAL HISTORY:  A-fib   CHF (congestive heart failure)   Glaucoma   HLD (hyperlipidemia).     PAST SURGICAL HISTORY:  S/P hip replacement, right.     FAMILY HISTORY:  Patient unable to provide history    Social History:  no tobacco, ETOH, IVDA lives home with aids, non ambulatory    MEDICATIONS  (STANDING):  aspirin  chewable 81 milliGRAM(s) Oral daily  dextrose 5%. 1000 milliLiter(s) (75 mL/Hr) IV Continuous <Continuous>  heparin   Injectable 5000 Unit(s) SubCutaneous every 12 hours  melatonin 5 milliGRAM(s) Oral at bedtime  metoprolol tartrate 50 milliGRAM(s) Oral every 8 hours  metoprolol tartrate Injectable 5 milliGRAM(s) IV Push every 6 hours    MEDICATIONS  (PRN):  acetaminophen   Tablet .. 650 milliGRAM(s) Oral every 6 hours PRN Mild Pain (1 - 3)    Vital Signs Last 24 Hrs  T(C): 36.5 (08 Apr 2021 06:39), Max: 36.6 (07 Apr 2021 21:31)  T(F): 97.7 (08 Apr 2021 06:39), Max: 97.8 (07 Apr 2021 21:31)  HR: 90 (08 Apr 2021 06:00) (87 - 103)  BP: 103/63 (08 Apr 2021 06:00) (97/56 - 127/59)  BP(mean): 72 (08 Apr 2021 06:00) (67 - 85)  RR: 28 (08 Apr 2021 06:00) (19 - 31)  SpO2: 99% (08 Apr 2021 06:00) (94% - 100%)    GENERAL:obtunded  LUNG: Decreased BS at bases but no W/R/R  HEART: irreg/irreg with soft systolic murmur at LSB  ABDOMEN: +BS, ST/ND/NT  EXTREMITIES:  2+ Peripheral Pulses, No clubbing, No cyanosis, No tibial edema  neuro: unable to answer questions or follow commands but moving all 4 ext    Labs:                         12.1   4.71  )-----------( 211      ( 06 Apr 2021 08:21 )             40.1   04-07    146<H>  |  114<H>  |  26<H>  ----------------------------<  133<H>  3.8   |  29  |  0.80    Ca    8.8      07 Apr 2021 08:41        Tele:  remains in AF, rate controlled with rates mostly 80-100s.      < from: TTE Echo Complete w/o Contrast w/ Doppler (11.20.20 @ 09:29) >     Findings     Mitral Valve   Mild to moderate mitral annular calcification is present.   Fibrocalcific changes noted to themitral valve leaflets with preserved   leaflet excursion.   Moderate to severe (3+) mitral regurgitation is present.     Aortic Valve   Fibrocalcific changes noted to the Aortic valve leaflets with preserved   leaflet excursion.   Mild to Moderate aortic regurgitation is present.     Tricuspid Valve   Mild to moderate tricuspid valve regurgitation is present.   Mild pulmonary hypertension.     Pulmonic Valve   Normal appearing pulmonic valve structure and function.   Mild pulmonic valvular regurgitation (1+) is present.     Left Atrium   The left atrium is moderately dilated.     Left Ventricle   The interventricular septum appears hypokinetic.   Estimated left ventricular ejection fraction is 35-40 %.     Right Atrium   The right atrium is at the upper limits of normal.     Right Ventricle   Normal appearing right ventricle structure and function.     Pericardial Effusion   No evidence of pericardial effusion.     Pleural Effusion   Pleural effusion - is present..     Miscellaneous   TheIVC appears normal.     Impression     Summary     Mild to moderate mitral annular calcification is present.   Fibrocalcific changes noted to the mitral valve leaflets with preserved   leaflet excursion.   Moderate to severe (3+) mitral regurgitationis present.   Fibrocalcific changes noted to the Aortic valve leaflets with preserved   leaflet excursion.   Mild to Moderate aortic regurgitation is present.   Mild to moderate tricuspid valve regurgitation is present.   Mild pulmonary hypertension.   Normal appearing pulmonic valve structure and function.   Mild pulmonic valvular regurgitation (1+) is present.   The left atrium is moderately dilated.   The interventricular septum appears hypokinetic.   Estimated left ventricular ejection fraction is 35-40 %.   The right atrium is at the upper limits of normal.   Pleural effusion - is present..     Signature     ----------------------------------------------------------------   Electronically signed by Crala Pineda MD(Interpreting   physician) on 11/21/2020 07:43 AM   ----------------------------------------------------------------      < end of copied text >

## 2021-04-08 NOTE — PROGRESS NOTE ADULT - ASSESSMENT
89 yo WF h/o A-fib not on anticoagulants (falls), CHF EF 35-40%, HLD, moderate to severe mitral regurgitation, s/p R hip replacement, who not does ambulate independently at baseline, who presented to the ED sent by her family due to progressive aphasia for the past 4 days.      # AMS with Encephalopathy   She apparently had some improvement initially and has now declined.  -Vascular event was suspected in setting of A-fib, off anticoagulation  -MRI brain is negative for acute infarct although limited  -No improvement in mental status despite use of BPAP.   -Can give aspirin RI.  -If patient improves, can reevaluate if patient is a candidate for anticoagulation for stroke prevention. AC was previously discontinued due to fall risk. This can be reconsidered if she is no longer ambulatory.    -Patient is unable to eat at this time due to decrease level of alertness.  -Palliative care consult  suggested but family is not interested on comfort measures at this time.   -Correct underling metabolic  causes  -EEG with bilateral slowing    # Rt Frontal Meningioma   -Incidental seen last scan in 2019 slightly large .  -Not a surgical candidate at this time.    Will f/u if additional input is needed from neurology service.

## 2021-04-08 NOTE — PROGRESS NOTE ADULT - ASSESSMENT
89 F with AF not on AC, systolic heart failure presents with altered mental status with aphasia    AF- current rate controlled, c/w BB and CCB-- overall goal is to decrease CCB and increase BB due to CHF- not on AC due to fall risk and family request - may need IV pushes of BB or CCb as not taking PO    CHF-was given lasix and now hypernatremic- appears euvolemic on exam- would hold lasix today and monitor - give back fluids    NSVT on monitor previously this admission- increased metoprolol and check electrolytes today     CVA vs TIA- vs AMS due to infection- unable to tolerate MRI. CT head stable. Likely deterioration in setting of dementia and poor PO itnake    grave prognosis overall

## 2021-04-08 NOTE — PROGRESS NOTE ADULT - SUBJECTIVE AND OBJECTIVE BOX
Subjective:  Pt seen, weak, taken off bipap and placed on 3L. AwaKE.    Vital Signs:  Vital Signs Last 24 Hrs  T(C): 36.5 (04-08-21 @ 06:39), Max: 36.6 (04-07-21 @ 21:31)  T(F): 97.7 (04-08-21 @ 06:39), Max: 97.8 (04-07-21 @ 21:31)  HR: 102 (04-08-21 @ 07:00) (87 - 102)  BP: 109/69 (04-08-21 @ 07:00) (97/56 - 127/59)  RR: 17 (04-08-21 @ 07:00) (17 - 31)  SpO2: 100% (04-08-21 @ 07:00) (97% - 100%) on (O2)    Telemetry/Alarms:    Relevant labs, radiology and Medications reviewed    04-07    146<H>  |  114<H>  |  26<H>  ----------------------------<  133<H>  3.8   |  29  |  0.80    Ca    8.8      07 Apr 2021 08:41        MEDICATIONS  (STANDING):  aspirin  chewable 81 milliGRAM(s) Oral daily  dextrose 5%. 1000 milliLiter(s) (75 mL/Hr) IV Continuous <Continuous>  heparin   Injectable 5000 Unit(s) SubCutaneous every 12 hours  melatonin 5 milliGRAM(s) Oral at bedtime  metoprolol tartrate 50 milliGRAM(s) Oral every 8 hours  metoprolol tartrate Injectable 5 milliGRAM(s) IV Push every 6 hours    MEDICATIONS  (PRN):  acetaminophen   Tablet .. 650 milliGRAM(s) Oral every 6 hours PRN Mild Pain (1 - 3)      Physical exam  Gen weak appearing  Neuro awake  Card irreg rate  Pulm decreased bases  Abd soft  Ext warm, no edema        I&O's Summary    07 Apr 2021 07:01  -  08 Apr 2021 07:00  --------------------------------------------------------  IN: 940 mL / OUT: 250 mL / NET: 690 mL        Assessment  89y Female  w/ PAST MEDICAL & SURGICAL HISTORY:  Glaucoma    HLD (hyperlipidemia)    A-fib    CHF (congestive heart failure)    S/P hip replacement, right    admitted with complaints of Patient is a 89y old  Female who presents with a chief complaint of sob (08 Apr 2021 08:47)  .  89 yo WF h/o A-fib not on anticoagulants (falls), CHF EF 35-40%, HLD, moderate to severe mitral regurgitation, CHF, Afib (not on AC), s/p R hip replacement admitted 3/30 w progressive aphasia. Pt w worsened CHF/b/l effusions on CT. Thoracentesis attempted 4/7 but aborted due to not finding a good window/pocket. Pt w intermittent bipap requirements, no diuresis at this time due to intravascular depletion as per medicine.     Cont w intermittent diuresis as tolerated  will cont to follow  No planned intervention for now.     Discussed with Cardiothoracic Team at AM rounds.

## 2021-04-08 NOTE — PROGRESS NOTE ADULT - SUBJECTIVE AND OBJECTIVE BOX
Interval History:  4/8/21: No significant change.     MEDICATIONS  (STANDING):  aspirin  chewable 81 milliGRAM(s) Oral daily  dextrose 5%. 1000 milliLiter(s) (75 mL/Hr) IV Continuous <Continuous>  heparin   Injectable 5000 Unit(s) SubCutaneous every 12 hours  melatonin 5 milliGRAM(s) Oral at bedtime  metoprolol tartrate 50 milliGRAM(s) Oral every 8 hours  metoprolol tartrate Injectable 5 milliGRAM(s) IV Push every 6 hours    MEDICATIONS  (PRN):  acetaminophen   Tablet .. 650 milliGRAM(s) Oral every 6 hours PRN Mild Pain (1 - 3)      Allergies    atenolol (Other)  sulfa drugs (Other)    Intolerances        PHYSICAL EXAM:  Vital Signs Last 24 Hrs  T(F): 97.7 (04-08-21 @ 06:39)  HR: 102 (04-08-21 @ 07:00)  BP: 109/69 (04-08-21 @ 07:00)  RR: 17 (04-08-21 @ 07:00)    GENERAL: NAD, well-groomed, well-developed  HEAD:  Atraumatic, Normocephalic  Neuro:  Opens eyes briefly to voice.  Does not follow commands  CN: PERRL, no facial weakness noted  motor: some minimal spontaneous movements in upper extremities, no movements in lower extremities  sensory: no withdrawal to noxious stimuli    LABS:    04-07    146<H>  |  114<H>  |  26<H>  ----------------------------<  133<H>  3.8   |  29  |  0.80    Ca    8.8      07 Apr 2021 08:41            RADIOLOGY & ADDITIONAL STUDIES:    CT head 3/30/21:   No acute hemorrhage, mass effect or extra-axial collection.  Increasing size of right frontal parafalcine extra-axial soft tissue mass likely representing a meningioma.    CT head 4/4/21:  No acute intracranial findings. No significant change when compared with study performed on 3/30/2021.    MRI head 4/5/21:  Limited exam due to motion    No acute intracranial hemorrhage or acute infarct.

## 2021-04-08 NOTE — PROGRESS NOTE ADULT - SUBJECTIVE AND OBJECTIVE BOX
CC: aphasia (04 Apr 2021 14:50)    HPI:  89 y.o. female with h/o A-fib not on anticoagulants (falls), CHF EF 35-40%, HLD, moderate to severe mitral regurgitation, s/p R hip replacement, who not does ambulate independently at baseline, who presented to the ED sent by her family due to progressive aphasia for the past 4 days.  Patient was admitted 2 weeks ago for rapid Afib and CHF and was discharged on Lasix and water restriction; On Admission,  Cr was found to be elevated    INTERVAL HPI/ OVERNIGHT EVENTS: transferred to SICU for BIPAP, confused and lethargic now, pCO2 improved with BIPAP but remains confused, no po intake  ROS UTO    Vital Signs Last 24 Hrs  T(C): 36.4 (08 Apr 2021 14:10), Max: 36.6 (07 Apr 2021 21:31)  T(F): 97.5 (08 Apr 2021 14:10), Max: 97.8 (07 Apr 2021 21:31)  HR: 92 (08 Apr 2021 11:00) (87 - 105)  BP: 110/71 (08 Apr 2021 11:00) (97/56 - 132/66)  BP(mean): 79 (08 Apr 2021 11:00) (67 - 81)  RR: 26 (08 Apr 2021 11:00) (16 - 28)  SpO2: 98% (08 Apr 2021 11:00) (93% - 100%)  I&O's Detail    07 Apr 2021 07:01  -  08 Apr 2021 07:00  --------------------------------------------------------  IN:    dextrose 5%: 940 mL  Total IN: 940 mL    OUT:    Voided (mL): 250 mL  Total OUT: 250 mL    Total NET: 690 mL    08 Apr 2021 11:22    137    |  104    |  20     ----------------------------<  168    3.6     |  31     |  0.71     Ca    8.6        08 Apr 2021 11:22    MEDICATIONS  (STANDING):  aspirin  chewable 81 milliGRAM(s) Oral daily  dextrose 5%. 1000 milliLiter(s) (75 mL/Hr) IV Continuous <Continuous>  heparin   Injectable 5000 Unit(s) SubCutaneous every 12 hours  melatonin 5 milliGRAM(s) Oral at bedtime  metoprolol tartrate 50 milliGRAM(s) Oral every 8 hours  metoprolol tartrate Injectable 5 milliGRAM(s) IV Push every 6 hours    MEDICATIONS  (PRN):  acetaminophen   Tablet .. 650 milliGRAM(s) Oral every 6 hours PRN Mild Pain (1 - 3)      PHYSICAL EXAM:  General: frail female, lethargic  Eyes: PERRLA, conjunctiva and sclera clear  Head: Normocephalic; atraumatic  ENMT: No nasal discharge; airway clear, dry mucosals  Neck: Supple; ; no masses  Respiratory: Diminished BS at bases BL, no RRW  Cardiovascular: S1, S2 irreg  Gastrointestinal: Soft non-distended; +  bowel sounds  Genitourinary: No  palpable bladder   Extremities: No edema, moves all for extremities spontaneously   Neurological: encephalopathic, grossly non focal   Skin: Warm and dry.     RADIOLOGY & ADDITIONAL TESTS:      EXAM:  CT BRAIN                        PROCEDURE DATE:  04/04/2021      FINDINGS:  The study is slightly limited by patient motion.    Redemonstrated  is a right parafalcine extra-axial mass measuring 3.2 x 2.4 cm, likely representing a meningioma, with minimal mass effect on the underlying right frontal lobe.    There is no CT evidence of acute intracranial hemorrhage, midline shift, or acute, large territorial infarct.  The ventricles and sulci are prominent compatible with moderate cerebral volume loss. Patchy nonspecific white matter hypoattenuation is likely on the basis of chronic microangiopathy. 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 The basal cisterns are patent.    There are atherosclerotic vascular calcifications at the skull base.    There are minimal right mastoid air cell effusions. There is a mucous retention cyst or polyp in the right maxillary sinus. The remaining visualized paranasal sinuses and mastoid air cells are grossly clear.    The calvarium and skull base are grossly intact.    IMPRESSION:  No acute intracranial findings. No significant change when compared with study performed on 3/30/2021.        EXAM:  CT CHEST                        PROCEDURE DATE:  04/03/2021      FINDINGS:    LUNGS AND AIRWAYS: Patent central airways.  Alveolar and interstitial edema increased from prior study. Bibasilar dependent atelectasis, increased.  PLEURA: Increased bilateral pleural effusions.  MEDIASTINUM AND SAUMYA: No lymphadenopathy.  VESSELS: Calcified thoracic aorta and coronary arteries.  HEART: Cardiomegaly. Valvular calcifications. No pericardial effusion.  CHEST WALL AND LOWER NECK: Within normal limits.  VISUALIZED UPPER ABDOMEN: Two focal calcifications associated with the liver capsule, unchanged.  BONES: Kyphosis, osteopenia.    IMPRESSION:  Worsening CHF.

## 2021-04-08 NOTE — PROGRESS NOTE ADULT - ASSESSMENT
PROBLEMS;    AC hypoxaemiac & hypercapnic respiratory failure  Acute on chronic systolic CHF   B/L Pleural effusion  ARF   AMS with Encephalopathy this could be secondary to UTI /  we were not able to rule out CVA  Hx of atrial fibrillation not on AC   Rt Frontal Meningioma   Acute UTI- proteus species in the urine    PLAN:    serial abg  put bipap as abg worsening-d/w staff  Keep neg balance  symptomatic rx  Neuro/cardic eval  supportive care  dvt prophylasix       PROBLEMS;    AC hypoxaemiac & hypercapnic respiratory failure  Acute on chronic systolic CHF   B/L Pleural effusion  ARF   AMS with Encephalopathy this could be secondary to UTI /  we were not able to rule out CVA  Hx of atrial fibrillation not on AC   Rt Frontal Meningioma   Acute UTI- proteus species in the urine    PLAN:    pulmonary better  Abg-improved  bipap qhs & PRN  Keep neg balance  symptomatic rx  Neuro/cardic eval  supportive care  dvt prophylasix

## 2021-04-08 NOTE — PROGRESS NOTE ADULT - SUBJECTIVE AND OBJECTIVE BOX
Subjective:    Home Medications:  aspirin 81 mg oral tablet: 1 tab(s) orally once a day (30 Mar 2021 12:09)  atorvastatin 10 mg oral tablet: 1 tab(s) orally once a day (30 Mar 2021 12:09)  MiraLax oral powder for reconstitution: Use as needed (30 Mar 2021 12:09)  PreserVision AREDS 2 oral capsule: 1 cap(s) orally 2 times a day (30 Mar 2021 12:09)  senna oral tablet: 2 tab(s) orally once a day (at bedtime), As Needed (30 Mar 2021 12:09)  Vitamin D3 5000 intl units (125 mcg) oral tablet: 1 tab(s) orally once a day (30 Mar 2021 12:09)    MEDICATIONS  (STANDING):  aspirin  chewable 81 milliGRAM(s) Oral daily  dextrose 5%. 1000 milliLiter(s) (75 mL/Hr) IV Continuous <Continuous>  heparin   Injectable 5000 Unit(s) SubCutaneous every 12 hours  melatonin 5 milliGRAM(s) Oral at bedtime  metoprolol tartrate 50 milliGRAM(s) Oral every 8 hours  metoprolol tartrate Injectable 5 milliGRAM(s) IV Push every 6 hours    MEDICATIONS  (PRN):  acetaminophen   Tablet .. 650 milliGRAM(s) Oral every 6 hours PRN Mild Pain (1 - 3)      Allergies    atenolol (Other)  sulfa drugs (Other)    Intolerances        Vital Signs Last 24 Hrs  T(C): 36.5 (08 Apr 2021 06:39), Max: 36.6 (07 Apr 2021 21:31)  T(F): 97.7 (08 Apr 2021 06:39), Max: 97.8 (07 Apr 2021 21:31)  HR: 102 (08 Apr 2021 07:00) (87 - 102)  BP: 109/69 (08 Apr 2021 07:00) (97/56 - 127/59)  BP(mean): 78 (08 Apr 2021 07:00) (67 - 85)  RR: 17 (08 Apr 2021 07:00) (17 - 31)  SpO2: 100% (08 Apr 2021 07:00) (97% - 100%)      PHYSICAL EXAMINATION:    NECK:  Supple. No lymphadenopathy. Jugular venous pressure not elevated. Carotids equal.   HEART:   The cardiac impulse has a normal quality. Reg., Nl S1 and S2.  There are no murmurs, rubs or gallops noted  CHEST:  Chest is clear to auscultation. Normal respiratory effort.  ABDOMEN:  Soft and nontender.   EXTREMITIES:  There is no edema.       LABS:    04-07    146<H>  |  114<H>  |  26<H>  ----------------------------<  133<H>  3.8   |  29  |  0.80    Ca    8.8      07 Apr 2021 08:41      Blood Gas Profile - Arterial (04.07.21 @ 09:41)   pH, Arterial: 7.32   pCO2, Arterial: 60 mmHg   pO2, Arterial: 123 mmHg   HCO3, Arterial: 30 mmoL/L   Base Excess, Arterial: 3.6 mmol/L   Oxygen Saturation, Arterial: 99 %   Blood Gas Comments Arterial: FIO2:_ MODE:_ VT:_ RATE:_ PEEP:_ Comment:_3 LPM n c       < from: Xray Chest 1 View- PORTABLE-Urgent (Xray Chest 1 View- PORTABLE-Urgent .) (04.07.21 @ 11:45) >  FINDINGS:    The lungs show perihilar diffuse airspace disease and/or pleural effusions. LEFT diaphragm not visualized concerning for LEFT retrocardiac airspace consolidation. No pneumothorax.    The  heart is enlarged in transverse diameter. There is diffuse vascular congestion..  Atherosclerotic calcifiedintimal wall plaques within nondilated thoracic aorta    There is a chronic displaced LEFT humeral neck fracture with medial displacement of the distal fracture segment.  IMPRESSION: 4/7/2021 6:50 AM chest radiograph: No interval change..   Cardiomegaly, diffuse perihilar airspace disease and/or pleural effusions.    < end of copied text >       Subjective:    pat more awake today, CT surgery no sucess for thoracentesis, off bipap.    Home Medications:  aspirin 81 mg oral tablet: 1 tab(s) orally once a day (30 Mar 2021 12:09)  atorvastatin 10 mg oral tablet: 1 tab(s) orally once a day (30 Mar 2021 12:09)  MiraLax oral powder for reconstitution: Use as needed (30 Mar 2021 12:09)  PreserVision AREDS 2 oral capsule: 1 cap(s) orally 2 times a day (30 Mar 2021 12:09)  senna oral tablet: 2 tab(s) orally once a day (at bedtime), As Needed (30 Mar 2021 12:09)  Vitamin D3 5000 intl units (125 mcg) oral tablet: 1 tab(s) orally once a day (30 Mar 2021 12:09)    MEDICATIONS  (STANDING):  aspirin  chewable 81 milliGRAM(s) Oral daily  dextrose 5%. 1000 milliLiter(s) (75 mL/Hr) IV Continuous <Continuous>  heparin   Injectable 5000 Unit(s) SubCutaneous every 12 hours  melatonin 5 milliGRAM(s) Oral at bedtime  metoprolol tartrate 50 milliGRAM(s) Oral every 8 hours  metoprolol tartrate Injectable 5 milliGRAM(s) IV Push every 6 hours    MEDICATIONS  (PRN):  acetaminophen   Tablet .. 650 milliGRAM(s) Oral every 6 hours PRN Mild Pain (1 - 3)      Allergies    atenolol (Other)  sulfa drugs (Other)    Intolerances        Vital Signs Last 24 Hrs  T(C): 36.5 (08 Apr 2021 06:39), Max: 36.6 (07 Apr 2021 21:31)  T(F): 97.7 (08 Apr 2021 06:39), Max: 97.8 (07 Apr 2021 21:31)  HR: 102 (08 Apr 2021 07:00) (87 - 102)  BP: 109/69 (08 Apr 2021 07:00) (97/56 - 127/59)  BP(mean): 78 (08 Apr 2021 07:00) (67 - 85)  RR: 17 (08 Apr 2021 07:00) (17 - 31)  SpO2: 100% (08 Apr 2021 07:00) (97% - 100%)      PHYSICAL EXAMINATION:    NECK:  Supple. No lymphadenopathy. Jugular venous pressure not elevated. Carotids equal.   HEART:   The cardiac impulse has a normal quality. Reg., Nl S1 and S2.  There are no murmurs, rubs or gallops noted  CHEST:  Chest is clear to auscultation. Normal respiratory effort.  ABDOMEN:  Soft and nontender.   EXTREMITIES:  There is no edema.       LABS:    04-07    146<H>  |  114<H>  |  26<H>  ----------------------------<  133<H>  3.8   |  29  |  0.80    Ca    8.8      07 Apr 2021 08:41      Blood Gas Profile - Arterial (04.07.21 @ 09:41)   pH, Arterial: 7.32   pCO2, Arterial: 60 mmHg   pO2, Arterial: 123 mmHg   HCO3, Arterial: 30 mmoL/L   Base Excess, Arterial: 3.6 mmol/L   Oxygen Saturation, Arterial: 99 %   Blood Gas Comments Arterial: FIO2:_ MODE:_ VT:_ RATE:_ PEEP:_ Comment:_3 LPM n c       Xray Chest 1 View- PORTABLE-Urgent (Xray Chest 1 View- PORTABLE-Urgent .) (04.07.21 @ 11:45) >  FINDINGS:    The lungs show perihilar diffuse airspace disease and/or pleural effusions. LEFT diaphragm not visualized concerning for LEFT retrocardiac airspace consolidation. No pneumothorax.    The  heart is enlarged in transverse diameter. There is diffuse vascular congestion..  Atherosclerotic calcifiedintimal wall plaques within nondilated thoracic aorta    There is a chronic displaced LEFT humeral neck fracture with medial displacement of the distal fracture segment.  IMPRESSION: 4/7/2021 6:50 AM chest radiograph: No interval change..   Cardiomegaly, diffuse perihilar airspace disease and/or pleural effusions.    ABG - ( 08 Apr 2021 09:12 )  pH, Arterial: 7.36  pH, Blood: x     /  pCO2: 52    /  pO2: 77    / HCO3: 29    / Base Excess: 3.5   /  SaO2: 96

## 2021-04-08 NOTE — PROGRESS NOTE ADULT - ASSESSMENT
89 y.o. female with h/o A-fib not on anticoagulants (falls), CHF EF 35-40%, HLD, moderate to severe mitral regurgitation, s/p R hip replacement, who not does ambulate independently at baseline    # AMS due to metabolic  Encephalopathy    - UTI treated w/o improvement   - no acute CVA with chronic mirovascular ischemic changes and volume loss   - neg procalcitonin level indicationg of unlikely infectious etiology   - hypercapnea improved with BIPAP without change in MS - remains lethargic    # Hx of atrial fibrillation not on AC   - C/w ASA / STATIN  - Speech / swallow evaluation appreciated on dysphagia diet    - now NPO due to lethargy    # Rt Frontal Meningioma   - Incidental seen last scan in 2019 slightly larger  - no immediate intervention warranted     # PAF  not on A/c   - iv metoprolol due to unable to tolerate po    # acute on chronic systolic CHF   - resolved on IV lasix - stopped as pt is clinically intravascularly depleted    # ARF with hypovolemic hypernatremia - likely secondary to intravascular depletion   - D5W, improved    # Severe protein-calorie malnutrition - due to lethargy   - corpak at family request to improve nutritional status with TF - explained risk and benefits    Overall poor prognosis as pt remains with PVC despite of diuresis and intravascular depletion also with acute hypercapnic respiratory failure and difficulty tolerating BIPAP.  Pt needs to be restrained in order to continue with BIPAP. Started on D5W for hypernatremia, changed to IV meds, no po intake.  As per family wants everything done.

## 2021-04-08 NOTE — CHART NOTE - NSCHARTNOTEFT_GEN_A_CORE
Clinical Nutrition Follow Up Note:    *89yo female p/w progressive aphasia for the 4 days PTA.   Pt admitted for AMS due to metabolic encephalopathy, Rt frontal meningeioma, ARH with hypovolemic hypernatremia 2/2 intravascular depletion; pt was transferred to SICU for BIPAP, confused and lethargic.  attempt at thoracentesis was unsuccessful.    *Labs Reviewed:  04-08    137  |  104  |  20  ----------------------------<  168<H>  3.6   |  31  |  0.71    Ca    8.6      08 Apr 2021 11:22    HbA1c: A1C with Estimated Average Glucose Result: 5.7 % (03-30-21 @ 17:16)    Glucose: POCT Blood Glucose.: 132 mg/dL (04-04-21 @ 01:36)    BP: 110/71 (04-08-21 @ 11:00) (87/44 - 132/66)  Lipid Panel: Date/Time: 03-31-21 @ 09:40  Cholesterol, Serum: 152  Direct LDL: --  HDL Cholesterol, Serum: 33  Total Cholesterol/HDL Ration Measurement: --  Triglycerides, Serum: 78    *labs reviewed; A1C in pre-DM range (no h/o DM and not on insulin), lytes WNL, however, given malnutrition status and lack of nutrition x 10 days, pt is high risk for refeeding syndrome; monitor lytes closely as nutr support is started and replete/correct prn.    *I and O's:    04-07-21 @ 07:01  -  04-08-21 @ 07:00  --------------------------------------------------------  IN:    dextrose 5%: 940 mL  Total IN: 940 mL    OUT:    Voided (mL): 250 mL  Total OUT: 250 mL    Total NET: 690 mL    *positive fluid balance; monitor closely and adjust IVF/Free water prn.      *(+) BM on 4/5 (3 days without BM) ; pt no on bowel regimen; monitor and add bowel regimen prn.    *isma score of 10 : PU documented, no stage or location documented. no edema documented.    *Per staff report, pt either too lethargic or breathing ability restricting PO intake; pt minimally to not eating x 10 days.  meeting <50% of estimated nutr needs.  received request from MD (Dr. Garcia) for PPN recommendations.  However, pt with working gut and extremely lethargic, rec'd corpak placement for temporary nutr support and d/w family about PEG placement.  If corpak placed, rec'd start TF with Glucerna 1.5 @ 10mL/hr and titrate by 10mL/hr q12hrs towards goal rate of 50mL/hr with 25mL q1hr free water flush (additional free water of 500mL daily).  TF at goal rate will provide ~1500Kcal, 83g protein, 759mL free water, and total TF volume of 1000mL. (discussed recommendations for corpak placement with Dr. Garcia).    *Malnutrition dx: severe malnutrition in acute illness r/t inadequate PO intake 2/2 chewing/swallowing difficulty 2/2 CHF, cerebral infarct AEB severe muscle/fat wasting; meeting <50% of ENN x 9 days; 6% wt loss in 6 days      Recommendations:  1) consider corpak placement and start TF with Glucerna 1.5 @ 10mL/hr and titrate by 10mL/hr q12hrs to goal rate of 50mL/hr  2) consider d/w family about PEG placement  3) if corpak placed, stop IVF and start free water flushes of 25mL q1hr (=500mL additional free water daily)  4) monitor hydration status closely; adjust IVF/free water flushes prn  5) monitor lytes/mins closely, pt high risk for refeeding syndrome  6) obtain new wt daily to track/trend changes  7) add thiamine 100mg daily as pt is likely deficient.      Diet, Dysphagia 2 Mechanical Soft-Nectar Consistency Fluid:   Prosource Gelatein Plus     Qty per Day:  2  Supplement Feeding Modality:  Oral  Ensure Enlive Servings Per Day:  1       Frequency:  Three Times a day (04-02-21 @ 14:58) [Active]  Diet, NPO (04-01-21 @ 10:33) [Pending Verification By Attending]            Estimated Needs: Based on 48Kg (wt from 4/5)  Calories: 4006-2525 Kcal (30-35 Kcal/Kg)  Protein: 77-86 g (1.6-1.8 g/Kg)  Fluids: 1410-1863  mL (30-35 mL/Kg)    *Wt Hx:  47.9Kg (4/5); pt with wt loss of ~3.1Kg in 6 days (6%), clinically significant.  Height (cm): 157.5 (03-30-21 @ 09:12), 157.5 (03-15-21 @ 08:32)  Weight (kg): 51 (03-30-21 @ 21:05), 51.8 (03-15-21 @ 21:14)  BMI (kg/m2): 20.6 (03-30-21 @ 21:05), 20.9 (03-30-21 @ 09:12), 20.9 (03-15-21 @ 21:14)  BSA (m2): 1.5 (03-30-21 @ 21:05), 1.51 (03-30-21 @ 09:12), 1.51 (03-15-21 @ 21:14)    *will continue to monitor and follow up with adjustments to treatment plan prn*    Kristina Villafana MA, RDN, CDN, [facility] [phone]

## 2021-04-09 NOTE — PROGRESS NOTE ADULT - ASSESSMENT
89 F with AF not on AC, systolic heart failure presents with altered mental status with aphasia    AF- current rate controlled, c/w BB and CCB-- overall goal is to decrease CCB and increase BB due to CHF- not on AC due to fall risk and family request - may need IV pushes of BB or CCb if not taking PO    CHF-was given lasix and now hypernatremic- appears euvolemic on exam- would hold lasix today and monitor - give back fluids    CVA vs TIA- vs AMS due to infection- unable to tolerate MRI. CT head stable. Likely deterioration in setting of dementia and poor PO itnake    grave prognosis overall

## 2021-04-09 NOTE — PROGRESS NOTE ADULT - SUBJECTIVE AND OBJECTIVE BOX
Interval History:  4/9/21: NG tube placed.     MEDICATIONS  (STANDING):  aspirin  chewable 81 milliGRAM(s) Oral daily  heparin   Injectable 5000 Unit(s) SubCutaneous every 12 hours  melatonin 5 milliGRAM(s) Oral at bedtime  metoprolol tartrate 50 milliGRAM(s) Oral every 8 hours  metoprolol tartrate Injectable 5 milliGRAM(s) IV Push every 6 hours    MEDICATIONS  (PRN):  acetaminophen   Tablet .. 650 milliGRAM(s) Oral every 6 hours PRN Mild Pain (1 - 3)      Allergies    atenolol (Other)  sulfa drugs (Other)    Intolerances        PHYSICAL EXAM:  Vital Signs Last 24 Hrs  T(F): 97.4 (04-09-21 @ 10:10)  HR: 87 (04-09-21 @ 12:00)  BP: 95/47 (04-09-21 @ 12:00)  RR: 27 (04-09-21 @ 12:00)    GENERAL: NAD, well-groomed, well-developed  HEAD:  Atraumatic, Normocephalic  Neuro:  Opens eyes to voice.  Not following commands consistently.  Pupils equally round and reactive to light.  No facial weakness  Refuses to participate in any confrontation testing      LABS:    04-09    134<L>  |  107  |  19  ----------------------------<  96  4.6   |  26  |  0.65    Ca    8.7      09 Apr 2021 06:29            RADIOLOGY & ADDITIONAL STUDIES:      CT head 3/30/21:   No acute hemorrhage, mass effect or extra-axial collection.  Increasing size of right frontal parafalcine extra-axial soft tissue mass likely representing a meningioma.    CT head 4/4/21:  No acute intracranial findings. No significant change when compared with study performed on 3/30/2021.    MRI head 4/5/21:  Limited exam due to motion    No acute intracranial hemorrhage or acute infarct.

## 2021-04-09 NOTE — PROGRESS NOTE ADULT - SUBJECTIVE AND OBJECTIVE BOX
History of Present Illness:   89 yo WF h/o A-fib not on anticoagulants (falls), CHF EF 35-40%, HLD, moderate to severe mitral regurgitation, s/p R hip replacement, who not does ambulate independently at baseline, who presented to the ED sent by her family due to progressive aphasia for the past 4 days.  Patient was admitted 2 weeks ago for rapid Afib and Chf, and was discharged on Lasix and water restriction; today her Cr was found to be elevated     she is currently agitated,  speaking/ screaming and moving all 4 limbs      4/1/21- more calm this AM, arousable, weak  4/2/21 confused but calm this AM  4/4 - Yesterday patient was wheezing with concerns for CHF and was restarted on lasix IV, with improvement  4/6  decreased mental status, on BIPAP  4/8 still with decreased mental status  4/9 not arousable this AM    Patient seen and examined at bedside. She is mumbling and not following commands. Unable to get ROS in current clinical condition.    PAST MEDICAL HISTORY:  A-fib   CHF (congestive heart failure)   Glaucoma   HLD (hyperlipidemia).     PAST SURGICAL HISTORY:  S/P hip replacement, right.     FAMILY HISTORY:  Patient unable to provide history    Social History:  no tobacco, ETOH, IVDA lives home with aids, non ambulatory      MEDICATIONS  (STANDING):  aspirin  chewable 81 milliGRAM(s) Oral daily  heparin   Injectable 5000 Unit(s) SubCutaneous every 12 hours  melatonin 5 milliGRAM(s) Oral at bedtime  metoprolol tartrate 50 milliGRAM(s) Oral every 8 hours  metoprolol tartrate Injectable 5 milliGRAM(s) IV Push every 6 hours    MEDICATIONS  (PRN):  acetaminophen   Tablet .. 650 milliGRAM(s) Oral every 6 hours PRN Mild Pain (1 - 3)      Vital Signs Last 24 Hrs  T(C): 36.3 (09 Apr 2021 05:10), Max: 37.2 (08 Apr 2021 17:36)  T(F): 97.3 (09 Apr 2021 05:10), Max: 98.9 (08 Apr 2021 17:36)  HR: 103 (09 Apr 2021 06:00) (89 - 109)  BP: 102/55 (09 Apr 2021 06:00) (99/65 - 132/66)  BP(mean): 61 (09 Apr 2021 06:00) (59 - 91)  RR: 18 (09 Apr 2021 06:00) (16 - 27)  SpO2: 100% (09 Apr 2021 06:00) (93% - 100%)    GENERAL:obtunded  LUNG: Decreased BS at bases but no W/R/R  HEART: irreg/irreg with soft systolic murmur at LSB  ABDOMEN: +BS, ST/ND/NT  EXTREMITIES:  2+ Peripheral Pulses, No clubbing, No cyanosis, No tibial edema  neuro: unable to answer questions or follow commands but moving all 4 ext    Labs:         04-08    137  |  104  |  20  ----------------------------<  168<H>  3.6   |  31  |  0.71    Ca    8.6      08 Apr 2021 11:22      Tele:  remains in AF, rate controlled with rates mostly 80-100s.      < from: TTE Echo Complete w/o Contrast w/ Doppler (11.20.20 @ 09:29) >     Findings     Mitral Valve   Mild to moderate mitral annular calcification is present.   Fibrocalcific changes noted to themitral valve leaflets with preserved   leaflet excursion.   Moderate to severe (3+) mitral regurgitation is present.     Aortic Valve   Fibrocalcific changes noted to the Aortic valve leaflets with preserved   leaflet excursion.   Mild to Moderate aortic regurgitation is present.     Tricuspid Valve   Mild to moderate tricuspid valve regurgitation is present.   Mild pulmonary hypertension.     Pulmonic Valve   Normal appearing pulmonic valve structure and function.   Mild pulmonic valvular regurgitation (1+) is present.     Left Atrium   The left atrium is moderately dilated.     Left Ventricle   The interventricular septum appears hypokinetic.   Estimated left ventricular ejection fraction is 35-40 %.     Right Atrium   The right atrium is at the upper limits of normal.     Right Ventricle   Normal appearing right ventricle structure and function.     Pericardial Effusion   No evidence of pericardial effusion.     Pleural Effusion   Pleural effusion - is present..     Miscellaneous   TheIVC appears normal.     Impression     Summary     Mild to moderate mitral annular calcification is present.   Fibrocalcific changes noted to the mitral valve leaflets with preserved   leaflet excursion.   Moderate to severe (3+) mitral regurgitationis present.   Fibrocalcific changes noted to the Aortic valve leaflets with preserved   leaflet excursion.   Mild to Moderate aortic regurgitation is present.   Mild to moderate tricuspid valve regurgitation is present.   Mild pulmonary hypertension.   Normal appearing pulmonic valve structure and function.   Mild pulmonic valvular regurgitation (1+) is present.   The left atrium is moderately dilated.   The interventricular septum appears hypokinetic.   Estimated left ventricular ejection fraction is 35-40 %.   The right atrium is at the upper limits of normal.   Pleural effusion - is present..     Signature     ----------------------------------------------------------------   Electronically signed by Carla Pineda MD(Interpreting   physician) on 11/21/2020 07:43 AM   ----------------------------------------------------------------      < end of copied text >

## 2021-04-09 NOTE — PROGRESS NOTE ADULT - SUBJECTIVE AND OBJECTIVE BOX
Subjective:    Home Medications:  aspirin 81 mg oral tablet: 1 tab(s) orally once a day (30 Mar 2021 12:09)  atorvastatin 10 mg oral tablet: 1 tab(s) orally once a day (30 Mar 2021 12:09)  MiraLax oral powder for reconstitution: Use as needed (30 Mar 2021 12:09)  PreserVision AREDS 2 oral capsule: 1 cap(s) orally 2 times a day (30 Mar 2021 12:09)  senna oral tablet: 2 tab(s) orally once a day (at bedtime), As Needed (30 Mar 2021 12:09)  Vitamin D3 5000 intl units (125 mcg) oral tablet: 1 tab(s) orally once a day (30 Mar 2021 12:09)    MEDICATIONS  (STANDING):  aspirin  chewable 81 milliGRAM(s) Oral daily  heparin   Injectable 5000 Unit(s) SubCutaneous every 12 hours  melatonin 5 milliGRAM(s) Oral at bedtime  metoprolol tartrate 50 milliGRAM(s) Oral every 8 hours  metoprolol tartrate Injectable 5 milliGRAM(s) IV Push every 6 hours    MEDICATIONS  (PRN):  acetaminophen   Tablet .. 650 milliGRAM(s) Oral every 6 hours PRN Mild Pain (1 - 3)      Allergies    atenolol (Other)  sulfa drugs (Other)    Intolerances        Vital Signs Last 24 Hrs  T(C): 36.3 (09 Apr 2021 05:10), Max: 37.2 (08 Apr 2021 17:36)  T(F): 97.3 (09 Apr 2021 05:10), Max: 98.9 (08 Apr 2021 17:36)  HR: 103 (09 Apr 2021 06:00) (89 - 109)  BP: 102/55 (09 Apr 2021 06:00) (99/65 - 132/66)  BP(mean): 61 (09 Apr 2021 06:00) (59 - 91)  RR: 18 (09 Apr 2021 06:00) (16 - 27)  SpO2: 100% (09 Apr 2021 06:00) (97% - 100%)      PHYSICAL EXAMINATION:    NECK:  Supple. No lymphadenopathy. Jugular venous pressure not elevated. Carotids equal.   HEART:   The cardiac impulse has a normal quality. Reg., Nl S1 and S2.  There are no murmurs, rubs or gallops noted  CHEST:  Chest is clear to auscultation. Normal respiratory effort.  ABDOMEN:  Soft and nontender.   EXTREMITIES:  There is no edema.       LABS:    04-09    134<L>  |  107  |  19  ----------------------------<  96  4.6   |  26  |  0.65    Ca    8.7      09 Apr 2021 06:29        Blood Gas Profile - Arterial (04.08.21 @ 09:12)   pH, Arterial: 7.36   pCO2, Arterial: 52 mmHg   pO2, Arterial: 77 mmHg   HCO3, Arterial: 29 mmoL/L   Base Excess, Arterial: 3.5 mmol/L   Oxygen Saturation, Arterial: 96 %   FIO2, Arterial: 3lpm n/c   Blood Gas Comments Arterial: FIO2:_ MODE:_ VT:_ RATE:_ PEEP:_ Comment:_3L NC   Blood Gas Source Arterial: Arterial          Subjective:    pat lying in bed, used bipap last night, no respiratory complaint.    Home Medications:  aspirin 81 mg oral tablet: 1 tab(s) orally once a day (30 Mar 2021 12:09)  atorvastatin 10 mg oral tablet: 1 tab(s) orally once a day (30 Mar 2021 12:09)  MiraLax oral powder for reconstitution: Use as needed (30 Mar 2021 12:09)  PreserVision AREDS 2 oral capsule: 1 cap(s) orally 2 times a day (30 Mar 2021 12:09)  senna oral tablet: 2 tab(s) orally once a day (at bedtime), As Needed (30 Mar 2021 12:09)  Vitamin D3 5000 intl units (125 mcg) oral tablet: 1 tab(s) orally once a day (30 Mar 2021 12:09)    MEDICATIONS  (STANDING):  aspirin  chewable 81 milliGRAM(s) Oral daily  heparin   Injectable 5000 Unit(s) SubCutaneous every 12 hours  melatonin 5 milliGRAM(s) Oral at bedtime  metoprolol tartrate 50 milliGRAM(s) Oral every 8 hours  metoprolol tartrate Injectable 5 milliGRAM(s) IV Push every 6 hours    MEDICATIONS  (PRN):  acetaminophen   Tablet .. 650 milliGRAM(s) Oral every 6 hours PRN Mild Pain (1 - 3)      Allergies    atenolol (Other)  sulfa drugs (Other)    Intolerances        Vital Signs Last 24 Hrs  T(C): 36.3 (09 Apr 2021 05:10), Max: 37.2 (08 Apr 2021 17:36)  T(F): 97.3 (09 Apr 2021 05:10), Max: 98.9 (08 Apr 2021 17:36)  HR: 103 (09 Apr 2021 06:00) (89 - 109)  BP: 102/55 (09 Apr 2021 06:00) (99/65 - 132/66)  BP(mean): 61 (09 Apr 2021 06:00) (59 - 91)  RR: 18 (09 Apr 2021 06:00) (16 - 27)  SpO2: 100% (09 Apr 2021 06:00) (97% - 100%)      PHYSICAL EXAMINATION:    NECK:  Supple. No lymphadenopathy. Jugular venous pressure not elevated. Carotids equal.   HEART:   The cardiac impulse has a normal quality. Reg., Nl S1 and S2.  There are no murmurs, rubs or gallops noted  CHEST:  Chest is clear to auscultation. Normal respiratory effort.  ABDOMEN:  Soft and nontender.   EXTREMITIES:  There is no edema.       LABS:    04-09    134<L>  |  107  |  19  ----------------------------<  96  4.6   |  26  |  0.65    Ca    8.7      09 Apr 2021 06:29        Blood Gas Profile - Arterial (04.08.21 @ 09:12)   pH, Arterial: 7.36   pCO2, Arterial: 52 mmHg   pO2, Arterial: 77 mmHg   HCO3, Arterial: 29 mmoL/L   Base Excess, Arterial: 3.5 mmol/L   Oxygen Saturation, Arterial: 96 %   FIO2, Arterial: 3lpm n/c   Blood Gas Comments Arterial: FIO2:_ MODE:_ VT:_ RATE:_ PEEP:_ Comment:_3L NC   Blood Gas Source Arterial: Arterial

## 2021-04-09 NOTE — PROGRESS NOTE ADULT - TIME-BASED BILLING (NON-CRITICAL CARE)
Time-based billing (NON-critical care)

## 2021-04-09 NOTE — PROGRESS NOTE ADULT - SUBJECTIVE AND OBJECTIVE BOX
CC: aphasia (04 Apr 2021 14:50)    HPI:  89 y.o. female with h/o A-fib not on anticoagulants (falls), CHF EF 35-40%, HLD, moderate to severe mitral regurgitation, s/p R hip replacement, who not does ambulate independently at baseline, who presented to the ED sent by her family due to progressive aphasia for the past 4 days.  Patient was admitted 2 weeks ago for rapid Afib and CHF and was discharged on Lasix and water restriction; On Admission,  Cr was found to be elevated    INTERVAL HPI/ OVERNIGHT EVENTS: transferred to SICU for BIPAP, remains confused and lethargic, pCO2 improved with BIPAP but remains confused, no po intake, corpak with TF  ROS UTO    Vital Signs Last 24 Hrs  T(C): 36.3 (09 Apr 2021 10:10), Max: 37.2 (08 Apr 2021 17:36)  T(F): 97.4 (09 Apr 2021 10:10), Max: 98.9 (08 Apr 2021 17:36)  HR: 93 (09 Apr 2021 08:00) (89 - 109)  BP: 87/51 (09 Apr 2021 08:00) (87/51 - 128/65)  BP(mean): 60 (09 Apr 2021 08:00) (59 - 91)  RR: 23 (09 Apr 2021 08:00) (16 - 27)  SpO2: 100% (09 Apr 2021 08:00) (97% - 100%)  I&O's Detail    08 Apr 2021 07:01  -  09 Apr 2021 07:00  --------------------------------------------------------  IN:  Total IN: 0 mL    OUT:    Voided (mL): 200 mL  Total OUT: 200 mL    Total NET: -200 mL    09 Apr 2021 06:29    134    |  107    |  19     ----------------------------<  96     4.6     |  26     |  0.65     Ca    8.7        09 Apr 2021 06:29    MEDICATIONS  (STANDING):  aspirin  chewable 81 milliGRAM(s) Oral daily  heparin   Injectable 5000 Unit(s) SubCutaneous every 12 hours  melatonin 5 milliGRAM(s) Oral at bedtime  metoprolol tartrate 50 milliGRAM(s) Oral every 8 hours  metoprolol tartrate Injectable 5 milliGRAM(s) IV Push every 6 hours    MEDICATIONS  (PRN):  acetaminophen   Tablet .. 650 milliGRAM(s) Oral every 6 hours PRN Mild Pain (1 - 3)    PHYSICAL EXAM:  General: frail female, lethargic  Eyes: PERRLA, conjunctiva and sclera clear  Head: Normocephalic; atraumatic  ENMT: No nasal discharge; airway clear, dry mucosals  Neck: Supple; ; no masses  Respiratory: Diminished BS at bases BL, no RRW  Cardiovascular: S1, S2 irreg  Gastrointestinal: Soft non-distended; +  bowel sounds  Genitourinary: No  palpable bladder   Extremities: No edema, moves all for extremities spontaneously   Neurological: encephalopathic, grossly non focal   Skin: Warm and dry.     RADIOLOGY & ADDITIONAL TESTS:      EXAM:  CT BRAIN                        PROCEDURE DATE:  04/04/2021      FINDINGS:  The study is slightly limited by patient motion.    Redemonstrated  is a right parafalcine extra-axial mass measuring 3.2 x 2.4 cm, likely representing a meningioma, with minimal mass effect on the underlying right frontal lobe.    There is no CT evidence of acute intracranial hemorrhage, midline shift, or acute, large territorial infarct.  The ventricles and sulci are prominent compatible with moderate cerebral volume loss. Patchy nonspecific white matter hypoattenuation is likely on the basis of chronic microangiopathy. 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 The basal cisterns are patent.    There are atherosclerotic vascular calcifications at the skull base.    There are minimal right mastoid air cell effusions. There is a mucous retention cyst or polyp in the right maxillary sinus. The remaining visualized paranasal sinuses and mastoid air cells are grossly clear.    The calvarium and skull base are grossly intact.    IMPRESSION:  No acute intracranial findings. No significant change when compared with study performed on 3/30/2021.        EXAM:  CT CHEST                        PROCEDURE DATE:  04/03/2021      FINDINGS:    LUNGS AND AIRWAYS: Patent central airways.  Alveolar and interstitial edema increased from prior study. Bibasilar dependent atelectasis, increased.  PLEURA: Increased bilateral pleural effusions.  MEDIASTINUM AND SAUMYA: No lymphadenopathy.  VESSELS: Calcified thoracic aorta and coronary arteries.  HEART: Cardiomegaly. Valvular calcifications. No pericardial effusion.  CHEST WALL AND LOWER NECK: Within normal limits.  VISUALIZED UPPER ABDOMEN: Two focal calcifications associated with the liver capsule, unchanged.  BONES: Kyphosis, osteopenia.    IMPRESSION:  Worsening CHF.

## 2021-04-09 NOTE — PROGRESS NOTE ADULT - SUBJECTIVE AND OBJECTIVE BOX
Subjective:    Vital Signs:  Vital Signs Last 24 Hrs  T(C): 36.3 (04-09-21 @ 10:10), Max: 37.2 (04-08-21 @ 17:36)  T(F): 97.4 (04-09-21 @ 10:10), Max: 98.9 (04-08-21 @ 17:36)  HR: 91 (04-09-21 @ 10:00) (89 - 109)  BP: 117/55 (04-09-21 @ 10:00) (87/51 - 128/65)  RR: 18 (04-09-21 @ 10:00) (16 - 27)  SpO2: 100% (04-09-21 @ 10:00) (97% - 100%) on (O2)    Telemetry/Alarms:    Relevant labs, radiology and Medications reviewed    04-09    134<L>  |  107  |  19  ----------------------------<  96  4.6   |  26  |  0.65    Ca    8.7      09 Apr 2021 06:29        MEDICATIONS  (STANDING):  aspirin  chewable 81 milliGRAM(s) Oral daily  heparin   Injectable 5000 Unit(s) SubCutaneous every 12 hours  melatonin 5 milliGRAM(s) Oral at bedtime  metoprolol tartrate 50 milliGRAM(s) Oral every 8 hours  metoprolol tartrate Injectable 5 milliGRAM(s) IV Push every 6 hours    MEDICATIONS  (PRN):  acetaminophen   Tablet .. 650 milliGRAM(s) Oral every 6 hours PRN Mild Pain (1 - 3)      Physical exam  Gen weak appearing  Neuro lethargic  Card irreg rate  Pulm decreased bases  Abd soft, feeding tube inplace via nostril  Ext warm, no edema    I&O's Summary    08 Apr 2021 07:01  -  09 Apr 2021 07:00  --------------------------------------------------------  IN: 0 mL / OUT: 200 mL / NET: -200 mL        Assessment  89y Female  w/ PAST MEDICAL & SURGICAL HISTORY:  Glaucoma    HLD (hyperlipidemia)    A-fib    CHF (congestive heart failure)    S/P hip replacement, right    admitted with complaints of Patient is a 89y old  Female who presents with a chief complaint of sob (09 Apr 2021 08:44)  .  .  89 yo WF h/o A-fib not on anticoagulants (falls), CHF EF 35-40%, HLD, moderate to severe mitral regurgitation, CHF, Afib (not on AC), s/p R hip replacement admitted 3/30 w progressive aphasia. Pt w worsened CHF/b/l effusions on CT. Thoracentesis attempted 4/7 but aborted due to not finding a good window/pocket. Pt w intermittent bipap requirements, no diuresis at this time due to intravascular depletion as per medicine.     Cont w intermittent diuresis as tolerated  No planned intervention for now.   Will sign off please recall as needed, [id number]    Discussed with Cardiothoracic Team at AM rounds.

## 2021-04-09 NOTE — PROGRESS NOTE ADULT - ASSESSMENT
PROBLEMS;    AC hypoxaemiac & hypercapnic respiratory failure  Acute on chronic systolic CHF   B/L Pleural effusion  ARF   AMS with Encephalopathy this could be secondary to UTI /  we were not able to rule out CVA  Hx of atrial fibrillation not on AC   Rt Frontal Meningioma   Acute UTI- proteus species in the urine    PLAN:    pulmonary better  Abg-improved  bipap qhs & PRN  Keep neg balance  symptomatic rx  Neuro/cardic eval  supportive care  dvt prophylasix

## 2021-04-09 NOTE — PROGRESS NOTE ADULT - ASSESSMENT
89 y.o. female with h/o A-fib not on anticoagulants (falls), CHF EF 35-40%, HLD, moderate to severe mitral regurgitation, s/p R hip replacement, who not does ambulate independently at baseline    # AMS due to metabolic  Encephalopathy    - UTI treated w/o improvement   - no acute CVA with chronic mirovascular ischemic changes and volume loss   - neg procalcitonin level indicationg of unlikely infectious etiology   - hypercapnea improved with BIPAP without change in MS - remains lethargic    # Hx of atrial fibrillation not on AC    - C/w ASA / STATIN   - now NPO due to lethargy    # Rt Frontal Meningioma   - Incidental seen last scan in 2019 slightly larger  - no immediate intervention warranted     # PAF  not on A/c   - iv metoprolol due to unable to tolerate po    # acute on chronic systolic CHF   - resolved on IV lasix - stopped as pt is clinically intravascularly depleted    # ARF with hypovolemic hypernatremia - likely secondary to intravascular depletion   - D5W, improved    # Severe protein-calorie malnutrition - due to lethargy   - corpak at family request to improve nutritional status with TF - explained risk and benefits    Overall poor prognosis as pt remains with PVC despite of diuresis and intravascular depletion also with acute hypercapnic respiratory failure and difficulty tolerating BIPAP.  Pt needs to be restrained in order to continue with BIPAP. Started on D5W for hypernatremia, changed to IV meds, no po intake.  As per family wants everything done.

## 2021-04-09 NOTE — PROGRESS NOTE ADULT - ASSESSMENT
89 yo WF h/o A-fib not on anticoagulants (falls), CHF EF 35-40%, HLD, moderate to severe mitral regurgitation, s/p R hip replacement, who not does ambulate independently at baseline, who presented to the ED sent by her family due to progressive aphasia for the past 4 days.      # AMS with Encephalopathy   She apparently had some improvement initially and has now declined.  -MRI brain is negative for acute infarct although limited  -EEG with bilateral slowing  -No improvement in mental status despite use of BPAP.   -Now receiving nutrition through [facility].   -Can give aspirin OK.  -If patient improves, can reevaluate if patient is a candidate for anticoagulation for stroke prevention. AC was previously discontinued due to fall risk. This can be reconsidered if she is no longer ambulatory.      # Rt Frontal Meningioma   -Incidental seen last scan in 2019 slightly large .  -Not a surgical candidate at this time.    Dr. Monae will take over neurology service on 4/10. Please call if additional input is needed from neurology service.

## 2021-04-10 NOTE — PROGRESS NOTE ADULT - SUBJECTIVE AND OBJECTIVE BOX
Subjective:    pat much more awake, bipap last night, corpeg feeding.    Home Medications:  aspirin 81 mg oral tablet: 1 tab(s) orally once a day (30 Mar 2021 12:09)  atorvastatin 10 mg oral tablet: 1 tab(s) orally once a day (30 Mar 2021 12:09)  MiraLax oral powder for reconstitution: Use as needed (30 Mar 2021 12:09)  PreserVision AREDS 2 oral capsule: 1 cap(s) orally 2 times a day (30 Mar 2021 12:09)  senna oral tablet: 2 tab(s) orally once a day (at bedtime), As Needed (30 Mar 2021 12:09)  Vitamin D3 5000 intl units (125 mcg) oral tablet: 1 tab(s) orally once a day (30 Mar 2021 12:09)    MEDICATIONS  (STANDING):  aspirin  chewable 81 milliGRAM(s) Oral daily  heparin   Injectable 5000 Unit(s) SubCutaneous every 12 hours  melatonin 5 milliGRAM(s) Oral at bedtime  metoprolol tartrate 50 milliGRAM(s) Oral every 8 hours    MEDICATIONS  (PRN):  acetaminophen   Tablet .. 650 milliGRAM(s) Oral every 6 hours PRN Mild Pain (1 - 3)      Allergies    atenolol (Other)  sulfa drugs (Other)    Intolerances        Vital Signs Last 24 Hrs  T(C): 36.8 (10 Apr 2021 09:24), Max: 36.9 (09 Apr 2021 16:33)  T(F): 98.2 (10 Apr 2021 09:24), Max: 98.4 (09 Apr 2021 16:33)  HR: 68 (10 Apr 2021 09:24) (65 - 102)  BP: 106/72 (10 Apr 2021 09:24) (93/47 - 132/58)  BP(mean): 80 (09 Apr 2021 16:00) (78 - 81)  RR: 20 (10 Apr 2021 09:24) (20 - 29)  SpO2: 100% (10 Apr 2021 09:24) (96% - 100%)      PHYSICAL EXAMINATION:    NECK:  Supple. No lymphadenopathy. Jugular venous pressure not elevated. Carotids equal.   HEART:   The cardiac impulse has a normal quality. Reg., Nl S1 and S2.  There are no murmurs, rubs or gallops noted  CHEST:  Chest is clear to auscultation. Normal respiratory effort.  ABDOMEN:  Soft and nontender.   EXTREMITIES:  There is no edema.       LABS:    04-10    142  |  107  |  22  ----------------------------<  87  4.1   |  32<H>  |  0.71    Ca    9.1      10 Apr 2021 10:38

## 2021-04-10 NOTE — PROGRESS NOTE ADULT - ASSESSMENT
PROBLEMS;    AC hypoxaemiac & hypercapnic respiratory failure  Acute on chronic systolic CHF   B/L Pleural effusion  ARF   AMS with Encephalopathy this could be secondary to UTI /  we were not able to rule out CVA  Hx of atrial fibrillation not on AC   Rt Frontal Meningioma   Acute UTI- proteus species in the urine    PLAN:    pulmonary better-  corpeg feeding-po liquid as tolerated-d/w nurse  Abg-improved  bipap qhs & PRN  Keep neg balance  symptomatic rx  Neuro/cardic eval  supportive care  dvt prophylasix

## 2021-04-10 NOTE — PROGRESS NOTE ADULT - SUBJECTIVE AND OBJECTIVE BOX
CC: aphasia (04 Apr 2021 14:50)    HPI:  89 y.o. female with h/o A-fib not on anticoagulants (falls), CHF EF 35-40%, HLD, moderate to severe mitral regurgitation, s/p R hip replacement, who not does ambulate independently at baseline, who presented to the ED sent by her family due to progressive aphasia for the past 4 days.  Patient was admitted 2 weeks ago for rapid Afib and CHF and was discharged on Lasix and water restriction; On Admission,  Cr was found to be elevated    INTERVAL HPI/ OVERNIGHT EVENTS: remains confused but more alert today, asking for water, does not want to eat, corpak with TF  ROS UTO    Vital Signs Last 24 Hrs  T(C): 36.8 (10 Apr 2021 09:24), Max: 36.9 (09 Apr 2021 16:33)  T(F): 98.2 (10 Apr 2021 09:24), Max: 98.4 (09 Apr 2021 16:33)  HR: 68 (10 Apr 2021 09:24) (65 - 102)  BP: 106/72 (10 Apr 2021 09:24) (93/47 - 132/58)  BP(mean): 80 (09 Apr 2021 16:00) (78 - 81)  RR: 20 (10 Apr 2021 09:24) (20 - 29)  SpO2: 100% (10 Apr 2021 09:24) (96% - 100%)    10 Apr 2021 10:38    142    |  107    |  22     ----------------------------<  87     4.1     |  32     |  0.71     Ca    9.1        10 Apr 2021 10:38    MEDICATIONS  (STANDING):  aspirin  chewable 81 milliGRAM(s) Oral daily  heparin   Injectable 5000 Unit(s) SubCutaneous every 12 hours  melatonin 5 milliGRAM(s) Oral at bedtime  metoprolol tartrate 50 milliGRAM(s) Oral every 8 hours    MEDICATIONS  (PRN):  acetaminophen   Tablet .. 650 milliGRAM(s) Oral every 6 hours PRN Mild Pain (1 - 3)    PHYSICAL EXAM:  General: frail female, alert  Eyes: PERRLA, conjunctiva and sclera clear  Head: Normocephalic; atraumatic  ENMT: No nasal discharge; airway clear, dry mucosals  Neck: Supple; ; no masses  Respiratory: Diminished BS at bases BL, no RRW  Cardiovascular: S1, S2 irreg  Gastrointestinal: Soft non-distended; +  bowel sounds  Genitourinary: No  palpable bladder   Extremities: No edema, moves all for extremities spontaneously   Neurological: encephalopathic, grossly non focal   Skin: Warm and dry.     RADIOLOGY & ADDITIONAL TESTS: personally reviewed      EXAM:  CT BRAIN                        PROCEDURE DATE:  04/04/2021      FINDINGS:  The study is slightly limited by patient motion.    Redemonstrated  is a right parafalcine extra-axial mass measuring 3.2 x 2.4 cm, likely representing a meningioma, with minimal mass effect on the underlying right frontal lobe.    There is no CT evidence of acute intracranial hemorrhage, midline shift, or acute, large territorial infarct.  The ventricles and sulci are prominent compatible with moderate cerebral volume loss. Patchy nonspecific white matter hypoattenuation is likely on the basis of chronic microangiopathy. 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 The basal cisterns are patent.    There are atherosclerotic vascular calcifications at the skull base.    There are minimal right mastoid air cell effusions. There is a mucous retention cyst or polyp in the right maxillary sinus. The remaining visualized paranasal sinuses and mastoid air cells are grossly clear.    The calvarium and skull base are grossly intact.    IMPRESSION:  No acute intracranial findings. No significant change when compared with study performed on 3/30/2021.        EXAM:  CT CHEST                        PROCEDURE DATE:  04/03/2021      FINDINGS:    LUNGS AND AIRWAYS: Patent central airways.  Alveolar and interstitial edema increased from prior study. Bibasilar dependent atelectasis, increased.  PLEURA: Increased bilateral pleural effusions.  MEDIASTINUM AND SAUMYA: No lymphadenopathy.  VESSELS: Calcified thoracic aorta and coronary arteries.  HEART: Cardiomegaly. Valvular calcifications. No pericardial effusion.  CHEST WALL AND LOWER NECK: Within normal limits.  VISUALIZED UPPER ABDOMEN: Two focal calcifications associated with the liver capsule, unchanged.  BONES: Kyphosis, osteopenia.    IMPRESSION:  Worsening CHF.

## 2021-04-11 NOTE — PROGRESS NOTE ADULT - ASSESSMENT
89 y.o. female with h/o A-fib not on anticoagulants (falls), CHF EF 35-40%, HLD, moderate to severe mitral regurgitation, s/p R hip replacement, who not does ambulate independently at baseline    # AMS due to metabolic  Encephalopathy    - UTI treated w/o improvement   - no acute CVA with chronic mirovascular ischemic changes and volume loss   - neg procalcitonin level indicationg of unlikely infectious etiology   - hypercapnea improved with BIPAP without change in MS    # Hx of atrial fibrillation not on AC    - C/w ASA / STATIN    # Rt Frontal Meningioma   - Incidental seen last scan in 2019 slightly larger  - no immediate intervention warranted     # PAF  not on A/c   - po metoprolol     # acute on chronic systolic CHF   - resolved on IV lasix - stopped as pt is clinically intravascularly depleted    # ARF with hypovolemic hypernatremia - likely secondary to intravascular depletion   - D5W, improved    # Severe protein-calorie malnutrition - due to lethargy   - corpak at family request to improve nutritional status with TF - explained risk and benefits   - resume clears if tolerates (bedside swallow eval with RN) - did not    Overall poor prognosis as pt remains with PVC despite of diuresis and intravascular depletion also with acute hypercapnic respiratory failure and difficulty tolerating BIPAP.  Pt needs to be restrained in order to continue with BIPAP. Started on D5W for hypernatremia, changed to IV meds, no po intake.  As per family wants everything done.

## 2021-04-11 NOTE — PROGRESS NOTE ADULT - ASSESSMENT
PROBLEMS;    AC hypoxaemiac & hypercapnic respiratory failure  Acute on chronic systolic CHF   B/L Pleural effusion  ARF   AMS with Encephalopathy this could be secondary to UTI /  we were not able to rule out CVA  Hx of atrial fibrillation not on AC   Rt Frontal Meningioma   Acute UTI- proteus species in the urine    PLAN:    pulmonary better  corpeg feeding-po liquid as tolerated-d/w nurse  bipap qhs & PRN  Keep neg balance  symptomatic rx  Neuro/cardic eval  supportive care  d/w staff  dvt prophylasix

## 2021-04-11 NOTE — PROGRESS NOTE ADULT - SUBJECTIVE AND OBJECTIVE BOX
Subjective:    pat better, lying in bed, used bipap yesterday.    Home Medications:  aspirin 81 mg oral tablet: 1 tab(s) orally once a day (30 Mar 2021 12:09)  atorvastatin 10 mg oral tablet: 1 tab(s) orally once a day (30 Mar 2021 12:09)  MiraLax oral powder for reconstitution: Use as needed (30 Mar 2021 12:09)  PreserVision AREDS 2 oral capsule: 1 cap(s) orally 2 times a day (30 Mar 2021 12:09)  senna oral tablet: 2 tab(s) orally once a day (at bedtime), As Needed (30 Mar 2021 12:09)  Vitamin D3 5000 intl units (125 mcg) oral tablet: 1 tab(s) orally once a day (30 Mar 2021 12:09)    MEDICATIONS  (STANDING):  aspirin  chewable 81 milliGRAM(s) Oral daily  heparin   Injectable 5000 Unit(s) SubCutaneous every 12 hours  melatonin 5 milliGRAM(s) Oral at bedtime  metoprolol tartrate 50 milliGRAM(s) Oral every 8 hours    MEDICATIONS  (PRN):  acetaminophen   Tablet .. 650 milliGRAM(s) Oral every 6 hours PRN Mild Pain (1 - 3)      Allergies    atenolol (Other)  sulfa drugs (Other)    Intolerances        Vital Signs Last 24 Hrs  T(C): 36.8 (10 Apr 2021 20:43), Max: 37.1 (10 Apr 2021 15:18)  T(F): 98.3 (10 Apr 2021 20:43), Max: 98.7 (10 Apr 2021 15:18)  HR: 98 (11 Apr 2021 06:00) (94 - 103)  BP: 110/70 (11 Apr 2021 06:00) (101/54 - 118/69)  BP(mean): --  RR: 20 (10 Apr 2021 20:43) (20 - 20)  SpO2: 100% (10 Apr 2021 20:43) (97% - 100%)      PHYSICAL EXAMINATION:    NECK:  Supple. No lymphadenopathy. Jugular venous pressure not elevated. Carotids equal.   HEART:   The cardiac impulse has a normal quality. Reg., Nl S1 and S2.  There are no murmurs, rubs or gallops noted  CHEST:  Chest crackles to auscultation. Normal respiratory effort.  ABDOMEN:  Soft and nontender.   EXTREMITIES:  There is no edema.       LABS:                        10.4   9.38  )-----------( 136      ( 11 Apr 2021 10:32 )             34.7     04-10    142  |  107  |  22  ----------------------------<  87  4.1   |  32<H>  |  0.71    Ca    9.1      10 Apr 2021 10:38

## 2021-04-11 NOTE — CHART NOTE - NSCHARTNOTEFT_GEN_A_CORE
Clinical Nutrition BRIEF NOTE    *89yo female p/w progressive aphasia for the 4 days PTA.   Pt admitted for AMS due to metabolic encephalopathy, Rt frontal meningeioma, ARH with hypovolemic hypernatremia 2/2 intravascular depletion.  Pt remains with PVC despite diuresis and intravascular depletion.    *pt s/p corpak placement on 4/8/21.  Pt seen by SLP last on 4/6 and rec'd NPO. rec'd SLP f/u as feasible.      *labs reviewed; 04-11    139  |  106  |  29<H>  ----------------------------<  137<H>  4.7   |  31  |  0.70    Ca    8.7      11 Apr 2021 10:32  Phos  2.8     04-11  Mg     2.3     04-11  BMI: BMI (kg/m2): 20.6 (03-30-21 @ 21:05)  HbA1c: A1C with Estimated Average Glucose Result: 5.7 % (03-30-21 @ 17:16)    Glucose: POCT Blood Glucose.: 132 mg/dL (04-04-21 @ 01:36)    BP: 110/70 (04-11-21 @ 06:00) (87/51 - 132/58)  Lipid Panel: Date/Time: 03-31-21 @ 09:40  Cholesterol, Serum: 152  Direct LDL: --  HDL Cholesterol, Serum: 33  Total Cholesterol/HDL Ration Measurement: --  Triglycerides, Serum: 78    *labs reviewed; lytes WNL.  triglycerides WNL.    *I&O's Detail    10 Apr 2021 07:01  -  11 Apr 2021 07:00  --------------------------------------------------------  IN:    Enteral Tube Flush: 191 mL    Glucerna 1.5: 239 mL  Total IN: 430 mL    OUT:    Incontinent per Collection Bag (mL): 150 mL    Voided (mL): 100 mL  Total OUT: 250 mL    Total NET: 180 mL    *positive fluid balance; monitor as TF titrated to goal and adjust free water flushes prn*    *isma score of 9; SDTI on coccyx.  (+1) Lt/Rt arm edema.  last BM documented on 4/5, no bowel regimen ordered, rec'd add bowel regimen.    *per pump, pt received an average of 396mL/day over the past 2 days of Glucerna 1.5.  Which provided ~594Kcal and 33g protein.  Meeting ~41% of estimated calorie needs and 43% of estimated protein needs.  TF now at goal rate, Goal rate will provide ~1500Kcal, 83g protein, 759mL free water and total TF volume of 1000mL.      *Malnutrition dx: severe malnutrition in acute illness r/t inadequate PO intake 2/2 chewing/swallowing difficulty 2/2 CHF, cerebral infarct AEB severe muscle/fat wasting; meeting <50% of ENN x 9 days; 6% wt loss in 6 days    RECOMMENDATIONS:  1) c/w Glucerna 1.5 @ 50mL/hr   2) monitor hydration status; adjust free water flushes prn  3) monitor TF tolerance; keep back of bed > 35 degrees  4) monitor BM; add bowel regimen  5) daily wt checks to track/trend changes  6) GOC discussion to include long term TF (PEG)      Estimated Needs: Based on 48Kg (wt from 4/5)  Calories: 7243-7687 Kcal (30-35 Kcal/Kg)  Protein: 77-86 g (1.6-1.8 g/Kg)  Fluids: 4397-1316  mL (30-35 mL/Kg)    *Wt Hx:  47.9Kg (4/5); pt with wt loss of ~3.1Kg in 6 days (6%), clinically significant.  Height (cm): 157.5 (03-30-21 @ 09:12), 157.5 (03-15-21 @ 08:32)  Weight (kg): 51 (03-30-21 @ 21:05), 51.8 (03-15-21 @ 21:14)  BMI (kg/m2): 20.6 (03-30-21 @ 21:05), 20.9 (03-30-21 @ 09:12), 20.9 (03-15-21 @ 21:14)  BSA (m2): 1.5 (03-30-21 @ 21:05), 1.51 (03-30-21 @ 09:12), 1.51 (03-15-21 @ 21:14)    *will continue to monitor and follow up with adjustments to treatment plan prn*    Kristina Villafana MA, RDN, CDN, [facility] [phone].

## 2021-04-11 NOTE — PROGRESS NOTE ADULT - SUBJECTIVE AND OBJECTIVE BOX
CC: aphasia (04 Apr 2021 14:50)    HPI:  89 y.o. female with h/o A-fib not on anticoagulants (falls), CHF EF 35-40%, HLD, moderate to severe mitral regurgitation, s/p R hip replacement, who not does ambulate independently at baseline, who presented to the ED sent by her family due to progressive aphasia for the past 4 days.  Patient was admitted 2 weeks ago for rapid Afib and CHF and was discharged on Lasix and water restriction; On Admission,  Cr was found to be elevated    INTERVAL HPI/ OVERNIGHT EVENTS: lethargic, confused, does not want to eat, corpak with TF  ROS UTO    Vital Signs Last 24 Hrs  T(C): 36.6 (11 Apr 2021 14:06), Max: 37.1 (10 Apr 2021 15:18)  T(F): 97.9 (11 Apr 2021 14:06), Max: 98.7 (10 Apr 2021 15:18)  HR: 83 (11 Apr 2021 14:06) (83 - 103)  BP: 104/54 (11 Apr 2021 14:06) (99/55 - 118/69)  BP(mean): --  RR: 18 (11 Apr 2021 14:06) (18 - 20)  SpO2: 100% (11 Apr 2021 14:06) (97% - 100%)  I&O's Detail    10 Apr 2021 07:01  -  11 Apr 2021 07:00  --------------------------------------------------------  IN:    Enteral Tube Flush: 191 mL    Glucerna 1.5: 239 mL  Total IN: 430 mL    OUT:    Incontinent per Collection Bag (mL): 150 mL    Voided (mL): 100 mL  Total OUT: 250 mL    Total NET: 180 mL                      10.4   9.38  )-----------( 136      ( 11 Apr 2021 10:32 )             34.7     11 Apr 2021 10:32    139    |  106    |  29     ----------------------------<  137    4.7     |  31     |  0.70     Ca    8.7        11 Apr 2021 10:32  Phos  2.8       11 Apr 2021 10:32  Mg     2.3       11 Apr 2021 10:32    PHYSICAL EXAM:  General: frail female, alert  Eyes: PERRLA, conjunctiva and sclera clear  Head: Normocephalic; atraumatic  ENMT: No nasal discharge; airway clear, dry mucosals  Neck: Supple; ; no masses  Respiratory: Diminished BS at bases BL, no RRW  Cardiovascular: S1, S2 irreg  Gastrointestinal: Soft non-distended; +  bowel sounds  Genitourinary: No  palpable bladder   Extremities: No edema, moves all for extremities spontaneously   Neurological: encephalopathic, grossly non focal   Skin: Warm and dry.     RADIOLOGY & ADDITIONAL TESTS: personally reviewed      EXAM:  CT BRAIN                        PROCEDURE DATE:  04/04/2021      FINDINGS:  The study is slightly limited by patient motion.    Redemonstrated  is a right parafalcine extra-axial mass measuring 3.2 x 2.4 cm, likely representing a meningioma, with minimal mass effect on the underlying right frontal lobe.    There is no CT evidence of acute intracranial hemorrhage, midline shift, or acute, large territorial infarct.  The ventricles and sulci are prominent compatible with moderate cerebral volume loss. Patchy nonspecific white matter hypoattenuation is likely on the basis of chronic microangiopathy. 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 The basal cisterns are patent.    There are atherosclerotic vascular calcifications at the skull base.    There are minimal right mastoid air cell effusions. There is a mucous retention cyst or polyp in the right maxillary sinus. The remaining visualized paranasal sinuses and mastoid air cells are grossly clear.    The calvarium and skull base are grossly intact.    IMPRESSION:  No acute intracranial findings. No significant change when compared with study performed on 3/30/2021.        EXAM:  CT CHEST                        PROCEDURE DATE:  04/03/2021      FINDINGS:    LUNGS AND AIRWAYS: Patent central airways.  Alveolar and interstitial edema increased from prior study. Bibasilar dependent atelectasis, increased.  PLEURA: Increased bilateral pleural effusions.  MEDIASTINUM AND SAUMYA: No lymphadenopathy.  VESSELS: Calcified thoracic aorta and coronary arteries.  HEART: Cardiomegaly. Valvular calcifications. No pericardial effusion.  CHEST WALL AND LOWER NECK: Within normal limits.  VISUALIZED UPPER ABDOMEN: Two focal calcifications associated with the liver capsule, unchanged.  BONES: Kyphosis, osteopenia.    IMPRESSION:  Worsening CHF.

## 2021-04-12 NOTE — PROGRESS NOTE ADULT - ASSESSMENT
89 y.o. female with h/o A-fib not on anticoagulants (falls), CHF EF 35-40%, HLD, moderate to severe mitral regurgitation, s/p R hip replacement, who not does ambulate independently at baseline    # AMS due to metabolic  Encephalopathy    - UTI treated w/o improvement   - no acute CVA with chronic mirovascular ischemic changes and volume loss   - neg procalcitonin level indicationg of unlikely infectious etiology   - hypercapnea improved with BIPAP without change in MS    # Hx of atrial fibrillation not on AC    - C/w ASA / STATIN    # Rt Frontal Meningioma   - Incidental seen last scan in 2019 slightly larger  - no immediate intervention warranted     # PAF  not on A/c   - po metoprolol     # acute on chronic systolic CHF   - resolved on IV lasix - stopped as pt is clinically intravascularly depleted    # ARF with hypovolemic hypernatremia - likely secondary to intravascular depletion  -on feeds    # Severe protein-calorie malnutrition - due to lethargy   - corpak at family request to improve nutritional status with TF - explained risk and benefits   - on NGT    Overall poor prognosis as pt remains with poor mental status, hypercapnic respiratory failure requiring BIPAP, on NGT feeds  As per family wants everything done. Discussed with daughter Velia states that this happened before and mental status eventually improving and she is hoping with feeding/time eventually improved. Also her mother previously states she wants to live and to do everything for now. Pt remains full code.   89 y.o. female with h/o A-fib not on anticoagulants (falls), CHF EF 35-40%, HLD, moderate to severe mitral regurgitation, s/p R hip replacement, who not does ambulate independently at baseline    # AMS due to metabolic  Encephalopathy    - UTI treated w/o improvement   - no acute CVA with chronic mirovascular ischemic changes and volume loss   - neg procalcitonin level indicationg of unlikely infectious etiology   - hypercapnea improved with BIPAP without change in MS    #Respiratory failure/acute on chronic CHF  -c/w bipap/NC  -pulmonary on board  -hold lasix as intravascularly depleted, cxr decreased congestion    # Hx of atrial fibrillation not on AC    - C/w ASA / STATIN    # Rt Frontal Meningioma   - Incidental seen last scan in 2019 slightly larger  - no immediate intervention warranted     # PAF  not on A/c   - po metoprolol     # acute on chronic systolic CHF   - resolved on IV lasix - stopped as pt is clinically intravascularly depleted    # ARF with hypovolemic hypernatremia - likely secondary to intravascular depletion  -on feeds    # Severe protein-calorie malnutrition - due to lethargy   - corpak at family request to improve nutritional status with TF - explained risk and benefits   - on NGT    Overall poor prognosis as pt remains with poor mental status, hypercapnic respiratory failure requiring BIPAP, on NGT feeds  As per family wants everything done. Discussed with daughter Velia states that this happened before and mental status eventually improving and she is hoping with feeding/time eventually improved. Also her mother previously states she wants to live and to do everything for now. Pt remains full code.

## 2021-04-12 NOTE — PROGRESS NOTE ADULT - ASSESSMENT
PROBLEMS;    AC hypoxaemiac & hypercapnic respiratory failure  Acute on chronic systolic CHF   B/L Pleural effusion  ARF   AMS with Encephalopathy this could be secondary to UTI /  we were not able to rule out CVA  Hx of atrial fibrillation not on AC   Rt Frontal Meningioma   Acute UTI- proteus species in the urine    PLAN:    pulmonary better  corpeg feeding-po liquid as tolerated-d/w nurse  bipap qhs & PRN  Keep neg balance  symptomatic rx  Neuro/cardic eval  supportive care  d/w staff  dvt prophylasix PROBLEMS;    AC hypoxaemiac & hypercapnic respiratory failure  Acute on chronic systolic CHF   B/L Pleural effusion  ARF   AMS with Encephalopathy this could be secondary to UTI /  we were not able to rule out CVA  Hx of atrial fibrillation not on AC   Rt Frontal Meningioma   Acute UTI- proteus species in the urine    PLAN:    pulmonary better  corpeg feeding-po liquid as tolerated  bipap qhs & PRN  Keep neg balance  symptomatic rx  Neuro/cardic eval  supportive care  d/w staff  dvt prophylasix

## 2021-04-12 NOTE — PROGRESS NOTE ADULT - SUBJECTIVE AND OBJECTIVE BOX
Subjective:    Home Medications:  aspirin 81 mg oral tablet: 1 tab(s) orally once a day (30 Mar 2021 12:09)  atorvastatin 10 mg oral tablet: 1 tab(s) orally once a day (30 Mar 2021 12:09)  MiraLax oral powder for reconstitution: Use as needed (30 Mar 2021 12:09)  PreserVision AREDS 2 oral capsule: 1 cap(s) orally 2 times a day (30 Mar 2021 12:09)  senna oral tablet: 2 tab(s) orally once a day (at bedtime), As Needed (30 Mar 2021 12:09)  Vitamin D3 5000 intl units (125 mcg) oral tablet: 1 tab(s) orally once a day (30 Mar 2021 12:09)    MEDICATIONS  (STANDING):  aspirin  chewable 81 milliGRAM(s) Oral daily  heparin   Injectable 5000 Unit(s) SubCutaneous every 12 hours  melatonin 5 milliGRAM(s) Oral at bedtime  metoprolol tartrate 50 milliGRAM(s) Oral every 8 hours    MEDICATIONS  (PRN):  acetaminophen   Tablet .. 650 milliGRAM(s) Oral every 6 hours PRN Mild Pain (1 - 3)      Allergies    atenolol (Other)  sulfa drugs (Other)    Intolerances        Vital Signs Last 24 Hrs  T(C): 36.4 (12 Apr 2021 06:23), Max: 36.6 (11 Apr 2021 14:06)  T(F): 97.6 (12 Apr 2021 06:23), Max: 97.9 (11 Apr 2021 14:06)  HR: 115 (12 Apr 2021 06:23) (83 - 115)  BP: 116/69 (12 Apr 2021 06:23) (99/55 - 116/69)  BP(mean): --  RR: 18 (12 Apr 2021 06:23) (16 - 18)  SpO2: 98% (12 Apr 2021 06:23) (95% - 100%)      PHYSICAL EXAMINATION:    NECK:  Supple. No lymphadenopathy. Jugular venous pressure not elevated. Carotids equal.   HEART:   The cardiac impulse has a normal quality. Reg., Nl S1 and S2.  There are no murmurs, rubs or gallops noted  CHEST:  Chest is clear to auscultation. Normal respiratory effort.  ABDOMEN:  Soft and nontender.   EXTREMITIES:  There is no edema.       LABS:                        10.4   9.38  )-----------( 136      ( 11 Apr 2021 10:32 )             34.7     04-11    139  |  106  |  29<H>  ----------------------------<  137<H>  4.7   |  31  |  0.70    Ca    8.7      11 Apr 2021 10:32  Phos  2.8     04-11  Mg     2.3     04-11                 Subjective:    pat no new complaint, used bipap last night, on corpeg feeding, no respiratory complaint.    Home Medications:  aspirin 81 mg oral tablet: 1 tab(s) orally once a day (30 Mar 2021 12:09)  atorvastatin 10 mg oral tablet: 1 tab(s) orally once a day (30 Mar 2021 12:09)  MiraLax oral powder for reconstitution: Use as needed (30 Mar 2021 12:09)  PreserVision AREDS 2 oral capsule: 1 cap(s) orally 2 times a day (30 Mar 2021 12:09)  senna oral tablet: 2 tab(s) orally once a day (at bedtime), As Needed (30 Mar 2021 12:09)  Vitamin D3 5000 intl units (125 mcg) oral tablet: 1 tab(s) orally once a day (30 Mar 2021 12:09)    MEDICATIONS  (STANDING):  aspirin  chewable 81 milliGRAM(s) Oral daily  heparin   Injectable 5000 Unit(s) SubCutaneous every 12 hours  melatonin 5 milliGRAM(s) Oral at bedtime  metoprolol tartrate 50 milliGRAM(s) Oral every 8 hours    MEDICATIONS  (PRN):  acetaminophen   Tablet .. 650 milliGRAM(s) Oral every 6 hours PRN Mild Pain (1 - 3)      Allergies    atenolol (Other)  sulfa drugs (Other)    Intolerances        Vital Signs Last 24 Hrs  T(C): 36.4 (12 Apr 2021 06:23), Max: 36.6 (11 Apr 2021 14:06)  T(F): 97.6 (12 Apr 2021 06:23), Max: 97.9 (11 Apr 2021 14:06)  HR: 115 (12 Apr 2021 06:23) (83 - 115)  BP: 116/69 (12 Apr 2021 06:23) (99/55 - 116/69)  BP(mean): --  RR: 18 (12 Apr 2021 06:23) (16 - 18)  SpO2: 98% (12 Apr 2021 06:23) (95% - 100%)      PHYSICAL EXAMINATION:    NECK:  Supple. No lymphadenopathy. Jugular venous pressure not elevated. Carotids equal.   HEART:   The cardiac impulse has a normal quality. Reg., Nl S1 and S2.  There are no murmurs, rubs or gallops noted  CHEST:  Chest is clear to auscultation. Normal respiratory effort.  ABDOMEN:  Soft and nontender.   EXTREMITIES:  There is no edema.       LABS:                        10.4   9.38  )-----------( 136      ( 11 Apr 2021 10:32 )             34.7     04-11    139  |  106  |  29<H>  ----------------------------<  137<H>  4.7   |  31  |  0.70    Ca    8.7      11 Apr 2021 10:32  Phos  2.8     04-11  Mg     2.3     04-11

## 2021-04-12 NOTE — PROGRESS NOTE ADULT - SUBJECTIVE AND OBJECTIVE BOX
89 y.o. female with h/o A-fib not on anticoagulants (falls), CHF EF 35-40%, HLD, moderate to severe mitral regurgitation, s/p R hip replacement, who not does ambulate independently at baseline, who presented to the ED sent by her family due to progressive aphasia for the past 4 days.  Patient was admitted 2 weeks ago for rapid Afib and CHF and was discharged on Lasix and water restriction; On Admission,  Cr was found to be elevated    4/12/2021--lethargic, barely opens eyes. was on bipap last night, currently on NC 4l saturating 100%. Vitals stable. NGT to feeds    ROS: unable to obtain due to AMS    Vital Signs Last 24 Hrs  T(C): 36.4 (12 Apr 2021 09:02), Max: 36.6 (11 Apr 2021 14:06)  T(F): 97.6 (12 Apr 2021 09:02), Max: 97.9 (11 Apr 2021 14:06)  HR: 94 (12 Apr 2021 09:02) (83 - 115)  BP: 96/60 (12 Apr 2021 09:02) (96/60 - 116/69)  BP(mean): --  RR: 18 (12 Apr 2021 09:02) (16 - 18)  SpO2: 100% (12 Apr 2021 09:02) (95% - 100%)    PHYSICAL EXAM:    GENERAL: elderly, lethargic, no acute distress  HEAD:  Atraumatic, Normocephalic  EYES: EOMI, PERRLA, conjunctiva and sclera clear  HEENT: dry mucous membranes  NECK: Supple, No JVD  CHEST/LUNG: decreased BS  HEART: Regular rate and rhythm; No murmurs, rubs, or gallops  ABDOMEN: Soft, Nontender, Nondistended; Bowel sounds present; +NGT  GENITOURINARY: no palpable bladder  EXTREMITIES:  2+ Peripheral Pulses, No clubbing, cyanosis, or edema  SKIN- no rash   NERVOUS SYSTEM:  confused                          10.4   9.38  )-----------( 136      ( 11 Apr 2021 10:32 )             34.7   04-11    139  |  106  |  29<H>  ----------------------------<  137<H>  4.7   |  31  |  0.70    Ca    8.7      11 Apr 2021 10:32  Phos  2.8     04-11  Mg     2.3     04-11    EXAM:  CT BRAIN                        PROCEDURE DATE:  04/04/2021      FINDINGS:  The study is slightly limited by patient motion.    Redemonstrated  is a right parafalcine extra-axial mass measuring 3.2 x 2.4 cm, likely representing a meningioma, with minimal mass effect on the underlying right frontal lobe.    There is no CT evidence of acute intracranial hemorrhage, midline shift, or acute, large territorial infarct.  The ventricles and sulci are prominent compatible with moderate cerebral volume loss. Patchy nonspecific white matter hypoattenuation is likely on the basis of chronic microangiopathy. 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 The basal cisterns are patent.    There are atherosclerotic vascular calcifications at the skull base.    There are minimal right mastoid air cell effusions. There is a mucous retention cyst or polyp in the right maxillary sinus. The remaining visualized paranasal sinuses and mastoid air cells are grossly clear.    The calvarium and skull base are grossly intact.    IMPRESSION:  No acute intracranial findings. No significant change when compared with study performed on 3/30/2021.    EXAM:  CT CHEST                        PROCEDURE DATE:  04/03/2021      FINDINGS:    LUNGS AND AIRWAYS: Patent central airways.  Alveolar and interstitial edema increased from prior study. Bibasilar dependent atelectasis, increased.  PLEURA: Increased bilateral pleural effusions.  MEDIASTINUM AND SAUMYA: No lymphadenopathy.  VESSELS: Calcified thoracic aorta and coronary arteries.  HEART: Cardiomegaly. Valvular calcifications. No pericardial effusion.  CHEST WALL AND LOWER NECK: Within normal limits.  VISUALIZED UPPER ABDOMEN: Two focal calcifications associated with the liver capsule, unchanged.  BONES: Kyphosis, osteopenia.    IMPRESSION:  Worsening CHF.      MRI-  No acute intracranial hemorrhage or acute infarct.    MEDICATIONS  (STANDING):  aspirin  chewable 81 milliGRAM(s) Oral daily  heparin   Injectable 5000 Unit(s) SubCutaneous every 12 hours  melatonin 5 milliGRAM(s) Oral at bedtime  metoprolol tartrate 50 milliGRAM(s) Oral every 8 hours    MEDICATIONS  (PRN):  acetaminophen   Tablet .. 650 milliGRAM(s) Oral every 6 hours PRN Mild Pain (1 - 3)

## 2021-04-13 NOTE — PROGRESS NOTE ADULT - SUBJECTIVE AND OBJECTIVE BOX
89 y.o. female with h/o A-fib not on anticoagulants (falls), CHF EF 35-40%, HLD, moderate to severe mitral regurgitation, s/p R hip replacement, who not does ambulate independently at baseline, who presented to the ED sent by her family due to progressive aphasia for the past 4 days.  Patient was admitted 2 weeks ago for rapid Afib and CHF and was discharged on Lasix and water restriction; On Admission,  Cr was found to be elevated    4/12/2021--lethargic, barely opens eyes. was on bipap last night, currently on NC 4l saturating 100%. Vitals stable. NGT to feeds  4/13-remains lethargic, on NC 4L    ROS: unable to obtain due to AMS    Vital Signs Last 24 Hrs  T(C): 36.6 (13 Apr 2021 15:18), Max: 36.7 (13 Apr 2021 04:52)  T(F): 97.8 (13 Apr 2021 15:18), Max: 98 (13 Apr 2021 04:52)  HR: 100 (13 Apr 2021 15:18) (85 - 118)  BP: 106/73 (13 Apr 2021 15:18) (94/54 - 106/73)  BP(mean): --  RR: 18 (13 Apr 2021 15:18) (18 - 20)  SpO2: 99% (13 Apr 2021 15:18) (96% - 100%)    PHYSICAL EXAM:    GENERAL: elderly, lethargic, no acute distress  HEAD:  Atraumatic, Normocephalic  EYES: EOMI, PERRLA, conjunctiva and sclera clear  HEENT: dry mucous membranes  NECK: Supple, No JVD  CHEST/LUNG: decreased BS  HEART: Regular rate and rhythm; No murmurs, rubs, or gallops  ABDOMEN: Soft, Nontender, Nondistended; Bowel sounds present; +NGT  GENITOURINARY: no palpable bladder  EXTREMITIES:  2+ Peripheral Pulses, No clubbing, cyanosis, or edema  SKIN- no rash   NERVOUS SYSTEM:  confused                          10.8   9.55  )-----------( 120      ( 13 Apr 2021 08:36 )             35.0   04-13    138  |  107  |  43<H>  ----------------------------<  130<H>  5.6<H>   |  31  |  0.80    Ca    9.3      13 Apr 2021 08:36    TPro  6.2  /  Alb  2.5<L>  /  TBili  0.6  /  DBili  x   /  AST  12<L>  /  ALT  24  /  AlkPhos  50  04-13      EXAM:  CT BRAIN                        PROCEDURE DATE:  04/04/2021      FINDINGS:  The study is slightly limited by patient motion.    Redemonstrated  is a right parafalcine extra-axial mass measuring 3.2 x 2.4 cm, likely representing a meningioma, with minimal mass effect on the underlying right frontal lobe.    There is no CT evidence of acute intracranial hemorrhage, midline shift, or acute, large territorial infarct.  The ventricles and sulci are prominent compatible with moderate cerebral volume loss. Patchy nonspecific white matter hypoattenuation is likely on the basis of chronic microangiopathy. 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 The basal cisterns are patent.    There are atherosclerotic vascular calcifications at the skull base.    There are minimal right mastoid air cell effusions. There is a mucous retention cyst or polyp in the right maxillary sinus. The remaining visualized paranasal sinuses and mastoid air cells are grossly clear.    The calvarium and skull base are grossly intact.    IMPRESSION:  No acute intracranial findings. No significant change when compared with study performed on 3/30/2021.    EXAM:  CT CHEST                        PROCEDURE DATE:  04/03/2021      FINDINGS:    LUNGS AND AIRWAYS: Patent central airways.  Alveolar and interstitial edema increased from prior study. Bibasilar dependent atelectasis, increased.  PLEURA: Increased bilateral pleural effusions.  MEDIASTINUM AND SAUMYA: No lymphadenopathy.  VESSELS: Calcified thoracic aorta and coronary arteries.  HEART: Cardiomegaly. Valvular calcifications. No pericardial effusion.  CHEST WALL AND LOWER NECK: Within normal limits.  VISUALIZED UPPER ABDOMEN: Two focal calcifications associated with the liver capsule, unchanged.  BONES: Kyphosis, osteopenia.    IMPRESSION:  Worsening CHF.      MRI-  No acute intracranial hemorrhage or acute infarct.    MEDICATIONS  (STANDING):  aspirin  chewable 81 milliGRAM(s) Oral daily  heparin   Injectable 5000 Unit(s) SubCutaneous every 12 hours  melatonin 5 milliGRAM(s) Oral at bedtime  metoprolol tartrate 50 milliGRAM(s) Oral every 8 hours    MEDICATIONS  (PRN):  acetaminophen   Tablet .. 650 milliGRAM(s) Oral every 6 hours PRN Mild Pain (1 - 3)

## 2021-04-13 NOTE — PROGRESS NOTE ADULT - ASSESSMENT
89 y.o. female with h/o A-fib not on anticoagulants (falls), CHF EF 35-40%, HLD, moderate to severe mitral regurgitation, s/p R hip replacement, who not does ambulate independently at baseline    # AMS due to metabolic  Encephalopathy    - UTI treated w/o improvement   - no acute CVA with chronic mirovascular ischemic changes and volume loss   - neg procalcitonin level indicationg of unlikely infectious etiology   - hypercapnea improved with BIPAP without change in MS    #Respiratory failure/acute on chronic CHF  -c/w bipap/NC  -pulmonary on board  -hold lasix as intravascularly depleted, cxr decreased congestion    # Hx of atrial fibrillation not on AC    - C/w ASA / STATIN    # Rt Frontal Meningioma   - Incidental seen last scan in 2019 slightly larger  - no immediate intervention warranted     # PAF  not on A/c   - po metoprolol     # acute on chronic systolic CHF   - resolved on IV lasix - stopped as pt is clinically intravascularly depleted    # ARF with hypovolemic hypernatremia - likely secondary to intravascular depletion  -on feeds    # Severe protein-calorie malnutrition - due to lethargy   - corpak at family request to improve nutritional status with TF - explained risk and benefits   - on NGT    Overall poor prognosis as pt remains with poor mental status, hypercapnic respiratory failure requiring BIPAP, on NGT feeds  As per family wants everything done. Discussed with daughter Velia, offered palliative eval but states that this happened before and mental status eventual improved and she is hoping with feeding/time eventually improved. Also her mother previously states she wants to live and to do everything for now. Pt remains full code.    Dispo: if no further improvement with mental status in next few days despite NGT feeding, consider palliative eval   89 y.o. female with h/o A-fib not on anticoagulants (falls), CHF EF 35-40%, HLD, moderate to severe mitral regurgitation, s/p R hip replacement, who not does ambulate independently at baseline    # AMS due to metabolic  Encephalopathy    - UTI treated w/o improvement   - no acute CVA with chronic mirovascular ischemic changes and volume loss   - neg procalcitonin level indicationg of unlikely infectious etiology   - hypercapnea improved with BIPAP without change in MS    #Respiratory failure/acute on chronic CHF  -c/w bipap/NC  -pulmonary on board  -hold lasix as intravascularly depleted, cxr decreased congestion    # Hx of atrial fibrillation not on AC    - C/w ASA / STATIN    # Rt Frontal Meningioma   - Incidental seen last scan in 2019 slightly larger  - no immediate intervention warranted     # PAF  not on A/c   - po metoprolol     # acute on chronic systolic CHF   - resolved on IV lasix - stopped as pt is clinically intravascularly depleted    # ARF with hypovolemic hypernatremia - likely secondary to intravascular depletion  -on feeds    #Hyperkalemia  -lokelma  -monitor    # Severe protein-calorie malnutrition - due to lethargy   - corpak at family request to improve nutritional status with TF - explained risk and benefits   - on NGT    Overall poor prognosis as pt remains with poor mental status, hypercapnic respiratory failure requiring BIPAP, on NGT feeds  As per family wants everything done. Discussed with daughter Velia, offered palliative eval but states that this happened before and mental status eventual improved and she is hoping with feeding/time eventually improved. Also her mother previously states she wants to live and to do everything for now. Pt remains full code.    Dispo: if no further improvement with mental status in next few days despite NGT feeding, consider palliative eval   89 y.o. female with h/o A-fib not on anticoagulants (falls), CHF EF 35-40%, HLD, moderate to severe mitral regurgitation, s/p R hip replacement, who not does ambulate independently at baseline    # AMS due to metabolic  Encephalopathy    - UTI treated w/o improvement   - no acute CVA with chronic mirovascular ischemic changes and volume loss   - neg procalcitonin level indicationg of unlikely infectious etiology   - hypercapnea improved with BIPAP without change in MS    #Respiratory failure/acute on chronic CHF  -c/w bipap/NC  -pulmonary on board  -hold lasix as intravascularly depleted, cxr decreased congestion    # Hx of atrial fibrillation not on AC    - C/w ASA / STATIN-on hold    # Rt Frontal Meningioma   - Incidental seen last scan in 2019 slightly larger  - no immediate intervention warranted     # PAF  not on A/c   - po metoprolol     # acute on chronic systolic CHF   - resolved on IV lasix - stopped as pt is clinically intravascularly depleted    # ARF with hypovolemic hypernatremia - likely secondary to intravascular depletion  -on feeds    #Hyperkalemia  -insulin+d50 (lokelma cannot be given thru corpak)  -monitor    # Severe protein-calorie malnutrition - due to lethargy   - corpak at family request to improve nutritional status with TF - explained risk and benefits   - on NGT    Overall poor prognosis as pt remains with poor mental status, hypercapnic respiratory failure requiring BIPAP, on NGT feeds  As per family wants everything done. Discussed with daughter Velia, offered palliative eval but states that this happened before and mental status eventual improved and she is hoping with feeding/time eventually improved. Also her mother previously states she wants to live and to do everything for now. Pt remains full code.    Dispo: if no further improvement with mental status in next few days despite NGT feeding, consider palliative eval   89 y.o. female with h/o A-fib not on anticoagulants (falls), CHF EF 35-40%, HLD, moderate to severe mitral regurgitation, s/p R hip replacement, who not does ambulate independently at baseline    # AMS due to metabolic  Encephalopathy    - UTI treated w/o improvement   - no acute CVA with chronic mirovascular ischemic changes and volume loss   - neg procalcitonin level indicationg of unlikely infectious etiology   - hypercapnea improved with BIPAP without change in MS    #Respiratory failure/acute on chronic CHF  -c/w bipap/NC  -pulmonary on board  -hold lasix as intravascularly depleted, cxr decreased congestion    # Hx of atrial fibrillation not on AC    - C/w ASA / STATIN    # Rt Frontal Meningioma   - Incidental seen last scan in 2019 slightly larger  - no immediate intervention warranted     # PAF  not on A/c   - po metoprolol --cannot be given thru corpak--prn ivpush for now for tachy    # acute on chronic systolic CHF   - resolved on IV lasix - stopped as pt is clinically intravascularly depleted    # ARF with hypovolemic hypernatremia - likely secondary to intravascular depletion  -on feeds    #Hyperkalemia  -insulin+d50 (lokelma cannot be given thru corpak)  -monitor    # Severe protein-calorie malnutrition - due to lethargy   - corpak at family request to improve nutritional status with TF - explained risk and benefits   - on NGT    Overall poor prognosis as pt remains with poor mental status, hypercapnic respiratory failure requiring BIPAP, on NGT feeds  As per family wants everything done. Discussed with daughter Velia, offered palliative eval but states that this happened before and mental status eventual improved and she is hoping with feeding/time eventually improved. Also her mother previously states she wants to live and to do everything for now. Pt remains full code.    Dispo: if no further improvement with mental status in next few days despite NGT feeding, consider palliative eval

## 2021-04-13 NOTE — PROGRESS NOTE ADULT - ASSESSMENT
PROBLEMS;    AC hypoxaemiac & hypercapnic respiratory failure  Acute on chronic systolic CHF   B/L Pleural effusion  ARF   AMS with Encephalopathy this could be secondary to UTI /  we were not able to rule out CVA  Hx of atrial fibrillation not on AC   Rt Frontal Meningioma   Acute UTI- proteus species in the urine    PLAN:    pulmonary better  corpeg feeding-po liquid as tolerated  bipap qhs & PRN  Keep neg balance  symptomatic rx  Neuro/cardic eval  supportive care  d/w staff  dvt prophylasix     PROBLEMS;    AC hypoxaemiac & hypercapnic respiratory failure  Acute on chronic systolic CHF   B/L Pleural effusion  ARF   AMS with Encephalopathy this could be secondary to UTI /  we were not able to rule out CVA  Hx of atrial fibrillation not on AC   Rt Frontal Meningioma   Acute UTI- proteus species in the urine    PLAN:    pulmonary better-d/w staff-agrees with hospitalist note pat condition not significant change after corrected all abnormalities & RX infection with no evidence of infection  corpeg feeding-po liquid as tolerated  bipap qhs & PRN  Keep neg balance  symptomatic rx  Neuro/cardic eval  supportive care  d/w staff  dvt prophylasix

## 2021-04-13 NOTE — PROGRESS NOTE ADULT - SUBJECTIVE AND OBJECTIVE BOX
Subjective:    Home Medications:  aspirin 81 mg oral tablet: 1 tab(s) orally once a day (30 Mar 2021 12:09)  atorvastatin 10 mg oral tablet: 1 tab(s) orally once a day (30 Mar 2021 12:09)  MiraLax oral powder for reconstitution: Use as needed (30 Mar 2021 12:09)  PreserVision AREDS 2 oral capsule: 1 cap(s) orally 2 times a day (30 Mar 2021 12:09)  senna oral tablet: 2 tab(s) orally once a day (at bedtime), As Needed (30 Mar 2021 12:09)  Vitamin D3 5000 intl units (125 mcg) oral tablet: 1 tab(s) orally once a day (30 Mar 2021 12:09)    MEDICATIONS  (STANDING):  aspirin  chewable 81 milliGRAM(s) Oral daily  heparin   Injectable 5000 Unit(s) SubCutaneous every 12 hours  melatonin 5 milliGRAM(s) Oral at bedtime  metoprolol tartrate 50 milliGRAM(s) Oral every 8 hours    MEDICATIONS  (PRN):  acetaminophen   Tablet .. 650 milliGRAM(s) Oral every 6 hours PRN Mild Pain (1 - 3)      Allergies    atenolol (Other)  sulfa drugs (Other)    Intolerances        Vital Signs Last 24 Hrs  T(C): 36.7 (13 Apr 2021 04:52), Max: 36.8 (12 Apr 2021 15:17)  T(F): 98 (13 Apr 2021 04:52), Max: 98.2 (12 Apr 2021 15:17)  HR: 88 (13 Apr 2021 04:52) (88 - 114)  BP: 104/67 (13 Apr 2021 04:52) (92/55 - 106/58)  BP(mean): --  RR: 20 (13 Apr 2021 04:52) (18 - 20)  SpO2: 96% (13 Apr 2021 04:52) (96% - 100%)      PHYSICAL EXAMINATION:    NECK:  Supple. No lymphadenopathy. Jugular venous pressure not elevated. Carotids equal.   HEART:   The cardiac impulse has a normal quality. Reg., Nl S1 and S2.  There are no murmurs, rubs or gallops noted  CHEST:  Chest is clear to auscultation. Normal respiratory effort.  ABDOMEN:  Soft and nontender.   EXTREMITIES:  There is no edema.       LABS:                        10.4   9.38  )-----------( 136      ( 11 Apr 2021 10:32 )             34.7     04-11    139  |  106  |  29<H>  ----------------------------<  137<H>  4.7   |  31  |  0.70    Ca    8.7      11 Apr 2021 10:32  Phos  2.8     04-11  Mg     2.3     04-11                 Subjective:    pat no new complaint, lying in bed.    Home Medications:  aspirin 81 mg oral tablet: 1 tab(s) orally once a day (30 Mar 2021 12:09)  atorvastatin 10 mg oral tablet: 1 tab(s) orally once a day (30 Mar 2021 12:09)  MiraLax oral powder for reconstitution: Use as needed (30 Mar 2021 12:09)  PreserVision AREDS 2 oral capsule: 1 cap(s) orally 2 times a day (30 Mar 2021 12:09)  senna oral tablet: 2 tab(s) orally once a day (at bedtime), As Needed (30 Mar 2021 12:09)  Vitamin D3 5000 intl units (125 mcg) oral tablet: 1 tab(s) orally once a day (30 Mar 2021 12:09)    MEDICATIONS  (STANDING):  aspirin  chewable 81 milliGRAM(s) Oral daily  heparin   Injectable 5000 Unit(s) SubCutaneous every 12 hours  melatonin 5 milliGRAM(s) Oral at bedtime  metoprolol tartrate 50 milliGRAM(s) Oral every 8 hours    MEDICATIONS  (PRN):  acetaminophen   Tablet .. 650 milliGRAM(s) Oral every 6 hours PRN Mild Pain (1 - 3)      Allergies    atenolol (Other)  sulfa drugs (Other)    Intolerances        Vital Signs Last 24 Hrs  T(C): 36.7 (13 Apr 2021 04:52), Max: 36.8 (12 Apr 2021 15:17)  T(F): 98 (13 Apr 2021 04:52), Max: 98.2 (12 Apr 2021 15:17)  HR: 88 (13 Apr 2021 04:52) (88 - 114)  BP: 104/67 (13 Apr 2021 04:52) (92/55 - 106/58)  BP(mean): --  RR: 20 (13 Apr 2021 04:52) (18 - 20)  SpO2: 96% (13 Apr 2021 04:52) (96% - 100%)      PHYSICAL EXAMINATION:    NECK:  Supple. No lymphadenopathy. Jugular venous pressure not elevated. Carotids equal.   HEART:   The cardiac impulse has a normal quality. Reg., Nl S1 and S2.  There are no murmurs, rubs or gallops noted  CHEST:  Chest is clear to auscultation. Normal respiratory effort.  ABDOMEN:  Soft and nontender.   EXTREMITIES:  There is no edema.       LABS:                        10.4   9.38  )-----------( 136      ( 11 Apr 2021 10:32 )             34.7     04-11    139  |  106  |  29<H>  ----------------------------<  137<H>  4.7   |  31  |  0.70    Ca    8.7      11 Apr 2021 10:32  Phos  2.8     04-11  Mg     2.3     04-11

## 2021-04-14 NOTE — PROVIDER CONTACT NOTE (OTHER) - REASON
/ consult
consult-Pulmonary
Dr Ingrid Manley (PCP)
/ consult
Cardio Consult
Consult - Neurosurgery
Pt lethargic, not follow commands
low urine output
failed dysphagia screening
family questioning plan
patient with 9 beats of wide complex arrhythmia
Pt unable to tolerate bipap, pulling to take mask off

## 2021-04-14 NOTE — PROVIDER CONTACT NOTE (OTHER) - BACKGROUND
Adm with AMS, progressive aphasia, FTT, Hx of afib, chf, hld,
admitted with aphasia and rapid a. fib. Hx of CHF, a fib, hld.
daughter states family is not ready for patient to die and does not want her on the floor where her vitals are not monitored more frequently &where she was on a monitor & could receive IV cardiac meds
Pt admitted change in MS, expressive aphasia. PMH CHF.

## 2021-04-14 NOTE — PROGRESS NOTE ADULT - SUBJECTIVE AND OBJECTIVE BOX
Subjective:    Home Medications:  aspirin 81 mg oral tablet: 1 tab(s) orally once a day (30 Mar 2021 12:09)  atorvastatin 10 mg oral tablet: 1 tab(s) orally once a day (30 Mar 2021 12:09)  MiraLax oral powder for reconstitution: Use as needed (30 Mar 2021 12:09)  PreserVision AREDS 2 oral capsule: 1 cap(s) orally 2 times a day (30 Mar 2021 12:09)  senna oral tablet: 2 tab(s) orally once a day (at bedtime), As Needed (30 Mar 2021 12:09)  Vitamin D3 5000 intl units (125 mcg) oral tablet: 1 tab(s) orally once a day (30 Mar 2021 12:09)    MEDICATIONS  (STANDING):  aspirin  chewable 81 milliGRAM(s) Oral daily  heparin   Injectable 5000 Unit(s) SubCutaneous every 12 hours  melatonin 5 milliGRAM(s) Oral at bedtime  metoprolol tartrate 50 milliGRAM(s) Oral every 8 hours    MEDICATIONS  (PRN):  acetaminophen   Tablet .. 650 milliGRAM(s) Oral every 6 hours PRN Mild Pain (1 - 3)  metoprolol tartrate Injectable 2.5 milliGRAM(s) IV Push every 6 hours PRN tachycardia      Allergies    atenolol (Other)  sulfa drugs (Other)    Intolerances        Vital Signs Last 24 Hrs  T(C): 36.6 (13 Apr 2021 21:45), Max: 36.6 (13 Apr 2021 15:18)  T(F): 97.8 (13 Apr 2021 21:45), Max: 97.8 (13 Apr 2021 15:18)  HR: 100 (13 Apr 2021 15:18) (85 - 118)  BP: 91/57 (13 Apr 2021 21:45) (91/57 - 106/73)  BP(mean): --  RR: 18 (13 Apr 2021 21:45) (18 - 18)  SpO2: 97% (13 Apr 2021 21:45) (97% - 99%)      PHYSICAL EXAMINATION:    NECK:  Supple. No lymphadenopathy. Jugular venous pressure not elevated. Carotids equal.   HEART:   The cardiac impulse has a normal quality. Reg., Nl S1 and S2.  There are no murmurs, rubs or gallops noted  CHEST:  Chest is clear to auscultation. Normal respiratory effort.  ABDOMEN:  Soft and nontender.   EXTREMITIES:  There is no edema.       LABS:                        11.1   8.01  )-----------( 150      ( 14 Apr 2021 07:01 )             35.6     04-14    140  |  107  |  45<H>  ----------------------------<  123<H>  5.7<H>   |  31  |  0.81    Ca    9.5      14 Apr 2021 07:01  Phos  2.9     04-14  Mg     2.5     04-14    TPro  6.4  /  Alb  2.6<L>  /  TBili  0.7  /  DBili  x   /  AST  11<L>  /  ALT  22  /  AlkPhos  53  04-14               Subjective:    pat lathergic, family wants full measures, seen by icu-oxygenating well no immenent death, corpeg was replaced as pat pull out early on.    Home Medications:  aspirin 81 mg oral tablet: 1 tab(s) orally once a day (30 Mar 2021 12:09)  atorvastatin 10 mg oral tablet: 1 tab(s) orally once a day (30 Mar 2021 12:09)  MiraLax oral powder for reconstitution: Use as needed (30 Mar 2021 12:09)  PreserVision AREDS 2 oral capsule: 1 cap(s) orally 2 times a day (30 Mar 2021 12:09)  senna oral tablet: 2 tab(s) orally once a day (at bedtime), As Needed (30 Mar 2021 12:09)  Vitamin D3 5000 intl units (125 mcg) oral tablet: 1 tab(s) orally once a day (30 Mar 2021 12:09)    MEDICATIONS  (STANDING):  aspirin  chewable 81 milliGRAM(s) Oral daily  heparin   Injectable 5000 Unit(s) SubCutaneous every 12 hours  melatonin 5 milliGRAM(s) Oral at bedtime  metoprolol tartrate 50 milliGRAM(s) Oral every 8 hours    MEDICATIONS  (PRN):  acetaminophen   Tablet .. 650 milliGRAM(s) Oral every 6 hours PRN Mild Pain (1 - 3)  metoprolol tartrate Injectable 2.5 milliGRAM(s) IV Push every 6 hours PRN tachycardia      Allergies    atenolol (Other)  sulfa drugs (Other)    Intolerances        Vital Signs Last 24 Hrs  T(C): 36.6 (13 Apr 2021 21:45), Max: 36.6 (13 Apr 2021 15:18)  T(F): 97.8 (13 Apr 2021 21:45), Max: 97.8 (13 Apr 2021 15:18)  HR: 100 (13 Apr 2021 15:18) (85 - 118)  BP: 91/57 (13 Apr 2021 21:45) (91/57 - 106/73)  BP(mean): --  RR: 18 (13 Apr 2021 21:45) (18 - 18)  SpO2: 97% (13 Apr 2021 21:45) (97% - 99%)      PHYSICAL EXAMINATION:    NECK:  Supple. No lymphadenopathy. Jugular venous pressure not elevated. Carotids equal.   HEART:   The cardiac impulse has a normal quality. Reg., Nl S1 and S2.  There are no murmurs, rubs or gallops noted  CHEST:  Chest is clear to auscultation. Normal respiratory effort.  ABDOMEN:  Soft and nontender.   EXTREMITIES:  There is no edema.       LABS:                        11.1   8.01  )-----------( 150      ( 14 Apr 2021 07:01 )             35.6     04-14    140  |  107  |  45<H>  ----------------------------<  123<H>  5.7<H>   |  31  |  0.81    Ca    9.5      14 Apr 2021 07:01  Phos  2.9     04-14  Mg     2.5     04-14    TPro  6.4  /  Alb  2.6<L>  /  TBili  0.7  /  DBili  x   /  AST  11<L>  /  ALT  22  /  AlkPhos  53  04-14

## 2021-04-14 NOTE — PROVIDER CONTACT NOTE (OTHER) - ACTION/TREATMENT ORDERED:
MD Quinn to contact patients daughter regarding care/plan and patients status at this time .will cont to monitor

## 2021-04-14 NOTE — CONSULT NOTE ADULT - CONSULT REQUESTED DATE/TIME
06-Apr-2021 07:32
03-Apr-2021 20:40
06-Apr-2021 16:58
31-Mar-2021 06:46
14-Apr-2021 12:12
31-Mar-2021 10:23

## 2021-04-14 NOTE — PROVIDER CONTACT NOTE (OTHER) - ASSESSMENT
pt extremely hypotensive, pt tachy. unable to receive cardiac meds r/t vital signs. explained to patients daughter, continuing to ask for tele transfer.

## 2021-04-14 NOTE — PROVIDER CONTACT NOTE (OTHER) - DATE AND TIME:
03-Apr-2021 14:39
10-Apr-2021 13:54
30-Mar-2021 18:54
02-Apr-2021 00:02
05-Apr-2021 19:12
06-Apr-2021 17:00
14-Apr-2021 16:33
06-Apr-2021 10:47
30-Mar-2021 16:51
05-Apr-2021 18:50
03-Apr-2021 22:00
14-Apr-2021 20:43

## 2021-04-14 NOTE — PROGRESS NOTE ADULT - REASON FOR ADMISSION
-
CVA
aphasia
sob
-
Aphasia
confusion
sob
aphasia
aphasia
confusion
sob
sob
aphasia
aphasia

## 2021-04-14 NOTE — PROGRESS NOTE ADULT - PROVIDER SPECIALTY LIST ADULT
Cardiology
Hospitalist
Neurology
Neurology
Pulmonology
Hospitalist
Neurology
Thoracic Surgery
Thoracic Surgery
Cardiology
Hospitalist
Hospitalist
Neurology
Pulmonology
Thoracic Surgery
Cardiology
Hospitalist
Neurology
Pulmonology
Pulmonology
Cardiology
Hospitalist
Pulmonology
Pulmonology
Cardiology

## 2021-04-14 NOTE — PROGRESS NOTE ADULT - SUBJECTIVE AND OBJECTIVE BOX
HOSPITALIST ATTENDING PROGRESS NOTE    Cc: Follow up for CHF, mod-sever MR, encephalopathy, UTI     HPI: breathing 24-30 BPM, eyes closed, unable to give history. pulled out NGT this morning.     EXAM  Vitals: Vital Signs Last 24 Hrs  T(C): 36.6 (13 Apr 2021 21:45), Max: 36.6 (13 Apr 2021 15:18)  T(F): 97.8 (13 Apr 2021 21:45), Max: 97.8 (13 Apr 2021 15:18)  HR: 100 (13 Apr 2021 15:18) (100 - 100)  BP: 91/57 (13 Apr 2021 21:45) (91/57 - 106/73)  BP(mean): --  RR: 18 (13 Apr 2021 21:45) (18 - 18)  SpO2: 97% (13 Apr 2021 21:45) (97% - 99%)  Gen: mild-mod respiratory distress. Severe generalized muscle wasting.   HEENT: normocephalic, atraumatic, no nasal discharge, trachea midline, anicteric sclerae  CVS: Normal S1S2, RRR, 2/6 murmur  RESP: diminished BS b/l bases, no wheezing, no rhonchi, moderately labored breathing  ABD: normal bowel sounds, non-tender, non-distended, no organomegaly  SKIN: no rash seen on visible skin  EXT: no edema    MEDS  MEDICATIONS  (STANDING):  aspirin  chewable 81 milliGRAM(s) Oral daily  heparin   Injectable 5000 Unit(s) SubCutaneous every 12 hours  melatonin 5 milliGRAM(s) Oral at bedtime  metoprolol tartrate 50 milliGRAM(s) Oral every 8 hours    MEDICATIONS  (PRN):  acetaminophen   Tablet .. 650 milliGRAM(s) Oral every 6 hours PRN Mild Pain (1 - 3)  metoprolol tartrate Injectable 2.5 milliGRAM(s) IV Push every 6 hours PRN tachycardia    LABS/RADIOLOGY                          11.1   8.01  )-----------( 150      ( 14 Apr 2021 07:01 )             35.6    04-14    140  |  107  |  45<H>  ----------------------------<  123<H>  5.7<H>   |  31  |  0.81    Ca    9.5      14 Apr 2021 07:01  Phos  2.9     04-14  Mg     2.5     04-14    TPro  6.4  /  Alb  2.6<L>  /  TBili  0.7  /  DBili  x   /  AST  11<L>  /  ALT  22  /  AlkPhos  53  04-14    CAPILLARY BLOOD GLUCOSE          I&O's Summary    13 Apr 2021 07:01  -  14 Apr 2021 07:00  --------------------------------------------------------  IN: 350 mL / OUT: 360 mL / NET: -10 mL        ASSESMENT/PLAN  89 y.o. female with h/o A-fib not on anticoagulants (falls), CHF EF 35-40%, HLD, moderate to severe mitral regurgitation, s/p R hip replacement, who not does ambulate independently at baseline, who presented to the ED sent by her family due to progressive aphasia for the past 4 days.  Patient was admitted 2 weeks ago for rapid Afib and CHF and was discharged on Lasix and water restriction; On Admission,  Cr was found to be elevated    # AMS due to metabolic  Encephalopathy, delirium. Likely underlying cognitive deficits   - UTI treated w/o improvement, no acute CVA with chronic mirovascular ischemic changes and volume loss   - hypercapnea improved with BIPAP without change in MS  -supportive treatment. May use seroquel low dose for agitation. Trazodone qhs to promote better sleep    #acute hypoxic and hypercapnic Respiratory failure/acute on chronic CHF with EF 35%, mod-sever MR  #Paroxysmal a.fib.  Not on a/c because of falls  -repeat CXR shows worsening pleural effusions. will give a dose of IV lasix now. This will likely worsen Na and Cr.   Cardio on board.   -c/w asa, statin, metoprolol   -Cannot use Bipap if patient is getting NGT feeds as per family wishes, as this carries a high risk of   aspiration from stomach insufflation from Bipap    # Rt Frontal Meningioma   - Incidental seen last scan in 2019 slightly larger  - no immediate intervention warranted     # ARF with hypovolemic hypernatremia - likely secondary to intravascular depletion  -NGT replaced. Will start low rate feeds because of family wishes    #Hyperkalemia  -should improve with lasix.     # Severe protein-calorie malnutrition - due to lethargy   - corpak at family request to improve nutritional status with TF - explained risk and benefits   - on NGT    DVT Px  Heparin    Discussed GOC: patient's daughter is very anxious around her mother not doing well and potentially dying during   this hospital. She is having a difficult time making a decision regarding DNR/DNI. I explained to her that any CPR or intubation  in this situation will not only be futile, but will add unnecessary suffering at the end of life. She is waiting to speak to her brother  to help her make this decision. Support provided.   Prognosis is very poor. Seen by critical care this morning.

## 2021-04-14 NOTE — CONSULT NOTE ADULT - CONSULT REASON
CVA/ Aphasia
sob
Acute hypoxic & hypercapnic respiratory failure, feeding tube
b/l effusions
CVA/TIA
meningioma

## 2021-04-14 NOTE — PROGRESS NOTE ADULT - ASSESSMENT
PROBLEMS;    AC hypoxaemiac & hypercapnic respiratory failure  Acute on chronic systolic CHF   B/L Pleural effusion  ARF   AMS with Encephalopathy this could be secondary to UTI /  we were not able to rule out CVA  Hx of atrial fibrillation not on AC   Rt Frontal Meningioma   Acute UTI- proteus species in the urine    PLAN:    pulmonary better-d/w staff-agrees with hospitalist note pat condition not significant change after corrected all abnormalities & RX infection with no evidence of infection  corpeg feeding-po liquid as tolerated  bipap qhs & PRN  Keep neg balance  symptomatic rx  Neuro/cardic eval  supportive care  d/w staff  dvt prophylasix PROBLEMS;    AC hypoxaemiac & hypercapnic respiratory failure  Acute on chronic systolic CHF   B/L Pleural effusion  ARF   AMS with Encephalopathy this could be secondary to UTI /  we were not able to rule out CVA  Hx of atrial fibrillation not on AC   Rt Frontal Meningioma   Acute UTI- proteus species in the urine    PLAN:    d/w staff-icu note read  Hospitalist note pat condition not significant change after corrected all abnormalities & RX infection with no evidence of infection  corpeg feeding as tolerated  bipap qhs & PRN  Keep neg balance  symptomatic rx  Neuro/cardic eval  supportive care  d/w staff  dvt prophylasix

## 2021-04-14 NOTE — CONSULT NOTE ADULT - ASSESSMENT
89 y/o with CHF exacerbation with elevated creatine levels now trending down. On lasix PRN with b/l pleural effusions    PLAN  CXR in am  Will ultrasound Right side in am  cont mgmt as per primary team   DW Dr Cha 
89 yo WF h/o A-fib not on anticoagulants (falls), CHF EF 35-40%, HLD, moderate to severe mitral regurgitation, s/p R hip replacement, who not does ambulate independently at baseline, who presented to the ED sent by her family due to progressive aphasia for the past 4 days.      # AMS with Encephalopathy   -Vascular event can not be ruled out with h/o CH AF and not on AC  -Agree with MRI if Pt cooperative.  -If not repeat CT head  -Cardiology Dr. ponce to follow up.  -Continue Aspirin/ statin  -Speech / swallow evaluation  -Correct underling metabolic  causes  -EEG    # Rt Frontal Meningioma   -Incidental seen last scan in 2019 slightly large .  -NS if family wants  -No intervention from my point of view    # Pyuria and Leukocytosis:  r/o Uti  -Antibiotics  -Per medicine    .#AFib:   -not on anticoag due to falls, however patient is not ambulatory; this will need to be re evaluated  -bblockers   -cardio  Follow up      # CHF, EF 35%  -stable, will be cautious with hydration   -hold Lasix  -Per cardiology and Medicine  Will follow up.
89 F with AF not on AC, systolic heart failure presents with altered mental status with aphasia- now resolved      AF- hx of AF not rate controlled as she is not taking PO- recommend Cardizem drip at  this time- at lowest dose needed - AC has been discontinued previously due to risk of falls- will rediscuss confirm with daughter      CHF- normally on lasix as home- recommend holding now due to increased  creatine-- consider gentle hydration in the setting of PACO and close monitoring of renal function-- no current clinical evidence for fluid overload       CVA vs TIA- vs AMS due to infection- recommend MRI if tolerates, no acute stroke or bleed on CT-  continue abx for infection     will follow
PROBLEMS;    Acute on chronic systolic CHF   B/L Pleural effusion  ARF   AMS with Encephalopathy this could be secondary to UTI /  we were not able to rule out CVA  Hx of atrial fibrillation not on AC   Rt Frontal Meningioma   Acute UTI- proteus species in the urine    PLAN:    Keep neg balance  s/p iv rhocephin  If persiitant pleural effusion may consider thorocentesis  Neuro/cardic eval  supportive care  dvt prophylasix      
89 yo WF h/o A-fib not on anticoagulants (falls), CHF EF 35-40%, HLD, moderate to severe mitral regurgitation, s/p R hip replacement, who not does ambulate independently at baseline, who presented to the ED sent by her family due to progressive aphasia for the past 4 days.  Pt was found to have pyuria/ UTI, and encephalopathy   Neurosurgery was called to consult for known meningioma. MRI was done showing right frontal meningioma.   Pt is a poor surgical candidate and there is no indication for neurosurgical intervention   Pt being followed by neurology who agree with no intervention   All above d/w Dr. rodriguez who agrees with the plan 
Assessment:  89 y.o. female with h/o A-fib not on anticoagulants (falls), CHF EF 35-40%, HLD, moderate to severe mitral regurgitation, s/p R hip replacement, who not does ambulate independently at baseline, who presented to the ED sent by her family due to progressive aphasia for the past 4 days.  Patient was admitted 2 weeks ago for rapid Afib and CHF and was discharged on Lasix and water restriction; On Admission,  Cr was found to be elevated    Problem List:  1. Acute hypoxic respiratory failure 2/2 acute on chronic CHF  2. acute hypercarbic respiratory failure   3. AMS 2/2 metabolic encephalopathy   4. hyperkalemia     Plan:  - At this time patient does not require ICU level of care given hemodynamics and oxygenating is a sufficient   - I replaced the  Jessi tube so patient can be fed. According to family, the most important thing to them is that she is fed and given water.   - Family in in between of deciding of making patient DNR/DNI. I do know that they do not want her intubated. I explained that then she should also be DNR because if her heart stops and CPR is done, she will likely need to be intubated.   - Daughter is waiting for brother to come, please discuss GOC w/them   - They just want her comfortable and fed   - Consider lasix is acute SOB, x-ray shows worsening pulmonary congestion   - BiPAP will help decrease preload, so maybe give it to patient now rather than at night     Please re consult if patients status changes.

## 2021-04-14 NOTE — PROGRESS NOTE ADULT - NSICDXPILOT_GEN_ALL_CORE
[place]

## 2021-04-14 NOTE — PROVIDER CONTACT NOTE (OTHER) - RECOMMENDATIONS
MD Quinn to speak w/ patients daughter/family
b/l wrist restraints
MD assessment, Metoprolol PO to IVP

## 2021-04-14 NOTE — PROGRESS NOTE ADULT - NUTRITIONAL ASSESSMENT
This patient has been assessed with a concern for Malnutrition and has been determined to have a diagnosis/diagnoses of Severe protein-calorie malnutrition.    This patient is being managed with:   Diet NPO with Tube Feed-  Tube Feeding Modality: Nasoduodenal  Glucerna 1.5 Aamir (GLUCERNA1.5)  Total Volume for 24 Hours (mL): 1200  Continuous  Starting Tube Feed Rate {mL per Hour}: 10  Increase Tube Feed Rate by (mL): 10     Every 12 hours  Until Goal Tube Feed Rate (mL per Hour): 50  Tube Feed Duration (in Hours): 24  Tube Feed Start Time: 17:00  Pump   Rate (mL per Hour): 25   Frequency: Every Hour    Duration (Hours): 20    Start Time: 17:00  Entered: Apr 10 2021 12:38PM    
This patient has been assessed with a concern for Malnutrition and has been determined to have a diagnosis/diagnoses of Severe protein-calorie malnutrition.    This patient is being managed with:   Diet Dysphagia 2 Mechanical Soft-Nectar Consistency Fluid-  Prosource Gelatein Plus     Qty per Day:  2  Supplement Feeding Modality:  Oral  Ensure Enlive Servings Per Day:  1       Frequency:  Three Times a day  Entered: Apr 2 2021  2:57PM    The following pending diet order is being considered for treatment of Severe protein-calorie malnutrition:  Diet NPO-  Entered: Apr 1 2021 10:33AM  
This patient has been assessed with a concern for Malnutrition and has been determined to have a diagnosis/diagnoses of Severe protein-calorie malnutrition.    This patient is being managed with:   Diet Pureed-  Entered: Mar 30 2021  2:45PM    The following pending diet order is being considered for treatment of Severe protein-calorie malnutrition:  Diet NPO-  Entered: Apr 1 2021 10:33AM  
This patient has been assessed with a concern for Malnutrition and has been determined to have a diagnosis/diagnoses of Severe protein-calorie malnutrition.    This patient is being managed with:   Diet Clear Liquid-  Entered: Apr 10 2021 12:35PM    The following pending diet order is being considered for treatment of Severe protein-calorie malnutrition:  Diet NPO-  Entered: Apr 1 2021 10:33AM  
This patient has been assessed with a concern for Malnutrition and has been determined to have a diagnosis/diagnoses of Severe protein-calorie malnutrition.    This patient is being managed with:   Diet Dysphagia 2 Mechanical Soft-Nectar Consistency Fluid-  Prosource Gelatein Plus     Qty per Day:  2  Supplement Feeding Modality:  Oral  Ensure Enlive Servings Per Day:  1       Frequency:  Three Times a day  Entered: Apr 2 2021  2:57PM    The following pending diet order is being considered for treatment of Severe protein-calorie malnutrition:  Diet NPO-  Entered: Apr 1 2021 10:33AM  
This patient has been assessed with a concern for Malnutrition and has been determined to have a diagnosis/diagnoses of Severe protein-calorie malnutrition.    This patient is being managed with:   Diet NPO with Tube Feed-  Tube Feeding Modality: Nasoduodenal  Glucerna 1.5 Aamir (GLUCERNA1.5)  Total Volume for 24 Hours (mL): 1200  Continuous  Starting Tube Feed Rate {mL per Hour}: 10  Increase Tube Feed Rate by (mL): 10     Every 12 hours  Until Goal Tube Feed Rate (mL per Hour): 50  Tube Feed Duration (in Hours): 24  Tube Feed Start Time: 17:00  Pump   Rate (mL per Hour): 25   Frequency: Every Hour    Duration (Hours): 20    Start Time: 17:00  Entered: Apr 10 2021 12:38PM    
This patient has been assessed with a concern for Malnutrition and has been determined to have a diagnosis/diagnoses of Severe protein-calorie malnutrition.    This patient is being managed with:   Diet NPO with Tube Feed-  Tube Feeding Modality: Nasoduodenal  Glucerna 1.5 Aamir (GLUCERNA1.5)  Total Volume for 24 Hours (mL): 1200  Continuous  Starting Tube Feed Rate {mL per Hour}: 10  Increase Tube Feed Rate by (mL): 10     Every 12 hours  Until Goal Tube Feed Rate (mL per Hour): 50  Tube Feed Duration (in Hours): 24  Tube Feed Start Time: 17:00  Pump   Rate (mL per Hour): 25   Frequency: Every Hour    Duration (Hours): 20    Start Time: 17:00  Entered: Apr 8 2021  4:28PM    The following pending diet order is being considered for treatment of Severe protein-calorie malnutrition:  Diet NPO-  Entered: Apr 1 2021 10:33AM  
This patient has been assessed with a concern for Malnutrition and has been determined to have a diagnosis/diagnoses of Severe protein-calorie malnutrition.      The following pending diet order is being considered for treatment of Severe protein-calorie malnutrition:  Diet Dysphagia 2 Mechanical Soft-Nectar Consistency Fluid-  Prosource Gelatein Plus     Qty per Day:  2  Supplement Feeding Modality:  Oral  Ensure Enlive Servings Per Day:  1       Frequency:  Three Times a day  Entered: Apr 2 2021  2:57PM    Diet NPO-  Entered: Apr 1 2021 10:33AM  
This patient has been assessed with a concern for Malnutrition and has been determined to have a diagnosis/diagnoses of Severe protein-calorie malnutrition.    This patient is being managed with:   Diet NPO with Tube Feed-  Tube Feeding Modality: Nasoduodenal  Glucerna 1.5 Aamir (GLUCERNA1.5)  Total Volume for 24 Hours (mL): 1200  Continuous  Starting Tube Feed Rate {mL per Hour}: 10  Increase Tube Feed Rate by (mL): 10     Every 12 hours  Until Goal Tube Feed Rate (mL per Hour): 50  Tube Feed Duration (in Hours): 24  Tube Feed Start Time: 17:00  Pump   Rate (mL per Hour): 25   Frequency: Every Hour    Duration (Hours): 20    Start Time: 17:00  Entered: Apr 10 2021 12:38PM    
This patient has been assessed with a concern for Malnutrition and has been determined to have a diagnosis/diagnoses of Severe protein-calorie malnutrition.    This patient is being managed with:   Diet Pureed-  Entered: Mar 30 2021  2:45PM    The following pending diet order is being considered for treatment of Severe protein-calorie malnutrition:  Diet NPO-  Entered: Apr 1 2021 10:33AM  
This patient has been assessed with a concern for Malnutrition and has been determined to have a diagnosis/diagnoses of Severe protein-calorie malnutrition.    This patient is being managed with:   Diet Dysphagia 2 Mechanical Soft-Nectar Consistency Fluid-  Prosource Gelatein Plus     Qty per Day:  2  Supplement Feeding Modality:  Oral  Ensure Enlive Servings Per Day:  1       Frequency:  Three Times a day  Entered: Apr 2 2021  2:57PM    The following pending diet order is being considered for treatment of Severe protein-calorie malnutrition:  Diet NPO-  Entered: Apr 1 2021 10:33AM  
This patient has been assessed with a concern for Malnutrition and has been determined to have a diagnosis/diagnoses of Severe protein-calorie malnutrition.    This patient is being managed with:   Diet Dysphagia 2 Mechanical Soft-Nectar Consistency Fluid-  Prosource Gelatein Plus     Qty per Day:  2  Supplement Feeding Modality:  Oral  Ensure Enlive Servings Per Day:  1       Frequency:  Three Times a day  Entered: Apr 2 2021  2:57PM    The following pending diet order is being considered for treatment of Severe protein-calorie malnutrition:  Diet NPO-  Entered: Apr 1 2021 10:33AM    This patient has been assessed with a concern for Malnutrition and has been determined to have a diagnosis/diagnoses of Severe protein-calorie malnutrition.    This patient is being managed with:   Diet Dysphagia 2 Mechanical Soft-Nectar Consistency Fluid-  Prosource Gelatein Plus     Qty per Day:  2  Supplement Feeding Modality:  Oral  Ensure Enlive Servings Per Day:  1       Frequency:  Three Times a day  Entered: Apr 2 2021  2:57PM    The following pending diet order is being considered for treatment of Severe protein-calorie malnutrition:  Diet NPO-  Entered: Apr 1 2021 10:33AM  
This patient has been assessed with a concern for Malnutrition and has been determined to have a diagnosis/diagnoses of Severe protein-calorie malnutrition.    This patient is being managed with:   Diet NPO with Tube Feed-  Tube Feeding Modality: Nasoduodenal  Glucerna 1.5 Aamir (GLUCERNA1.5)  Total Volume for 24 Hours (mL): 1200  Continuous  Starting Tube Feed Rate {mL per Hour}: 10  Increase Tube Feed Rate by (mL): 10     Every 12 hours  Until Goal Tube Feed Rate (mL per Hour): 50  Tube Feed Duration (in Hours): 24  Tube Feed Start Time: 17:00  Pump   Rate (mL per Hour): 25   Frequency: Every Hour    Duration (Hours): 20    Start Time: 17:00  Entered: Apr 10 2021 12:38PM    
This patient has been assessed with a concern for Malnutrition and has been determined to have a diagnosis/diagnoses of Severe protein-calorie malnutrition.    This patient is being managed with:   Diet Dysphagia 2 Mechanical Soft-Nectar Consistency Fluid-  Prosource Gelatein Plus     Qty per Day:  2  Supplement Feeding Modality:  Oral  Ensure Enlive Servings Per Day:  1       Frequency:  Three Times a day  Entered: Apr 2 2021  2:57PM    The following pending diet order is being considered for treatment of Severe protein-calorie malnutrition:  Diet NPO-  Entered: Apr 1 2021 10:33AM  
This patient has been assessed with a concern for Malnutrition and has been determined to have a diagnosis/diagnoses of Severe protein-calorie malnutrition.    This patient is being managed with:   Diet Dysphagia 2 Mechanical Soft-Nectar Consistency Fluid-  Prosource Gelatein Plus     Qty per Day:  2  Supplement Feeding Modality:  Oral  Ensure Enlive Servings Per Day:  1       Frequency:  Three Times a day  Entered: Apr 2 2021  2:57PM    The following pending diet order is being considered for treatment of Severe protein-calorie malnutrition:  Diet NPO-  Entered: Apr 1 2021 10:33AM

## 2021-04-15 NOTE — DISCHARGE NOTE FOR THE EXPIRED PATIENT - HOSPITAL COURSE
89 y.o. female with h/o A-fib not on anticoagulants (falls), CHF EF 35-40%, HLD, moderate to severe mitral regurgitation, s/p R hip replacement, who not does ambulate independently at baseline, who presented to the ED sent by her family due to progressive aphasia for the past 4 days.  Patient was admitted 2 weeks ago for rapid Afib and CHF and was discharged on Lasix and water restriction; On Admission,  Cr was found to be elevated. Patient more SOB of breath today, was lethargic. Unable to give Lasix because BP low and intravascularly depleted. Patient was made DNR/DNI this evening, Family at bedside when patients passed.

## 2021-04-28 DIAGNOSIS — R45.1 RESTLESSNESS AND AGITATION: ICD-10-CM

## 2021-04-28 DIAGNOSIS — B96.4 PROTEUS (MIRABILIS) (MORGANII) AS THE CAUSE OF DISEASES CLASSIFIED ELSEWHERE: ICD-10-CM

## 2021-04-28 DIAGNOSIS — A41.9 SEPSIS, UNSPECIFIED ORGANISM: ICD-10-CM

## 2021-04-28 DIAGNOSIS — Z78.1 PHYSICAL RESTRAINT STATUS: ICD-10-CM

## 2021-04-28 DIAGNOSIS — D32.0 BENIGN NEOPLASM OF CEREBRAL MENINGES: ICD-10-CM

## 2021-04-28 DIAGNOSIS — I48.0 PAROXYSMAL ATRIAL FIBRILLATION: ICD-10-CM

## 2021-04-28 DIAGNOSIS — Z74.01 BED CONFINEMENT STATUS: ICD-10-CM

## 2021-04-28 DIAGNOSIS — E87.0 HYPEROSMOLALITY AND HYPERNATREMIA: ICD-10-CM

## 2021-04-28 DIAGNOSIS — N17.9 ACUTE KIDNEY FAILURE, UNSPECIFIED: ICD-10-CM

## 2021-04-28 DIAGNOSIS — F03.90 UNSPECIFIED DEMENTIA WITHOUT BEHAVIORAL DISTURBANCE: ICD-10-CM

## 2021-04-28 DIAGNOSIS — I47.2 VENTRICULAR TACHYCARDIA: ICD-10-CM

## 2021-04-28 DIAGNOSIS — Z88.2 ALLERGY STATUS TO SULFONAMIDES: ICD-10-CM

## 2021-04-28 DIAGNOSIS — E87.5 HYPERKALEMIA: ICD-10-CM

## 2021-04-28 DIAGNOSIS — I34.0 NONRHEUMATIC MITRAL (VALVE) INSUFFICIENCY: ICD-10-CM

## 2021-04-28 DIAGNOSIS — E43 UNSPECIFIED SEVERE PROTEIN-CALORIE MALNUTRITION: ICD-10-CM

## 2021-04-28 DIAGNOSIS — J96.01 ACUTE RESPIRATORY FAILURE WITH HYPOXIA: ICD-10-CM

## 2021-04-28 DIAGNOSIS — Z88.8 ALLERGY STATUS TO OTHER DRUGS, MEDICAMENTS AND BIOLOGICAL SUBSTANCES STATUS: ICD-10-CM

## 2021-04-28 DIAGNOSIS — Z91.81 HISTORY OF FALLING: ICD-10-CM

## 2021-04-28 DIAGNOSIS — I95.9 HYPOTENSION, UNSPECIFIED: ICD-10-CM

## 2021-04-28 DIAGNOSIS — J91.8 PLEURAL EFFUSION IN OTHER CONDITIONS CLASSIFIED ELSEWHERE: ICD-10-CM

## 2021-04-28 DIAGNOSIS — H40.9 UNSPECIFIED GLAUCOMA: ICD-10-CM

## 2021-04-28 DIAGNOSIS — N39.0 URINARY TRACT INFECTION, SITE NOT SPECIFIED: ICD-10-CM

## 2021-04-28 DIAGNOSIS — E78.5 HYPERLIPIDEMIA, UNSPECIFIED: ICD-10-CM

## 2021-04-28 DIAGNOSIS — Z96.641 PRESENCE OF RIGHT ARTIFICIAL HIP JOINT: ICD-10-CM

## 2021-04-28 DIAGNOSIS — Z79.82 LONG TERM (CURRENT) USE OF ASPIRIN: ICD-10-CM

## 2021-04-28 DIAGNOSIS — I50.23 ACUTE ON CHRONIC SYSTOLIC (CONGESTIVE) HEART FAILURE: ICD-10-CM

## 2021-04-28 DIAGNOSIS — G93.41 METABOLIC ENCEPHALOPATHY: ICD-10-CM

## 2021-04-28 DIAGNOSIS — J96.02 ACUTE RESPIRATORY FAILURE WITH HYPERCAPNIA: ICD-10-CM

## 2021-04-28 DIAGNOSIS — Z66 DO NOT RESUSCITATE: ICD-10-CM

## 2021-04-28 DIAGNOSIS — R47.01 APHASIA: ICD-10-CM

## 2021-05-09 NOTE — PLAN
Hospitalist [FreeTextEntry1] : CHF\par DIscussed importance of weighing patient daily if possible with assistance. Advise to call for weight gain greater than 2 lbs in a day or 5 lbs in a week. HHA will d/w daughter as she does not know where scale is in the house.\par Adhere to low salt diet and educated patient on foods that should be  avoided such as processed or fast food.\par Limit fluids to 1 liter a day which is 4-5 glasses.\par Continue medications  as ordered. Discussed importance and Lasix.\par Follow up with Cardiologist with appt and BW results.\par \par Afib\par Rate well controlled today, c/w current medications including diltiazem and aspirin\par .\par TCM program reinforced and 24/7 contact number of CN provided. Pt advised to call for any worsening symptoms, questions or concerns. Pt verbalized understanding.\par

## 2021-08-12 NOTE — CONSULT NOTE ADULT - SUBJECTIVE AND OBJECTIVE BOX
REASON FOR CONSULT: Acute hypoxic & hypercapnic respiratory failure, feeding tube    CONSULT REQUESTED BY: ***    Patient is a 89y old  Female who presents with a chief complaint of sob (14 Apr 2021 07:49)      BRIEF HOSPITAL COURSE:   89 y.o. female with h/o A-fib not on anticoagulants (falls), CHF EF 35-40%, HLD, moderate to severe mitral regurgitation, s/p R hip replacement, who not does ambulate independently at baseline, who presented to the ED sent by her family due to progressive aphasia for the past 4 days.  Patient was admitted 2 weeks ago for rapid Afib and CHF and was discharged on Lasix and water restriction; On Admission,  Cr was found to be elevated    Events last 24 hours: ***    PAST MEDICAL & SURGICAL HISTORY:  Glaucoma    HLD (hyperlipidemia)    A-fib    CHF (congestive heart failure)    S/P hip replacement, right      Allergies    atenolol (Other)  sulfa drugs (Other)    Intolerances      FAMILY HISTORY:  Known health problems: none        Review of Systems:  CONSTITUTIONAL: No fever, chills, or fatigue  EYES: No eye pain, visual disturbances, or discharge  ENMT:  No difficulty hearing, tinnitus, vertigo; No sinus or throat pain  NECK: No pain or stiffness  RESPIRATORY: No cough, wheezing, chills or hemoptysis; No shortness of breath  CARDIOVASCULAR: No chest pain, palpitations, dizziness, or leg swelling  GASTROINTESTINAL: No abdominal or epigastric pain. No nausea, vomiting, or hematemesis; No diarrhea or constipation. No melena or hematochezia.  GENITOURINARY: No dysuria, frequency, hematuria, or incontinence  NEUROLOGICAL: No headaches, memory loss, loss of strength, numbness, or tremors  SKIN: No itching, burning, rashes, or lesions   MUSCULOSKELETAL: No joint pain or swelling; No muscle, back, or extremity pain  PSYCHIATRIC: No depression, anxiety, mood swings, or difficulty sleeping      Medications:    metoprolol tartrate 50 milliGRAM(s) Oral every 8 hours  metoprolol tartrate Injectable 2.5 milliGRAM(s) IV Push every 6 hours PRN      acetaminophen   Tablet .. 650 milliGRAM(s) Oral every 6 hours PRN  melatonin 5 milliGRAM(s) Oral at bedtime      aspirin  chewable 81 milliGRAM(s) Oral daily  heparin   Injectable 5000 Unit(s) SubCutaneous every 12 hours                        ICU Vital Signs Last 24 Hrs  T(C): 36.6 (13 Apr 2021 21:45), Max: 36.6 (13 Apr 2021 15:18)  T(F): 97.8 (13 Apr 2021 21:45), Max: 97.8 (13 Apr 2021 15:18)  HR: 100 (13 Apr 2021 15:18) (85 - 100)  BP: 91/57 (13 Apr 2021 21:45) (91/57 - 106/73)  BP(mean): --  ABP: --  ABP(mean): --  RR: 18 (13 Apr 2021 21:45) (18 - 18)  SpO2: 97% (13 Apr 2021 21:45) (97% - 99%)    Vital Signs Last 24 Hrs  T(C): 36.6 (13 Apr 2021 21:45), Max: 36.6 (13 Apr 2021 15:18)  T(F): 97.8 (13 Apr 2021 21:45), Max: 97.8 (13 Apr 2021 15:18)  HR: 100 (13 Apr 2021 15:18) (85 - 100)  BP: 91/57 (13 Apr 2021 21:45) (91/57 - 106/73)  BP(mean): --  RR: 18 (13 Apr 2021 21:45) (18 - 18)  SpO2: 97% (13 Apr 2021 21:45) (97% - 99%)        I&O's Detail    13 Apr 2021 07:01  -  14 Apr 2021 07:00  --------------------------------------------------------  IN:    Enteral Tube Flush: 50 mL    Glucerna 1.5: 300 mL  Total IN: 350 mL    OUT:    Incontinent per Collection Bag (mL): 300 mL    Voided (mL): 60 mL  Total OUT: 360 mL    Total NET: -10 mL            LABS:                        11.1   8.01  )-----------( 150      ( 14 Apr 2021 07:01 )             35.6     04-14    140  |  107  |  45<H>  ----------------------------<  123<H>  5.7<H>   |  31  |  0.81    Ca    9.5      14 Apr 2021 07:01  Phos  2.9     04-14  Mg     2.5     04-14    TPro  6.4  /  Alb  2.6<L>  /  TBili  0.7  /  DBili  x   /  AST  11<L>  /  ALT  22  /  AlkPhos  53  04-14          CAPILLARY BLOOD GLUCOSE            CULTURES:      Physical Examination:    General: No acute distress.  Alert, oriented, interactive, nonfocal    HEENT: Pupils equal, reactive to light.  Symmetric.    PULM: Clear to auscultation bilaterally, no significant sputum production    CVS: Regular rate and rhythm, no murmurs, rubs, or gallops    ABD: Soft, nondistended, nontender, normoactive bowel sounds, no masses    EXT: No edema, nontender    SKIN: Warm and well perfused, no rashes noted.    RADIOLOGY: ***    CRITICAL CARE TIME SPENT: ***   REASON FOR CONSULT: Acute hypoxic & hypercapnic respiratory failure, feeding tube    CONSULT REQUESTED BY: Dr. Trammell     Patient is a 89y old  Female who presents with a chief complaint of sob (14 Apr 2021 07:49)    BRIEF HOSPITAL COURSE:   89 y.o. female with h/o A-fib not on anticoagulants (falls), CHF EF 35-40%, HLD, moderate to severe mitral regurgitation, s/p R hip replacement, who not does ambulate independently at baseline, who presented to the ED sent by her family due to progressive aphasia for the past 4 days.  Patient was admitted 2 weeks ago for rapid Afib and CHF and was discharged on Lasix and water restriction; On Admission,  Cr was found to be elevated    Events last 24 hours:   - Patient more SOB of breath today, was consulted to come see patient   - Unable to give Lasix because BP low and intravascularly depleted   - Patient is lethargic at bedside but hemodynamically stable     PAST MEDICAL & SURGICAL HISTORY:  Glaucoma  HLD (hyperlipidemia)  A-fib  CHF (congestive heart failure)  S/P hip replacement, right    Allergies  atenolol (Other)  sulfa drugs (Other)  Intolerances    FAMILY HISTORY:  Known health problems: none    Review of Systems:  Unable to assess given mental status     Medications:  metoprolol tartrate 50 milliGRAM(s) Oral every 8 hours  metoprolol tartrate Injectable 2.5 milliGRAM(s) IV Push every 6 hours PRN  acetaminophen   Tablet .. 650 milliGRAM(s) Oral every 6 hours PRN  melatonin 5 milliGRAM(s) Oral at bedtime  aspirin  chewable 81 milliGRAM(s) Oral daily  heparin   Injectable 5000 Unit(s) SubCutaneous every 12 hours    ICU Vital Signs Last 24 Hrs  T(C): 36.6 (13 Apr 2021 21:45), Max: 36.6 (13 Apr 2021 15:18)  T(F): 97.8 (13 Apr 2021 21:45), Max: 97.8 (13 Apr 2021 15:18)  HR: 100 (13 Apr 2021 15:18) (85 - 100)  BP: 91/57 (13 Apr 2021 21:45) (91/57 - 106/73)  RR: 18 (13 Apr 2021 21:45) (18 - 18)  SpO2: 97% (13 Apr 2021 21:45) (97% - 99%)    I&O's Detail    13 Apr 2021 07:01  -  14 Apr 2021 07:00  --------------------------------------------------------  IN:    Enteral Tube Flush: 50 mL    Glucerna 1.5: 300 mL  Total IN: 350 mL    OUT:    Incontinent per Collection Bag (mL): 300 mL    Voided (mL): 60 mL  Total OUT: 360 mL    Total NET: -10 mL    LABS:                        11.1   8.01  )-----------( 150      ( 14 Apr 2021 07:01 )             35.6     04-14    140  |  107  |  45<H>  ----------------------------<  123<H>  5.7<H>   |  31  |  0.81    Ca    9.5      14 Apr 2021 07:01  Phos  2.9     04-14  Mg     2.5     04-14    TPro  6.4  /  Alb  2.6<L>  /  TBili  0.7  /  DBili  x   /  AST  11<L>  /  ALT  22  /  AlkPhos  53  04-14      Physical Examination:    General: No acute distress.  Lethargic    HEENT: Pupils equal, reactive to light.  Symmetric.    PULM: Decreased bs b/l    CVS: Regular rate and rhythm, no murmurs, rubs, or gallops    ABD: Soft, nondistended, nontender, normoactive bowel sounds, no masses    EXT: No edema, nontender    SKIN: Warm and well perfused, no rashes noted.    RADIOLOGY:   < from: Xray Chest 1 View-PORTABLE IMMEDIATE (Xray Chest 1 View-PORTABLE IMMEDIATE .) (04.14.21 @ 10:33) >    EXAM:  XR CHEST PORTABLE IMMED 1V                          PROCEDURE DATE:  04/14/2021      INTERPRETATION:  AP chest on April 14, 2021 at 9:22 AM. Patient has atrial fibrillation and worsening CHF.    Heart size cannot be assessed but was larger on April 8. Clips in the left axilla and posterior back deformity left upper humerus again seen.    There are now moderate to large bilateral effusions greater on the left and the effusions have increased from April 8. There is also increasing interstitial CHF.    IMPRESSION: Diffuse increasing congestive findings.    YUE SORTO MD; Attending Radiologist  This document has been electronically signed. Apr 14 2021 10:35AM    < end of copied text >       Metronidazole Pregnancy And Lactation Text: This medication is Pregnancy Category B and considered safe during pregnancy.  It is also excreted in breast milk.

## 2021-09-14 NOTE — PATIENT PROFILE ADULT - LEGAL HELP
Hydroxychloroquine Pregnancy And Lactation Text: This medication has been shown to cause fetal harm but it isn't assigned a Pregnancy Risk Category. There are small amounts excreted in breast milk. no

## 2022-03-28 NOTE — H&P ADULT - PROBLEM SELECTOR PROBLEM 3
Atrial fibrillation with RVR [No Acute Distress] : no acute distress [Normal Oropharynx] : normal oropharynx [Normal Appearance] : normal appearance [No Neck Mass] : no neck mass [Normal Rate/Rhythm] : normal rate/rhythm [Normal S1, S2] : normal s1, s2 [No Murmurs] : no murmurs [No Resp Distress] : no resp distress [Clear to Auscultation Bilaterally] : clear to auscultation bilaterally [No Abnormalities] : no abnormalities [Benign] : benign [Normal Gait] : normal gait [No Clubbing] : no clubbing [No Cyanosis] : no cyanosis [No Edema] : no edema [FROM] : FROM [Normal Color/ Pigmentation] : normal color/ pigmentation [No Focal Deficits] : no focal deficits [Oriented x3] : oriented x3 [Normal Affect] : normal affect

## 2022-06-01 NOTE — PATIENT PROFILE ADULT - NSASFALLNEEDSASSIST_GEN_A_NUR
[facility] is requesting a new prescription on behalf of the pt via fax. Previous prescription was sent to [facility]. Last visit 04/25/2022 MD Felicity Mcburney   Next appointment 08/22/2022 MD Felicity Mcburney   Previous refill encounter(s)   03/14/2022 Norvasc #90 with 1 refill. For Pharmacy Admin Tracking Only     CPA in place:    Recommendation Provided To:    Intervention Detail: New Rx: 1, reason: Patient Preference   Gap Closed?:   Intervention Accepted By:   Susan Smith Time Spent (min): 5          Requested Prescriptions     Pending Prescriptions Disp Refills    amLODIPine (NORVASC) 10 mg tablet 90 Tablet 0     Sig: Take 1 Tablet by mouth daily.
yes

## 2022-06-10 NOTE — DIETITIAN INITIAL EVALUATION ADULT. - ETIOLOGY
difficulty consuming adequate calories/protein to regain wt r/t  w/ advanced dementia requiring increased care. unable to assess

## 2022-07-29 NOTE — PROVIDER CONTACT NOTE (OTHER) - SITUATION
Bedside dysphagia screening performed at Dr Garcia's request. Patient failed, unable to tolerate 5ml of water.  Dribbled, cough and wet sounding voice noted. No

## 2022-09-06 NOTE — PATIENT PROFILE ADULT - CHOOSE INDICATION TO IMMUNIZE (AN ORDER WILL BE GENERATED WHEN THIS NOTE IS SAVED):
Received critical lab result    Hgb 7.9     This been the patient's hgb level for 3 weeks now. Patient is also scheduled for bone marrow biopsy at [facility] on 9/15    Dr. Velásquez please advise if needed    Thanks  Hortencia Manzo RN   Patient is 18 years or older (and not pregnant)

## 2022-11-11 NOTE — ED PROVIDER NOTE - TOBACCO USE
Universal Instructions Sheet    Valerie,     Your procedure/Surgery is scheduled at [facility]:  [address]   on _11/14/22_ at _1:00 pm_, please plan to arrive at _11:30 AM_.    Please do NOT follow the arrival time on your live well nafisa, it is incorrect and doing so may cause your procedure to be cancelled (we are working on the issue).    Free  service is available Monday through Friday starting at 8am until 3:30pm     If you have received the COVID vaccine, bring your vaccination card with you the day of your procedure.    Please bring your insurance card, 's license and a list of your medications, vitamins and supplements to the hospital, including the last dosage and time taken.    ON DAY OF PROCEDURE, CHECK IN AT:  Outpatient Care- located on the 1st floor. ([phone])    COVID Testing:    Your covid test is scheduled on: _11/12/22_ at _7:20 AM_    [facility]:  [address]   [phone]    Diet:    DO NOT EAT OR DRINK AFTER MIDNIGHT PRIOR TO YOUR PROCEDURE/SURGERY.    Medication Instructions:    Stop ALL vitamins and supplements 7 days prior to procedure, including Fish Oil, Vitamins A,B,C,D,E, Preservision, and PrenatalVitamins.    STOP taking non-steroidal, anti-inflammatory drugs, otherwise known as NSAIDS per your surgeon's instructions.  Examples of NSAIDS are Aleve, ibuprofen, Motrin, Advil and Naproxen.      You may also take the following medications if needed: Tessalon Perles, Guaifenesin      If there are any changes in your physical condition, such as cold, fever or infection, or you have specific questions regarding your procedure/surgery, please contact your surgeon directly.    For questions regarding these instructions, contact Pre-Admission Testing at [phone], Monday through Friday, 8 am - 4 pm.    On the day of your procedure, please bring in a copy of your complete up-to-date medication list, I.D.,  insurance card and COVID vaccine card (if you have been vaccinated).      Important Information:    Hibiclens Highly Recommended    Please bathe or shower the evening before and morning of your procedure/surgery.  Avoid using lotions, creams, powders, make-up and deodorant on your clean skin.    Remove all jewelry, earrings, body piercings and contact lenses before coming to the hospital.    You may wear you glasses, hearing aids and/or dentures to the hospital. Please bring a case as they will need to be removed prior to the procedure/surgery.    If you are a smoker, please DO NOT SMOKE FOR 12 HOURS PRIOR  to your procedure/surgery.  This includes cigarettes, marijuana and vaping.    If your doctor mentioned that you may might spend the night, please bring a small overnight bag with toiletries and any personal items you may need.  If you use a CPAP machine and will be spending the night, bring your machine, tubing and mask.    Due to anesthesia, you will not be able to operate power tools, drink alcohol, or drive a vehicle for 24 hours after surgery.  You will not be able to walk home, use public transportation or take a cab, Uber or Lyft home.      *If you are not being admitted after your procedure, you must have a responsible adult (18 or older) to drive you home and it is recommended that you have a responsible adult with you overnight following the procedure/surgery.*    REQUIRED INSTRUCTIONS PRESURGERY SHOWER/BATH   DO NOT: shave any part of the surgical area for at least 2 days prior to surgery.   Tiny skin cuts and abrasions from shaving in the surgical area can increase risk of infection.   DO NOT use lotions on skin before surgery.   DO use a fresh clean towel after each shower.   DO dress in clean clothes after each of your showers.   DO shower or bathe your whole body, including hair, on the night before surgery.   DO shower again on the morning of surgery if time permits (do not wash hair during this  shower)     GENERAL REQUIRED SHOWER/BATH INSTRUCTIONS (SOAP AND WATER)   1. Use warm, but not hot, water for shower/bath       NOTE: Showers are recommended. Showers are more effective than a bath for pre-operative skin cleaning   2. Use regular soap to remove dirt and germs from your skin   3. Do not scrub so vigorously that skin is abraded or scratched   4. Use clean towels to gently dry your skin after each shower/bath          Instructions for Preoperative Skin Cleansing Before Planned Surgical Procedure at [facility]   For your safety and to help prevent infection, please follow this:    HIGHLY RECOMMENDED  additional antiseptic shower/bath.   ANTISEPTIC SHOWER/BATH PROCESS     e.g. HIBICLENS* or alternative chlorhexidine (CHG) containing skin antisepsis solutions   Chlorhexidine gluconate (2% - 4%) skin cleansing solutions are wash solutions used to kill germs on the skin. They are available over the counter (no prescription needed) at minimal cost at most pharmacies and drug stores.   *WHEN USING HIBICLENS* MAY CAUSE SHOWER AND BATHTUB SURFACES TO BECOME SLIPPERY*   Instructions for \"whole body\" shower or bath with chlorhexidine gluconate (CHG) antiseptic solution   1. After your required shower or bath, rinse thoroughly.   2. Step away from the stream of water, and thoroughly apply enough solution to cover skin below the neck (not head or face), within skin folds, and in hard to reach areas (e.g. the back of the knees, inside elbows, etc). Use your hands to apply and rub onto your skin without using a washcloth. Note: Solution will NOT suds up when applying   3. Rinse thoroughly.   4. Dry off with a clean towel.   5. Dress in clean clothes.   • DO NOT use on the head or face DO NOT use in the genital area   • DO NOT use on non-healing wounds DO NOT use in eyes, ears or mouth.   Making sure that your skin is clean and ready for surgery at home is very important.   You can reduce the risk of infection by  Never smoker following the required instructions above.     Email

## 2022-11-14 NOTE — PHYSICAL THERAPY INITIAL EVALUATION ADULT - RANGE OF MOTION EXAMINATION, REHAB EVAL
Right UE ROM was WFL (within functional limits)/Left LE ROM was WFL (within functional limits)/Right LE ROM was WFL (within functional limits) 10

## 2022-12-13 NOTE — ED ADULT NURSE REASSESSMENT NOTE - GENERAL PATIENT STATE
smiling/interactive/resting/sleeping/cooperative/comfortable appearance [Hyperlipidemia] : hyperlipidemia [Diet Modification] : diet modification [Hypertension] : hypertension [Stable] : stable [None] : There are no changes in medication management [Low Sodium Diet] : low sodium diet [NSAIDs Avoidance] : non-steroidal anti-inflammatory drugs avoidance [Patient] : the patient [FreeTextEntry1] : VHD (AI/MR) - Stable; no further intervention at this time (see echo). \par Hypothyroidism - Blood work drawn. Maintain a low caloric, low sodium, low cholesterol diet. Dietary counseling given, diet and exercise discussed in detail.

## 2023-02-17 NOTE — ED PROVIDER NOTE - NSTIMEPROVIDERCAREINITIATE_GEN_ER
[FreeTextEntry1] : This note was written by Jett Zurita on 02/17/2023 acting as scribe for Dr. Jose F Trotter M.D.\par \par I, Dr. Jose F Trotter, have read and attest that all the information, medical decision making and discharge instructions within are true and accurate.\par \par This note was written by ISHMAEL GOSS on 02/17/2023 acting as scribe for Dr. Jose F Trotter M.D.\par \par MUNA, Dr. Jose F Trotter, have read and attest that all the information, medical decision making and discharge instructions within are true and accurate. 
10-Nov-2018 04:21

## 2023-06-04 NOTE — PHARMACOTHERAPY INTERVENTION NOTE - COMMENTS
Completed medication history with patient's daughter and Dr First Sanz. Daughter reported that patient is not taking Coumadin per Dr. Villa's instruction (per daughter pt has been off Coumadin for at least one year) and is on aspirin 81mg daily instead. All medication-related questions were addressed.
show

## 2023-07-14 NOTE — CHART NOTE - NSCHARTNOTEFT_GEN_A_CORE
CT abdomen findings  1. Small to moderate bilateral pleural effusions. Moderate patchy bibasilar   airspace disease and/or atelectasis.   2. Diffuse wall thickening of the stomach suggestive of a nonspecific   gastritis, versus artifact related to underdistention. Correlate clinically.   3. Mild bilateral hydronephrosis. No ureteral stones.   4. Left inguinal hernia containing nonobstructed bowel. No evidence of   incarceration or obstruction.   5. Above average stool throughout the colon, with the rectosigmoid distended up   to 8.2 cm. Associated wall thickening and surrounding edematous change at the   rectosigmoid, and stercoral colitis is a consideration. Correlate clinically.    # Constipation   # Hydronephrosis likely due to severe constipation   # Pleural effusion   -Will give tap water enema now  -Pulmonary f/u in am for pleural effusion   -GI consult   -continue with bowel regimen CT abdomen findings  1. Small to moderate bilateral pleural effusions. Moderate patchy bibasilar   airspace disease and/or atelectasis.   2. Diffuse wall thickening of the stomach suggestive of a nonspecific   gastritis, versus artifact related to underdistention. Correlate clinically.   3. Mild bilateral hydronephrosis. No ureteral stones.   4. Left inguinal hernia containing nonobstructed bowel. No evidence of   incarceration or obstruction.   5. Above average stool throughout the colon, with the rectosigmoid distended up   to 8.2 cm. Associated wall thickening and surrounding edematous change at the   rectosigmoid, and stercoral colitis is a consideration. Correlate clinically.    VSS, afebrile     # Constipation   # Hydronephrosis likely due to severe constipation   # Pleural effusion   -Will give tap water enema now  -Pulmonary f/u in am for pleural effusion   -GI consult     Pt had a large BM after enema  Abdomen is now soft and nontender    Above case discussed with Dr. Mejias Detail Level: Detailed Depth Of Biopsy: dermis Was A Bandage Applied: Yes Size Of Lesion In Cm: 0 Biopsy Type: H and E Biopsy Method: Dermablade Anesthesia Type: 1% lidocaine with epinephrine Anesthesia Volume In Cc: 0.5 Hemostasis: Drysol Wound Care: Petrolatum Dressing: bandage Destruction After The Procedure: No Type Of Destruction Used: Curettage Curettage Text: The wound bed was treated with curettage after the biopsy was performed. Cryotherapy Text: The wound bed was treated with cryotherapy after the biopsy was performed. Electrodesiccation Text: The wound bed was treated with electrodesiccation after the biopsy was performed. Electrodesiccation And Curettage Text: The wound bed was treated with electrodesiccation and curettage after the biopsy was performed. Silver Nitrate Text: The wound bed was treated with silver nitrate after the biopsy was performed. Lab: [id number] Lab Facility: [id number] Consent: Written consent was obtained and risks were reviewed including but not limited to scarring, infection, bleeding, scabbing, incomplete removal, nerve damage and allergy to anesthesia. Post-Care Instructions: I reviewed with the patient in detail post-care instructions. Patient is to keep the biopsy site dry overnight, and then apply bacitracin twice daily until healed. Patient may apply hydrogen peroxide soaks to remove any crusting. Notification Instructions: Patient will be notified of biopsy results. However, patient instructed to call the office if not contacted within 2 weeks. Billing Type: Third-Party Bill Information: Selecting Yes will display possible errors in your note based on the variables you have selected. This validation is only offered as a suggestion for you. PLEASE NOTE THAT THE VALIDATION TEXT WILL BE REMOVED WHEN YOU FINALIZE YOUR NOTE. IF YOU WANT TO FAX A PRELIMINARY NOTE YOU WILL NEED TO TOGGLE THIS TO 'NO' IF YOU DO NOT WANT IT IN YOUR FAXED NOTE.

## 2023-07-21 NOTE — PHYSICAL THERAPY INITIAL EVALUATION ADULT - DISCHARGE DISPOSITION, PT EVAL
21-Jul-2023 Recommend continued skilled PT and [facility] when medically stable for discharge./rehabilitation facility

## 2023-07-24 NOTE — DISCHARGE NOTE PROVIDER - NSDCHHPTASSISTHOME_GEN_ALL_CORE
CT PAGED  
CT REPAGED  
Patient placed on telemetry, TTX [id number], confirmed by CTU.   
Report called to Hanna CARRION, all questions and concerns addressed at this time.  
XRR  
Patient Needs Assistance to Leave Residence...

## 2023-08-09 NOTE — ED ADULT NURSE NOTE - NS ED NURSE LEVEL OF CONSCIOUSNESS SPEECH
Speaking Coherently Simponi Counseling:  I discussed with the patient the risks of golimumab including but not limited to myelosuppression, immunosuppression, autoimmune hepatitis, demyelinating diseases, lymphoma, and serious infections.  The patient understands that monitoring is required including a PPD at baseline and must alert us or the primary physician if symptoms of infection or other concerning signs are noted.

## 2023-09-20 NOTE — PATIENT PROFILE ADULT - NSPROGENPREVTRANSF_GEN_A_NUR
no [Fever] : no fever [Chills] : no chills [Night Sweats] : no night sweats [Discharge] : no discharge [Vision Problems] : no vision problems [Itching] : no itching [Earache] : no earache [Nasal Discharge] : no nasal discharge [Sore Throat] : no sore throat [Chest Pain] : no chest pain [Palpitations] : no palpitations [Lower Ext Edema] : no lower extremity edema [Shortness Of Breath] : no shortness of breath [Wheezing] : no wheezing [Cough] : no cough [Dyspnea on Exertion] : no dyspnea on exertion [Abdominal Pain] : no abdominal pain [Nausea] : no nausea [Constipation] : no constipation [Diarrhea] : diarrhea [Vomiting] : no vomiting [Dysuria] : no dysuria [Muscle Weakness] : no muscle weakness [Muscle Pain] : no muscle pain [Itching] : no itching [Skin Rash] : no skin rash [Headache] : no headache [Dizziness] : no dizziness [Depression] : no depression [Easy Bleeding] : no easy bleeding [Easy Bruising] : no easy bruising [Swollen Glands] : no swollen glands Protopic Pregnancy And Lactation Text: This medication is Pregnancy Category C. It is unknown if this medication is excreted in breast milk when applied topically.

## 2023-11-01 NOTE — ED PROVIDER NOTE - OBJECTIVE STATEMENT
89 y/o female with PMHx of HLD, macular degeneration, hip surgery 8 years ago presents to the ED c/o R hip pain radiating to knee s/p fall. Pt was getting up off of toilet with help of commode rails when she fell forward, landing against shower door. Unable to ambulate afterwards. Denies numbness, tingling, CP, HA, abd pain, dizziness, back pain. Took 2 Tylenol comes her. Surgeon/Dr. Casimiro Hennessy. Pt sustained L shoulder fx recently. Lives at home with  with dementia. Has 2 home aids. No BT/AC. Dutasteride Pregnancy And Lactation Text: This medication is absolutely contraindicated in women, especially during pregnancy and breast feeding. Feminization of male fetuses is possible if taking while pregnant.

## 2025-01-23 NOTE — PATIENT PROFILE ADULT - NSTOBACCONEVERSMOKERY/N_GEN_A
Grandma called to scheduled follow up with Dr Boston 2/20  Asking if labs are needed prior to appointment? Can send live well message   Aware of bone age needing to complete   
No
[Annual Wellness Visit] : an annual wellness visit

## 2025-07-14 NOTE — PROCEDURAL SAFETY CHECKLIST WITH OR WITHOUT SEDATION - NSRESOLVEDISCREP_GEN_ALL_CORE
HP reviewed. Patient seen. No changes. Risks of procedure including sub optimal improvement in symptoms, bleeding, septal perforation, change in sense of smell, csf leak, discussed in detail.  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     Ismael Antonio MD  Otolaryngology  [facility]  [phone]    
done